# Patient Record
Sex: FEMALE | Race: WHITE | NOT HISPANIC OR LATINO
[De-identification: names, ages, dates, MRNs, and addresses within clinical notes are randomized per-mention and may not be internally consistent; named-entity substitution may affect disease eponyms.]

---

## 2017-07-19 ENCOUNTER — APPOINTMENT (OUTPATIENT)
Dept: DERMATOLOGY | Facility: CLINIC | Age: 72
End: 2017-07-19

## 2017-07-19 VITALS — HEIGHT: 64 IN | WEIGHT: 170 LBS | BODY MASS INDEX: 29.02 KG/M2

## 2017-08-11 ENCOUNTER — OUTPATIENT (OUTPATIENT)
Dept: OUTPATIENT SERVICES | Facility: HOSPITAL | Age: 72
LOS: 1 days | End: 2017-08-11
Payer: MEDICARE

## 2017-08-11 ENCOUNTER — APPOINTMENT (OUTPATIENT)
Dept: MAMMOGRAPHY | Facility: CLINIC | Age: 72
End: 2017-08-11
Payer: MEDICARE

## 2017-08-11 DIAGNOSIS — Z00.8 ENCOUNTER FOR OTHER GENERAL EXAMINATION: ICD-10-CM

## 2017-08-11 PROCEDURE — 77063 BREAST TOMOSYNTHESIS BI: CPT

## 2017-08-11 PROCEDURE — G0202: CPT | Mod: 26

## 2017-08-11 PROCEDURE — 77067 SCR MAMMO BI INCL CAD: CPT

## 2017-08-11 PROCEDURE — 77063 BREAST TOMOSYNTHESIS BI: CPT | Mod: 26

## 2018-07-18 ENCOUNTER — APPOINTMENT (OUTPATIENT)
Dept: DERMATOLOGY | Facility: CLINIC | Age: 73
End: 2018-07-18
Payer: MEDICARE

## 2018-07-18 DIAGNOSIS — M54.16 RADICULOPATHY, LUMBAR REGION: ICD-10-CM

## 2018-07-18 PROCEDURE — 99213 OFFICE O/P EST LOW 20 MIN: CPT

## 2018-07-19 ENCOUNTER — APPOINTMENT (OUTPATIENT)
Dept: DERMATOLOGY | Facility: CLINIC | Age: 73
End: 2018-07-19
Payer: MEDICARE

## 2018-07-19 PROCEDURE — ZZZZZ: CPT

## 2018-08-13 ENCOUNTER — APPOINTMENT (OUTPATIENT)
Dept: MAMMOGRAPHY | Facility: CLINIC | Age: 73
End: 2018-08-13
Payer: MEDICARE

## 2018-08-13 ENCOUNTER — OUTPATIENT (OUTPATIENT)
Dept: OUTPATIENT SERVICES | Facility: HOSPITAL | Age: 73
LOS: 1 days | End: 2018-08-13
Payer: MEDICARE

## 2018-08-13 DIAGNOSIS — Z00.8 ENCOUNTER FOR OTHER GENERAL EXAMINATION: ICD-10-CM

## 2018-08-13 PROCEDURE — 77063 BREAST TOMOSYNTHESIS BI: CPT | Mod: 26

## 2018-08-13 PROCEDURE — 77067 SCR MAMMO BI INCL CAD: CPT | Mod: 26

## 2018-08-13 PROCEDURE — 77067 SCR MAMMO BI INCL CAD: CPT

## 2018-08-13 PROCEDURE — 77063 BREAST TOMOSYNTHESIS BI: CPT

## 2018-12-30 ENCOUNTER — EMERGENCY (EMERGENCY)
Facility: HOSPITAL | Age: 73
LOS: 1 days | Discharge: HOME | End: 2018-12-30
Attending: EMERGENCY MEDICINE | Admitting: EMERGENCY MEDICINE
Payer: MEDICARE

## 2018-12-30 VITALS
DIASTOLIC BLOOD PRESSURE: 80 MMHG | WEIGHT: 175.05 LBS | HEIGHT: 64 IN | SYSTOLIC BLOOD PRESSURE: 166 MMHG | OXYGEN SATURATION: 99 % | RESPIRATION RATE: 18 BRPM | HEART RATE: 66 BPM | TEMPERATURE: 98 F

## 2018-12-30 VITALS
SYSTOLIC BLOOD PRESSURE: 170 MMHG | TEMPERATURE: 97 F | RESPIRATION RATE: 17 BRPM | DIASTOLIC BLOOD PRESSURE: 68 MMHG | OXYGEN SATURATION: 96 % | HEART RATE: 63 BPM

## 2018-12-30 LAB
ANION GAP SERPL CALC-SCNC: 8 MMOL/L — SIGNIFICANT CHANGE UP (ref 5–17)
APTT BLD: 45.7 SEC — HIGH (ref 28.5–37)
BASOPHILS # BLD AUTO: 0.05 K/UL — SIGNIFICANT CHANGE UP (ref 0–0.2)
BASOPHILS NFR BLD AUTO: 0.7 % — SIGNIFICANT CHANGE UP (ref 0–2)
BUN SERPL-MCNC: 22 MG/DL — SIGNIFICANT CHANGE UP (ref 7–23)
CALCIUM SERPL-MCNC: 9.4 MG/DL — SIGNIFICANT CHANGE UP (ref 8.4–10.5)
CHLORIDE SERPL-SCNC: 103 MMOL/L — SIGNIFICANT CHANGE UP (ref 96–108)
CO2 SERPL-SCNC: 30 MMOL/L — SIGNIFICANT CHANGE UP (ref 22–31)
CREAT SERPL-MCNC: 1.05 MG/DL — SIGNIFICANT CHANGE UP (ref 0.5–1.3)
EOSINOPHIL # BLD AUTO: 0.14 K/UL — SIGNIFICANT CHANGE UP (ref 0–0.5)
EOSINOPHIL NFR BLD AUTO: 1.9 % — SIGNIFICANT CHANGE UP (ref 0–6)
GLUCOSE SERPL-MCNC: 117 MG/DL — HIGH (ref 70–99)
HCT VFR BLD CALC: 34.4 % — LOW (ref 34.5–45)
HGB BLD-MCNC: 11.3 G/DL — LOW (ref 11.5–15.5)
IMM GRANULOCYTES NFR BLD AUTO: 0.3 % — SIGNIFICANT CHANGE UP (ref 0–1.5)
INR BLD: 3.01 RATIO — HIGH (ref 0.88–1.16)
LYMPHOCYTES # BLD AUTO: 1.68 K/UL — SIGNIFICANT CHANGE UP (ref 1–3.3)
LYMPHOCYTES # BLD AUTO: 22.8 % — SIGNIFICANT CHANGE UP (ref 13–44)
MCHC RBC-ENTMCNC: 32.7 PG — SIGNIFICANT CHANGE UP (ref 27–34)
MCHC RBC-ENTMCNC: 32.8 GM/DL — SIGNIFICANT CHANGE UP (ref 32–36)
MCV RBC AUTO: 99.4 FL — SIGNIFICANT CHANGE UP (ref 80–100)
MONOCYTES # BLD AUTO: 0.78 K/UL — SIGNIFICANT CHANGE UP (ref 0–0.9)
MONOCYTES NFR BLD AUTO: 10.6 % — SIGNIFICANT CHANGE UP (ref 2–14)
NEUTROPHILS # BLD AUTO: 4.71 K/UL — SIGNIFICANT CHANGE UP (ref 1.8–7.4)
NEUTROPHILS NFR BLD AUTO: 63.7 % — SIGNIFICANT CHANGE UP (ref 43–77)
PLATELET # BLD AUTO: 231 K/UL — SIGNIFICANT CHANGE UP (ref 150–400)
POTASSIUM SERPL-MCNC: 3.8 MMOL/L — SIGNIFICANT CHANGE UP (ref 3.5–5.3)
POTASSIUM SERPL-SCNC: 3.8 MMOL/L — SIGNIFICANT CHANGE UP (ref 3.5–5.3)
PROTHROM AB SERPL-ACNC: 34 SEC — HIGH (ref 10–12.9)
RBC # BLD: 3.46 M/UL — LOW (ref 3.8–5.2)
RBC # FLD: 13.6 % — SIGNIFICANT CHANGE UP (ref 10.3–14.5)
SODIUM SERPL-SCNC: 141 MMOL/L — SIGNIFICANT CHANGE UP (ref 135–145)
WBC # BLD: 7.38 K/UL — SIGNIFICANT CHANGE UP (ref 3.8–10.5)
WBC # FLD AUTO: 7.38 K/UL — SIGNIFICANT CHANGE UP (ref 3.8–10.5)

## 2018-12-30 PROCEDURE — 99283 EMERGENCY DEPT VISIT LOW MDM: CPT | Mod: 25

## 2018-12-30 PROCEDURE — 36415 COLL VENOUS BLD VENIPUNCTURE: CPT

## 2018-12-30 PROCEDURE — 99283 EMERGENCY DEPT VISIT LOW MDM: CPT

## 2018-12-30 PROCEDURE — 85730 THROMBOPLASTIN TIME PARTIAL: CPT

## 2018-12-30 PROCEDURE — 73630 X-RAY EXAM OF FOOT: CPT

## 2018-12-30 PROCEDURE — 73630 X-RAY EXAM OF FOOT: CPT | Mod: 26,50

## 2018-12-30 PROCEDURE — 85027 COMPLETE CBC AUTOMATED: CPT

## 2018-12-30 PROCEDURE — 85610 PROTHROMBIN TIME: CPT

## 2018-12-30 PROCEDURE — 80048 BASIC METABOLIC PNL TOTAL CA: CPT

## 2018-12-30 NOTE — ED PROVIDER NOTE - OBJECTIVE STATEMENT
72 y/o F pt with PMHx of HTN, HLD presents to the ED c/o left foot/leg pain and edema for approximately 4 weeks. Pt reports having varicose vein injections into her left foot and leg for 1 year, last injection 12/07 by Dr. Alarcon. Denies having similar sx prior to last injection. She had an XR done by podiatrist recently because of foot pain. States the injection sites have hardened and she has pain at the sites. She is concerned there is a foreign body in her leg s/p XR. Reports elevating her legs at night. Pt takes Coumadin and diuretics. Nonsmoker. Denies fever, chills, SOB when laying down. No further complaints at this time.  PMD: Sridhar Torrez)

## 2018-12-30 NOTE — ED PROVIDER NOTE - MEDICAL DECISION MAKING DETAILS
74 y/o F pt presents to the ED c/o left foot/leg pain and edema for approximately 4 weeks. Will do labs, bilateral XR foot then reassess.

## 2018-12-30 NOTE — ED ADULT NURSE NOTE - OBJECTIVE STATEMENT
pt s/p varicose vein injections and lumps to area no fever no red streaks area hard and slightly pink left foot swollen and warm with strong dp pulses

## 2018-12-30 NOTE — ED ADULT TRIAGE NOTE - CHIEF COMPLAINT QUOTE
I think I have cellulitis on my left foot. Had my veins injected for varicose vein December 7th and I notice redness.

## 2018-12-30 NOTE — ED ADULT NURSE NOTE - NSIMPLEMENTINTERV_GEN_ALL_ED
Implemented All Fall Risk Interventions:  [place] to call system. Call bell, personal items and telephone within reach. Instruct patient to call for assistance. Room bathroom lighting operational. Non-slip footwear when patient is off stretcher. Physically safe environment: no spills, clutter or unnecessary equipment. Stretcher in lowest position, wheels locked, appropriate side rails in place. Provide visual cue, wrist band, yellow gown, etc. Monitor gait and stability. Monitor for mental status changes and reorient to person, place, and time. Review medications for side effects contributing to fall risk. Reinforce activity limits and safety measures with patient and family.

## 2018-12-30 NOTE — ED PROVIDER NOTE - MUSCULOSKELETAL, MLM
Left leg 2+ edema, right leg no edema, sclerose varicosities at injection sites. Legs nontender. Tenderness over dorsum of left foot with no erythema.

## 2019-03-05 ENCOUNTER — APPOINTMENT (OUTPATIENT)
Dept: MRI IMAGING | Facility: CLINIC | Age: 74
End: 2019-03-05
Payer: MEDICARE

## 2019-03-05 ENCOUNTER — OUTPATIENT (OUTPATIENT)
Dept: OUTPATIENT SERVICES | Facility: HOSPITAL | Age: 74
LOS: 1 days | End: 2019-03-05
Payer: MEDICARE

## 2019-03-05 DIAGNOSIS — Z00.8 ENCOUNTER FOR OTHER GENERAL EXAMINATION: ICD-10-CM

## 2019-03-05 PROCEDURE — 72148 MRI LUMBAR SPINE W/O DYE: CPT | Mod: 26

## 2019-03-05 PROCEDURE — 72148 MRI LUMBAR SPINE W/O DYE: CPT

## 2019-06-20 ENCOUNTER — APPOINTMENT (OUTPATIENT)
Dept: DERMATOLOGY | Facility: CLINIC | Age: 74
End: 2019-06-20
Payer: MEDICARE

## 2019-06-20 VITALS — BODY MASS INDEX: 29.02 KG/M2 | WEIGHT: 170 LBS | HEIGHT: 64 IN

## 2019-06-20 DIAGNOSIS — D22.9 MELANOCYTIC NEVI, UNSPECIFIED: ICD-10-CM

## 2019-06-20 PROCEDURE — 99213 OFFICE O/P EST LOW 20 MIN: CPT

## 2019-06-20 NOTE — ASSESSMENT
[FreeTextEntry1] : A complete skin examination was performed.  There is no evidence of skin cancer.  We discussed the importance of photoprotection, including the use of hats, protective clothing and sunscreens with an SPF of at least 30.  Sun avoidance was also discussed.\par \par Pilar cyst - superior scalp.\par Will remove if bothersome - currently possible with large punch.\par Patient to call if desires removal.

## 2019-06-20 NOTE — HISTORY OF PRESENT ILLNESS
[FreeTextEntry1] : Patient presents for skin examination. [de-identified] : Scalp bump for months, slow growth.  No bleeding or tenderness currently.  No self tx.

## 2019-06-20 NOTE — PHYSICAL EXAM
[Alert] : alert [Oriented x 3] : ~L oriented x 3 [Well Nourished] : well nourished [Full Body Skin Exam Performed] : performed [FreeTextEntry3] : A full skin exam was performed including the scalp, face, neck, chest, abdomen, back, buttocks, upper extremities and lower extremities.  The patient declined examination of the breasts and genitalia.  \par The exam did not reveal any evidence of skin cancer, showing only the following benign growths:\par [place] pigmented nevi.\par Seborrheic keratoses.\par Lentigines.\par \par Cystic nodule of the superior scalp.\par

## 2019-08-15 ENCOUNTER — APPOINTMENT (OUTPATIENT)
Dept: MAMMOGRAPHY | Facility: CLINIC | Age: 74
End: 2019-08-15
Payer: MEDICARE

## 2019-08-15 ENCOUNTER — OUTPATIENT (OUTPATIENT)
Dept: OUTPATIENT SERVICES | Facility: HOSPITAL | Age: 74
LOS: 1 days | End: 2019-08-15
Payer: MEDICARE

## 2019-08-15 DIAGNOSIS — Z12.31 ENCOUNTER FOR SCREENING MAMMOGRAM FOR MALIGNANT NEOPLASM OF BREAST: ICD-10-CM

## 2019-08-15 DIAGNOSIS — Z00.8 ENCOUNTER FOR OTHER GENERAL EXAMINATION: ICD-10-CM

## 2019-08-15 PROCEDURE — 77067 SCR MAMMO BI INCL CAD: CPT

## 2019-08-15 PROCEDURE — 77067 SCR MAMMO BI INCL CAD: CPT | Mod: 26

## 2019-08-15 PROCEDURE — 77063 BREAST TOMOSYNTHESIS BI: CPT

## 2019-08-15 PROCEDURE — 77063 BREAST TOMOSYNTHESIS BI: CPT | Mod: 26

## 2019-08-26 ENCOUNTER — APPOINTMENT (OUTPATIENT)
Dept: ULTRASOUND IMAGING | Facility: CLINIC | Age: 74
End: 2019-08-26
Payer: MEDICARE

## 2019-08-26 ENCOUNTER — APPOINTMENT (OUTPATIENT)
Dept: MAMMOGRAPHY | Facility: CLINIC | Age: 74
End: 2019-08-26
Payer: MEDICARE

## 2019-08-26 ENCOUNTER — OUTPATIENT (OUTPATIENT)
Dept: OUTPATIENT SERVICES | Facility: HOSPITAL | Age: 74
LOS: 1 days | End: 2019-08-26
Payer: MEDICARE

## 2019-08-26 DIAGNOSIS — Z00.8 ENCOUNTER FOR OTHER GENERAL EXAMINATION: ICD-10-CM

## 2019-08-26 PROCEDURE — G0279: CPT | Mod: 26

## 2019-08-26 PROCEDURE — G0279: CPT

## 2019-08-26 PROCEDURE — 77065 DX MAMMO INCL CAD UNI: CPT | Mod: 26,RT

## 2019-08-26 PROCEDURE — 77065 DX MAMMO INCL CAD UNI: CPT

## 2019-09-02 ENCOUNTER — EMERGENCY (EMERGENCY)
Facility: HOSPITAL | Age: 74
LOS: 0 days | Discharge: HOME | End: 2019-09-02
Attending: EMERGENCY MEDICINE
Payer: MEDICARE

## 2019-09-02 VITALS
HEART RATE: 61 BPM | DIASTOLIC BLOOD PRESSURE: 71 MMHG | RESPIRATION RATE: 18 BRPM | TEMPERATURE: 97 F | OXYGEN SATURATION: 100 % | SYSTOLIC BLOOD PRESSURE: 160 MMHG

## 2019-09-02 VITALS
TEMPERATURE: 97 F | WEIGHT: 164.91 LBS | HEIGHT: 64 IN | OXYGEN SATURATION: 99 % | DIASTOLIC BLOOD PRESSURE: 81 MMHG | RESPIRATION RATE: 18 BRPM | SYSTOLIC BLOOD PRESSURE: 158 MMHG | HEART RATE: 61 BPM

## 2019-09-02 DIAGNOSIS — S09.90XA UNSPECIFIED INJURY OF HEAD, INITIAL ENCOUNTER: ICD-10-CM

## 2019-09-02 DIAGNOSIS — Z88.8 ALLERGY STATUS TO OTHER DRUGS, MEDICAMENTS AND BIOLOGICAL SUBSTANCES STATUS: ICD-10-CM
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
## 2019-09-02 DIAGNOSIS — E78.5 HYPERLIPIDEMIA, UNSPECIFIED: ICD-10-CM

## 2019-09-02 DIAGNOSIS — I48.91 UNSPECIFIED ATRIAL FIBRILLATION: ICD-10-CM
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
## 2019-09-02 DIAGNOSIS — I10 ESSENTIAL (PRIMARY) HYPERTENSION: ICD-10-CM

## 2019-09-02 DIAGNOSIS — Z79.01 LONG TERM (CURRENT) USE OF ANTICOAGULANTS: ICD-10-CM

## 2019-09-02 PROCEDURE — 99284 EMERGENCY DEPT VISIT MOD MDM: CPT

## 2019-09-02 PROCEDURE — 99284 EMERGENCY DEPT VISIT MOD MDM: CPT | Mod: 25

## 2019-09-02 PROCEDURE — 72220 X-RAY EXAM SACRUM TAILBONE: CPT

## 2019-09-02 PROCEDURE — 72125 CT NECK SPINE W/O DYE: CPT | Mod: 26

## 2019-09-02 PROCEDURE — 72125 CT NECK SPINE W/O DYE: CPT

## 2019-09-02 PROCEDURE — 70450 CT HEAD/BRAIN W/O DYE: CPT | Mod: 26

## 2019-09-02 PROCEDURE — 93005 ELECTROCARDIOGRAM TRACING: CPT

## 2019-09-02 PROCEDURE — 93010 ELECTROCARDIOGRAM REPORT: CPT

## 2019-09-02 PROCEDURE — 72220 X-RAY EXAM SACRUM TAILBONE: CPT | Mod: 26

## 2019-09-02 PROCEDURE — 70450 CT HEAD/BRAIN W/O DYE: CPT

## 2019-09-02 NOTE — ED ADULT TRIAGE NOTE - CHIEF COMPLAINT QUOTE
Pt slipped on floor and fell, landing on her coccyx and hit her head on the baseboard. Pt states that she is on Coumadin. Pt denies any LOC. Pt with large hematoma to back of head. Trauma alert called at triage.

## 2019-09-02 NOTE — ED PROVIDER NOTE - OBJECTIVE STATEMENT
73 y/o female with a PMHx of Afib on Coumadin, HLD, HTN presents to the ED s/p fall today. Pt states that she tripped on a step at a restaurant. Pt states that she landed on her coccyx and bumped her head on the fall. Now pt with pain on her coccyx. No LOC. Last Coumadin level was checked 4 days ago, which was around 1.9. States that she takes Coumadin every night. PCP: Rosalino Klein.

## 2019-09-02 NOTE — ED ADULT NURSE NOTE - OBJECTIVE STATEMENT
Patient fell and struck back of head on tile floor while at a [employer] Restaurant.  Patient has a contusion to the back of the head with 10/10 pain and pain in her coccyx area from the fall.  Patient reports that she is on Coumadin for chronic a-fib.

## 2019-09-02 NOTE — ED ADULT NURSE NOTE - CHPI ED NUR SYMPTOMS NEG
no seizure/no dizziness/no syncope/no weakness/no nausea/no change in level of consciousness/no confusion/no blurred vision/no vomiting

## 2019-09-02 NOTE — ED PROVIDER NOTE - NSCAREINITIATED _GEN_ER
I have personally evaluated and examined the patient. The Attending was available to me as a supervising provider if needed.
Matheus Bowling(Attending)

## 2019-09-02 NOTE — ED ADULT NURSE NOTE - NSIMPLEMENTINTERV_GEN_ALL_ED
Implemented All Fall with Harm Risk Interventions:  [place] to call system. Call bell, personal items and telephone within reach. Instruct patient to call for assistance. Room bathroom lighting operational. Non-slip footwear when patient is off stretcher. Physically safe environment: no spills, clutter or unnecessary equipment. Stretcher in lowest position, wheels locked, appropriate side rails in place. Provide visual cue, wrist band, yellow gown, etc. Monitor gait and stability. Monitor for mental status changes and reorient to person, place, and time. Review medications for side effects contributing to fall risk. Reinforce activity limits and safety measures with patient and family. Provide visual clues: red socks.

## 2020-05-11 ENCOUNTER — APPOINTMENT (OUTPATIENT)
Dept: DERMATOLOGY | Facility: CLINIC | Age: 75
End: 2020-05-11

## 2020-09-11 ENCOUNTER — APPOINTMENT (OUTPATIENT)
Dept: DERMATOLOGY | Facility: CLINIC | Age: 75
End: 2020-09-11
Payer: MEDICARE

## 2020-09-11 PROCEDURE — 17000 DESTRUCT PREMALG LESION: CPT | Mod: 59

## 2020-09-11 PROCEDURE — 99214 OFFICE O/P EST MOD 30 MIN: CPT | Mod: 25

## 2020-09-11 PROCEDURE — 11102 TANGNTL BX SKIN SINGLE LES: CPT | Mod: 59

## 2020-09-16 LAB — CORE LAB BIOPSY: NORMAL

## 2020-09-19 ENCOUNTER — OUTPATIENT (OUTPATIENT)
Dept: OUTPATIENT SERVICES | Facility: HOSPITAL | Age: 75
LOS: 1 days | End: 2020-09-19
Payer: MEDICARE

## 2020-09-19 ENCOUNTER — APPOINTMENT (OUTPATIENT)
Dept: MAMMOGRAPHY | Facility: CLINIC | Age: 75
End: 2020-09-19
Payer: MEDICARE

## 2020-09-19 ENCOUNTER — APPOINTMENT (OUTPATIENT)
Dept: ULTRASOUND IMAGING | Facility: CLINIC | Age: 75
End: 2020-09-19
Payer: MEDICARE

## 2020-09-19 DIAGNOSIS — Z00.8 ENCOUNTER FOR OTHER GENERAL EXAMINATION: ICD-10-CM

## 2020-09-19 PROCEDURE — 77067 SCR MAMMO BI INCL CAD: CPT | Mod: 26

## 2020-09-19 PROCEDURE — 77067 SCR MAMMO BI INCL CAD: CPT

## 2020-09-19 PROCEDURE — 77063 BREAST TOMOSYNTHESIS BI: CPT

## 2020-09-19 PROCEDURE — 77063 BREAST TOMOSYNTHESIS BI: CPT | Mod: 26

## 2020-10-01 ENCOUNTER — APPOINTMENT (OUTPATIENT)
Dept: DERMATOLOGY | Facility: CLINIC | Age: 75
End: 2020-10-01
Payer: MEDICARE

## 2020-10-01 VITALS — WEIGHT: 170 LBS | BODY MASS INDEX: 29.02 KG/M2 | HEIGHT: 64 IN

## 2020-10-01 PROCEDURE — 17262 DSTRJ MAL LES T/A/L 1.1-2.0: CPT | Mod: 79

## 2020-11-27 ENCOUNTER — TRANSCRIPTION ENCOUNTER (OUTPATIENT)
Age: 75
End: 2020-11-27

## 2020-12-10 ENCOUNTER — APPOINTMENT (OUTPATIENT)
Dept: DERMATOLOGY | Facility: CLINIC | Age: 75
End: 2020-12-10

## 2021-06-10 NOTE — ED ADULT NURSE NOTE - PAIN: BODY LOCATION
foot/Left: Rhof[place] Pregnancy And Lactation Text: This medication has not been assigned a Pregnancy Risk Category. It is unknown if the medication is excreted in breast milk.

## 2021-09-16 ENCOUNTER — APPOINTMENT (OUTPATIENT)
Dept: DERMATOLOGY | Facility: CLINIC | Age: 76
End: 2021-09-16
Payer: MEDICARE

## 2021-09-16 PROCEDURE — 99213 OFFICE O/P EST LOW 20 MIN: CPT

## 2021-09-16 RX ORDER — NIACINAMIDE 500 MG
500 TABLET ORAL
Qty: 60 | Refills: 5 | Status: COMPLETED | COMMUNITY
Start: 2020-10-01 | End: 2021-09-16

## 2021-09-21 ENCOUNTER — APPOINTMENT (OUTPATIENT)
Dept: MAMMOGRAPHY | Facility: CLINIC | Age: 76
End: 2021-09-21
Payer: MEDICARE

## 2021-09-21 ENCOUNTER — OUTPATIENT (OUTPATIENT)
Dept: OUTPATIENT SERVICES | Facility: HOSPITAL | Age: 76
LOS: 1 days | End: 2021-09-21
Payer: MEDICARE

## 2021-09-21 DIAGNOSIS — Z12.31 ENCOUNTER FOR SCREENING MAMMOGRAM FOR MALIGNANT NEOPLASM OF BREAST: ICD-10-CM

## 2021-09-21 PROCEDURE — 77063 BREAST TOMOSYNTHESIS BI: CPT

## 2021-09-21 PROCEDURE — 76641 ULTRASOUND BREAST COMPLETE: CPT

## 2021-09-21 PROCEDURE — 77067 SCR MAMMO BI INCL CAD: CPT | Mod: 26

## 2021-09-21 PROCEDURE — 76641 ULTRASOUND BREAST COMPLETE: CPT | Mod: 26,50

## 2021-09-21 PROCEDURE — 77067 SCR MAMMO BI INCL CAD: CPT

## 2021-09-21 PROCEDURE — 77063 BREAST TOMOSYNTHESIS BI: CPT | Mod: 26

## 2022-01-30 ENCOUNTER — OUTPATIENT (OUTPATIENT)
Dept: OUTPATIENT SERVICES | Facility: HOSPITAL | Age: 77
LOS: 1 days | End: 2022-01-30

## 2022-01-30 ENCOUNTER — APPOINTMENT (OUTPATIENT)
Dept: MRI IMAGING | Facility: CLINIC | Age: 77
End: 2022-01-30

## 2022-01-30 DIAGNOSIS — Z00.8 ENCOUNTER FOR OTHER GENERAL EXAMINATION: ICD-10-CM

## 2022-02-05 ENCOUNTER — OUTPATIENT (OUTPATIENT)
Dept: OUTPATIENT SERVICES | Facility: HOSPITAL | Age: 77
LOS: 1 days | End: 2022-02-05
Payer: MEDICARE

## 2022-02-05 ENCOUNTER — APPOINTMENT (OUTPATIENT)
Dept: MRI IMAGING | Facility: CLINIC | Age: 77
End: 2022-02-05
Payer: MEDICARE

## 2022-02-05 DIAGNOSIS — M75.41 IMPINGEMENT SYNDROME OF RIGHT SHOULDER: ICD-10-CM

## 2022-02-05 PROCEDURE — 73221 MRI JOINT UPR EXTREM W/O DYE: CPT | Mod: 26,RT,MH

## 2022-02-05 PROCEDURE — 73221 MRI JOINT UPR EXTREM W/O DYE: CPT | Mod: MH

## 2022-02-22 ENCOUNTER — OUTPATIENT (OUTPATIENT)
Dept: OUTPATIENT SERVICES | Facility: HOSPITAL | Age: 77
LOS: 1 days | End: 2022-02-22
Payer: MEDICARE

## 2022-02-22 ENCOUNTER — RESULT REVIEW (OUTPATIENT)
Age: 77
End: 2022-02-22

## 2022-02-22 ENCOUNTER — APPOINTMENT (OUTPATIENT)
Dept: CT IMAGING | Facility: CLINIC | Age: 77
End: 2022-02-22
Payer: MEDICARE

## 2022-02-22 DIAGNOSIS — R07.89 OTHER CHEST PAIN: ICD-10-CM

## 2022-02-22 PROCEDURE — 71250 CT THORAX DX C-: CPT | Mod: MH

## 2022-02-22 PROCEDURE — 71250 CT THORAX DX C-: CPT | Mod: 26,MH

## 2022-03-05 ENCOUNTER — OUTPATIENT (OUTPATIENT)
Dept: OUTPATIENT SERVICES | Facility: HOSPITAL | Age: 77
LOS: 1 days | End: 2022-03-05
Payer: MEDICARE

## 2022-03-05 ENCOUNTER — APPOINTMENT (OUTPATIENT)
Dept: MRI IMAGING | Facility: CLINIC | Age: 77
End: 2022-03-05
Payer: MEDICARE

## 2022-03-05 DIAGNOSIS — Z00.8 ENCOUNTER FOR OTHER GENERAL EXAMINATION: ICD-10-CM

## 2022-03-05 PROCEDURE — 73721 MRI JNT OF LWR EXTRE W/O DYE: CPT | Mod: MH

## 2022-03-05 PROCEDURE — 73721 MRI JNT OF LWR EXTRE W/O DYE: CPT | Mod: 26,LT,MH

## 2022-04-20 ENCOUNTER — NON-APPOINTMENT (OUTPATIENT)
Age: 77
End: 2022-04-20

## 2022-04-21 ENCOUNTER — APPOINTMENT (OUTPATIENT)
Dept: DERMATOLOGY | Facility: CLINIC | Age: 77
End: 2022-04-21
Payer: MEDICARE

## 2022-04-21 DIAGNOSIS — D48.5 NEOPLASM OF UNCERTAIN BEHAVIOR OF SKIN: ICD-10-CM

## 2022-04-21 PROCEDURE — 11102 TANGNTL BX SKIN SINGLE LES: CPT | Mod: 59

## 2022-04-21 PROCEDURE — 17000 DESTRUCT PREMALG LESION: CPT | Mod: 59

## 2022-04-21 PROCEDURE — 99213 OFFICE O/P EST LOW 20 MIN: CPT | Mod: 25

## 2022-04-21 PROCEDURE — 11103 TANGNTL BX SKIN EA SEP/ADDL: CPT | Mod: 59

## 2022-05-13 LAB — CORE LAB BIOPSY: NORMAL

## 2022-07-05 DIAGNOSIS — M79.672 PAIN IN LEFT FOOT: ICD-10-CM

## 2022-07-08 ENCOUNTER — RESULT REVIEW (OUTPATIENT)
Age: 77
End: 2022-07-08

## 2022-07-08 ENCOUNTER — APPOINTMENT (OUTPATIENT)
Dept: ORTHOPEDIC SURGERY | Facility: CLINIC | Age: 77
End: 2022-07-08

## 2022-07-08 ENCOUNTER — NON-APPOINTMENT (OUTPATIENT)
Age: 77
End: 2022-07-08

## 2022-07-08 DIAGNOSIS — M21.40 FLAT FOOT [PES PLANUS] (ACQUIRED), UNSPECIFIED FOOT: ICD-10-CM

## 2022-07-08 PROCEDURE — 73630 X-RAY EXAM OF FOOT: CPT | Mod: LT

## 2022-07-08 PROCEDURE — 99203 OFFICE O/P NEW LOW 30 MIN: CPT

## 2022-07-08 RX ORDER — FUROSEMIDE 20 MG/1
20 TABLET ORAL
Qty: 30 | Refills: 0 | Status: ACTIVE | COMMUNITY
Start: 2022-03-11

## 2022-07-08 RX ORDER — FLUCONAZOLE 100 MG/1
100 TABLET ORAL
Qty: 3 | Refills: 0 | Status: ACTIVE | COMMUNITY
Start: 2022-02-28

## 2022-07-08 RX ORDER — METOPROLOL TARTRATE 25 MG/1
25 TABLET, FILM COATED ORAL
Qty: 180 | Refills: 0 | Status: ACTIVE | COMMUNITY
Start: 2022-02-03

## 2022-07-08 RX ORDER — TRIAMCINOLONE ACETONIDE 1 MG/G
0.1 CREAM TOPICAL
Qty: 30 | Refills: 0 | Status: ACTIVE | COMMUNITY
Start: 2022-02-07

## 2022-07-08 RX ORDER — GABAPENTIN 300 MG/1
300 CAPSULE ORAL
Qty: 180 | Refills: 0 | Status: ACTIVE | COMMUNITY
Start: 2022-06-14

## 2022-07-08 RX ORDER — LOSARTAN POTASSIUM 50 MG/1
50 TABLET, FILM COATED ORAL
Qty: 180 | Refills: 0 | Status: ACTIVE | COMMUNITY
Start: 2021-12-27

## 2022-07-08 RX ORDER — CARVEDILOL 3.12 MG/1
3.12 TABLET, FILM COATED ORAL
Qty: 90 | Refills: 0 | Status: ACTIVE | COMMUNITY
Start: 2022-03-29

## 2022-07-08 RX ORDER — ATORVASTATIN CALCIUM 10 MG/1
10 TABLET, FILM COATED ORAL
Qty: 90 | Refills: 0 | Status: ACTIVE | COMMUNITY
Start: 2022-04-21

## 2022-07-08 RX ORDER — POTASSIUM CHLORIDE 1500 MG/1
20 TABLET, EXTENDED RELEASE ORAL
Qty: 135 | Refills: 0 | Status: ACTIVE | COMMUNITY
Start: 2022-04-13

## 2022-07-08 RX ORDER — NYSTATIN 100000 [USP'U]/G
100000 CREAM TOPICAL
Qty: 30 | Refills: 0 | Status: ACTIVE | COMMUNITY
Start: 2022-02-07

## 2022-07-08 NOTE — DISCUSSION/SUMMARY
[de-identified] : After a thorough rectal examination and review of the x-rays and MRI findings. It was explained to the patient she does have a pes planus deformity. With some strain along the posterior tibial tendon. She was also planned she does have some osteoarthritic changes especially at the second and third TMT joints. She was explained the different modalities of treatment which include conservative versus surgical intervention. However it is recommended that she continue with conservative management at the present time. This includes proper shoe wear. Along with ice moist heat anti-inflammatories as well as orthotics. She was also given a prescription for ultrasound guided intra-articular cortisone injection to be placed in the second and third TMT joint\par \par I saw and evaluated the patient. I discussed and reviewed the case details with the PA and agree with the PA's findings and plan as documented in the PA note.\par \par  MED

## 2022-07-08 NOTE — END OF VISIT
Chief Complaint   Patient presents with     RECHECK     RETURN       Vianca Pendleton MA    
[Time Spent: ___ minutes] : I have spent [unfilled] minutes of time on the encounter.

## 2022-07-08 NOTE — HISTORY OF PRESENT ILLNESS
[FreeTextEntry1] : Patient is a 77-year-old female who presents today for evaluation regarding her left foot. She states she's had discomfort for many years. It has been worse over the last year. It is also noted that her feet are becoming more flat-footed. She called with the podiatrist who tried injections along some therapy as well as orthotics. She states normally orthotics does do give her some improvement. Most recently this is becoming more pronounced with stiffness. She denies any history of injury or accident. Presents today for evaluation regarding her left foot.

## 2022-07-08 NOTE — PHYSICAL EXAM
[de-identified] : On physical examination of the left foot. Skin is clean dry and intact no erythema the swelling or heat noted. There is an obvious pes planus deformity noted. Especially with the patient standing area there is some diffuse pain and tenderness. Primarily along the plantar aspect. At the level of the second and third TMT joint. She also has some tenderness along the posterior tibial tendon. Which is most noted when the patient is trying to stand on 1 foot. There is no evidence of sensory deficit. No evidence of any muscle atrophy. No evidence of any calf tenderness. Patient does have good distal pulses. Good range of motion both passive and actively. [de-identified] : Trace of the left foot reveal pes planus deformity. Also some DJD at the second and third TMT joints.

## 2022-07-19 ENCOUNTER — OUTPATIENT (OUTPATIENT)
Dept: OUTPATIENT SERVICES | Facility: HOSPITAL | Age: 77
LOS: 1 days | End: 2022-07-19
Payer: MEDICARE

## 2022-07-19 ENCOUNTER — APPOINTMENT (OUTPATIENT)
Dept: ULTRASOUND IMAGING | Facility: CLINIC | Age: 77
End: 2022-07-19

## 2022-07-19 DIAGNOSIS — M21.40 FLAT FOOT [PES PLANUS] (ACQUIRED), UNSPECIFIED FOOT: ICD-10-CM

## 2022-07-19 PROCEDURE — 20611 DRAIN/INJ JOINT/BURSA W/US: CPT | Mod: LT

## 2022-07-19 PROCEDURE — 20611 DRAIN/INJ JOINT/BURSA W/US: CPT

## 2022-09-01 DIAGNOSIS — Z12.39 ENCOUNTER FOR OTHER SCREENING FOR MALIGNANT NEOPLASM OF BREAST: ICD-10-CM

## 2022-09-01 DIAGNOSIS — Z12.31 ENCOUNTER FOR SCREENING MAMMOGRAM FOR MALIGNANT NEOPLASM OF BREAST: ICD-10-CM

## 2022-09-23 ENCOUNTER — APPOINTMENT (OUTPATIENT)
Dept: ULTRASOUND IMAGING | Facility: CLINIC | Age: 77
End: 2022-09-23

## 2022-09-23 ENCOUNTER — APPOINTMENT (OUTPATIENT)
Dept: MAMMOGRAPHY | Facility: CLINIC | Age: 77
End: 2022-09-23

## 2022-09-23 ENCOUNTER — OUTPATIENT (OUTPATIENT)
Dept: OUTPATIENT SERVICES | Facility: HOSPITAL | Age: 77
LOS: 1 days | End: 2022-09-23
Payer: MEDICARE

## 2022-09-23 ENCOUNTER — RESULT REVIEW (OUTPATIENT)
Age: 77
End: 2022-09-23

## 2022-09-23 DIAGNOSIS — Z00.00 ENCOUNTER FOR GENERAL ADULT MEDICAL EXAMINATION WITHOUT ABNORMAL FINDINGS: ICD-10-CM

## 2022-09-23 PROCEDURE — 77067 SCR MAMMO BI INCL CAD: CPT | Mod: 26

## 2022-09-23 PROCEDURE — 77063 BREAST TOMOSYNTHESIS BI: CPT | Mod: 26

## 2022-09-23 PROCEDURE — 77067 SCR MAMMO BI INCL CAD: CPT

## 2022-09-23 PROCEDURE — 77063 BREAST TOMOSYNTHESIS BI: CPT

## 2022-09-26 ENCOUNTER — NON-APPOINTMENT (OUTPATIENT)
Age: 77
End: 2022-09-26

## 2022-11-14 ENCOUNTER — APPOINTMENT (OUTPATIENT)
Dept: OBGYN | Facility: CLINIC | Age: 77
End: 2022-11-14

## 2022-11-14 ENCOUNTER — RESULT CHARGE (OUTPATIENT)
Age: 77
End: 2022-11-14

## 2022-11-14 VITALS
DIASTOLIC BLOOD PRESSURE: 99 MMHG | SYSTOLIC BLOOD PRESSURE: 175 MMHG | BODY MASS INDEX: 25.44 KG/M2 | WEIGHT: 149 LBS | HEART RATE: 77 BPM | HEIGHT: 64 IN

## 2022-11-14 LAB
BILIRUB UR QL STRIP: NORMAL
CLARITY UR: CLEAR
COLLECTION METHOD: NORMAL
GLUCOSE UR-MCNC: NORMAL
HCG UR QL: 0.2 EU/DL
HEMOGLOBIN: 12.8
HGB UR QL STRIP.AUTO: NORMAL
KETONES UR-MCNC: NORMAL
LEUKOCYTE ESTERASE UR QL STRIP: NORMAL
NITRITE UR QL STRIP: NORMAL
PH UR STRIP: 7
PROT UR STRIP-MCNC: NORMAL
SP GR UR STRIP: 1.01

## 2022-11-14 PROCEDURE — 81003 URINALYSIS AUTO W/O SCOPE: CPT | Mod: QW

## 2022-11-14 PROCEDURE — G0101: CPT

## 2022-11-14 PROCEDURE — 85018 HEMOGLOBIN: CPT | Mod: QW

## 2022-11-17 NOTE — PHYSICAL EXAM
[Appropriately responsive] : appropriately responsive [Alert] : alert [No Lymphadenopathy] : no lymphadenopathy [Soft] : soft [Non-tender] : non-tender [Oriented x3] : oriented x3 [Examination Of The Breasts] : a normal appearance [No Discharge] : no discharge [No Masses] : no breast masses were palpable [Labia Majora] : normal [Labia Minora] : normal [Normal] : normal [Atrophy] : atrophy [No Bleeding] : There was no active vaginal bleeding [Absent] : absent [Uterine Adnexae] : normal [Occult Blood Positive] : was negative for occult blood analysis

## 2022-11-17 NOTE — HISTORY OF PRESENT ILLNESS
[TextBox_4] : Patient is a 78y/o female here today for annual visit. She denies any GYN complaints at this time. She is s/p total hysterectomy on premarin 0.625 tolerating well.

## 2022-11-17 NOTE — PLAN
[FreeTextEntry1] : \par \par Patient to follow up in 1 year for annual GYN exam\par Mammogram: 9/23 Rx given \par Colonoscopy due:\par Bone density due: next year \par Pap ordered\par \par Hemoccult ordered \par All questions answered, patient agreeable with plan.\par \par \par \par I Rossana RUST am scribing for the presence of Dr. Kessler the following sections HISTORY OF PRESENT ILLNESS, PAST MEDICAL/FAMILY/SOCIAL HISTORY; REVIEW OF SYSTEMS; VITAL SIGNS; PHYSICAL EXAM; DISPOSITION. \par \par \par I personally performed the services described in the documentation, reviewed the documentation recorded by the scribe in my presence and it accurately and completely records my words and actions.\par

## 2022-11-25 ENCOUNTER — APPOINTMENT (OUTPATIENT)
Dept: MRI IMAGING | Facility: CLINIC | Age: 77
End: 2022-11-25

## 2022-12-02 ENCOUNTER — NON-APPOINTMENT (OUTPATIENT)
Age: 77
End: 2022-12-02

## 2022-12-02 LAB — CYTOLOGY CVX/VAG DOC THIN PREP: ABNORMAL

## 2022-12-04 ENCOUNTER — NON-APPOINTMENT (OUTPATIENT)
Age: 77
End: 2022-12-04

## 2022-12-10 ENCOUNTER — OUTPATIENT (OUTPATIENT)
Dept: OUTPATIENT SERVICES | Facility: HOSPITAL | Age: 77
LOS: 1 days | End: 2022-12-10
Payer: MEDICARE

## 2022-12-10 ENCOUNTER — APPOINTMENT (OUTPATIENT)
Dept: MRI IMAGING | Facility: CLINIC | Age: 77
End: 2022-12-10

## 2022-12-10 DIAGNOSIS — S86.291A: ICD-10-CM

## 2022-12-10 PROCEDURE — 73718 MRI LOWER EXTREMITY W/O DYE: CPT | Mod: MH

## 2022-12-10 PROCEDURE — 73718 MRI LOWER EXTREMITY W/O DYE: CPT | Mod: 26,RT,MH

## 2022-12-13 ENCOUNTER — APPOINTMENT (OUTPATIENT)
Dept: OBGYN | Facility: CLINIC | Age: 77
End: 2022-12-13

## 2023-01-12 ENCOUNTER — APPOINTMENT (OUTPATIENT)
Dept: DERMATOLOGY | Facility: CLINIC | Age: 78
End: 2023-01-12
Payer: MEDICARE

## 2023-01-12 DIAGNOSIS — L72.11 PILAR CYST: ICD-10-CM

## 2023-01-12 DIAGNOSIS — D09.9 CARCINOMA IN SITU, UNSPECIFIED: ICD-10-CM

## 2023-01-12 DIAGNOSIS — L82.0 INFLAMED SEBORRHEIC KERATOSIS: ICD-10-CM

## 2023-01-12 PROCEDURE — 17110 DESTRUCTION B9 LES UP TO 14: CPT | Mod: 59

## 2023-01-12 PROCEDURE — 17000 DESTRUCT PREMALG LESION: CPT | Mod: 59

## 2023-01-12 PROCEDURE — 99213 OFFICE O/P EST LOW 20 MIN: CPT | Mod: 25

## 2023-03-26 ENCOUNTER — NON-APPOINTMENT (OUTPATIENT)
Age: 78
End: 2023-03-26

## 2023-03-27 ENCOUNTER — APPOINTMENT (OUTPATIENT)
Dept: DERMATOLOGY | Facility: CLINIC | Age: 78
End: 2023-03-27
Payer: MEDICARE

## 2023-03-27 DIAGNOSIS — D69.2 OTHER NONTHROMBOCYTOPENIC PURPURA: ICD-10-CM

## 2023-03-27 PROCEDURE — 11102 TANGNTL BX SKIN SINGLE LES: CPT

## 2023-03-27 PROCEDURE — 99213 OFFICE O/P EST LOW 20 MIN: CPT | Mod: 25

## 2023-03-27 NOTE — HISTORY OF PRESENT ILLNESS
[FreeTextEntry1] : Fit in for lesion of the right cheek. [de-identified] : Present for a month.  Persistent, without improvement despite warm soaks.

## 2023-03-27 NOTE — PHYSICAL EXAM
[Alert] : alert [Oriented x 3] : ~L oriented x 3 [Well Nourished] : well nourished [FreeTextEntry3] : violaceous firm papule of the right cheek.  Trace fine vessels centrally.\par \par Lentigines of the face, dorsal hands.\par \par Purpuric macules, forrearms, dorsal right hand.

## 2023-03-27 NOTE — ASSESSMENT
[FreeTextEntry1] : Lentigines\par Hats and sunscreens encouraged.\par \par Senile purpura\par Patient on coumadin.\par \par R/o BCC of the right cheek.\par Plan excision if positive.

## 2023-04-06 LAB — CORE LAB BIOPSY: NORMAL

## 2023-05-27 ENCOUNTER — APPOINTMENT (OUTPATIENT)
Dept: ULTRASOUND IMAGING | Facility: CLINIC | Age: 78
End: 2023-05-27
Payer: MEDICARE

## 2023-05-27 ENCOUNTER — OUTPATIENT (OUTPATIENT)
Dept: OUTPATIENT SERVICES | Facility: HOSPITAL | Age: 78
LOS: 1 days | End: 2023-05-27
Payer: MEDICARE

## 2023-05-27 DIAGNOSIS — R60.0 LOCALIZED EDEMA: ICD-10-CM

## 2023-05-27 DIAGNOSIS — Z00.8 ENCOUNTER FOR OTHER GENERAL EXAMINATION: ICD-10-CM

## 2023-05-27 PROCEDURE — 93970 EXTREMITY STUDY: CPT | Mod: 26

## 2023-05-27 PROCEDURE — 93970 EXTREMITY STUDY: CPT

## 2023-07-01 ENCOUNTER — APPOINTMENT (OUTPATIENT)
Dept: MRI IMAGING | Facility: CLINIC | Age: 78
End: 2023-07-01
Payer: MEDICARE

## 2023-07-01 ENCOUNTER — OUTPATIENT (OUTPATIENT)
Dept: OUTPATIENT SERVICES | Facility: HOSPITAL | Age: 78
LOS: 1 days | End: 2023-07-01
Payer: MEDICARE

## 2023-07-01 DIAGNOSIS — M51.26 OTHER INTERVERTEBRAL DISC DISPLACEMENT, LUMBAR REGION: ICD-10-CM

## 2023-07-01 PROCEDURE — 72148 MRI LUMBAR SPINE W/O DYE: CPT | Mod: 26,MH

## 2023-07-01 PROCEDURE — 72148 MRI LUMBAR SPINE W/O DYE: CPT | Mod: MH

## 2023-09-25 ENCOUNTER — OUTPATIENT (OUTPATIENT)
Dept: OUTPATIENT SERVICES | Facility: HOSPITAL | Age: 78
LOS: 1 days | End: 2023-09-25
Payer: MEDICARE

## 2023-09-25 ENCOUNTER — RESULT REVIEW (OUTPATIENT)
Age: 78
End: 2023-09-25

## 2023-09-25 ENCOUNTER — APPOINTMENT (OUTPATIENT)
Dept: MAMMOGRAPHY | Facility: CLINIC | Age: 78
End: 2023-09-25
Payer: MEDICARE

## 2023-09-25 DIAGNOSIS — Z00.00 ENCOUNTER FOR GENERAL ADULT MEDICAL EXAMINATION WITHOUT ABNORMAL FINDINGS: ICD-10-CM

## 2023-09-25 PROCEDURE — 77063 BREAST TOMOSYNTHESIS BI: CPT

## 2023-09-25 PROCEDURE — 77067 SCR MAMMO BI INCL CAD: CPT

## 2023-09-25 PROCEDURE — 77067 SCR MAMMO BI INCL CAD: CPT | Mod: 26

## 2023-09-25 PROCEDURE — 77063 BREAST TOMOSYNTHESIS BI: CPT | Mod: 26

## 2023-09-27 ENCOUNTER — APPOINTMENT (OUTPATIENT)
Dept: ULTRASOUND IMAGING | Facility: CLINIC | Age: 78
End: 2023-09-27
Payer: MEDICARE

## 2023-09-27 ENCOUNTER — RESULT REVIEW (OUTPATIENT)
Age: 78
End: 2023-09-27

## 2023-09-27 ENCOUNTER — OUTPATIENT (OUTPATIENT)
Dept: OUTPATIENT SERVICES | Facility: HOSPITAL | Age: 78
LOS: 1 days | End: 2023-09-27
Payer: MEDICARE

## 2023-09-27 DIAGNOSIS — Z00.8 ENCOUNTER FOR OTHER GENERAL EXAMINATION: ICD-10-CM

## 2023-09-27 PROCEDURE — 77065 DX MAMMO INCL CAD UNI: CPT | Mod: 26,RT

## 2023-09-27 PROCEDURE — G0279: CPT

## 2023-09-27 PROCEDURE — 77065 DX MAMMO INCL CAD UNI: CPT

## 2023-09-27 PROCEDURE — G0279: CPT | Mod: 26

## 2023-09-30 ENCOUNTER — NON-APPOINTMENT (OUTPATIENT)
Age: 78
End: 2023-09-30

## 2023-10-05 ENCOUNTER — NON-APPOINTMENT (OUTPATIENT)
Age: 78
End: 2023-10-05

## 2023-10-06 ENCOUNTER — APPOINTMENT (OUTPATIENT)
Dept: DERMATOLOGY | Facility: CLINIC | Age: 78
End: 2023-10-06
Payer: MEDICARE

## 2023-10-06 DIAGNOSIS — L90.5 SCAR CONDITIONS AND FIBROSIS OF SKIN: ICD-10-CM

## 2023-10-06 DIAGNOSIS — L72.0 EPIDERMAL CYST: ICD-10-CM

## 2023-10-06 PROCEDURE — 99214 OFFICE O/P EST MOD 30 MIN: CPT

## 2023-11-13 DIAGNOSIS — Z00.00 ENCOUNTER FOR GENERAL ADULT MEDICAL EXAMINATION W/OUT ABNORMAL FINDINGS: ICD-10-CM

## 2023-11-13 DIAGNOSIS — N95.2 POSTMENOPAUSAL ATROPHIC VAGINITIS: ICD-10-CM

## 2023-11-20 ENCOUNTER — RESULT CHARGE (OUTPATIENT)
Age: 78
End: 2023-11-20

## 2023-11-20 ENCOUNTER — NON-APPOINTMENT (OUTPATIENT)
Age: 78
End: 2023-11-20

## 2023-11-20 ENCOUNTER — APPOINTMENT (OUTPATIENT)
Dept: OBGYN | Facility: CLINIC | Age: 78
End: 2023-11-20
Payer: MEDICARE

## 2023-11-20 VITALS
HEIGHT: 64 IN | DIASTOLIC BLOOD PRESSURE: 65 MMHG | SYSTOLIC BLOOD PRESSURE: 157 MMHG | WEIGHT: 149 LBS | HEART RATE: 100 BPM | BODY MASS INDEX: 25.44 KG/M2

## 2023-11-20 LAB — HEMOGLOBIN: 9.5

## 2023-11-20 PROCEDURE — 82270 OCCULT BLOOD FECES: CPT

## 2023-11-20 PROCEDURE — 85018 HEMOGLOBIN: CPT | Mod: QW

## 2023-11-20 PROCEDURE — G0101: CPT

## 2023-11-27 ENCOUNTER — NON-APPOINTMENT (OUTPATIENT)
Age: 78
End: 2023-11-27

## 2023-11-28 ENCOUNTER — NON-APPOINTMENT (OUTPATIENT)
Age: 78
End: 2023-11-28

## 2023-11-28 LAB — CYTOLOGY CVX/VAG DOC THIN PREP: ABNORMAL

## 2023-12-05 ENCOUNTER — OUTPATIENT (OUTPATIENT)
Dept: OUTPATIENT SERVICES | Facility: HOSPITAL | Age: 78
LOS: 1 days | End: 2023-12-05
Payer: MEDICARE

## 2023-12-05 ENCOUNTER — APPOINTMENT (OUTPATIENT)
Dept: RADIOLOGY | Facility: CLINIC | Age: 78
End: 2023-12-05
Payer: MEDICARE

## 2023-12-05 DIAGNOSIS — Z00.00 ENCOUNTER FOR GENERAL ADULT MEDICAL EXAMINATION WITHOUT ABNORMAL FINDINGS: ICD-10-CM

## 2023-12-05 PROCEDURE — 77080 DXA BONE DENSITY AXIAL: CPT

## 2023-12-05 PROCEDURE — 77080 DXA BONE DENSITY AXIAL: CPT | Mod: 26

## 2023-12-12 ENCOUNTER — NON-APPOINTMENT (OUTPATIENT)
Age: 78
End: 2023-12-12

## 2023-12-29 ENCOUNTER — NON-APPOINTMENT (OUTPATIENT)
Age: 78
End: 2023-12-29

## 2024-01-11 ENCOUNTER — OUTPATIENT (OUTPATIENT)
Dept: OUTPATIENT SERVICES | Facility: HOSPITAL | Age: 79
LOS: 1 days | End: 2024-01-11
Payer: MEDICARE

## 2024-01-11 PROCEDURE — 93325 DOPPLER ECHO COLOR FLOW MAPG: CPT

## 2024-01-11 PROCEDURE — 93312 ECHO TRANSESOPHAGEAL: CPT

## 2024-01-11 PROCEDURE — 93320 DOPPLER ECHO COMPLETE: CPT

## 2024-01-18 DIAGNOSIS — I35.0 NONRHEUMATIC AORTIC (VALVE) STENOSIS: ICD-10-CM

## 2024-01-19 DIAGNOSIS — I35.0 NONRHEUMATIC AORTIC (VALVE) STENOSIS: ICD-10-CM

## 2024-01-23 PROBLEM — Z86.39 HISTORY OF HYPERLIPIDEMIA: Status: ACTIVE | Noted: 2024-01-23

## 2024-01-23 PROBLEM — I10 BENIGN ESSENTIAL HYPERTENSION: Status: ACTIVE | Noted: 2024-01-23

## 2024-01-23 PROBLEM — I48.20 CHRONIC ATRIAL FIBRILLATION: Status: ACTIVE | Noted: 2024-01-23

## 2024-01-30 ENCOUNTER — APPOINTMENT (OUTPATIENT)
Dept: CARDIOTHORACIC SURGERY | Facility: CLINIC | Age: 79
End: 2024-01-30
Payer: MEDICARE

## 2024-01-30 VITALS
HEART RATE: 60 BPM | HEIGHT: 64 IN | OXYGEN SATURATION: 98 % | BODY MASS INDEX: 25.61 KG/M2 | SYSTOLIC BLOOD PRESSURE: 194 MMHG | DIASTOLIC BLOOD PRESSURE: 84 MMHG | WEIGHT: 150 LBS | RESPIRATION RATE: 16 BRPM

## 2024-01-30 DIAGNOSIS — I36.1 NONRHEUMATIC TRICUSPID (VALVE) INSUFFICIENCY: ICD-10-CM

## 2024-01-30 DIAGNOSIS — I35.0 NONRHEUMATIC AORTIC (VALVE) STENOSIS: ICD-10-CM

## 2024-01-30 DIAGNOSIS — I10 ESSENTIAL (PRIMARY) HYPERTENSION: ICD-10-CM

## 2024-01-30 DIAGNOSIS — I48.20 CHRONIC ATRIAL FIBRILLATION, UNSP: ICD-10-CM

## 2024-01-30 DIAGNOSIS — Z86.39 PERSONAL HISTORY OF OTHER ENDOCRINE, NUTRITIONAL AND METABOLIC DISEASE: ICD-10-CM

## 2024-01-30 PROCEDURE — 99205 OFFICE O/P NEW HI 60 MIN: CPT

## 2024-01-30 RX ORDER — LOSARTAN POTASSIUM AND HYDROCHLOROTHIAZIDE 25; 100 MG/1; MG/1
100-25 TABLET ORAL
Refills: 0 | Status: COMPLETED | COMMUNITY
End: 2024-01-30

## 2024-01-30 NOTE — DATA REVIEWED
[FreeTextEntry1] : Transesophageal Echocardiogram from 01/11/24 - LVEF 60% - PAVG 42 mmHg, MAVG 25 mmHg, EZRA 0.86 cm2, severe aortic stenosis - Moderate mitral valve regurgitation - The left atrium is moderately dilated, right atrium is mildly dilated - moderate tricuspid valve regurgitation      Transthoracic Echocardiogram from 12/19/23 at Dr Lovell office - LVEF 60-65% - LV size is normal  - RV is normal  - LA and RA is normal - The aortic valve is trileaflet with trace aortic insufficiency. Moderate to severe aortic stenosis with PAVG 41.32 mmHg, MAVG 18.46 mmHg, EZRA 0.611 cm2 - Mild MAC, mild MR - Moderate to severe tricuspid valve regurgitation - Moderate pulmonary HTN

## 2024-01-30 NOTE — REVIEW OF SYSTEMS
[Feeling Tired] : feeling tired [Negative] : Heme/Lymph [Feeling Poorly] : not feeling poorly [Chest Pain] : no chest pain

## 2024-01-30 NOTE — HISTORY OF PRESENT ILLNESS
[FreeTextEntry1] : Ms. VALENTINE is a 78 year old female referred by Dr. Sridhar Lovell who presents for consultation. Her past medical history includes HTN, HLD, and atrial fibrillation (Warfarin), Cardiac Ablation in 1996 at [place].   She had routine work up with Cardiology and echocardiogram revealed severe aortic stenosis. She presents today to discuss candidacy for TAVR. She has epidural injections for her back pain and has left ankle weakness and wears an ankle brace and does yoga 2x a week. 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   She had routine follow up care with Cardiology and echocardiogram was performed revealing severe aortic stenosis. She has difficulty walking which prevents her from exerting herself however had noticed that she has to catch her breath half way up the stairs. She is still able to work and care for her grandchildren. Lives alone in private home with stairs, family available for assistance. She is independent with all ADL and has no home health aid.

## 2024-01-30 NOTE — PHYSICAL EXAM
[General Appearance - Well Nourished] : well nourished [General Appearance - Well Developed] : well developed [Sclera] : the sclera and conjunctiva were normal [Outer Ear] : the ears and nose were normal in appearance [Neck Appearance] : the appearance of the neck was normal [Respiration, Rhythm And Depth] : normal respiratory rhythm and effort [Auscultation Breath Sounds / Voice Sounds] : lungs were clear to auscultation bilaterally [Heart Rate And Rhythm] : heart rate was normal and rhythm regular [Heart Sounds] : normal S1 and S2 [Examination Of The Chest] : the chest was normal in appearance [Abnormal Walk] : normal gait [Skin Color & Pigmentation] : normal skin color and pigmentation [Sensation] : the sensory exam was normal to light touch and pinprick [Oriented To Time, Place, And Person] : oriented to person, place, and time [Impaired Insight] : insight and judgment were intact [FreeTextEntry1] : NYHAC II

## 2024-01-30 NOTE — ASSESSMENT
[FreeTextEntry1] : Ms. VALENTINE is a 78 year old female referred by Dr. Sridhar Lovell who presents for consultation. Her past medical history includes HTN, HLD, and atrial fibrillation (Warfarin), Cardiac Ablation in 1996 at [place].   Independent review of imaging and independent interpretation was performed at today's visit. Transesophageal imaging revealed severe aortic stenosis. This has showed progression since her prior imaging with Cardiology. She would be a good candidate for TAVR. Additional imaging will be required prior to placement including CTA of the chest abdomen and pelvis as well as Cardiac Catheterization. She is agreeable to proceed with imaging.    Risks benefits and alternatives to transcatheter aortic valve placement were discussed with the patient in detail. Risks discussed included, but not limited to infection, bleeding, myocardial infarction, cerebrovascular accident, renal failure, vascular injury requiring intervention, cardiac rupture, and death. In addition, a roughly 5-10% risk of permanent pacemaker requirement was highlighted. The procedure, hospital stay and recovery was discussed in detail. All questions addressed. Patient would like to proceed with surgical intervention as discussed.    Preoperative checklist (Reviewed with patient in office) - Confirm allergies, including latex: ADHESIVE DILAUDID - Confirm pacemaker: NONE - Anticoagulation/antiplatelets noted and will be discontinued/continued: WARFARIN D/C 3 DAYS - SGLT-2 Inhibitors (discontinued 3 days prior to surgery) or GLP-1 (discontinued 1 week prior to surgery): NONE - All other supplements, NSAIDs and fish oil were discussed and will be held one week before surgery   Testing: - Dental evaluation and clearance to be completed prior to surgery - Presurgical testing to be scheduled, which will include chest xray, electrocardiogram and standard labs - Testing to be completed prior to surgery includes:            - cardiac catheterization (Per Cardiology Guidelines: Pre-Cath: Coumadin: hold 3 nights)             - TAVR CTA chest/abdomen/pelvis   Surgical Plan: - Anticipate TAVR      I, Dr. Ferraro personally performed the evaluation and management (E/M) services for this new patient. That E/M includes conducting the initial examination, assessing all conditions, and establishing the plan of care. Today, Leif Hector NP was here to observe my evaluation and management services for this patient.

## 2024-02-06 RX ORDER — ESTROGENS, CONJUGATED 0.62 MG/1
0.62 TABLET, FILM COATED ORAL DAILY
Qty: 90 | Refills: 3 | Status: ACTIVE | COMMUNITY
Start: 2022-11-14

## 2024-02-08 ENCOUNTER — TRANSCRIPTION ENCOUNTER (OUTPATIENT)
Age: 79
End: 2024-02-08

## 2024-02-08 ENCOUNTER — OUTPATIENT (OUTPATIENT)
Dept: OUTPATIENT SERVICES | Facility: HOSPITAL | Age: 79
LOS: 1 days | Discharge: HOME | End: 2024-02-08
Payer: MEDICARE

## 2024-02-08 VITALS
OXYGEN SATURATION: 98 % | DIASTOLIC BLOOD PRESSURE: 79 MMHG | RESPIRATION RATE: 15 BRPM | HEART RATE: 61 BPM | SYSTOLIC BLOOD PRESSURE: 152 MMHG

## 2024-02-08 VITALS
SYSTOLIC BLOOD PRESSURE: 177 MMHG | DIASTOLIC BLOOD PRESSURE: 86 MMHG | TEMPERATURE: 98 F | HEART RATE: 64 BPM | RESPIRATION RATE: 13 BRPM | OXYGEN SATURATION: 96 % | WEIGHT: 145.06 LBS | HEIGHT: 64 IN

## 2024-02-08 DIAGNOSIS — I35.0 NONRHEUMATIC AORTIC (VALVE) STENOSIS: ICD-10-CM

## 2024-02-08 LAB
ANION GAP SERPL CALC-SCNC: 15 MMOL/L — SIGNIFICANT CHANGE UP (ref 5–17)
BUN SERPL-MCNC: 35.3 MG/DL — HIGH (ref 8–20)
CALCIUM SERPL-MCNC: 8.9 MG/DL — SIGNIFICANT CHANGE UP (ref 8.4–10.5)
CHLORIDE SERPL-SCNC: 101 MMOL/L — SIGNIFICANT CHANGE UP (ref 96–108)
CO2 SERPL-SCNC: 23 MMOL/L — SIGNIFICANT CHANGE UP (ref 22–29)
CREAT SERPL-MCNC: 1.24 MG/DL — SIGNIFICANT CHANGE UP (ref 0.5–1.3)
EGFR: 45 ML/MIN/1.73M2 — LOW
GLUCOSE SERPL-MCNC: 103 MG/DL — HIGH (ref 70–99)
HCT VFR BLD CALC: 34.2 % — LOW (ref 34.5–45)
HGB BLD-MCNC: 11.1 G/DL — LOW (ref 11.5–15.5)
MAGNESIUM SERPL-MCNC: 1.9 MG/DL — SIGNIFICANT CHANGE UP (ref 1.6–2.6)
MCHC RBC-ENTMCNC: 32.3 PG — SIGNIFICANT CHANGE UP (ref 27–34)
MCHC RBC-ENTMCNC: 32.5 GM/DL — SIGNIFICANT CHANGE UP (ref 32–36)
MCV RBC AUTO: 99.4 FL — SIGNIFICANT CHANGE UP (ref 80–100)
PLATELET # BLD AUTO: 180 K/UL — SIGNIFICANT CHANGE UP (ref 150–400)
POTASSIUM SERPL-MCNC: 3.5 MMOL/L — SIGNIFICANT CHANGE UP (ref 3.5–5.3)
POTASSIUM SERPL-SCNC: 3.5 MMOL/L — SIGNIFICANT CHANGE UP (ref 3.5–5.3)
RBC # BLD: 3.44 M/UL — LOW (ref 3.8–5.2)
RBC # FLD: 15.8 % — HIGH (ref 10.3–14.5)
SODIUM SERPL-SCNC: 138 MMOL/L — SIGNIFICANT CHANGE UP (ref 135–145)
WBC # BLD: 8.13 K/UL — SIGNIFICANT CHANGE UP (ref 3.8–10.5)
WBC # FLD AUTO: 8.13 K/UL — SIGNIFICANT CHANGE UP (ref 3.8–10.5)

## 2024-02-08 PROCEDURE — C1769: CPT

## 2024-02-08 PROCEDURE — C1887: CPT

## 2024-02-08 PROCEDURE — 85027 COMPLETE CBC AUTOMATED: CPT

## 2024-02-08 PROCEDURE — 99152 MOD SED SAME PHYS/QHP 5/>YRS: CPT

## 2024-02-08 PROCEDURE — 93454 CORONARY ARTERY ANGIO S&I: CPT | Mod: 26

## 2024-02-08 PROCEDURE — 93010 ELECTROCARDIOGRAM REPORT: CPT

## 2024-02-08 PROCEDURE — 93454 CORONARY ARTERY ANGIO S&I: CPT

## 2024-02-08 PROCEDURE — 83735 ASSAY OF MAGNESIUM: CPT

## 2024-02-08 PROCEDURE — C1894: CPT

## 2024-02-08 PROCEDURE — 93005 ELECTROCARDIOGRAM TRACING: CPT

## 2024-02-08 PROCEDURE — 80048 BASIC METABOLIC PNL TOTAL CA: CPT

## 2024-02-08 PROCEDURE — 36415 COLL VENOUS BLD VENIPUNCTURE: CPT

## 2024-02-08 RX ORDER — CHLORHEXIDINE GLUCONATE 213 G/1000ML
1 SOLUTION TOPICAL ONCE
Refills: 0 | Status: DISCONTINUED | OUTPATIENT
Start: 2024-02-08 | End: 2024-02-22

## 2024-02-08 RX ORDER — ASPIRIN/CALCIUM CARB/MAGNESIUM 324 MG
81 TABLET ORAL ONCE
Refills: 0 | Status: COMPLETED | OUTPATIENT
Start: 2024-02-08 | End: 2024-02-08

## 2024-02-08 RX ORDER — SODIUM CHLORIDE 9 MG/ML
250 INJECTION INTRAMUSCULAR; INTRAVENOUS; SUBCUTANEOUS ONCE
Refills: 0 | Status: COMPLETED | OUTPATIENT
Start: 2024-02-08 | End: 2024-02-08

## 2024-02-08 RX ADMIN — SODIUM CHLORIDE 125 MILLILITER(S): 9 INJECTION INTRAMUSCULAR; INTRAVENOUS; SUBCUTANEOUS at 11:26

## 2024-02-08 RX ADMIN — Medication 81 MILLIGRAM(S): at 08:45

## 2024-02-08 NOTE — DISCHARGE NOTE PROVIDER - NSDCFUSCHEDAPPT_GEN_ALL_CORE_FT
[facility] Physician [facility]  [facility]  Scheduled Appointment: 02/21/2024

## 2024-02-08 NOTE — H&P PST ADULT - NSICDXPASTMEDICALHX_GEN_ALL_CORE_FT
PAST MEDICAL HISTORY:  Atrial Fibrillation     History of Cardiac Arrhythmia     Hyperlipidemia     Hypertension

## 2024-02-08 NOTE — DISCHARGE NOTE NURSING/CASE MANAGEMENT/SOCIAL WORK - PATIENT PORTAL LINK FT
You can access the FollowMyHealth Patient Portal offered by [facility] by registering at the following website: [email or web address]. By joining [employer]’s FollowMyHealth portal, you will also be able to view your health information using other applications (apps) compatible with our system.

## 2024-02-08 NOTE — H&P PST ADULT - GASTROINTESTINAL
Patient Instructions by Neo Cleveland PT at 10/16/2020  9:00 AM     Author: Neo Cleveland PT Service: -- Author Type: Physical Therapist    Filed: 10/16/2020  9:42 AM Encounter Date: 10/16/2020 Status: Signed    : Neo Cleveland PT (Physical Therapist)       It is recommended that you ice 2-3x/day for 20 minutes at a time. Remember to not apply ice directly on skin.    You can build or buy a shoulder pulley system to work on keeping your range of motion in your shoulder. Keep your arms out in front of you and do in a pain-free range for about 3-5' at a time, 1-2x/day. Do pulley's before doing your stretches! It will help loosen up your shoulder     WALL WALK    Place your affected hand on the wall with the palm facing the wall. Next, walk your fingers up the wall towards overhead. Hold for 5-10 seconds. Lastly, slowly slide your hand back down the wall to the starting position. Do 5-8 repetitions, 2x/day      TOWEL STRETCH    Gently pull up your affected arm behind your back with the assist of a towel. Hold for 5-10 seconds. Do 5-8 repetitions at a time. Perform 2x/day      WAND ABDUCTION - STANDING    While holding a wand/cane palm face up on the injured side and palm face down on the uninjured side, slowly raise up your injured arm to the side. Do 5-8 repetitions, 2x/day    WAND EXTERNAL ROTATION - SUPINE    Lying on your back and holding a wand, palm face up the injured side and palm face down on the uninjured, push the wand to the side and let your injured shoulder roll outward. Hold for 5-10 seconds. Do 5-8 repetitions, 2x/day             normal/soft/nontender/nondistended/normal active bowel sounds negative

## 2024-02-08 NOTE — DISCHARGE NOTE PROVIDER - PROVIDER TOKENS
PROVIDER:[TOKEN:[[id number]],FOLLOWUP:[2 weeks]],PROVIDER:[TOKEN:[[id number]],FOLLOWUP:[2 weeks]]

## 2024-02-08 NOTE — DISCHARGE NOTE PROVIDER - CARE PROVIDER_API CALL
Sridhar Lovell  Cardiovascular Disease  [address], [address]  [place] [address]  Phone: [phone]  Fax: [phone]  Follow Up Time: 2 weeks    Pasah Ferraro  Thoracic and Cardiac Surgery  [address] [place] [address]  Phone: [phone]  Fax: [phone]  Follow Up Time: 2 weeks

## 2024-02-08 NOTE — H&P PST ADULT - HISTORY OF PRESENT ILLNESS
HPI:     Ms. VALENTINE is a 78 year old female  with  past medical history includes HTN, HLD, and atrial fibrillation (Warfarin), Cardiac Ablation in 1996 at [place], cerebral aneurysm, renal artery stenosis, lumbar radiculopathy.  She had routine work up with Cardiology and echocardiogram revealed severe aortic stenosis. . She has epidural injections for her back pain and has left ankle weakness and wears an ankle brace and does yoga 2x a week. 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  ?She had routine follow up care with Cardiology and echocardiogram was performed revealing severe aortic stenosis. She has difficulty walking which prevents her from exerting herself however had noticed that she has to catch her breath half way up the stairs. She is still able to work and care for her grandchildren. Lives alone in private home with stairs, family available for assistance. She is independent with all ADLS.   Patient was seen by CT surgeon on 01/30/24 for evaluation of her severe AS, patient referred for cath by CT surgery to evluate for CAD as a part of pre op work up.  Patient now presents to [facility] for LHC.      Symptoms:        Angina (Class): 2       Ischemic Symptoms: WHITE    Heart Failure: No       Systolic/Diastolic/Combined:        NYHA Class (within 2 weeks):     Assessment of LVEF (Must be within 6 months):       EF: 60%       Assessed by: TTE       Date: 01/30/24    Prior Cardiac Interventions:       PCI's (Date, Stents, Vessels): NO       CABG (Date, Grafts): NO    Noninvasive Testing:   Stress Test: Date: NA       Protocol:        Duration of Exercise:        Symptoms:        EKG Changes:        DTS:        Myocardial Imaging:        Risk Assessment (Low, Medium, High):     Echo (Date, Findings):     Transesophageal Echocardiogram from 01/11/24  - LVEF 60%  - PAVG 42 mmHg, MAVG 25 mmHg, EZRA 0.86 cm2, severe aortic stenosis  - Moderate mitral valve regurgitation  - The left atrium is moderately dilated, right atrium is mildly dilated  - moderate tricuspid valve regurgitation  ?  ?  Transthoracic Echocardiogram from 12/19/23 at Dr Lovell office  - LVEF 60-65%  - LV size is normal  - RV is normal  - LA and RA is normal  - The aortic valve is trileaflet with trace aortic insufficiency. Moderate to severe aortic stenosis with PAVG 41.32 mmHg, MAVG 18.46 mmHg, EZRA 0.611 cm2  - Mild MAC, mild MR  - Moderate to severe tricuspid valve regurgitation  - Moderate pulmonary HTN.    Antianginal Therapies:        Beta Blockers:  carvedolol       Calcium Channel Blockers: no       Long Acting Nitrates: no       Ranexa: no      Associated Risk Factors:        Cerebrovascular Disease: N/A       Chronic Lung Disease: N/A       Peripheral Arterial Disease: N/A       Chronic Kidney Disease (if yes, what is GFR): N/A       Uncontrolled Diabetes (if yes, what is HgbA1C or FBS): N/A       Poorly Controlled Hypertension (if yes, what is SBP): N/A       Morbid Obesity (if yes, what is BMI): N/A       History of Recent Ventricular Arrhythmia: N/A       Inability to Ambulate Safely: N/A       Need for Therapeutic Anticoagulation: Afib, last dose of coumadin on        Antiplatelet or Contrast Allergy: N/A    I HPI:     Ms. VALENTINE is a 78 year old female  with  past medical history includes HTN, HLD, and atrial fibrillation (Warfarin, last dose of coumadin on 01/31/24), Cardiac Ablation in 1996 at [place], left  renal artery stenosis, lumbar radiculopathy.  She had routine work up with Cardiology and echocardiogram revealed severe aortic stenosis. . She has epidural injections for her back pain and has left ankle weakness and wears an ankle brace and does yoga 2x a week. 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  ?She had routine follow up care with Cardiology and echocardiogram was performed revealing severe aortic stenosis. She has difficulty walking which prevents her from exerting herself however had noticed that she has to catch her breath half way up the stairs. She is still able to work and care for her grandchildren. Lives alone in private home with stairs, family available for assistance. She is independent with all ADLS.   Patient was seen by CT surgeon on 01/30/24 for evaluation of her severe AS, patient referred for cath by CT surgery to evluate for CAD as a part of pre op work up.  Patient now presents to [facility] for LHC.      Symptoms:        Angina (Class): 2       Ischemic Symptoms: WHITE    Heart Failure: No       Systolic/Diastolic/Combined:        NYHA Class (within 2 weeks):     Assessment of LVEF (Must be within 6 months):       EF: 60%       Assessed by: TTE       Date: 01/30/24    Prior Cardiac Interventions:       PCI's (Date, Stents, Vessels): NO       CABG (Date, Grafts): NO    Noninvasive Testing:   Stress Test: Date: NA       Protocol:        Duration of Exercise:        Symptoms:        EKG Changes:        DTS:        Myocardial Imaging:        Risk Assessment (Low, Medium, High):     Echo (Date, Findings):     Transesophageal Echocardiogram from 01/11/24  - LVEF 60%  - PAVG 42 mmHg, MAVG 25 mmHg, EZRA 0.86 cm2, severe aortic stenosis  - Moderate mitral valve regurgitation  - The left atrium is moderately dilated, right atrium is mildly dilated  - moderate tricuspid valve regurgitation  ?  ?  Transthoracic Echocardiogram from 12/19/23 at Dr Lovell office  - LVEF 60-65%  - LV size is normal  - RV is normal  - LA and RA is normal  - The aortic valve is trileaflet with trace aortic insufficiency. Moderate to severe aortic stenosis with PAVG 41.32 mmHg, MAVG 18.46 mmHg, EZRA 0.611 cm2  - Mild MAC, mild MR  - Moderate to severe tricuspid valve regurgitation  - Moderate pulmonary HTN.    Antianginal Therapies:        Beta Blockers:  carvedilol       Calcium Channel Blockers: no       Long Acting Nitrates: no       Ranexa: no      Associated Risk Factors:        Cerebrovascular Disease: N/A       Chronic Lung Disease: N/A       Peripheral Arterial Disease: N/A       Chronic Kidney Disease (if yes, what is GFR): left renal artery stenosis       Uncontrolled Diabetes (if yes, what is HgbA1C or FBS): N/A       Poorly Controlled Hypertension (if yes, what is SBP): N/A       Morbid Obesity (if yes, what is BMI): N/A       History of Recent Ventricular Arrhythmia: N/A       Inability to Ambulate Safely: N/A       Need for Therapeutic Anticoagulation: Afib, last dose of coumadin on        Antiplatelet or Contrast Allergy: N/A

## 2024-02-08 NOTE — H&P PST ADULT - ASSESSMENT
mpression:    Plan:    -plan for *** via ***  -patient seen and examined  -confirmed appropriate NPO duration  -ECG and Labs reviewed  -Aspirin 81mg po pre-cath  -procedure discussed with patient; risks and benefits explained, questions answered  -consent obtained by attending IC     Ms. VALENTINE is a 78 year old female  with  past medical history includes HTN, HLD, and atrial fibrillation (Warfarin, last dose of coumadin on 01/31/24), Cardiac Ablation in 1996 at [place], left  renal artery stenosis, lumbar radiculopathy.  She had routine work up with Cardiology and echocardiogram revealed severe aortic stenosis. . She has epidural injections for her back pain and has left ankle weakness and wears an ankle brace and does yoga 2x a week. 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  ?She had routine follow up care with Cardiology and echocardiogram was performed revealing severe aortic stenosis. She has difficulty walking which prevents her from exerting herself however had noticed that she has to catch her breath half way up the stairs. She is still able to work and care for her grandchildren. Lives alone in private home with stairs, family available for assistance. She is independent with all ADLS.   Patient was seen by CT surgeon on 01/30/24 for evaluation of her severe AS, patient referred for cath by CT surgery to evluate for CAD as a part of pre op work up.  Patient now presents to [facility] for LHC.    Risk assessments  ASA: 3  Mallampati: 2  BRA: 5.7%      impression: Strong CVD risk factors of HTN, HLD, SEVERE AS, ischemic w/u pre op w/u for TAVR   Requires LHC     Plan:    -plan for LHC  -patient seen and examined  -confirmed appropriate NPO duration  -ECG and Labs reviewed  -Aspirin 81mg po pre-cath  -Normal saline 250ml iv bolus held in setting of severe AS, WHITE, LLE swelling, on diuretics PRN  -procedure discussed with patient; risks and benefits explained, questions answered  -consent obtained by attending IC

## 2024-02-08 NOTE — DISCHARGE NOTE PROVIDER - NSDCMRMEDTOKEN_GEN_ALL_CORE_FT
atorvastatin 10 mg oral tablet: 1 tab(s) orally once a day  B12: orally once a day  CoQ10 300 mg oral capsule: 1 cap(s) orally once a day  Coreg 3.125 mg oral tablet: 1 tab(s) orally 2 times a day  Coumadin: 1.25 milligram(s) orally 4 times a week tuesdays, thrusdays, saturdays, sundays  RESUME COUMADIN ON 02/08/24 AT 10 PM  Coumadin 2.5 mg oral tablet: 1 tab(s) orally 3 times a week MONDAYS, WEDNESDAYS, FRIDAYS  gabapentin 300 mg oral tablet: orally 3 times a day  hydroCHLOROthiazide 25 mg oral tablet: orally 3 times a day  Lasix 20 mg oral tablet: 1 tab(s) orally prn as needed for lOWER EXTREMITY SWELLING  losartan 50 mg oral tablet: orally 2 times a day  potassium: 30 milliequivalent(s) orally once a day  Premarin: 0.625 milligram(s) orally once a day

## 2024-02-08 NOTE — PROGRESS NOTE ADULT - SUBJECTIVE AND OBJECTIVE BOX
Department of Cardiology                                                                  [facility]/[place] [address] [address]                                                            Telephone: [phone]. Fax:[phone]                                                    Post- Procedure Note: Left Heart Cardiac Catheterization       Narrative:   Patient  now s/p left heart catheterization via (right or left radial or groin) approach with Dr. Landeros,  tolerated procedure well without complications. Arrived to recovery room NAD and hemodynamically stable, distal pulse +, neurovascular intact          PAST MEDICAL & SURGICAL HISTORY:  History of Cardiac Arrhythmia      Hypertension      Hyperlipidemia      Atrial Fibrillation      S/P Exploratory Laparotomy  1966        Home Medications:  atorvastatin 10 mg oral tablet: 1 tab(s) orally once a day (11 Jan 2024 07:11)  B12: orally once a day (11 Jan 2024 07:11)  CoQ10 300 mg oral capsule: 1 cap(s) orally once a day (11 Jan 2024 07:11)  Coreg 3.125 mg oral tablet: 1 tab(s) orally 2 times a day (11 Jan 2024 07:11)  Coumadin: 1.25 milligram(s) orally once a day (11 Jan 2024 07:11)  Coumadin 2.5 mg oral tablet: 1 tab(s) orally once a day (11 Jan 2024 07:11)  gabapentin 300 mg oral tablet: orally 3 times a day (08 Feb 2024 08:48)  hydroCHLOROthiazide 25 mg oral tablet: orally 3 times a day (11 Jan 2024 07:11)  Lasix 20 mg oral tablet: 1 tab(s) orally (08 Feb 2024 08:50)  losartan 50 mg oral tablet: orally 2 times a day (11 Jan 2024 07:11)  potassium: 30 milliequivalent(s) orally once a day (11 Jan 2024 07:11)  Premarin: 0.625 milligram(s) orally once a day (11 Jan 2024 07:11)        No Known Allergies      Objective:  Vital Signs Last 24 Hrs  T(C): 36.6 (08 Feb 2024 08:12), Max: 36.6 (08 Feb 2024 08:12)  T(F): 97.9 (08 Feb 2024 08:12), Max: 97.9 (08 Feb 2024 08:12)  HR: 55 (08 Feb 2024 11:25) (53 - 64)  BP: 153/73 (08 Feb 2024 11:20) (153/73 - 177/86)  BP(mean): --  RR: 16 (08 Feb 2024 11:25) (13 - 16)  SpO2: 96% (08 Feb 2024 11:25) (96% - 96%)    Parameters below as of 08 Feb 2024 11:25  Patient On (Oxygen Delivery Method): room air        GENERAL: Pt lying comfortably, NAD.  ENMT: PERRL, +EOMI.  NECK: soft, Supple, No JVD,   CHEST/LUNG: Clear to auscultatation bilaterally; No wheezing.  HEART: S1S2+, Regular rate and rhythm; No murmurs.  ABDOMEN: Soft, Nontender, Nondistended; Bowel sounds present.  MUSCULOSKELETAL: Normal range of motion.  SKIN: No rashes or lesions.  NEURO: AAOX3, no focal deficits, no motor r sensory loss.  PSYCH: normal mood.  Procedure site: RRA,  no signs of bleeding or hematoma, neurovascular intact   VASCULAR:   Radial +2 R/+2 L  Femoral +2 R/+2 L  PT +2 R/+2 L  DP +2 R/+2 L                          11.1   8.13  )-----------( 180      ( 08 Feb 2024 07:53 )             34.2     02-08    138  |  101  |  35.3<H>  ----------------------------<  103<H>  3.5   |  23.0  |  1.24    Ca    8.9      08 Feb 2024 07:53  Mg     1.9     02-08

## 2024-02-08 NOTE — ASU PATIENT PROFILE, ADULT - FALL HARM RISK - UNIVERSAL INTERVENTIONS
Bed in lowest position, wheels locked, appropriate side rails in place/Call bell, personal items and telephone in reach/Instruct patient to call for assistance before getting out of bed or chair/Non-slip footwear when patient is out of bed/[employer] to call system/Physically safe environment - no spills, clutter or unnecessary equipment/Purposeful Proactive Rounding/Room/bathroom lighting operational, light cord in reach

## 2024-02-08 NOTE — DISCHARGE NOTE PROVIDER - CARE PROVIDERS DIRECT ADDRESSES
,DirectAddress_Unknown,[email or web address]

## 2024-02-08 NOTE — DISCHARGE NOTE PROVIDER - HOSPITAL COURSE
Ms. VALENTINE is a 78 year old female  with  past medical history includes HTN, HLD, and atrial fibrillation (Warfarin, last dose of coumadin on 24), Cardiac Ablation in  at [place], left  renal artery stenosis, lumbar radiculopathy.  She had routine work up with Cardiology and echocardiogram revealed severe aortic stenosis. . She has epidural injections for her back pain and has left ankle weakness and wears an ankle brace and does yoga 2x a week. 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  ?She had routine follow up care with Cardiology and echocardiogram was performed revealing severe aortic stenosis. She has difficulty walking which prevents her from exerting herself however had noticed that she has to catch her breath half way up the stairs. She is still able to work and care for her grandchildren. Lives alone in private home with stairs, family available for assistance. She is independent with all ADLS.   Patient was seen by CT surgeon on 24 for evaluation of her severe AS, patient referred for cath by CT surgery to evluate for CAD as a part of pre op work up.  Patient  presented  to [facility] for LHC.    Patient  now s/p left heart catheterization via (right or left radial or groin) approach with Dr. Landeros,    ntraprocedurally:  Ptaient received IV sedation fentanyl and Midazolam  received IV Heparin 3,000 units    Omnipaque: 35 ml     Findings:    Prelim cath  findings  as below:     S/P diagnostic LHC   mild LAD Disease   Dia %    Official cath report pending     # S/P Diagnostic LHC / Mild LAD disease Diag with 40%v disease       -post cardiac cath management per protocol   -Right radial precautions  -bedrest x 1 hour post procedure while RRB in place   -NS 0.9% 250ml/ 2 hours  x 1 bolus: post procedure SWETA ppx   -continue current medical therapy INCLUDING   - rESUME COUMADIN tonight at 10 pm  -statin therapy; C/W Atorvastatin   -beta blocker: c/w cOREG   -C/W rest of the home mediaction regimen   -follow up outpt in 2 weeks with Cardiologist DR Lovell   -[facility] following during hospitalization  -Lifestyle modifications discussed to reduce cardiovascular risk factors including weight reduction, smoking cessation, medication compliance, and routine follow up with Cardiologist to track your BMI, cholesterol, and glucose levels.   - Patient is not a candidate for cardiac rehab secondary to diagnostic cath   -Discharge at 13: 30 pm once criteria

## 2024-02-08 NOTE — PROGRESS NOTE ADULT - ASSESSMENT
Ms. VALENTINE is a 78 year old female  with  past medical history includes HTN, HLD, and atrial fibrillation (Warfarin, last dose of coumadin on 24), Cardiac Ablation in  at [place], left  renal artery stenosis, lumbar radiculopathy.  She had routine work up with Cardiology and echocardiogram revealed severe aortic stenosis. . She has epidural injections for her back pain and has left ankle weakness and wears an ankle brace and does yoga 2x a week. 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  ?She had routine follow up care with Cardiology and echocardiogram was performed revealing severe aortic stenosis. She has difficulty walking which prevents her from exerting herself however had noticed that she has to catch her breath half way up the stairs. She is still able to work and care for her grandchildren. Lives alone in private home with stairs, family available for assistance. She is independent with all ADLS.   Patient was seen by CT surgeon on 24 for evaluation of her severe AS, patient referred for cath by CT surgery to evluate for CAD as a part of pre op work up.  Patient  presented  to [facility] for LHC.    Patient  now s/p left heart catheterization via (right or left radial or groin) approach with Dr. Landeros,    ntraprocedurally:  Ptaient received IV sedation fentanyl and Midazolam  received IV Heparin 3,000 units    Omnipaque: 35 ml     Findings:    Prelim cath  findings  as below:     S/P diagnostic LHC   mild LAD Disease   Dia %    Official cath report pending     # S/P Diagnostic LHC / Mild LAD disease Diag with 40%v disease       -post cardiac cath management per protocol   -Right radial precautions  -bedrest x 1 hour post procedure while RRB in place   -NS 0.9% 250ml/ 2 hours  x 1 bolus: post procedure SWETA ppx   -continue current medical therapy INCLUDING   - rESUME COUMADIN tonight at 10 pm  -statin therapy; C/W Atorvastatin   -beta blocker: c/w cOREG   -follow up outpt in 2 weeks with Cardiologist DR oLvell   -[facility] following during hospitalization  -Lifestyle modifications discussed to reduce cardiovascular risk factors including weight reduction, smoking cessation, medication compliance, and routine follow up with Cardiologist to track your BMI, cholesterol, and glucose levels.   - Patient is not a candidate for cardiac rehab secondary to diagnostic cath   -Discharge at 13: 30 pm once criteria

## 2024-02-21 ENCOUNTER — OUTPATIENT (OUTPATIENT)
Dept: OUTPATIENT SERVICES | Facility: HOSPITAL | Age: 79
LOS: 1 days | End: 2024-02-21
Payer: MEDICARE

## 2024-02-21 ENCOUNTER — APPOINTMENT (OUTPATIENT)
Dept: CT IMAGING | Facility: CLINIC | Age: 79
End: 2024-02-21
Payer: MEDICARE

## 2024-02-21 ENCOUNTER — RESULT REVIEW (OUTPATIENT)
Age: 79
End: 2024-02-21

## 2024-02-21 DIAGNOSIS — I35.0 NONRHEUMATIC AORTIC (VALVE) STENOSIS: ICD-10-CM

## 2024-02-21 PROCEDURE — 74174 CTA ABD&PLVS W/CONTRAST: CPT

## 2024-02-21 PROCEDURE — 71275 CT ANGIOGRAPHY CHEST: CPT | Mod: MH

## 2024-02-21 PROCEDURE — 75574 CT ANGIO HRT W/3D IMAGE: CPT

## 2024-02-21 PROCEDURE — 75574 CT ANGIO HRT W/3D IMAGE: CPT | Mod: 26,MH

## 2024-02-21 PROCEDURE — 74174 CTA ABD&PLVS W/CONTRAST: CPT | Mod: 26,MH

## 2024-02-21 PROCEDURE — 71275 CT ANGIOGRAPHY CHEST: CPT | Mod: 26,MH

## 2024-02-23 ENCOUNTER — NON-APPOINTMENT (OUTPATIENT)
Age: 79
End: 2024-02-23

## 2024-02-26 ENCOUNTER — OUTPATIENT (OUTPATIENT)
Dept: OUTPATIENT SERVICES | Facility: HOSPITAL | Age: 79
LOS: 1 days | End: 2024-02-26
Payer: MEDICARE

## 2024-02-26 VITALS
RESPIRATION RATE: 16 BRPM | TEMPERATURE: 96 F | HEART RATE: 66 BPM | SYSTOLIC BLOOD PRESSURE: 162 MMHG | DIASTOLIC BLOOD PRESSURE: 80 MMHG | OXYGEN SATURATION: 96 % | WEIGHT: 141.1 LBS | HEIGHT: 64 IN

## 2024-02-26 DIAGNOSIS — Z98.890 OTHER SPECIFIED POSTPROCEDURAL STATES: Chronic | ICD-10-CM

## 2024-02-26 DIAGNOSIS — I35.0 NONRHEUMATIC AORTIC (VALVE) STENOSIS: ICD-10-CM

## 2024-02-26 DIAGNOSIS — Z13.89 ENCOUNTER FOR SCREENING FOR OTHER DISORDER: ICD-10-CM

## 2024-02-26 DIAGNOSIS — Z29.9 ENCOUNTER FOR PROPHYLACTIC MEASURES, UNSPECIFIED: ICD-10-CM

## 2024-02-26 DIAGNOSIS — I10 ESSENTIAL (PRIMARY) HYPERTENSION: ICD-10-CM

## 2024-02-26 DIAGNOSIS — Z01.818 ENCOUNTER FOR OTHER PREPROCEDURAL EXAMINATION: ICD-10-CM

## 2024-02-26 LAB
A1C WITH ESTIMATED AVERAGE GLUCOSE RESULT: 6.2 % — HIGH (ref 4–5.6)
ALBUMIN SERPL ELPH-MCNC: 4.1 G/DL — SIGNIFICANT CHANGE UP (ref 3.3–5.2)
ALP SERPL-CCNC: 140 U/L — HIGH (ref 40–120)
ALT FLD-CCNC: 21 U/L — SIGNIFICANT CHANGE UP
ANION GAP SERPL CALC-SCNC: 16 MMOL/L — SIGNIFICANT CHANGE UP (ref 5–17)
APPEARANCE UR: CLEAR — SIGNIFICANT CHANGE UP
APTT BLD: 39.8 SEC — HIGH (ref 24.5–35.6)
AST SERPL-CCNC: 28 U/L — SIGNIFICANT CHANGE UP
BASOPHILS # BLD AUTO: 0.06 K/UL — SIGNIFICANT CHANGE UP (ref 0–0.2)
BASOPHILS NFR BLD AUTO: 1 % — SIGNIFICANT CHANGE UP (ref 0–2)
BILIRUB DIRECT SERPL-MCNC: 0.3 MG/DL — SIGNIFICANT CHANGE UP (ref 0–0.3)
BILIRUB INDIRECT FLD-MCNC: 0.3 MG/DL — SIGNIFICANT CHANGE UP (ref 0.2–1)
BILIRUB SERPL-MCNC: 0.6 MG/DL — SIGNIFICANT CHANGE UP (ref 0.4–2)
BILIRUB UR-MCNC: NEGATIVE — SIGNIFICANT CHANGE UP
BLD GP AB SCN SERPL QL: SIGNIFICANT CHANGE UP
BUN SERPL-MCNC: 31.6 MG/DL — HIGH (ref 8–20)
CALCIUM SERPL-MCNC: 10.2 MG/DL — SIGNIFICANT CHANGE UP (ref 8.4–10.5)
CHLORIDE SERPL-SCNC: 101 MMOL/L — SIGNIFICANT CHANGE UP (ref 96–108)
CO2 SERPL-SCNC: 26 MMOL/L — SIGNIFICANT CHANGE UP (ref 22–29)
COLOR SPEC: YELLOW — SIGNIFICANT CHANGE UP
CREAT SERPL-MCNC: 0.98 MG/DL — SIGNIFICANT CHANGE UP (ref 0.5–1.3)
DIFF PNL FLD: NEGATIVE — SIGNIFICANT CHANGE UP
EGFR: 59 ML/MIN/1.73M2 — LOW
EOSINOPHIL # BLD AUTO: 0.15 K/UL — SIGNIFICANT CHANGE UP (ref 0–0.5)
EOSINOPHIL NFR BLD AUTO: 2.6 % — SIGNIFICANT CHANGE UP (ref 0–6)
ESTIMATED AVERAGE GLUCOSE: 131 MG/DL — HIGH (ref 68–114)
GLUCOSE SERPL-MCNC: 89 MG/DL — SIGNIFICANT CHANGE UP (ref 70–99)
GLUCOSE UR QL: NEGATIVE MG/DL — SIGNIFICANT CHANGE UP
HCT VFR BLD CALC: 39.2 % — SIGNIFICANT CHANGE UP (ref 34.5–45)
HGB BLD-MCNC: 12.9 G/DL — SIGNIFICANT CHANGE UP (ref 11.5–15.5)
IMM GRANULOCYTES NFR BLD AUTO: 0 % — SIGNIFICANT CHANGE UP (ref 0–0.9)
INR BLD: 1.56 RATIO — HIGH (ref 0.85–1.18)
KETONES UR-MCNC: NEGATIVE MG/DL — SIGNIFICANT CHANGE UP
LEUKOCYTE ESTERASE UR-ACNC: NEGATIVE — SIGNIFICANT CHANGE UP
LYMPHOCYTES # BLD AUTO: 1.5 K/UL — SIGNIFICANT CHANGE UP (ref 1–3.3)
LYMPHOCYTES # BLD AUTO: 26.1 % — SIGNIFICANT CHANGE UP (ref 13–44)
MCHC RBC-ENTMCNC: 32.4 PG — SIGNIFICANT CHANGE UP (ref 27–34)
MCHC RBC-ENTMCNC: 32.9 GM/DL — SIGNIFICANT CHANGE UP (ref 32–36)
MCV RBC AUTO: 98.5 FL — SIGNIFICANT CHANGE UP (ref 80–100)
MONOCYTES # BLD AUTO: 0.79 K/UL — SIGNIFICANT CHANGE UP (ref 0–0.9)
MONOCYTES NFR BLD AUTO: 13.8 % — SIGNIFICANT CHANGE UP (ref 2–14)
NEUTROPHILS # BLD AUTO: 3.24 K/UL — SIGNIFICANT CHANGE UP (ref 1.8–7.4)
NEUTROPHILS NFR BLD AUTO: 56.5 % — SIGNIFICANT CHANGE UP (ref 43–77)
NITRITE UR-MCNC: NEGATIVE — SIGNIFICANT CHANGE UP
NT-PROBNP SERPL-SCNC: 907 PG/ML — HIGH (ref 0–300)
PH UR: 6 — SIGNIFICANT CHANGE UP (ref 5–8)
PLATELET # BLD AUTO: 161 K/UL — SIGNIFICANT CHANGE UP (ref 150–400)
POTASSIUM SERPL-MCNC: 3.8 MMOL/L — SIGNIFICANT CHANGE UP (ref 3.5–5.3)
POTASSIUM SERPL-SCNC: 3.8 MMOL/L — SIGNIFICANT CHANGE UP (ref 3.5–5.3)
PREALB SERPL-MCNC: 25 MG/DL — SIGNIFICANT CHANGE UP (ref 18–38)
PROT SERPL-MCNC: 7.3 G/DL — SIGNIFICANT CHANGE UP (ref 6.6–8.7)
PROT UR-MCNC: NEGATIVE MG/DL — SIGNIFICANT CHANGE UP
PROTHROM AB SERPL-ACNC: 17.1 SEC — HIGH (ref 9.5–13)
RBC # BLD: 3.98 M/UL — SIGNIFICANT CHANGE UP (ref 3.8–5.2)
RBC # FLD: 15.4 % — HIGH (ref 10.3–14.5)
SODIUM SERPL-SCNC: 143 MMOL/L — SIGNIFICANT CHANGE UP (ref 135–145)
SP GR SPEC: 1.01 — SIGNIFICANT CHANGE UP (ref 1–1.03)
T3 SERPL-MCNC: 126 NG/DL — SIGNIFICANT CHANGE UP (ref 80–200)
T4 AB SER-ACNC: 9 UG/DL — SIGNIFICANT CHANGE UP (ref 4.5–12)
TSH SERPL-MCNC: 6.19 UIU/ML — HIGH (ref 0.27–4.2)
UROBILINOGEN FLD QL: 0.2 MG/DL — SIGNIFICANT CHANGE UP (ref 0.2–1)
WBC # BLD: 5.74 K/UL — SIGNIFICANT CHANGE UP (ref 3.8–10.5)
WBC # FLD AUTO: 5.74 K/UL — SIGNIFICANT CHANGE UP (ref 3.8–10.5)

## 2024-02-26 PROCEDURE — G0463: CPT

## 2024-02-26 PROCEDURE — 87086 URINE CULTURE/COLONY COUNT: CPT

## 2024-02-26 PROCEDURE — 71046 X-RAY EXAM CHEST 2 VIEWS: CPT

## 2024-02-26 PROCEDURE — 93010 ELECTROCARDIOGRAM REPORT: CPT

## 2024-02-26 PROCEDURE — 87186 SC STD MICRODIL/AGAR DIL: CPT

## 2024-02-26 PROCEDURE — 87077 CULTURE AEROBIC IDENTIFY: CPT

## 2024-02-26 PROCEDURE — 93005 ELECTROCARDIOGRAM TRACING: CPT

## 2024-02-26 PROCEDURE — 71046 X-RAY EXAM CHEST 2 VIEWS: CPT | Mod: 26

## 2024-02-26 RX ORDER — SODIUM CHLORIDE 9 MG/ML
3 INJECTION INTRAMUSCULAR; INTRAVENOUS; SUBCUTANEOUS EVERY 8 HOURS
Refills: 0 | Status: DISCONTINUED | OUTPATIENT
Start: 2024-02-26 | End: 2024-03-12

## 2024-02-26 RX ORDER — CEFUROXIME AXETIL 250 MG
1500 TABLET ORAL ONCE
Refills: 0 | Status: DISCONTINUED | OUTPATIENT
Start: 2024-02-26 | End: 2024-03-12

## 2024-02-26 NOTE — H&P PST ADULT - PROBLEM SELECTOR PLAN 1
78 year old female with PMH HTN, afib (warfarin) Cardiac Ablation in 1996 at [place] now with aortic valve stenosis schedueld for transcatheter aortic valve replacement , transfemoral on 3/1/2024.     - NPO after midnight the night before surgery except for meds  -Educated on NSAIDS, multivitamins and herbals that increase the risk of bleeding and need to be stopped 5 days before procedure  -Educated on infection prevention  -Tylenol can be taken if needed for pain  -Stop coumadin 3 days before surgery per Dr. moser  -Will continue all other medications as prescribed  -Verbalized understanding of all instructions.

## 2024-02-26 NOTE — H&P PST ADULT - ASSESSMENT
78 year old female with PMH HTN, afib (warfarin) Cardiac Ablation in  at [place] now with aortic valve stenosis schedueld for transcatheter aortic valve replacement , transfemoral on 3/1/2024.     - NPO after midnight the night before surgery except for meds  -Educated on NSAIDS, multivitamins and herbals that increase the risk of bleeding and need to be stopped 5 days before procedure  -Educated on infection prevention  -Tylenol can be taken if needed for pain  -Stop coumadin 3 days before surgery per Dr. moser  -Will continue all other medications as prescribed  -Verbalized understanding of all instructions.      OPIOID RISK TOOL    TOMAS EACH BOX THAT APPLIES AND ADD TOTALS AT THE END    FAMILY HISTORY OF SUBSTANCE ABUSE                 FEMALE         MALE                                                Alcohol                             [  ]1 pt          [  ]3pts                                               Illegal Durgs                     [  ]2 pts        [  ]3pts                                               Rx Drugs                           [  ]4 pts        [  ]4 pts    PERSONAL HISTORY OF SUBSTANCE ABUSE                                                                                          Alcohol                             [  ]3 pts       [  ]3 pts                                               Illegal Drugs                     [  ]4 pts        [  ]4 pts                                               Rx Drugs                           [  ]5 pts        [  ]5 pts    AGE BETWEEN 16-45 YEARS                                      [  ]1 pt         [  ]1 pt    HISTORY OF PREADOLESCENT   SEXUAL ABUSE                                                             [  ]3 pts        [  ]0pts    PSYCHOLOGICAL DISEASE                     ADD, OCD, Bipolar, Schizophrenia        [  ]2 pts         [  ]2 pts                      Depression                                               [  ]1 pt           [  ]1 pt           SCORING TOTAL   (add numbers and type here)              (0)                                     A score of 3 or lower indicated LOW risk for future opioid abuse  A score of 4 to 7 indicated moderate risk for future opioid abuse  A score of 8 or higher indicates a high risk for opioid abuse        CAPRINI SCORE [CLOT]    AGE RELATED RISK FACTORS                                                       MOBILITY RELATED FACTORS  [ ] Age 41-60 years                                            (1 Point)                  [ ] Bed rest                                                        (1 Point)  [ ] Age: 61-74 years                                           (2 Points)                 [ ] Plaster cast                                                   (2 Points)  [x ] Age= 75 years                                              (3 Points)                 [ ] Bed bound for more than 72 hours                 (2 Points)    DISEASE RELATED RISK FACTORS                                               GENDER SPECIFIC FACTORS  [ ] Edema in the lower extremities                       (1 Point)                  [ ] Pregnancy                                                     (1 Point)  [x ] Varicose veins                                               (1 Point)                  [ ] Post-partum < 6 weeks                                   (1 Point)             [x ] BMI > 25 Kg/m2                                            (1 Point)                  [ ] Hormonal therapy  or oral contraception          (1 Point)                 [ ] Sepsis (in the previous month)                        (1 Point)                  [ ] History of pregnancy complications                 (1 point)  [ ] Pneumonia or serious lung disease                                               [ ] Unexplained or recurrent                     (1 Point)           (in the previous month)                               (1 Point)  [ ] Abnormal pulmonary function test                     (1 Point)                 SURGERY RELATED RISK FACTORS  [ ] Acute myocardial infarction                              (1 Point)                 [ ]  Section                                             (1 Point)  [ ] Congestive heart failure (in the previous month)  (1 Point)               [ ] Minor surgery                                                  (1 Point)   [ ] Inflammatory bowel disease                             (1 Point)                 [ ] Arthroscopic surgery                                        (2 Points)  [ ] Central venous access                                      (2 Points)                [ x] General surgery lasting more than 45 minutes   (2 Points)       [ ] Stroke (in the previous month)                          (5 Points)               [ ] Elective arthroplasty                                         (5 Points)                                                                                                                                               HEMATOLOGY RELATED FACTORS                                                 TRAUMA RELATED RISK FACTORS  [ ] Prior episodes of VTE                                     (3 Points)                [ ] Fracture of the hip, pelvis, or leg                       (5 Points)  [ ] Positive family history for VTE                         (3 Points)                 [ ] Acute spinal cord injury (in the previous month)  (5 Points)  [ ] Prothrombin 13905 A                                     (3 Points)                 [ ] Paralysis  (less than 1 month)                             (5 Points)  [ ] Factor V Leiden                                             (3 Points)                  [ ] Multiple Trauma within 1 month                        (5 Points)  [ ] Lupus anticoagulants                                     (3 Points)                                                           [ ] Anticardiolipin antibodies                               (3 Points)                                                       [ ] High homocysteine in the blood                      (3 Points)                                             [ ] Other congenital or acquired thrombophilia      (3 Points)                                                [ ] Heparin induced thrombocytopenia                  (3 Points)                                          Total Score [ 7         ]    Caprini Score 0 - 2:  Low Risk, No VTE Prophylaxis required for most patients, encourage ambulation  Caprini Score 3 - 6:  At Risk, pharmacologic VTE prophylaxis is indicated for most patients (in the absence of a contraindication)  Caprini Score Greater than or = 7:  High Risk, pharmacologic VTE prophylaxis is indicated for most patients (in the absence of a contraindication)

## 2024-02-26 NOTE — H&P PST ADULT - PROBLEM SELECTOR PLAN 3
Caprini - 7 high risk   Surgical team please assess/recommend mechanical/pharmacological measures for VTE prophylaxis

## 2024-02-26 NOTE — H&P PST ADULT - EKG AND INTERPRETATION
Afib  Left axis deviation  Minimal voltage criteria for LVH, may be normal variant  Anteroseptal infarct  Pending final int

## 2024-02-26 NOTE — H&P PST ADULT - HISTORY OF PRESENT ILLNESS
Ms. VALENTINE is a 78 year old female referred by Dr. Sridhar Lovell who presents for consultation. Her past medical history includes HTN, HLD, and atrial fibrillation (Warfarin), Cardiac Ablation in 1996 at [place].  ?  She had routine work up with Cardiology and echocardiogram revealed severe aortic stenosis. She presents today to discuss candidacy for TAVR. She has epidural injections for her back pain and has left ankle weakness and wears an ankle brace and does yoga 2x a week. 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  ?  She had routine follow up care with Cardiology and echocardiogram was performed revealing severe aortic stenosis. She has difficulty walking which prevents her from exerting herself however had noticed that she has to catch her breath half way up the stairs. She is still able to work and care for her grandchildren. Lives alone in private home with stairs, family available for assistance. She is independent with all ADL and has no home health aid.  ?Current Meds  ALPRAZolam 0.5 MG Oral Tablet  Atorvastatin Calcium 10 MG Oral Tablet  Carvedilol 3.125 MG Oral Tablet  Coumadin TABS  Fluconazole 100 MG Oral Tablet  Furosemide 20 MG Oral Tablet  Gabapentin 300 MG Oral Capsule  hydroCHLOROthiazide TABS  Losartan Potassium 50 MG Oral Tablet; TAKE 1 TABLET BY MOUTH TWICE DAILY  Metoprolol Tartrate 25 MG Oral Tablet  Nystatin 712158 UNIT/GM External Cream  Potassium Chloride Janett ER 20 MEQ Oral Tablet Extended Release  Premarin 0.625 MG Oral Tablet; TAKE 1 TABLET DAILY  Triamcinolone Acetonide 0.1 % External Cream; APPLY 1 APPLICATION ON THE SKIN  TWICE A DAY  Warfarin Sodium 2.5 MG Oral Tablet; TAKE 1 TABLET BY MOUTH EVERY DAY OR AS  DIRECTED       ??  Data Reviewed       Transesophageal Echocardiogram from 01/11/24  - LVEF 60%  - PAVG 42 mmHg, MAVG 25 mmHg, EZRA 0.86 cm2, severe aortic stenosis  - Moderate mitral valve regurgitation  - The left atrium is moderately dilated, right atrium is mildly dilated  - moderate tricuspid valve regurgitation  ?  ?  ?  ?  Transthoracic Echocardiogram from 12/19/23 at Dr Lovell office  - LVEF 60-65%  - LV size is normal  - RV is normal  - LA and RA is normal  - The aortic valve is trileaflet with trace aortic insufficiency. Moderate to severe aortic stenosis with PAVG 41.32 mmHg, MAVG 18.46 mmHg, EZRA 0.611 cm2  - Mild MAC, mild MR  - Moderate to severe tricuspid valve regurgitation  - Moderate pulmonary HTN.    78 year old female with PMH HTN, afib (warfarin presents to PST today. Pt. reports hx aortic valve stenosis diagnosed 1 month ago. Reports SOB with ambulation and inability to go up the stairs with stopping. Denies any chest pain, palpitations. Denies any dizziness, syncope or lightheadedness. C/o edema to BLE.     referred by Dr. Sridhar Lovell who presents for consultation. Her past medical history includes HTN, HLD, and atrial fibrillation (Warfarin), Cardiac Ablation in 1996 at [employer].  ?  She had routine work up with Cardiology and echocardiogram revealed severe aortic stenosis. She presents today to discuss candidacy for TAVR. She has epidural injections for her back pain and has left ankle weakness and wears an ankle brace and does yoga 2x a week. 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  ?  She had routine follow up care with Cardiology and echocardiogram was performed revealing severe aortic stenosis. She has difficulty walking which prevents her from exerting herself however had noticed that she has to catch her breath half way up the stairs. She is still able to work and care for her grandchildren. Lives alone in private home with stairs, family available for assistance. She is independent with all ADL and has no home health aid.  ?Current Meds  ALPRAZolam 0.5 MG Oral Tablet  Atorvastatin Calcium 10 MG Oral Tablet  Carvedilol 3.125 MG Oral Tablet  Coumadin TABS  Fluconazole 100 MG Oral Tablet  Furosemide 20 MG Oral Tablet  Gabapentin 300 MG Oral Capsule  hydroCHLOROthiazide TABS  Losartan Potassium 50 MG Oral Tablet; TAKE 1 TABLET BY MOUTH TWICE DAILY  Metoprolol Tartrate 25 MG Oral Tablet  Nystatin 654442 UNIT/GM External Cream  Potassium Chloride Janett ER 20 MEQ Oral Tablet Extended Release  Premarin 0.625 MG Oral Tablet; TAKE 1 TABLET DAILY  Triamcinolone Acetonide 0.1 % External Cream; APPLY 1 APPLICATION ON THE SKIN  TWICE A DAY  Warfarin Sodium 2.5 MG Oral Tablet; TAKE 1 TABLET BY MOUTH EVERY DAY OR AS  DIRECTED       ??  Data Reviewed       Transesophageal Echocardiogram from 01/11/24  - LVEF 60%  - PAVG 42 mmHg, MAVG 25 mmHg, EZRA 0.86 cm2, severe aortic stenosis  - Moderate mitral valve regurgitation  - The left atrium is moderately dilated, right atrium is mildly dilated  - moderate tricuspid valve regurgitation  ?  ?  ?  ?  Transthoracic Echocardiogram from 12/19/23 at Dr Lovell office  - LVEF 60-65%  - LV size is normal  - RV is normal  - LA and RA is normal  - The aortic valve is trileaflet with trace aortic insufficiency. Moderate to severe aortic stenosis with PAVG 41.32 mmHg, MAVG 18.46 mmHg, EZRA 0.611 cm2  - Mild MAC, mild MR  - Moderate to severe tricuspid valve regurgitation  - Moderate pulmonary HTN.    78 year old female with PMH HTN, afib (warfarin) Cardiac Ablation in 1996 at [place] presents to PST today. Pt. reports hx aortic valve stenosis diagnosed 1 month ago. Reports SOB with ambulation and inability to go up the stairs without stopping for rest. Denies any chest pain, palpitations. Denies any dizziness, syncope or lightheadedness. C/o edema to BLE. Scheduled for transcatheter aortic valve replacement , transfemoral on 3/1/2024.     Transesophageal Echocardiogram from 01/11/24  - LVEF 60%  - PAVG 42 mmHg, MAVG 25 mmHg, EZRA 0.86 cm2, severe aortic stenosis  - Moderate mitral valve regurgitation  - The left atrium is moderately dilated, right atrium is mildly dilated  - moderate tricuspid valve regurgitation  ?  ?  Transthoracic Echocardiogram from 12/19/23 at Dr Lovell office  - LVEF 60-65%  - LV size is normal  - RV is normal  - LA and RA is normal  - The aortic valve is trileaflet with trace aortic insufficiency. Moderate to severe aortic stenosis with PAVG 41.32 mmHg, MAVG 18.46 mmHg, EZRA 0.611 cm2  - Mild MAC, mild MR  - Moderate to severe tricuspid valve regurgitation  - Moderate pulmonary HTN.

## 2024-02-26 NOTE — H&P PST ADULT - TEMPERATURE IN CELSIUS (DEGREES C)
Congestion and sore throat started 1 week ago. Covid positive 3 weeks ago. No fever. Last night coughed all night. Seems improved this am.  No coughing. Better energy. Eating and drinking well. No wheezing noted. Has inhaler and uses before bed. Advised symptomatic care of symptoms. Monitor. If symptoms worsen or fever develops call back for evaluation. Mother agreeable.
Pt mother is calling pt has cold for a week.    Pt is up all night coughing ,
35.8

## 2024-02-26 NOTE — H&P PST ADULT - MUSCULOSKELETAL
details… no joint swelling/no joint erythema/no joint warmth/strength 5/5 bilateral lower extremities

## 2024-02-27 LAB
MRSA PCR RESULT.: SIGNIFICANT CHANGE UP
S AUREUS DNA NOSE QL NAA+PROBE: SIGNIFICANT CHANGE UP

## 2024-02-28 ENCOUNTER — NON-APPOINTMENT (OUTPATIENT)
Age: 79
End: 2024-02-28

## 2024-02-29 LAB
-  AMOXICILLIN/CLAVULANIC ACID: SIGNIFICANT CHANGE UP
-  AMPICILLIN/SULBACTAM: SIGNIFICANT CHANGE UP
-  AMPICILLIN: SIGNIFICANT CHANGE UP
-  AZTREONAM: SIGNIFICANT CHANGE UP
-  CEFAZOLIN: SIGNIFICANT CHANGE UP
-  CEFEPIME: SIGNIFICANT CHANGE UP
-  CEFTRIAXONE: SIGNIFICANT CHANGE UP
-  CEFUROXIME: SIGNIFICANT CHANGE UP
-  CIPROFLOXACIN: SIGNIFICANT CHANGE UP
-  ERTAPENEM: SIGNIFICANT CHANGE UP
-  GENTAMICIN: SIGNIFICANT CHANGE UP
-  IMIPENEM: SIGNIFICANT CHANGE UP
-  LEVOFLOXACIN: SIGNIFICANT CHANGE UP
-  MEROPENEM: SIGNIFICANT CHANGE UP
-  NITROFURANTOIN: SIGNIFICANT CHANGE UP
-  PIPERACILLIN/TAZOBACTAM: SIGNIFICANT CHANGE UP
-  TOBRAMYCIN: SIGNIFICANT CHANGE UP
-  TRIMETHOPRIM/SULFAMETHOXAZOLE: SIGNIFICANT CHANGE UP
CULTURE RESULTS: ABNORMAL
METHOD TYPE: SIGNIFICANT CHANGE UP
ORGANISM # SPEC MICROSCOPIC CNT: ABNORMAL
ORGANISM # SPEC MICROSCOPIC CNT: SIGNIFICANT CHANGE UP
SPECIMEN SOURCE: SIGNIFICANT CHANGE UP

## 2024-03-01 ENCOUNTER — NON-APPOINTMENT (OUTPATIENT)
Age: 79
End: 2024-03-01

## 2024-03-01 ENCOUNTER — APPOINTMENT (OUTPATIENT)
Dept: CARDIOTHORACIC SURGERY | Facility: HOSPITAL | Age: 79
End: 2024-03-01

## 2024-03-01 DIAGNOSIS — N30.00 ACUTE CYSTITIS W/OUT HEMATURIA: ICD-10-CM

## 2024-03-06 ENCOUNTER — NON-APPOINTMENT (OUTPATIENT)
Age: 79
End: 2024-03-06

## 2024-03-06 RX ORDER — WARFARIN 2.5 MG/1
2.5 TABLET ORAL
Qty: 90 | Refills: 0 | Status: COMPLETED | COMMUNITY
Start: 2021-06-02 | End: 2024-03-06

## 2024-03-06 RX ORDER — RIVAROXABAN 20 MG/1
20 TABLET, FILM COATED ORAL
Refills: 0 | Status: ACTIVE | COMMUNITY
Start: 2024-03-06

## 2024-03-20 DIAGNOSIS — B37.9 CANDIDIASIS, UNSPECIFIED: ICD-10-CM

## 2024-03-20 DIAGNOSIS — T36.95XA CANDIDIASIS, UNSPECIFIED: ICD-10-CM

## 2024-03-21 ENCOUNTER — EMERGENCY (EMERGENCY)
Facility: HOSPITAL | Age: 79
LOS: 0 days | Discharge: HOME | End: 2024-03-22
Attending: EMERGENCY MEDICINE
Payer: MEDICARE

## 2024-03-21 VITALS
HEIGHT: 64 IN | WEIGHT: 164.91 LBS | DIASTOLIC BLOOD PRESSURE: 88 MMHG | SYSTOLIC BLOOD PRESSURE: 176 MMHG | RESPIRATION RATE: 16 BRPM | TEMPERATURE: 98 F | OXYGEN SATURATION: 100 % | HEART RATE: 76 BPM

## 2024-03-21 DIAGNOSIS — I48.91 UNSPECIFIED ATRIAL FIBRILLATION: ICD-10-CM

## 2024-03-21 DIAGNOSIS — I10 ESSENTIAL (PRIMARY) HYPERTENSION: ICD-10-CM
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
## 2024-03-21 DIAGNOSIS — Z79.01 LONG TERM (CURRENT) USE OF ANTICOAGULANTS: ICD-10-CM

## 2024-03-21 DIAGNOSIS — S81.812A LACERATION WITHOUT FOREIGN BODY, LEFT LOWER LEG, INITIAL ENCOUNTER: ICD-10-CM
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
## 2024-03-21 DIAGNOSIS — Z98.890 OTHER SPECIFIED POSTPROCEDURAL STATES: Chronic | ICD-10-CM
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
## 2024-03-21 PROCEDURE — 86901 BLOOD TYPING SEROLOGIC RH(D): CPT

## 2024-03-21 PROCEDURE — 85025 COMPLETE CBC W/AUTO DIFF WBC: CPT

## 2024-03-21 PROCEDURE — 85730 THROMBOPLASTIN TIME PARTIAL: CPT

## 2024-03-21 PROCEDURE — 86900 BLOOD TYPING SEROLOGIC ABO: CPT

## 2024-03-21 PROCEDURE — 73562 X-RAY EXAM OF KNEE 3: CPT | Mod: LT

## 2024-03-21 PROCEDURE — 96374 THER/PROPH/DIAG INJ IV PUSH: CPT

## 2024-03-21 PROCEDURE — 86850 RBC ANTIBODY SCREEN: CPT

## 2024-03-21 PROCEDURE — 85610 PROTHROMBIN TIME: CPT

## 2024-03-21 PROCEDURE — 99284 EMERGENCY DEPT VISIT MOD MDM: CPT | Mod: 25

## 2024-03-21 PROCEDURE — 80048 BASIC METABOLIC PNL TOTAL CA: CPT

## 2024-03-21 PROCEDURE — 73590 X-RAY EXAM OF LOWER LEG: CPT | Mod: LT

## 2024-03-21 PROCEDURE — 36415 COLL VENOUS BLD VENIPUNCTURE: CPT

## 2024-03-21 PROCEDURE — 99285 EMERGENCY DEPT VISIT HI MDM: CPT

## 2024-03-21 NOTE — ED ADULT TRIAGE NOTE - CHIEF COMPLAINT QUOTE
Pt BIB ambulance from home to the ED with c/o left sided knee pain/injury s/p fall. Pt reports she tripped and fell on the carpet and "scrapped her knee". Pt is on Xarelto for AFIB. Pt's left knee noted to be wrapped in guaze and is saturated in blood. Pt brought into trauma 1. MD Starr evaluating patient.

## 2024-03-22 VITALS
SYSTOLIC BLOOD PRESSURE: 164 MMHG | TEMPERATURE: 98 F | DIASTOLIC BLOOD PRESSURE: 77 MMHG | HEART RATE: 71 BPM | OXYGEN SATURATION: 97 % | RESPIRATION RATE: 18 BRPM

## 2024-03-22 LAB
ANION GAP SERPL CALC-SCNC: 5 MMOL/L — SIGNIFICANT CHANGE UP (ref 5–17)
APTT BLD: 47.5 SEC — HIGH (ref 24.5–35.6)
BASOPHILS # BLD AUTO: 0 K/UL — SIGNIFICANT CHANGE UP (ref 0–0.2)
BASOPHILS NFR BLD AUTO: 0 % — SIGNIFICANT CHANGE UP (ref 0–2)
BLD GP AB SCN SERPL QL: SIGNIFICANT CHANGE UP
BUN SERPL-MCNC: 33 MG/DL — HIGH (ref 7–23)
CALCIUM SERPL-MCNC: 9.1 MG/DL — SIGNIFICANT CHANGE UP (ref 8.5–10.1)
CHLORIDE SERPL-SCNC: 110 MMOL/L — HIGH (ref 96–108)
CO2 SERPL-SCNC: 27 MMOL/L — SIGNIFICANT CHANGE UP (ref 22–31)
CREAT SERPL-MCNC: 1.17 MG/DL — SIGNIFICANT CHANGE UP (ref 0.5–1.3)
EGFR: 48 ML/MIN/1.73M2 — LOW
EOSINOPHIL # BLD AUTO: 0 K/UL — SIGNIFICANT CHANGE UP (ref 0–0.5)
EOSINOPHIL NFR BLD AUTO: 0 % — SIGNIFICANT CHANGE UP (ref 0–6)
GLUCOSE SERPL-MCNC: 145 MG/DL — HIGH (ref 70–99)
HCT VFR BLD CALC: 34 % — LOW (ref 34.5–45)
HGB BLD-MCNC: 11.2 G/DL — LOW (ref 11.5–15.5)
IMM GRANULOCYTES NFR BLD AUTO: 0.2 % — SIGNIFICANT CHANGE UP (ref 0–0.9)
INR BLD: 2.76 RATIO — HIGH (ref 0.85–1.18)
LYMPHOCYTES # BLD AUTO: 1.26 K/UL — SIGNIFICANT CHANGE UP (ref 1–3.3)
LYMPHOCYTES # BLD AUTO: 14.8 % — SIGNIFICANT CHANGE UP (ref 13–44)
MCHC RBC-ENTMCNC: 32.3 PG — SIGNIFICANT CHANGE UP (ref 27–34)
MCHC RBC-ENTMCNC: 32.9 GM/DL — SIGNIFICANT CHANGE UP (ref 32–36)
MCV RBC AUTO: 98 FL — SIGNIFICANT CHANGE UP (ref 80–100)
MONOCYTES # BLD AUTO: 0.92 K/UL — HIGH (ref 0–0.9)
MONOCYTES NFR BLD AUTO: 10.8 % — SIGNIFICANT CHANGE UP (ref 2–14)
NEUTROPHILS # BLD AUTO: 6.31 K/UL — SIGNIFICANT CHANGE UP (ref 1.8–7.4)
NEUTROPHILS NFR BLD AUTO: 74.2 % — SIGNIFICANT CHANGE UP (ref 43–77)
PLATELET # BLD AUTO: 193 K/UL — SIGNIFICANT CHANGE UP (ref 150–400)
POTASSIUM SERPL-MCNC: 4.2 MMOL/L — SIGNIFICANT CHANGE UP (ref 3.5–5.3)
POTASSIUM SERPL-SCNC: 4.2 MMOL/L — SIGNIFICANT CHANGE UP (ref 3.5–5.3)
PROTHROM AB SERPL-ACNC: 30.3 SEC — HIGH (ref 9.5–13)
RBC # BLD: 3.47 M/UL — LOW (ref 3.8–5.2)
RBC # FLD: 15.6 % — HIGH (ref 10.3–14.5)
SODIUM SERPL-SCNC: 142 MMOL/L — SIGNIFICANT CHANGE UP (ref 135–145)
WBC # BLD: 8.51 K/UL — SIGNIFICANT CHANGE UP (ref 3.8–10.5)
WBC # FLD AUTO: 8.51 K/UL — SIGNIFICANT CHANGE UP (ref 3.8–10.5)

## 2024-03-22 PROCEDURE — 73590 X-RAY EXAM OF LOWER LEG: CPT | Mod: 26,LT

## 2024-03-22 PROCEDURE — 73562 X-RAY EXAM OF KNEE 3: CPT | Mod: 26,LT

## 2024-03-22 RX ORDER — CEFAZOLIN SODIUM 1 G
1000 VIAL (EA) INJECTION ONCE
Refills: 0 | Status: DISCONTINUED | OUTPATIENT
Start: 2024-03-22 | End: 2024-03-22

## 2024-03-22 RX ORDER — CEFAZOLIN SODIUM 1 G
1000 VIAL (EA) INJECTION ONCE
Refills: 0 | Status: COMPLETED | OUTPATIENT
Start: 2024-03-22 | End: 2024-03-22

## 2024-03-22 RX ADMIN — Medication 1000 MILLIGRAM(S): at 00:45

## 2024-03-22 NOTE — ED PROVIDER NOTE - SKIN, MLM
approximately 10 cm vertical by 3 cm horizontal skin tear of the left anterior knee with moderate active bleeding when the dressing is removed.

## 2024-03-22 NOTE — ED ADULT NURSE NOTE - NSFALLHARMRISKINTERV_ED_ALL_ED

## 2024-03-22 NOTE — ED ADULT NURSE NOTE - NS ED NOTE  TALK SOMEONE YN
LVM for patient to call the office to schedule the injection under ultrasound with Dr. Mcnally.     Routed to PSR's to call the patient tomorrow. Prior auth will need to be done so I will ask for the date to be sent back to staff.    No

## 2024-03-22 NOTE — ED ADULT NURSE NOTE - OBJECTIVE STATEMENT
Pt presents to ED c/o left sided knee pain/injury s/p fall. Pt states she tripped and slid on the carpet and 'scraped her knee". Denies hitting head, (-) LOC,  Pt is on Xarelto for pmh AFIB. carpet.  Pt notes that there was a significant amount of bleeding and she applied pressure with several towels as well as wrapping dressing around her knee. Pt left knee noted to be wrapped in gauze and is saturated in blood. Dressing changed by MD Haskins, bleeding starting to slow. Pt awake and alert, daughter at bedside, no s/s of distress noted.

## 2024-03-22 NOTE — ED PROVIDER NOTE - CLINICAL SUMMARY MEDICAL DECISION MAKING FREE TEXT BOX
78-year-old female with history of hypertension, atrial fibrillation on anticoagulation presents for evaluation deep skin tear with continued hemorrhage of the left anterior knee status post trip and fall on a carpeted surface.  The patient states that she did not feel dizzy and simply tripped on her shoes causing her to fall forward directly onto her left knee and states that she skidded across the carpet.  Patient notes that there was a significant amount of bleeding and she applied pressure with several towels as well as wrapping dressing around her knee.  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  She was able to bear weight on the left lower extremity after the fall and presented because of the continued bleeding.  She denies any head injury or loss of consciousness.  Patient denies any other injury or complaint.    patient has a large skin tear on exam which cannot be sutured.  Bleeding controlled was achieved with placement of Gelfoam covered with gauze, roll gauze and Ace bandage.  Patient advised to continue wound care and dressing changes and given strict return precautions.  Patient was given a prophylactic dose of 1000 mg of Ancef in case she had an open fracture.  There is no evidence of fracture and x-ray.

## 2024-03-22 NOTE — ED PROVIDER NOTE - OBJECTIVE STATEMENT
78-year-old female with history of hypertension, atrial fibrillation on anticoagulation presents for evaluation deep skin tear with continued hemorrhage of the left anterior knee status post trip and fall on a carpeted surface.  The patient states that she did not feel dizzy and simply tripped on her shoes causing her to fall forward directly onto her left knee and states that she skidded across the carpet.  Patient notes that there was a significant amount of bleeding and she applied pressure with several towels as well as wrapping dressing around her knee.  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  She was able to bear weight on the left lower extremity after the fall and presented because of the continued bleeding.  She denies any head injury or loss of consciousness.  Patient denies any other injury or complaint.

## 2024-03-22 NOTE — ED PROVIDER NOTE - PATIENT PORTAL LINK FT
You can access the FollowMyHealth Patient Portal offered by Health system by registering at the following website: [email or web address]. By joining [employer]’s FollowMyHealth portal, you will also be able to view your health information using other applications (apps) compatible with our system.

## 2024-03-22 NOTE — ED PROVIDER NOTE - NSFOLLOWUPINSTRUCTIONS_ED_ALL_ED_FT
Laceration    A laceration is a cut that goes through all of the layers of the skin and into the tissue that is right under the skin. Some lacerations heal on their own. Others need to be closed with skin adhesive strips, skin glue, stitches (sutures), or staples. Proper laceration care minimizes the risk of infection and helps the laceration to heal better.  If non-absorbable stitches or staples have been placed, they must be taken out within the time frame instructed by your healthcare provider.    SEEK IMMEDIATE MEDICAL CARE IF YOU HAVE ANY OF THE FOLLOWING SYMPTOMS: swelling around the wound, worsening pain, drainage from the wound, red streaking going away from your wound, inability to move finger or toe near the laceration, or discoloration of skin near the laceration.     performed dressing changes every 24 hours with Xeroform gauze, 4 x 4's, Kerlix and Ace bandage.  Return to the emergency department if you have red streaking from the wound, drainage of pus, increased swelling, decreased range of motion or further concerns.    Follow-up with your primary care doctor in 2 days for wound check.

## 2024-03-24 ENCOUNTER — EMERGENCY (EMERGENCY)
Facility: HOSPITAL | Age: 79
LOS: 0 days | Discharge: HOME | End: 2024-03-24
Attending: EMERGENCY MEDICINE
Payer: MEDICARE

## 2024-03-24 VITALS
HEART RATE: 80 BPM | SYSTOLIC BLOOD PRESSURE: 164 MMHG | OXYGEN SATURATION: 99 % | RESPIRATION RATE: 18 BRPM | DIASTOLIC BLOOD PRESSURE: 93 MMHG | TEMPERATURE: 98 F

## 2024-03-24 VITALS — WEIGHT: 139.99 LBS | HEIGHT: 64 IN

## 2024-03-24 DIAGNOSIS — Z98.890 OTHER SPECIFIED POSTPROCEDURAL STATES: Chronic | ICD-10-CM

## 2024-03-24 DIAGNOSIS — I48.91 UNSPECIFIED ATRIAL FIBRILLATION: ICD-10-CM

## 2024-03-24 DIAGNOSIS — I35.0 NONRHEUMATIC AORTIC (VALVE) STENOSIS: ICD-10-CM

## 2024-03-24 DIAGNOSIS — S81.002D UNSPECIFIED OPEN WOUND, LEFT KNEE, SUBSEQUENT ENCOUNTER: ICD-10-CM

## 2024-03-24 DIAGNOSIS — Z79.01 LONG TERM (CURRENT) USE OF ANTICOAGULANTS: ICD-10-CM

## 2024-03-24 DIAGNOSIS — I10 ESSENTIAL (PRIMARY) HYPERTENSION: ICD-10-CM

## 2024-03-24 PROCEDURE — 99282 EMERGENCY DEPT VISIT SF MDM: CPT

## 2024-03-24 PROCEDURE — 99283 EMERGENCY DEPT VISIT LOW MDM: CPT | Mod: FS

## 2024-03-24 NOTE — ED STATDOCS - SKIN, MLM
left knee with irregular 8cm deep skin avulsion now with clotted blood over wound, blood soaked dressing removed, ecchymosis to anterior left knee

## 2024-03-24 NOTE — ED ADULT TRIAGE NOTE - CHIEF COMPLAINT QUOTE
Pt presents to ED c/o L knee abrasion. Pt fell on Thursday and was seen in  for same complaint. Unable to control bleeding. +bloodthinners. Scheduled TAVR 4/18, concerned for cancellatin of procedure. Pt ambulatory in triage.

## 2024-03-24 NOTE — ED STATDOCS - CLINICAL SUMMARY MEDICAL DECISION MAKING FREE TEXT BOX
repeat wound dressing, bulking compression dressing to stop bleeding, outpatient plastics referral. repeat wound dressing, bulking compression dressing to stop bleeding, outpatient plastics referral.    signed Purvi Perales PA-C Pt seen in intake initially by Dr Smart.   78F with skin tear below left knee from the evening of 3/21. Pt seen in ED and wound dressed but today it bled through the dressing. Pt on xarelto. Pt says she may have been a bit too active today. wound well appearing in ED, no erythema, swelling, or purulence. Not actively bleeding at this time. Dressed with surgifoam over area pt says was bleeding and then covered with xeroform, gauze and bulky dressing. f/u PMD wilder. return precautions given. Pt feeling well, pt and family agree with DC and plan of care.

## 2024-03-24 NOTE — ED STATDOCS - OBJECTIVE STATEMENT
77 y/o female with PMHx of HTN, a-fib on Xarelto, aortic stenosis scheduled for TAVR presents to the ED c/o bleeding wound to left knee. Pt reports she tripped and fell on Thursday, has had persistent oozing and bleeding despite having Xeroform placed.

## 2024-03-24 NOTE — ED ADULT NURSE NOTE - PATIENT'S PREFERRED PRONOUN
Bedside and Verbal shift change report given to Gabino Chairez RN (oncoming nurse) by Rebekah Smith RN (offgoing nurse). Report included the following information SBAR, Intake/Output, MAR and Recent Results. Her/She

## 2024-03-24 NOTE — ED ADULT NURSE NOTE - NSFALLHARMRISKINTERV_ED_ALL_ED

## 2024-03-24 NOTE — ED STATDOCS - ATTENDING APP SHARED VISIT CONTRIBUTION OF CARE
Attending Contribution to Care: I, Deborah Smart, performed the initial face to face bedside interview with this patient regarding history of present illness, review of symptoms and relevant past medical, social and family history.  I completed an independent physical examination.  I was the initial provider who evaluated this patient. I have signed out the follow up of any pending tests (i.e. labs, radiological studies) to the ACP.  I have communicated the patient’s plan of care and disposition with the ACP.

## 2024-03-24 NOTE — ED ADULT NURSE NOTE - SUICIDE SCREENING QUESTION 3
Problem: Discharge Planning  Goal: Discharge to home or other facility with appropriate resources  11/14/2023 0835 by Malika Lilly RN  Outcome: Progressing  11/13/2023 2344 by Herman Jade RN  Outcome: Progressing     Problem: Chronic Conditions and Co-morbidities  Goal: Patient's chronic conditions and co-morbidity symptoms are monitored and maintained or improved  11/14/2023 0835 by Malika Lilly RN  Outcome: Progressing  11/13/2023 2344 by Herman Jade RN  Outcome: Progressing     Problem: Pain  Goal: Verbalizes/displays adequate comfort level or baseline comfort level  11/14/2023 0835 by Malika Lilly RN  Outcome: Progressing  11/13/2023 2344 by Herman Jade RN  Outcome: Progressing     Problem: Safety - Adult  Goal: Free from fall injury  11/14/2023 0835 by Malika Lilly RN  Outcome: Progressing  11/13/2023 2344 by Herman Jade RN  Outcome: Progressing No

## 2024-03-24 NOTE — ED STATDOCS - NSFOLLOWUPINSTRUCTIONS_ED_ALL_ED_FT
REMOVE THE BANDAGE IN 3 DAYS. FOLLOW UP WITH YOUR DOCTOR THIS WEEK. CALL THE OFFICE TO MAKE AN APPOINTMENT. RETURN TO ER FOR ANY WORSENING SYMPTOMS OR NEW CONCERNS.    Skin Tear    WHAT YOU NEED TO KNOW:    What do I need to know about a skin tear? A skin tear occurs when the layers of weakened skin split open from an injury. It is important to treat and prevent skin tears to prevent infection.    What increases my risk for a skin tear?     and elderly ages    Chronic or critical illness    Long-term use of steroids  How is a skin tear treated? Treatment may include any of the following:    Medicines may be given to decrease pain or treat a bacterial infection.    Bandages such as moist gauze pads or wraps may be placed on your wound. Bandages will help protect your wound from more injury, and allow your wound to heal. Do not use adhesive bandages. These could stick to your wound and make your skin tear worse.    Stitches or medical glue may be used to close the wound so it can heal.    Debridement is removal of dead, damaged, or infected skin to help your wound heal correctly.  How can I help prevent a skin tear?    Clean, moisturize, and protect your skin. Baths, hot showers, and soap can dry your skin and increase your risk for skin tears. Take lukewarm showers, use mild soap as directed, and gently pat your skin dry. Use lotion to keep your skin moist after you shower. Wear long sleeves, pants, and protective footwear.    Move carefully. Ask for help if you cannot lift yourself. Do not drag your skin when you move.    Keep your home safe. Cover sharp corners, keep your pathways clear, and turn on lights so you can see clearly. Ask for more information if you have questions about home safety.    Drink liquids as directed. Ask your provider how much liquid to drink each day and which liquids are best for you. Liquids will help keep your skin moist and protected from another skin tear.    Eat high-protein foods to help with wound healing. Examples are lean meats, fish, low-fat dairy products, and beans.  When should I call my doctor?    You have a fever or chills.    Blood soaks through your bandage.    You have redness, swelling, pus, or a bad odor coming from your wound.    You have severe pain.    Your wound tears open again.    You have questions or concerns about your condition or care.  CARE AGREEMENT:    You have the right to help plan your care. Learn about your health condition and how it may be treated. Discuss treatment options with your healthcare providers to decide what care you want to receive. You always have the right to refuse treatment.    © Cris SHOOK L.P. 2024

## 2024-03-24 NOTE — ED ADULT NURSE NOTE - OBJECTIVE STATEMENT
Pt presented to ED after fall on carpet after tripping on shoes. Pt is on Xarelto. Pt presented with dressing and it was saturated.

## 2024-03-28 ENCOUNTER — APPOINTMENT (OUTPATIENT)
Dept: CT IMAGING | Facility: CLINIC | Age: 79
End: 2024-03-28
Payer: MEDICARE

## 2024-03-28 ENCOUNTER — RESULT REVIEW (OUTPATIENT)
Age: 79
End: 2024-03-28

## 2024-03-28 ENCOUNTER — OUTPATIENT (OUTPATIENT)
Dept: OUTPATIENT SERVICES | Facility: HOSPITAL | Age: 79
LOS: 1 days | End: 2024-03-28
Payer: MEDICARE

## 2024-03-28 DIAGNOSIS — I23.6 THROMBOSIS OF ATRIUM, AURICULAR APPENDAGE, AND VENTRICLE AS CURRENT COMPLICATIONS FOLLOWING ACUTE MYOCARDIAL INFARCTION: ICD-10-CM

## 2024-03-28 DIAGNOSIS — Z98.890 OTHER SPECIFIED POSTPROCEDURAL STATES: Chronic | ICD-10-CM

## 2024-03-28 DIAGNOSIS — Z00.8 ENCOUNTER FOR OTHER GENERAL EXAMINATION: ICD-10-CM

## 2024-03-28 PROCEDURE — 75572 CT HRT W/3D IMAGE: CPT | Mod: 26,MH

## 2024-03-28 PROCEDURE — 75572 CT HRT W/3D IMAGE: CPT | Mod: MH

## 2024-04-09 ENCOUNTER — APPOINTMENT (OUTPATIENT)
Dept: DERMATOLOGY | Facility: CLINIC | Age: 79
End: 2024-04-09
Payer: MEDICARE

## 2024-04-09 DIAGNOSIS — L82.1 OTHER SEBORRHEIC KERATOSIS: ICD-10-CM

## 2024-04-09 DIAGNOSIS — L81.4 OTHER MELANIN HYPERPIGMENTATION: ICD-10-CM

## 2024-04-09 DIAGNOSIS — L57.0 ACTINIC KERATOSIS: ICD-10-CM

## 2024-04-09 DIAGNOSIS — Z12.83 ENCOUNTER FOR SCREENING FOR MALIGNANT NEOPLASM OF SKIN: ICD-10-CM

## 2024-04-09 DIAGNOSIS — D48.5 NEOPLASM OF UNCERTAIN BEHAVIOR OF SKIN: ICD-10-CM

## 2024-04-09 PROCEDURE — 99213 OFFICE O/P EST LOW 20 MIN: CPT | Mod: 25

## 2024-04-09 PROCEDURE — 17000 DESTRUCT PREMALG LESION: CPT | Mod: 59

## 2024-04-09 PROCEDURE — 11102 TANGNTL BX SKIN SINGLE LES: CPT | Mod: 59

## 2024-04-09 NOTE — ASSESSMENT
[FreeTextEntry1] : Neoplasm of uncertain behavior x1, R shin - Rule out hemorrhagic crusted erosion vs. melanoma - Recommend tangential shave biopsy for definitive diagnosis.   - After informed consent was obtained, using Hibiclens for cleansing and 1% Lidocaine with epinephrine for anesthetic, with sterile technique a shave biopsy was used to obtain a biopsy specimen of the lesion. Hemostasis was obtained with aluminum chloride.  Vaseline was applied, and wound care instructions provided. The specimen was labeled and sent to pathology for evaluation. The procedure was well tolerated without complication.   - The patient will be contacted with biopsy results  Actinic Keratosis x1, R forearm - Patient was verbally consented and the lesions identified above were treated with liquid nitrogen freeze, thaw, freeze x 10 seconds each cycle x 2. Side effects include blister formation, hypopigmentation, and scarring. The patient tolerated the procedure well.  Wound care instructions, care of a blister with vaseline, signs and symptoms of infection were discussed in full.  The patient denies any questions at this time.  Solar lentigines - Discussed etiology and benign nature of condition - Sun protective measures reinforced. Recommended OTC sunscreen products, including SPF30+ with broadband UV protection as well as proper use.  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  Recommended OTC sunscreen products (EltaMD/Neutrogena/La Roche Posay), including SPF30+ with broadband UV protection as well as proper use.  Discussed OTC sun protective clothing - Counseled patient to monitor for changes, including mole monitoring and self-skin exams

## 2024-04-09 NOTE — HISTORY OF PRESENT ILLNESS
[FreeTextEntry1] : spots [de-identified] : Ms. BASILIO VALENTINE is a 78 year old F here for evaluation of below s/p traumatic fall 3 weeks ago and L knee fracture  FBSE.  Spots scattered on body x years. Asymptomatic and unchanged. No alleviating/aggravating factors. Never been treated.  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

## 2024-04-09 NOTE — PHYSICAL EXAM
[Alert] : alert [Oriented x 3] : ~L oriented x 3 [Well Nourished] : well nourished [Conjunctiva Non-injected] : conjunctiva non-injected [No Visual Lymphadenopathy] : no visual  lymphadenopathy [No Clubbing] : no clubbing [No Bromhidrosis] : no bromhidrosis [No Chromhidrosis] : no chromhidrosis [Full Body Skin Exam Performed] : performed [FreeTextEntry3] : General: Alert and oriented, in NAD.  All of the following were examined and were within normal limits, except as noted:   Scalp: Face, including eyelids, nose, lips, ears, oropharynx: Neck: Chest/Back/Abdomen: Bilateral Arms/Hands: Bilateral Legs/Feet: Buttocks, Genitalia, Anus/perineum:  	 Hair, Nails, Oral Mucosa, Eyes:   Unable to examine toenails due to polish  *crusted black papule with underlying erythema R shin pink gritty papule R wrist - firm mobile SQ nodule on crown of scalp - numerous brown macules and patches on face, torso, arms, legs - stuckon waxy brown papules - WHSS without recurrence on left lower leg (shin); background varicosities on BLE - R forearm with traumatic scar (well healed) with overlying milia L knee in wraps; distal LLE with swelling

## 2024-04-16 ENCOUNTER — NON-APPOINTMENT (OUTPATIENT)
Age: 79
End: 2024-04-16

## 2024-04-19 ENCOUNTER — APPOINTMENT (OUTPATIENT)
Dept: CARDIOTHORACIC SURGERY | Facility: HOSPITAL | Age: 79
End: 2024-04-19

## 2024-04-23 LAB — CORE LAB BIOPSY: NORMAL

## 2024-06-12 DIAGNOSIS — K05.10 CHRONIC GINGIVITIS, PLAQUE INDUCED: ICD-10-CM

## 2024-06-12 RX ORDER — AMOXICILLIN AND CLAVULANATE POTASSIUM 500; 125 MG/1; MG/1
500-125 TABLET, FILM COATED ORAL
Qty: 14 | Refills: 0 | Status: COMPLETED | COMMUNITY
Start: 2024-03-01 | End: 2024-06-12

## 2024-06-12 RX ORDER — ALPRAZOLAM 0.5 MG/1
0.5 TABLET ORAL
Qty: 16 | Refills: 0 | Status: COMPLETED | COMMUNITY
Start: 2022-01-27 | End: 2024-06-12

## 2024-06-12 RX ORDER — BENZOCAINE/BENZALKONIUM/ALOE/E 5 %-0.13 %
10 CREAM (GRAM) TOPICAL
Refills: 0 | Status: ACTIVE | COMMUNITY

## 2024-06-12 RX ORDER — FLUCONAZOLE 150 MG/1
150 TABLET ORAL DAILY
Qty: 1 | Refills: 0 | Status: COMPLETED | COMMUNITY
Start: 2024-03-20 | End: 2024-06-12

## 2024-06-12 RX ORDER — DOXYCYCLINE HYCLATE 20 MG/1
20 TABLET ORAL
Refills: 0 | Status: ACTIVE | COMMUNITY

## 2024-06-21 ENCOUNTER — OUTPATIENT (OUTPATIENT)
Dept: OUTPATIENT SERVICES | Facility: HOSPITAL | Age: 79
LOS: 1 days | End: 2024-06-21
Payer: MEDICARE

## 2024-06-21 VITALS
OXYGEN SATURATION: 98 % | HEART RATE: 62 BPM | TEMPERATURE: 97 F | SYSTOLIC BLOOD PRESSURE: 120 MMHG | WEIGHT: 147.71 LBS | DIASTOLIC BLOOD PRESSURE: 70 MMHG | RESPIRATION RATE: 18 BRPM | HEIGHT: 64 IN

## 2024-06-21 DIAGNOSIS — Z86.79 PERSONAL HISTORY OF OTHER DISEASES OF THE CIRCULATORY SYSTEM: ICD-10-CM

## 2024-06-21 DIAGNOSIS — Z29.9 ENCOUNTER FOR PROPHYLACTIC MEASURES, UNSPECIFIED: ICD-10-CM

## 2024-06-21 DIAGNOSIS — I35.0 NONRHEUMATIC AORTIC (VALVE) STENOSIS: ICD-10-CM

## 2024-06-21 DIAGNOSIS — Z98.890 OTHER SPECIFIED POSTPROCEDURAL STATES: Chronic | ICD-10-CM

## 2024-06-21 DIAGNOSIS — Z01.818 ENCOUNTER FOR OTHER PREPROCEDURAL EXAMINATION: ICD-10-CM

## 2024-06-21 DIAGNOSIS — I48.91 UNSPECIFIED ATRIAL FIBRILLATION: ICD-10-CM

## 2024-06-21 DIAGNOSIS — I10 ESSENTIAL (PRIMARY) HYPERTENSION: ICD-10-CM

## 2024-06-21 LAB
A1C WITH ESTIMATED AVERAGE GLUCOSE RESULT: 5.8 % — HIGH (ref 4–5.6)
ALBUMIN SERPL ELPH-MCNC: 3.7 G/DL — SIGNIFICANT CHANGE UP (ref 3.3–5.2)
ALP SERPL-CCNC: 125 U/L — HIGH (ref 40–120)
ALT FLD-CCNC: 15 U/L — SIGNIFICANT CHANGE UP
ANION GAP SERPL CALC-SCNC: 14 MMOL/L — SIGNIFICANT CHANGE UP (ref 5–17)
APPEARANCE UR: CLEAR — SIGNIFICANT CHANGE UP
APTT BLD: 38.2 SEC — HIGH (ref 24.5–35.6)
AST SERPL-CCNC: 20 U/L — SIGNIFICANT CHANGE UP
BASOPHILS # BLD AUTO: 0.08 K/UL — SIGNIFICANT CHANGE UP (ref 0–0.2)
BASOPHILS NFR BLD AUTO: 1.4 % — SIGNIFICANT CHANGE UP (ref 0–2)
BILIRUB SERPL-MCNC: 0.6 MG/DL — SIGNIFICANT CHANGE UP (ref 0.4–2)
BILIRUB UR-MCNC: NEGATIVE — SIGNIFICANT CHANGE UP
BLD GP AB SCN SERPL QL: SIGNIFICANT CHANGE UP
BUN SERPL-MCNC: 31.7 MG/DL — HIGH (ref 8–20)
CALCIUM SERPL-MCNC: 9.1 MG/DL — SIGNIFICANT CHANGE UP (ref 8.4–10.5)
CHLORIDE SERPL-SCNC: 104 MMOL/L — SIGNIFICANT CHANGE UP (ref 96–108)
CO2 SERPL-SCNC: 23 MMOL/L — SIGNIFICANT CHANGE UP (ref 22–29)
COLOR SPEC: YELLOW — SIGNIFICANT CHANGE UP
CREAT SERPL-MCNC: 1.07 MG/DL — SIGNIFICANT CHANGE UP (ref 0.5–1.3)
DIFF PNL FLD: NEGATIVE — SIGNIFICANT CHANGE UP
EGFR: 53 ML/MIN/1.73M2 — LOW
EOSINOPHIL # BLD AUTO: 0.25 K/UL — SIGNIFICANT CHANGE UP (ref 0–0.5)
EOSINOPHIL NFR BLD AUTO: 4.4 % — SIGNIFICANT CHANGE UP (ref 0–6)
ESTIMATED AVERAGE GLUCOSE: 120 MG/DL — HIGH (ref 68–114)
GLUCOSE SERPL-MCNC: 88 MG/DL — SIGNIFICANT CHANGE UP (ref 70–99)
GLUCOSE UR QL: NEGATIVE MG/DL — SIGNIFICANT CHANGE UP
HCT VFR BLD CALC: 29.9 % — LOW (ref 34.5–45)
HGB BLD-MCNC: 9.2 G/DL — LOW (ref 11.5–15.5)
IMM GRANULOCYTES NFR BLD AUTO: 0.4 % — SIGNIFICANT CHANGE UP (ref 0–0.9)
INR BLD: 1.63 RATIO — HIGH (ref 0.85–1.18)
KETONES UR-MCNC: NEGATIVE MG/DL — SIGNIFICANT CHANGE UP
LEUKOCYTE ESTERASE UR-ACNC: NEGATIVE — SIGNIFICANT CHANGE UP
LYMPHOCYTES # BLD AUTO: 1.42 K/UL — SIGNIFICANT CHANGE UP (ref 1–3.3)
LYMPHOCYTES # BLD AUTO: 25.1 % — SIGNIFICANT CHANGE UP (ref 13–44)
MAGNESIUM SERPL-MCNC: 1.9 MG/DL — SIGNIFICANT CHANGE UP (ref 1.8–2.6)
MCHC RBC-ENTMCNC: 30.3 PG — SIGNIFICANT CHANGE UP (ref 27–34)
MCHC RBC-ENTMCNC: 30.8 GM/DL — LOW (ref 32–36)
MCV RBC AUTO: 98.4 FL — SIGNIFICANT CHANGE UP (ref 80–100)
MONOCYTES # BLD AUTO: 0.77 K/UL — SIGNIFICANT CHANGE UP (ref 0–0.9)
MONOCYTES NFR BLD AUTO: 13.6 % — SIGNIFICANT CHANGE UP (ref 2–14)
MRSA PCR RESULT.: SIGNIFICANT CHANGE UP
NEUTROPHILS # BLD AUTO: 3.11 K/UL — SIGNIFICANT CHANGE UP (ref 1.8–7.4)
NEUTROPHILS NFR BLD AUTO: 55.1 % — SIGNIFICANT CHANGE UP (ref 43–77)
NITRITE UR-MCNC: NEGATIVE — SIGNIFICANT CHANGE UP
NT-PROBNP SERPL-SCNC: 862 PG/ML — HIGH (ref 0–300)
PH UR: 5.5 — SIGNIFICANT CHANGE UP (ref 5–8)
PLATELET # BLD AUTO: 166 K/UL — SIGNIFICANT CHANGE UP (ref 150–400)
POTASSIUM SERPL-MCNC: 4.2 MMOL/L — SIGNIFICANT CHANGE UP (ref 3.5–5.3)
POTASSIUM SERPL-SCNC: 4.2 MMOL/L — SIGNIFICANT CHANGE UP (ref 3.5–5.3)
PREALB SERPL-MCNC: 18 MG/DL — SIGNIFICANT CHANGE UP (ref 18–38)
PROT SERPL-MCNC: 6.7 G/DL — SIGNIFICANT CHANGE UP (ref 6.6–8.7)
PROT UR-MCNC: SIGNIFICANT CHANGE UP MG/DL
PROTHROM AB SERPL-ACNC: 17.8 SEC — HIGH (ref 9.5–13)
RBC # BLD: 3.04 M/UL — LOW (ref 3.8–5.2)
RBC # FLD: 14.8 % — HIGH (ref 10.3–14.5)
S AUREUS DNA NOSE QL NAA+PROBE: SIGNIFICANT CHANGE UP
SODIUM SERPL-SCNC: 140 MMOL/L — SIGNIFICANT CHANGE UP (ref 135–145)
SP GR SPEC: 1.02 — SIGNIFICANT CHANGE UP (ref 1–1.03)
T3 SERPL-MCNC: 129 NG/DL — SIGNIFICANT CHANGE UP (ref 80–200)
T4 AB SER-ACNC: 8.1 UG/DL — SIGNIFICANT CHANGE UP (ref 4.5–12)
TSH SERPL-MCNC: 4.29 UIU/ML — HIGH (ref 0.27–4.2)
UROBILINOGEN FLD QL: 1 MG/DL — SIGNIFICANT CHANGE UP (ref 0.2–1)
WBC # BLD: 5.65 K/UL — SIGNIFICANT CHANGE UP (ref 3.8–10.5)
WBC # FLD AUTO: 5.65 K/UL — SIGNIFICANT CHANGE UP (ref 3.8–10.5)

## 2024-06-21 PROCEDURE — G0463: CPT

## 2024-06-21 PROCEDURE — 93005 ELECTROCARDIOGRAM TRACING: CPT

## 2024-06-21 PROCEDURE — 84134 ASSAY OF PREALBUMIN: CPT

## 2024-06-21 PROCEDURE — 71046 X-RAY EXAM CHEST 2 VIEWS: CPT | Mod: 26

## 2024-06-21 PROCEDURE — 71046 X-RAY EXAM CHEST 2 VIEWS: CPT

## 2024-06-21 PROCEDURE — 80053 COMPREHEN METABOLIC PANEL: CPT

## 2024-06-21 PROCEDURE — 81003 URINALYSIS AUTO W/O SCOPE: CPT

## 2024-06-21 PROCEDURE — 84436 ASSAY OF TOTAL THYROXINE: CPT

## 2024-06-21 PROCEDURE — 85025 COMPLETE CBC W/AUTO DIFF WBC: CPT

## 2024-06-21 PROCEDURE — 86901 BLOOD TYPING SEROLOGIC RH(D): CPT

## 2024-06-21 PROCEDURE — 84443 ASSAY THYROID STIM HORMONE: CPT

## 2024-06-21 PROCEDURE — 83036 HEMOGLOBIN GLYCOSYLATED A1C: CPT

## 2024-06-21 PROCEDURE — 85610 PROTHROMBIN TIME: CPT

## 2024-06-21 PROCEDURE — 87641 MR-STAPH DNA AMP PROBE: CPT

## 2024-06-21 PROCEDURE — 86850 RBC ANTIBODY SCREEN: CPT

## 2024-06-21 PROCEDURE — 83735 ASSAY OF MAGNESIUM: CPT

## 2024-06-21 PROCEDURE — 87640 STAPH A DNA AMP PROBE: CPT

## 2024-06-21 PROCEDURE — 84480 ASSAY TRIIODOTHYRONINE (T3): CPT

## 2024-06-21 PROCEDURE — 83880 ASSAY OF NATRIURETIC PEPTIDE: CPT

## 2024-06-21 PROCEDURE — 93010 ELECTROCARDIOGRAM REPORT: CPT

## 2024-06-21 PROCEDURE — 87086 URINE CULTURE/COLONY COUNT: CPT

## 2024-06-21 PROCEDURE — 86900 BLOOD TYPING SEROLOGIC ABO: CPT

## 2024-06-21 PROCEDURE — 36415 COLL VENOUS BLD VENIPUNCTURE: CPT

## 2024-06-21 PROCEDURE — 85730 THROMBOPLASTIN TIME PARTIAL: CPT

## 2024-06-21 RX ORDER — LOSARTAN POTASSIUM 100 MG/1
0 TABLET, FILM COATED ORAL
Qty: 0 | Refills: 0 | DISCHARGE

## 2024-06-21 RX ORDER — UBIDECARENONE 100 MG
1 CAPSULE ORAL
Qty: 0 | Refills: 0 | DISCHARGE

## 2024-06-21 RX ORDER — CARVEDILOL PHOSPHATE 80 MG/1
1 CAPSULE, EXTENDED RELEASE ORAL
Qty: 0 | Refills: 0 | DISCHARGE

## 2024-06-21 RX ORDER — GABAPENTIN 400 MG/1
0 CAPSULE ORAL
Refills: 0 | DISCHARGE

## 2024-06-21 RX ORDER — ATORVASTATIN CALCIUM 80 MG/1
1 TABLET, FILM COATED ORAL
Qty: 0 | Refills: 0 | DISCHARGE

## 2024-06-21 RX ORDER — FUROSEMIDE 40 MG
1 TABLET ORAL
Qty: 0 | Refills: 0 | DISCHARGE

## 2024-06-21 RX ORDER — ESTROGENS, CONJUGATED 0.625 MG
0.62 TABLET ORAL
Qty: 0 | Refills: 0 | DISCHARGE

## 2024-06-21 RX ORDER — POTASSIUM CHLORIDE 20 MEQ
30 PACKET (EA) ORAL
Qty: 0 | Refills: 0 | DISCHARGE

## 2024-06-21 RX ORDER — HYDROCHLOROTHIAZIDE 25 MG
0 TABLET ORAL
Qty: 0 | Refills: 0 | DISCHARGE

## 2024-06-21 RX ORDER — WARFARIN SODIUM 2.5 MG/1
1.25 TABLET ORAL
Qty: 0 | Refills: 0 | DISCHARGE

## 2024-06-21 RX ORDER — WARFARIN SODIUM 2.5 MG/1
1 TABLET ORAL
Qty: 0 | Refills: 0 | DISCHARGE

## 2024-06-22 LAB
CULTURE RESULTS: SIGNIFICANT CHANGE UP
SPECIMEN SOURCE: SIGNIFICANT CHANGE UP

## 2024-06-28 ENCOUNTER — APPOINTMENT (OUTPATIENT)
Dept: DERMATOLOGY | Facility: CLINIC | Age: 79
End: 2024-06-28

## 2024-07-04 ENCOUNTER — INPATIENT (INPATIENT)
Facility: HOSPITAL | Age: 79
LOS: 1 days | Discharge: HOME HEALTH | DRG: 267 | End: 2024-07-06
Attending: THORACIC SURGERY (CARDIOTHORACIC VASCULAR SURGERY) | Admitting: THORACIC SURGERY (CARDIOTHORACIC VASCULAR SURGERY)
Payer: MEDICARE

## 2024-07-04 VITALS
TEMPERATURE: 98 F | DIASTOLIC BLOOD PRESSURE: 70 MMHG | OXYGEN SATURATION: 100 % | RESPIRATION RATE: 16 BRPM | HEART RATE: 79 BPM | SYSTOLIC BLOOD PRESSURE: 138 MMHG

## 2024-07-04 DIAGNOSIS — Z98.890 OTHER SPECIFIED POSTPROCEDURAL STATES: Chronic | ICD-10-CM

## 2024-07-04 DIAGNOSIS — I35.0 NONRHEUMATIC AORTIC (VALVE) STENOSIS: ICD-10-CM

## 2024-07-04 DIAGNOSIS — I51.3 INTRACARDIAC THROMBOSIS, NOT ELSEWHERE CLASSIFIED: ICD-10-CM

## 2024-07-04 LAB
ALBUMIN SERPL ELPH-MCNC: 3.7 G/DL — SIGNIFICANT CHANGE UP (ref 3.3–5.2)
ALP SERPL-CCNC: 141 U/L — HIGH (ref 40–120)
ALT FLD-CCNC: 13 U/L — SIGNIFICANT CHANGE UP
ANION GAP SERPL CALC-SCNC: 12 MMOL/L — SIGNIFICANT CHANGE UP (ref 5–17)
APTT BLD: 33.4 SEC — SIGNIFICANT CHANGE UP (ref 24.5–35.6)
APTT BLD: 77.8 SEC — HIGH (ref 24.5–35.6)
AST SERPL-CCNC: 19 U/L — SIGNIFICANT CHANGE UP
BASOPHILS # BLD AUTO: 0.07 K/UL — SIGNIFICANT CHANGE UP (ref 0–0.2)
BASOPHILS NFR BLD AUTO: 1.2 % — SIGNIFICANT CHANGE UP (ref 0–2)
BILIRUB SERPL-MCNC: 0.5 MG/DL — SIGNIFICANT CHANGE UP (ref 0.4–2)
BLD GP AB SCN SERPL QL: SIGNIFICANT CHANGE UP
BUN SERPL-MCNC: 28.9 MG/DL — HIGH (ref 8–20)
CALCIUM SERPL-MCNC: 9.2 MG/DL — SIGNIFICANT CHANGE UP (ref 8.4–10.5)
CHLORIDE SERPL-SCNC: 103 MMOL/L — SIGNIFICANT CHANGE UP (ref 96–108)
CO2 SERPL-SCNC: 24 MMOL/L — SIGNIFICANT CHANGE UP (ref 22–29)
CREAT SERPL-MCNC: 0.97 MG/DL — SIGNIFICANT CHANGE UP (ref 0.5–1.3)
EGFR: 59 ML/MIN/1.73M2 — LOW
EGFR: 59 ML/MIN/1.73M2 — LOW
EOSINOPHIL # BLD AUTO: 0.28 K/UL — SIGNIFICANT CHANGE UP (ref 0–0.5)
EOSINOPHIL NFR BLD AUTO: 4.9 % — SIGNIFICANT CHANGE UP (ref 0–6)
GLUCOSE SERPL-MCNC: 90 MG/DL — SIGNIFICANT CHANGE UP (ref 70–99)
HCT VFR BLD CALC: 29.3 % — LOW (ref 34.5–45)
HCT VFR BLD CALC: 30.2 % — LOW (ref 34.5–45)
HGB BLD-MCNC: 9.4 G/DL — LOW (ref 11.5–15.5)
HGB BLD-MCNC: 9.6 G/DL — LOW (ref 11.5–15.5)
IMM GRANULOCYTES NFR BLD AUTO: 0.3 % — SIGNIFICANT CHANGE UP (ref 0–0.9)
INR BLD: 1.05 RATIO — SIGNIFICANT CHANGE UP (ref 0.85–1.18)
LYMPHOCYTES # BLD AUTO: 1.4 K/UL — SIGNIFICANT CHANGE UP (ref 1–3.3)
LYMPHOCYTES # BLD AUTO: 24.3 % — SIGNIFICANT CHANGE UP (ref 13–44)
MCHC RBC-ENTMCNC: 30.6 PG — SIGNIFICANT CHANGE UP (ref 27–34)
MCHC RBC-ENTMCNC: 30.7 PG — SIGNIFICANT CHANGE UP (ref 27–34)
MCHC RBC-ENTMCNC: 31.8 GM/DL — LOW (ref 32–36)
MCHC RBC-ENTMCNC: 32.1 GM/DL — SIGNIFICANT CHANGE UP (ref 32–36)
MCV RBC AUTO: 95.4 FL — SIGNIFICANT CHANGE UP (ref 80–100)
MCV RBC AUTO: 96.5 FL — SIGNIFICANT CHANGE UP (ref 80–100)
MONOCYTES # BLD AUTO: 0.85 K/UL — SIGNIFICANT CHANGE UP (ref 0–0.9)
MONOCYTES NFR BLD AUTO: 14.8 % — HIGH (ref 2–14)
NEUTROPHILS # BLD AUTO: 3.13 K/UL — SIGNIFICANT CHANGE UP (ref 1.8–7.4)
NEUTROPHILS NFR BLD AUTO: 54.5 % — SIGNIFICANT CHANGE UP (ref 43–77)
NT-PROBNP SERPL-SCNC: 712 PG/ML — HIGH (ref 0–300)
PLATELET # BLD AUTO: 231 K/UL — SIGNIFICANT CHANGE UP (ref 150–400)
PLATELET # BLD AUTO: 238 K/UL — SIGNIFICANT CHANGE UP (ref 150–400)
POTASSIUM SERPL-MCNC: 4.1 MMOL/L — SIGNIFICANT CHANGE UP (ref 3.5–5.3)
POTASSIUM SERPL-SCNC: 4.1 MMOL/L — SIGNIFICANT CHANGE UP (ref 3.5–5.3)
PROT SERPL-MCNC: 6.7 G/DL — SIGNIFICANT CHANGE UP (ref 6.6–8.7)
PROTHROM AB SERPL-ACNC: 11.6 SEC — SIGNIFICANT CHANGE UP (ref 9.5–13)
RBC # BLD: 3.07 M/UL — LOW (ref 3.8–5.2)
RBC # BLD: 3.13 M/UL — LOW (ref 3.8–5.2)
RBC # FLD: 14.9 % — HIGH (ref 10.3–14.5)
RBC # FLD: 15.1 % — HIGH (ref 10.3–14.5)
SODIUM SERPL-SCNC: 138 MMOL/L — SIGNIFICANT CHANGE UP (ref 135–145)
WBC # BLD: 4.94 K/UL — SIGNIFICANT CHANGE UP (ref 3.8–10.5)
WBC # BLD: 5.75 K/UL — SIGNIFICANT CHANGE UP (ref 3.8–10.5)
WBC # FLD AUTO: 4.94 K/UL — SIGNIFICANT CHANGE UP (ref 3.8–10.5)
WBC # FLD AUTO: 5.75 K/UL — SIGNIFICANT CHANGE UP (ref 3.8–10.5)

## 2024-07-04 PROCEDURE — 99232 SBSQ HOSP IP/OBS MODERATE 35: CPT

## 2024-07-04 RX ORDER — GABAPENTIN 400 MG/1
300 CAPSULE ORAL THREE TIMES A DAY
Refills: 0 | Status: DISCONTINUED | OUTPATIENT
Start: 2024-07-04 | End: 2024-07-04

## 2024-07-04 RX ORDER — CEFUROXIME SODIUM 1.5 G
1500 VIAL (EA) INJECTION ONCE
Refills: 0 | Status: DISCONTINUED | OUTPATIENT
Start: 2024-07-05 | End: 2024-07-05

## 2024-07-04 RX ORDER — VANCOMYCIN HCL IN 5 % DEXTROSE 1.5G/250ML
1000 PLASTIC BAG, INJECTION (ML) INTRAVENOUS ONCE
Refills: 0 | Status: DISCONTINUED | OUTPATIENT
Start: 2024-07-05 | End: 2024-07-05

## 2024-07-04 RX ORDER — GABAPENTIN 400 MG/1
300 CAPSULE ORAL
Refills: 0 | Status: DISCONTINUED | OUTPATIENT
Start: 2024-07-05 | End: 2024-07-05

## 2024-07-04 RX ORDER — ATORVASTATIN CALCIUM 80 MG/1
10 TABLET, FILM COATED ORAL AT BEDTIME
Refills: 0 | Status: DISCONTINUED | OUTPATIENT
Start: 2024-07-04 | End: 2024-07-05

## 2024-07-04 RX ORDER — ACETAMINOPHEN 500 MG/5ML
975 LIQUID (ML) ORAL EVERY 6 HOURS
Refills: 0 | Status: DISCONTINUED | OUTPATIENT
Start: 2024-07-04 | End: 2024-07-05

## 2024-07-04 RX ORDER — HEPARIN SODIUM 1000 [USP'U]/ML
1000 INJECTION INTRAVENOUS; SUBCUTANEOUS
Qty: 25000 | Refills: 0 | Status: DISCONTINUED | OUTPATIENT
Start: 2024-07-04 | End: 2024-07-05

## 2024-07-04 RX ADMIN — Medication 15 MILLILITER(S): at 21:11

## 2024-07-04 RX ADMIN — GABAPENTIN 300 MILLIGRAM(S): 400 CAPSULE ORAL at 17:15

## 2024-07-04 RX ADMIN — Medication 3 MILLILITER(S): at 13:09

## 2024-07-04 RX ADMIN — Medication 1 APPLICATION(S): at 21:13

## 2024-07-04 RX ADMIN — Medication 3 MILLILITER(S): at 20:41

## 2024-07-04 RX ADMIN — HEPARIN SODIUM 10 UNIT(S)/HR: 1000 INJECTION INTRAVENOUS; SUBCUTANEOUS at 21:14

## 2024-07-04 RX ADMIN — Medication 975 MILLIGRAM(S): at 23:07

## 2024-07-04 RX ADMIN — Medication 975 MILLIGRAM(S): at 22:01

## 2024-07-04 RX ADMIN — HEPARIN SODIUM 10 UNIT(S)/HR: 1000 INJECTION INTRAVENOUS; SUBCUTANEOUS at 19:29

## 2024-07-04 RX ADMIN — HEPARIN SODIUM 10 UNIT(S)/HR: 1000 INJECTION INTRAVENOUS; SUBCUTANEOUS at 14:36

## 2024-07-05 ENCOUNTER — RESULT REVIEW (OUTPATIENT)
Age: 79
End: 2024-07-05

## 2024-07-05 ENCOUNTER — APPOINTMENT (OUTPATIENT)
Dept: CARDIOTHORACIC SURGERY | Facility: HOSPITAL | Age: 79
End: 2024-07-05

## 2024-07-05 ENCOUNTER — TRANSCRIPTION ENCOUNTER (OUTPATIENT)
Age: 79
End: 2024-07-05

## 2024-07-05 ENCOUNTER — APPOINTMENT (OUTPATIENT)
Dept: CARDIOTHORACIC SURGERY | Facility: CLINIC | Age: 79
End: 2024-07-05
Payer: MEDICARE

## 2024-07-05 PROBLEM — I35.0 NONRHEUMATIC AORTIC (VALVE) STENOSIS: Chronic | Status: ACTIVE | Noted: 2024-06-21

## 2024-07-05 LAB
ALBUMIN SERPL ELPH-MCNC: 3.8 G/DL — SIGNIFICANT CHANGE UP (ref 3.3–5.2)
ALP SERPL-CCNC: 147 U/L — HIGH (ref 40–120)
ALT FLD-CCNC: 13 U/L — SIGNIFICANT CHANGE UP
ANION GAP SERPL CALC-SCNC: 14 MMOL/L — SIGNIFICANT CHANGE UP (ref 5–17)
ANION GAP SERPL CALC-SCNC: 14 MMOL/L — SIGNIFICANT CHANGE UP (ref 5–17)
APTT BLD: 37.9 SEC — HIGH (ref 24.5–35.6)
AST SERPL-CCNC: 20 U/L — SIGNIFICANT CHANGE UP
BASE EXCESS BLDA CALC-SCNC: -0.8 MMOL/L — SIGNIFICANT CHANGE UP (ref -2–3)
BASE EXCESS BLDA CALC-SCNC: -1.1 MMOL/L — SIGNIFICANT CHANGE UP (ref -2–3)
BASOPHILS # BLD AUTO: 0.03 K/UL — SIGNIFICANT CHANGE UP (ref 0–0.2)
BASOPHILS # BLD AUTO: 0.06 K/UL — SIGNIFICANT CHANGE UP (ref 0–0.2)
BASOPHILS NFR BLD AUTO: 0.5 % — SIGNIFICANT CHANGE UP (ref 0–2)
BASOPHILS NFR BLD AUTO: 1.2 % — SIGNIFICANT CHANGE UP (ref 0–2)
BILIRUB SERPL-MCNC: 0.5 MG/DL — SIGNIFICANT CHANGE UP (ref 0.4–2)
BUN SERPL-MCNC: 22 MG/DL — HIGH (ref 8–20)
BUN SERPL-MCNC: 25.6 MG/DL — HIGH (ref 8–20)
CA-I BLDA-SCNC: 1.15 MMOL/L — SIGNIFICANT CHANGE UP (ref 1.15–1.33)
CA-I BLDA-SCNC: 1.21 MMOL/L — SIGNIFICANT CHANGE UP (ref 1.15–1.33)
CALCIUM SERPL-MCNC: 8.7 MG/DL — SIGNIFICANT CHANGE UP (ref 8.4–10.5)
CALCIUM SERPL-MCNC: 9.2 MG/DL — SIGNIFICANT CHANGE UP (ref 8.4–10.5)
CHLORIDE BLDA-SCNC: 104 MMOL/L — SIGNIFICANT CHANGE UP (ref 96–108)
CHLORIDE SERPL-SCNC: 101 MMOL/L — SIGNIFICANT CHANGE UP (ref 96–108)
CHLORIDE SERPL-SCNC: 102 MMOL/L — SIGNIFICANT CHANGE UP (ref 96–108)
CK SERPL-CCNC: 35 U/L — SIGNIFICANT CHANGE UP (ref 25–170)
CO2 SERPL-SCNC: 19 MMOL/L — LOW (ref 22–29)
CO2 SERPL-SCNC: 23 MMOL/L — SIGNIFICANT CHANGE UP (ref 22–29)
COHGB MFR BLDA: 1.1 % — SIGNIFICANT CHANGE UP
COHGB MFR BLDA: 1.3 % — SIGNIFICANT CHANGE UP
CREAT SERPL-MCNC: 0.89 MG/DL — SIGNIFICANT CHANGE UP (ref 0.5–1.3)
CREAT SERPL-MCNC: 0.93 MG/DL — SIGNIFICANT CHANGE UP (ref 0.5–1.3)
EGFR: 63 ML/MIN/1.73M2 — SIGNIFICANT CHANGE UP
EGFR: 63 ML/MIN/1.73M2 — SIGNIFICANT CHANGE UP
EGFR: 66 ML/MIN/1.73M2 — SIGNIFICANT CHANGE UP
EGFR: 66 ML/MIN/1.73M2 — SIGNIFICANT CHANGE UP
EOSINOPHIL # BLD AUTO: 0.15 K/UL — SIGNIFICANT CHANGE UP (ref 0–0.5)
EOSINOPHIL # BLD AUTO: 0.29 K/UL — SIGNIFICANT CHANGE UP (ref 0–0.5)
EOSINOPHIL NFR BLD AUTO: 2.4 % — SIGNIFICANT CHANGE UP (ref 0–6)
EOSINOPHIL NFR BLD AUTO: 6 % — SIGNIFICANT CHANGE UP (ref 0–6)
GAS PNL BLDA: SIGNIFICANT CHANGE UP
GLUCOSE BLDA-MCNC: 91 MG/DL — SIGNIFICANT CHANGE UP (ref 70–99)
GLUCOSE BLDA-MCNC: 95 MG/DL — SIGNIFICANT CHANGE UP (ref 70–99)
GLUCOSE SERPL-MCNC: 193 MG/DL — HIGH (ref 70–99)
GLUCOSE SERPL-MCNC: 93 MG/DL — SIGNIFICANT CHANGE UP (ref 70–99)
HCO3 BLDA-SCNC: 24 MMOL/L — SIGNIFICANT CHANGE UP (ref 21–28)
HCO3 BLDA-SCNC: 24 MMOL/L — SIGNIFICANT CHANGE UP (ref 21–28)
HCT VFR BLD CALC: 25.7 % — LOW (ref 34.5–45)
HCT VFR BLD CALC: 28.5 % — LOW (ref 34.5–45)
HCT VFR BLD CALC: 30.2 % — LOW (ref 34.5–45)
HCT VFR BLDA CALC: 25 % — SIGNIFICANT CHANGE UP
HCT VFR BLDA CALC: 29 % — SIGNIFICANT CHANGE UP
HGB BLD-MCNC: 8.4 G/DL — LOW (ref 11.5–15.5)
HGB BLD-MCNC: 9.2 G/DL — LOW (ref 11.5–15.5)
HGB BLD-MCNC: 9.8 G/DL — LOW (ref 11.5–15.5)
HGB BLDA-MCNC: 8.3 G/DL — LOW (ref 11.7–16.1)
HGB BLDA-MCNC: 9.6 G/DL — LOW (ref 11.7–16.1)
IMM GRANULOCYTES NFR BLD AUTO: 0.2 % — SIGNIFICANT CHANGE UP (ref 0–0.9)
IMM GRANULOCYTES NFR BLD AUTO: 1 % — HIGH (ref 0–0.9)
INR BLD: 1.09 RATIO — SIGNIFICANT CHANGE UP (ref 0.85–1.18)
LACTATE BLDA-MCNC: 0.7 MMOL/L — SIGNIFICANT CHANGE UP (ref 0.5–2)
LACTATE BLDA-MCNC: 1.1 MMOL/L — SIGNIFICANT CHANGE UP (ref 0.5–2)
LYMPHOCYTES # BLD AUTO: 0.58 K/UL — LOW (ref 1–3.3)
LYMPHOCYTES # BLD AUTO: 1.66 K/UL — SIGNIFICANT CHANGE UP (ref 1–3.3)
LYMPHOCYTES # BLD AUTO: 34.4 % — SIGNIFICANT CHANGE UP (ref 13–44)
LYMPHOCYTES # BLD AUTO: 9.3 % — LOW (ref 13–44)
MAGNESIUM SERPL-MCNC: 1.4 MG/DL — LOW (ref 1.6–2.6)
MAGNESIUM SERPL-MCNC: 1.5 MG/DL — LOW (ref 1.6–2.6)
MAGNESIUM SERPL-MCNC: 1.7 MG/DL — SIGNIFICANT CHANGE UP (ref 1.6–2.6)
MCHC RBC-ENTMCNC: 30.3 PG — SIGNIFICANT CHANGE UP (ref 27–34)
MCHC RBC-ENTMCNC: 30.8 PG — SIGNIFICANT CHANGE UP (ref 27–34)
MCHC RBC-ENTMCNC: 30.9 PG — SIGNIFICANT CHANGE UP (ref 27–34)
MCHC RBC-ENTMCNC: 32.3 GM/DL — SIGNIFICANT CHANGE UP (ref 32–36)
MCHC RBC-ENTMCNC: 32.5 GM/DL — SIGNIFICANT CHANGE UP (ref 32–36)
MCHC RBC-ENTMCNC: 32.7 GM/DL — SIGNIFICANT CHANGE UP (ref 32–36)
MCV RBC AUTO: 93.8 FL — SIGNIFICANT CHANGE UP (ref 80–100)
MCV RBC AUTO: 94.5 FL — SIGNIFICANT CHANGE UP (ref 80–100)
MCV RBC AUTO: 95 FL — SIGNIFICANT CHANGE UP (ref 80–100)
METHGB MFR BLDA: 0.9 % — SIGNIFICANT CHANGE UP
METHGB MFR BLDA: 0.9 % — SIGNIFICANT CHANGE UP
MONOCYTES # BLD AUTO: 0.27 K/UL — SIGNIFICANT CHANGE UP (ref 0–0.9)
MONOCYTES # BLD AUTO: 0.82 K/UL — SIGNIFICANT CHANGE UP (ref 0–0.9)
MONOCYTES NFR BLD AUTO: 17 % — HIGH (ref 2–14)
MONOCYTES NFR BLD AUTO: 4.3 % — SIGNIFICANT CHANGE UP (ref 2–14)
NEUTROPHILS # BLD AUTO: 1.98 K/UL — SIGNIFICANT CHANGE UP (ref 1.8–7.4)
NEUTROPHILS # BLD AUTO: 5.12 K/UL — SIGNIFICANT CHANGE UP (ref 1.8–7.4)
NEUTROPHILS NFR BLD AUTO: 41.2 % — LOW (ref 43–77)
NEUTROPHILS NFR BLD AUTO: 82.5 % — HIGH (ref 43–77)
OXYHGB MFR BLDA: 98 % — HIGH (ref 90–95)
OXYHGB MFR BLDA: 98 % — HIGH (ref 90–95)
PCO2 BLDA: 37 MMHG — SIGNIFICANT CHANGE UP (ref 32–45)
PCO2 BLDA: 41 MMHG — SIGNIFICANT CHANGE UP (ref 32–45)
PH BLDA: 7.38 — SIGNIFICANT CHANGE UP (ref 7.35–7.45)
PH BLDA: 7.41 — SIGNIFICANT CHANGE UP (ref 7.35–7.45)
PLATELET # BLD AUTO: 180 K/UL — SIGNIFICANT CHANGE UP (ref 150–400)
PLATELET # BLD AUTO: 201 K/UL — SIGNIFICANT CHANGE UP (ref 150–400)
PLATELET # BLD AUTO: 248 K/UL — SIGNIFICANT CHANGE UP (ref 150–400)
PO2 BLDA: 421 MMHG — HIGH (ref 83–108)
PO2 BLDA: >496 MMHG — HIGH (ref 83–108)
POTASSIUM BLDA-SCNC: 3.3 MMOL/L — LOW (ref 3.5–5.1)
POTASSIUM BLDA-SCNC: 3.5 MMOL/L — SIGNIFICANT CHANGE UP (ref 3.5–5.1)
POTASSIUM SERPL-MCNC: 3.9 MMOL/L — SIGNIFICANT CHANGE UP (ref 3.5–5.3)
POTASSIUM SERPL-MCNC: 5.2 MMOL/L — SIGNIFICANT CHANGE UP (ref 3.5–5.3)
POTASSIUM SERPL-SCNC: 3.9 MMOL/L — SIGNIFICANT CHANGE UP (ref 3.5–5.3)
POTASSIUM SERPL-SCNC: 5.2 MMOL/L — SIGNIFICANT CHANGE UP (ref 3.5–5.3)
PROT SERPL-MCNC: 6.7 G/DL — SIGNIFICANT CHANGE UP (ref 6.6–8.7)
PROTHROM AB SERPL-ACNC: 12.1 SEC — SIGNIFICANT CHANGE UP (ref 9.5–13)
RBC # BLD: 2.72 M/UL — LOW (ref 3.8–5.2)
RBC # BLD: 3.04 M/UL — LOW (ref 3.8–5.2)
RBC # BLD: 3.18 M/UL — LOW (ref 3.8–5.2)
RBC # FLD: 14.8 % — HIGH (ref 10.3–14.5)
RBC # FLD: 15 % — HIGH (ref 10.3–14.5)
RBC # FLD: 15.1 % — HIGH (ref 10.3–14.5)
SAO2 % BLDA: 100 % — HIGH (ref 94–98)
SAO2 % BLDA: 100 % — HIGH (ref 94–98)
SODIUM BLDA-SCNC: 133 MMOL/L — LOW (ref 136–145)
SODIUM BLDA-SCNC: 134 MMOL/L — LOW (ref 136–145)
SODIUM SERPL-SCNC: 135 MMOL/L — SIGNIFICANT CHANGE UP (ref 135–145)
SODIUM SERPL-SCNC: 138 MMOL/L — SIGNIFICANT CHANGE UP (ref 135–145)
TROPONIN T, HIGH SENSITIVITY RESULT: 42 NG/L — SIGNIFICANT CHANGE UP (ref 0–51)
WBC # BLD: 4.82 K/UL — SIGNIFICANT CHANGE UP (ref 3.8–10.5)
WBC # BLD: 6.14 K/UL — SIGNIFICANT CHANGE UP (ref 3.8–10.5)
WBC # BLD: 6.21 K/UL — SIGNIFICANT CHANGE UP (ref 3.8–10.5)
WBC # FLD AUTO: 4.82 K/UL — SIGNIFICANT CHANGE UP (ref 3.8–10.5)
WBC # FLD AUTO: 6.14 K/UL — SIGNIFICANT CHANGE UP (ref 3.8–10.5)
WBC # FLD AUTO: 6.21 K/UL — SIGNIFICANT CHANGE UP (ref 3.8–10.5)

## 2024-07-05 PROCEDURE — 71045 X-RAY EXAM CHEST 1 VIEW: CPT | Mod: 26

## 2024-07-05 PROCEDURE — MCOT1: CPT | Mod: NC

## 2024-07-05 PROCEDURE — 93010 ELECTROCARDIOGRAM REPORT: CPT

## 2024-07-05 PROCEDURE — 33361 REPLACE AORTIC VALVE PERQ: CPT | Mod: 62,Q0

## 2024-07-05 PROCEDURE — 93306 TTE W/DOPPLER COMPLETE: CPT | Mod: 26

## 2024-07-05 DEVICE — VLV TRANS CATH W/COMM SYS SAPIEN 3 ULTRA 23MM: Type: IMPLANTABLE DEVICE | Status: FUNCTIONAL

## 2024-07-05 DEVICE — KIT A-LINE 1LUM 20G X 12CM SAFE KIT: Type: IMPLANTABLE DEVICE | Status: FUNCTIONAL

## 2024-07-05 RX ORDER — ACETAMINOPHEN 500 MG/5ML
1000 LIQUID (ML) ORAL ONCE
Refills: 0 | Status: COMPLETED | OUTPATIENT
Start: 2024-07-05 | End: 2024-07-05

## 2024-07-05 RX ORDER — KETOROLAC TROMETHAMINE 30 MG/ML
15 INJECTION, SOLUTION INTRAMUSCULAR; INTRAVENOUS ONCE
Refills: 0 | Status: DISCONTINUED | OUTPATIENT
Start: 2024-07-05 | End: 2024-07-05

## 2024-07-05 RX ORDER — HYDROMORPHONE/SOD CHLOR,ISO/PF 2 MG/10 ML
0.25 SYRINGE (ML) INJECTION ONCE
Refills: 0 | Status: DISCONTINUED | OUTPATIENT
Start: 2024-07-05 | End: 2024-07-05

## 2024-07-05 RX ORDER — CEFUROXIME SODIUM 1.5 G
1500 VIAL (EA) INJECTION EVERY 8 HOURS
Refills: 0 | Status: DISCONTINUED | OUTPATIENT
Start: 2024-07-05 | End: 2024-07-06

## 2024-07-05 RX ORDER — MAGNESIUM SULFATE 500 MG/ML
2 SYRINGE (ML) INJECTION ONCE
Refills: 0 | Status: COMPLETED | OUTPATIENT
Start: 2024-07-05 | End: 2024-07-05

## 2024-07-05 RX ORDER — LOSARTAN POTASSIUM 100 MG/1
50 TABLET, FILM COATED ORAL DAILY
Refills: 0 | Status: DISCONTINUED | OUTPATIENT
Start: 2024-07-05 | End: 2024-07-06

## 2024-07-05 RX ORDER — GABAPENTIN 400 MG/1
300 CAPSULE ORAL THREE TIMES A DAY
Refills: 0 | Status: DISCONTINUED | OUTPATIENT
Start: 2024-07-05 | End: 2024-07-06

## 2024-07-05 RX ORDER — ASPIRIN 325 MG
81 TABLET ORAL DAILY
Refills: 0 | Status: DISCONTINUED | OUTPATIENT
Start: 2024-07-05 | End: 2024-07-06

## 2024-07-05 RX ORDER — ASPIRIN 325 MG
325 TABLET ORAL DAILY
Refills: 0 | Status: DISCONTINUED | OUTPATIENT
Start: 2024-07-05 | End: 2024-07-05

## 2024-07-05 RX ORDER — FUROSEMIDE 10 MG/ML
20 INJECTION INTRAMUSCULAR; INTRAVENOUS DAILY
Refills: 0 | Status: DISCONTINUED | OUTPATIENT
Start: 2024-07-05 | End: 2024-07-06

## 2024-07-05 RX ORDER — ATORVASTATIN CALCIUM 80 MG/1
10 TABLET, FILM COATED ORAL AT BEDTIME
Refills: 0 | Status: DISCONTINUED | OUTPATIENT
Start: 2024-07-05 | End: 2024-07-06

## 2024-07-05 RX ADMIN — Medication 400 MILLIGRAM(S): at 21:20

## 2024-07-05 RX ADMIN — Medication 10 MILLIGRAM(S): at 16:33

## 2024-07-05 RX ADMIN — ATORVASTATIN CALCIUM 10 MILLIGRAM(S): 80 TABLET, FILM COATED ORAL at 21:20

## 2024-07-05 RX ADMIN — Medication 1 APPLICATION(S): at 07:17

## 2024-07-05 RX ADMIN — Medication 25 GRAM(S): at 22:08

## 2024-07-05 RX ADMIN — Medication 0.25 MILLIGRAM(S): at 14:06

## 2024-07-05 RX ADMIN — GABAPENTIN 300 MILLIGRAM(S): 400 CAPSULE ORAL at 21:20

## 2024-07-05 RX ADMIN — Medication 10 MILLILITER(S): at 14:07

## 2024-07-05 RX ADMIN — Medication 975 MILLIGRAM(S): at 06:30

## 2024-07-05 RX ADMIN — Medication 3 MILLILITER(S): at 05:39

## 2024-07-05 RX ADMIN — Medication 100 MILLIEQUIVALENT(S): at 16:16

## 2024-07-05 RX ADMIN — Medication 100 MILLIEQUIVALENT(S): at 15:14

## 2024-07-05 RX ADMIN — Medication 100 MILLIGRAM(S): at 19:10

## 2024-07-05 RX ADMIN — Medication 40 MILLIEQUIVALENT(S): at 17:16

## 2024-07-05 RX ADMIN — Medication 975 MILLIGRAM(S): at 05:30

## 2024-07-05 RX ADMIN — Medication 15 MILLILITER(S): at 07:17

## 2024-07-05 RX ADMIN — Medication 15 MILLILITER(S): at 17:16

## 2024-07-05 RX ADMIN — Medication 81 MILLIGRAM(S): at 19:11

## 2024-07-05 RX ADMIN — KETOROLAC TROMETHAMINE 15 MILLIGRAM(S): 30 INJECTION, SOLUTION INTRAMUSCULAR; INTRAVENOUS at 16:13

## 2024-07-05 RX ADMIN — GABAPENTIN 300 MILLIGRAM(S): 400 CAPSULE ORAL at 05:30

## 2024-07-06 ENCOUNTER — TRANSCRIPTION ENCOUNTER (OUTPATIENT)
Age: 79
End: 2024-07-06

## 2024-07-06 VITALS
DIASTOLIC BLOOD PRESSURE: 43 MMHG | HEART RATE: 67 BPM | OXYGEN SATURATION: 98 % | RESPIRATION RATE: 17 BRPM | SYSTOLIC BLOOD PRESSURE: 123 MMHG

## 2024-07-06 PROBLEM — Z87.39 PERSONAL HISTORY OF OTHER DISEASES OF THE MUSCULOSKELETAL SYSTEM AND CONNECTIVE TISSUE: Chronic | Status: ACTIVE | Noted: 2024-06-21

## 2024-07-06 PROBLEM — E78.00 PURE HYPERCHOLESTEROLEMIA, UNSPECIFIED: Chronic | Status: ACTIVE | Noted: 2024-06-21

## 2024-07-06 LAB
ALBUMIN SERPL ELPH-MCNC: 3 G/DL — LOW (ref 3.3–5.2)
ALP SERPL-CCNC: 133 U/L — HIGH (ref 40–120)
ALT FLD-CCNC: 12 U/L — SIGNIFICANT CHANGE UP
ANION GAP SERPL CALC-SCNC: 11 MMOL/L — SIGNIFICANT CHANGE UP (ref 5–17)
AST SERPL-CCNC: 22 U/L — SIGNIFICANT CHANGE UP
BASOPHILS # BLD AUTO: 0.01 K/UL — SIGNIFICANT CHANGE UP (ref 0–0.2)
BASOPHILS NFR BLD AUTO: 0.2 % — SIGNIFICANT CHANGE UP (ref 0–2)
BILIRUB SERPL-MCNC: 0.3 MG/DL — LOW (ref 0.4–2)
BUN SERPL-MCNC: 25.6 MG/DL — HIGH (ref 8–20)
BURR CELLS BLD QL SMEAR: PRESENT — SIGNIFICANT CHANGE UP
CALCIUM SERPL-MCNC: 8.5 MG/DL — SIGNIFICANT CHANGE UP (ref 8.4–10.5)
CHLORIDE SERPL-SCNC: 103 MMOL/L — SIGNIFICANT CHANGE UP (ref 96–108)
CK SERPL-CCNC: 32 U/L — SIGNIFICANT CHANGE UP (ref 25–170)
CO2 SERPL-SCNC: 22 MMOL/L — SIGNIFICANT CHANGE UP (ref 22–29)
CREAT SERPL-MCNC: 1.09 MG/DL — SIGNIFICANT CHANGE UP (ref 0.5–1.3)
EGFR: 52 ML/MIN/1.73M2 — LOW
EGFR: 52 ML/MIN/1.73M2 — LOW
ELLIPTOCYTES BLD QL SMEAR: SLIGHT — SIGNIFICANT CHANGE UP
EOSINOPHIL # BLD AUTO: 0 K/UL — SIGNIFICANT CHANGE UP (ref 0–0.5)
EOSINOPHIL NFR BLD AUTO: 0 % — SIGNIFICANT CHANGE UP (ref 0–6)
GLUCOSE SERPL-MCNC: 131 MG/DL — HIGH (ref 70–99)
HCT VFR BLD CALC: 24.9 % — LOW (ref 34.5–45)
HGB BLD-MCNC: 7.9 G/DL — LOW (ref 11.5–15.5)
IMM GRANULOCYTES NFR BLD AUTO: 0.2 % — SIGNIFICANT CHANGE UP (ref 0–0.9)
LYMPHOCYTES # BLD AUTO: 0.47 K/UL — LOW (ref 1–3.3)
LYMPHOCYTES # BLD AUTO: 11.1 % — LOW (ref 13–44)
MANUAL SMEAR VERIFICATION: SIGNIFICANT CHANGE UP
MCHC RBC-ENTMCNC: 29.8 PG — SIGNIFICANT CHANGE UP (ref 27–34)
MCHC RBC-ENTMCNC: 31.7 GM/DL — LOW (ref 32–36)
MCV RBC AUTO: 94 FL — SIGNIFICANT CHANGE UP (ref 80–100)
MONOCYTES # BLD AUTO: 0.68 K/UL — SIGNIFICANT CHANGE UP (ref 0–0.9)
MONOCYTES NFR BLD AUTO: 16 % — HIGH (ref 2–14)
NEUTROPHILS # BLD AUTO: 3.08 K/UL — SIGNIFICANT CHANGE UP (ref 1.8–7.4)
NEUTROPHILS NFR BLD AUTO: 72.5 % — SIGNIFICANT CHANGE UP (ref 43–77)
OVALOCYTES BLD QL SMEAR: SLIGHT — SIGNIFICANT CHANGE UP
PLAT MORPH BLD: NORMAL — SIGNIFICANT CHANGE UP
PLATELET # BLD AUTO: 193 K/UL — SIGNIFICANT CHANGE UP (ref 150–400)
POIKILOCYTOSIS BLD QL AUTO: SLIGHT — SIGNIFICANT CHANGE UP
POLYCHROMASIA BLD QL SMEAR: SLIGHT — SIGNIFICANT CHANGE UP
POTASSIUM SERPL-MCNC: 4.1 MMOL/L — SIGNIFICANT CHANGE UP (ref 3.5–5.3)
POTASSIUM SERPL-SCNC: 4.1 MMOL/L — SIGNIFICANT CHANGE UP (ref 3.5–5.3)
PROT SERPL-MCNC: 5.5 G/DL — LOW (ref 6.6–8.7)
RBC # BLD: 2.65 M/UL — LOW (ref 3.8–5.2)
RBC # FLD: 15.1 % — HIGH (ref 10.3–14.5)
RBC BLD AUTO: ABNORMAL
SODIUM SERPL-SCNC: 136 MMOL/L — SIGNIFICANT CHANGE UP (ref 135–145)
TROPONIN T, HIGH SENSITIVITY RESULT: 58 NG/L — HIGH (ref 0–51)
WBC # BLD: 4.25 K/UL — SIGNIFICANT CHANGE UP (ref 3.8–10.5)
WBC # FLD AUTO: 4.25 K/UL — SIGNIFICANT CHANGE UP (ref 3.8–10.5)

## 2024-07-06 PROCEDURE — C1894: CPT

## 2024-07-06 PROCEDURE — 82803 BLOOD GASES ANY COMBINATION: CPT

## 2024-07-06 PROCEDURE — 83605 ASSAY OF LACTIC ACID: CPT

## 2024-07-06 PROCEDURE — 82947 ASSAY GLUCOSE BLOOD QUANT: CPT

## 2024-07-06 PROCEDURE — 93325 DOPPLER ECHO COLOR FLOW MAPG: CPT

## 2024-07-06 PROCEDURE — C1769: CPT

## 2024-07-06 PROCEDURE — 80048 BASIC METABOLIC PNL TOTAL CA: CPT

## 2024-07-06 PROCEDURE — 80053 COMPREHEN METABOLIC PANEL: CPT

## 2024-07-06 PROCEDURE — 86901 BLOOD TYPING SEROLOGIC RH(D): CPT

## 2024-07-06 PROCEDURE — 85025 COMPLETE CBC W/AUTO DIFF WBC: CPT

## 2024-07-06 PROCEDURE — 36415 COLL VENOUS BLD VENIPUNCTURE: CPT

## 2024-07-06 PROCEDURE — 85014 HEMATOCRIT: CPT

## 2024-07-06 PROCEDURE — 84484 ASSAY OF TROPONIN QUANT: CPT

## 2024-07-06 PROCEDURE — 71045 X-RAY EXAM CHEST 1 VIEW: CPT

## 2024-07-06 PROCEDURE — 86850 RBC ANTIBODY SCREEN: CPT

## 2024-07-06 PROCEDURE — 85018 HEMOGLOBIN: CPT

## 2024-07-06 PROCEDURE — C1889: CPT

## 2024-07-06 PROCEDURE — 86923 COMPATIBILITY TEST ELECTRIC: CPT

## 2024-07-06 PROCEDURE — 71045 X-RAY EXAM CHEST 1 VIEW: CPT | Mod: 26

## 2024-07-06 PROCEDURE — C1887: CPT

## 2024-07-06 PROCEDURE — 83735 ASSAY OF MAGNESIUM: CPT

## 2024-07-06 PROCEDURE — 85610 PROTHROMBIN TIME: CPT

## 2024-07-06 PROCEDURE — 93306 TTE W/DOPPLER COMPLETE: CPT

## 2024-07-06 PROCEDURE — 84132 ASSAY OF SERUM POTASSIUM: CPT

## 2024-07-06 PROCEDURE — 84295 ASSAY OF SERUM SODIUM: CPT

## 2024-07-06 PROCEDURE — 82435 ASSAY OF BLOOD CHLORIDE: CPT

## 2024-07-06 PROCEDURE — L8699: CPT

## 2024-07-06 PROCEDURE — 86900 BLOOD TYPING SEROLOGIC ABO: CPT

## 2024-07-06 PROCEDURE — C9399: CPT

## 2024-07-06 PROCEDURE — 83880 ASSAY OF NATRIURETIC PEPTIDE: CPT

## 2024-07-06 PROCEDURE — 82330 ASSAY OF CALCIUM: CPT

## 2024-07-06 PROCEDURE — 85027 COMPLETE CBC AUTOMATED: CPT

## 2024-07-06 PROCEDURE — 93320 DOPPLER ECHO COMPLETE: CPT

## 2024-07-06 PROCEDURE — 85730 THROMBOPLASTIN TIME PARTIAL: CPT

## 2024-07-06 PROCEDURE — 76000 FLUOROSCOPY <1 HR PHYS/QHP: CPT

## 2024-07-06 PROCEDURE — 93005 ELECTROCARDIOGRAM TRACING: CPT

## 2024-07-06 PROCEDURE — C1760: CPT

## 2024-07-06 PROCEDURE — 82550 ASSAY OF CK (CPK): CPT

## 2024-07-06 RX ORDER — FOLIC ACID 1 MG/1
1 TABLET ORAL
Qty: 30 | Refills: 0
Start: 2024-07-06 | End: 2024-08-04

## 2024-07-06 RX ORDER — RIVAROXABAN 10 MG/1
1 TABLET, FILM COATED ORAL
Qty: 0 | Refills: 0 | DISCHARGE

## 2024-07-06 RX ORDER — ASPIRIN 325 MG
1 TABLET ORAL
Qty: 30 | Refills: 0
Start: 2024-07-06 | End: 2024-08-04

## 2024-07-06 RX ORDER — IRON SUCROSE 20 MG/ML
100 INJECTION, SOLUTION INTRAVENOUS ONCE
Refills: 0 | Status: COMPLETED | OUTPATIENT
Start: 2024-07-06 | End: 2024-07-06

## 2024-07-06 RX ORDER — FERROUS SULFATE 137(45) MG
1 TABLET, EXTENDED RELEASE ORAL
Qty: 30 | Refills: 0
Start: 2024-07-06 | End: 2024-08-04

## 2024-07-06 RX ORDER — FUROSEMIDE 10 MG/ML
1 INJECTION INTRAMUSCULAR; INTRAVENOUS
Qty: 0 | Refills: 0 | DISCHARGE
Start: 2024-07-06

## 2024-07-06 RX ADMIN — IRON SUCROSE 210 MILLIGRAM(S): 20 INJECTION, SOLUTION INTRAVENOUS at 05:36

## 2024-07-06 RX ADMIN — Medication 81 MILLIGRAM(S): at 09:15

## 2024-07-06 RX ADMIN — Medication 3 MILLILITER(S): at 13:50

## 2024-07-06 RX ADMIN — Medication 100 MILLIGRAM(S): at 07:19

## 2024-07-06 RX ADMIN — GABAPENTIN 300 MILLIGRAM(S): 400 CAPSULE ORAL at 13:55

## 2024-07-06 RX ADMIN — Medication 100 MILLIGRAM(S): at 12:03

## 2024-07-06 RX ADMIN — LOSARTAN POTASSIUM 50 MILLIGRAM(S): 100 TABLET, FILM COATED ORAL at 05:36

## 2024-07-06 RX ADMIN — GABAPENTIN 300 MILLIGRAM(S): 400 CAPSULE ORAL at 05:35

## 2024-07-06 RX ADMIN — Medication 30 MILLIEQUIVALENT(S): at 09:15

## 2024-07-08 ENCOUNTER — NON-APPOINTMENT (OUTPATIENT)
Age: 79
End: 2024-07-08

## 2024-07-08 PROBLEM — I35.0 NONRHEUMATIC AORTIC (VALVE) STENOSIS: Chronic | Status: ACTIVE | Noted: 2024-06-21

## 2024-07-09 ENCOUNTER — NON-APPOINTMENT (OUTPATIENT)
Age: 79
End: 2024-07-09

## 2024-07-11 ENCOUNTER — APPOINTMENT (OUTPATIENT)
Dept: CARE COORDINATION | Facility: HOME HEALTH | Age: 79
End: 2024-07-11

## 2024-07-11 DIAGNOSIS — Z86.39 PERSONAL HISTORY OF OTHER ENDOCRINE, NUTRITIONAL AND METABOLIC DISEASE: ICD-10-CM

## 2024-07-11 DIAGNOSIS — I35.0 NONRHEUMATIC AORTIC (VALVE) STENOSIS: ICD-10-CM

## 2024-07-11 DIAGNOSIS — Z95.2 PRESENCE OF PROSTHETIC HEART VALVE: ICD-10-CM

## 2024-07-11 DIAGNOSIS — I10 ESSENTIAL (PRIMARY) HYPERTENSION: ICD-10-CM

## 2024-07-11 RX ORDER — DOXYCYCLINE HYCLATE 20 MG/1
20 TABLET ORAL
Refills: 0 | Status: ACTIVE | COMMUNITY
Start: 2024-07-11

## 2024-07-16 ENCOUNTER — APPOINTMENT (OUTPATIENT)
Dept: CARDIOTHORACIC SURGERY | Facility: CLINIC | Age: 79
End: 2024-07-16
Payer: MEDICARE

## 2024-07-16 VITALS
WEIGHT: 136 LBS | SYSTOLIC BLOOD PRESSURE: 123 MMHG | RESPIRATION RATE: 16 BRPM | HEIGHT: 64 IN | OXYGEN SATURATION: 98 % | DIASTOLIC BLOOD PRESSURE: 73 MMHG | HEART RATE: 74 BPM | BODY MASS INDEX: 23.22 KG/M2

## 2024-07-16 PROCEDURE — 99213 OFFICE O/P EST LOW 20 MIN: CPT

## 2024-07-16 NOTE — ASU PATIENT PROFILE, ADULT - PRO ARRIVE FROM
no lesions, no deformities, no traumatic injuries, no significant scars are present, chest wall non-tender, no masses present, breathing is unlabored without accessory muscle use,normal breath sounds home

## 2024-07-25 ENCOUNTER — NON-APPOINTMENT (OUTPATIENT)
Age: 79
End: 2024-07-25

## 2024-07-26 RX ORDER — ASPIRIN/MAG CARB/ALUMINUM AMIN 325 MG
1 TABLET ORAL
Qty: 30 | Refills: 0 | DISCHARGE
Start: 2024-07-26

## 2024-07-30 ENCOUNTER — TRANSCRIPTION ENCOUNTER (OUTPATIENT)
Age: 79
End: 2024-07-30

## 2024-08-05 ENCOUNTER — TRANSCRIPTION ENCOUNTER (OUTPATIENT)
Age: 79
End: 2024-08-05

## 2024-09-05 NOTE — H&P PST ADULT - BLOOD AVOIDANCE/RESTRICTIONS, PROFILE
Kwesi Ellison (:  1967) is a 56 y.o. male,New patient, here for evaluation of the following chief complaint(s):  New Patient (Establish Care- Back pain down into Rt hip. Started 3 weeks ago. ) and Health Maintenance (Refuses Cologuard at this time)     Diagnosis Orders   1. Sacroiliac joint pain  predniSONE (DELTASONE) 20 MG tablet      2. Sciatica of right side  predniSONE (DELTASONE) 20 MG tablet          Assessment & Plan  Sacroiliac joint pain    Use Prednisone taper  Can continue to use OTC Topical ointments   Increase water intake   Do sciatica stretches daily (resources provided)  May need to consider PT if symptoms persist  Orders:    predniSONE (DELTASONE) 20 MG tablet; 3 tabs daily for 2 days then 2 tabs daily for 2 days then 1 tab daily for 2 days.    Sciatica of right side       Orders:    predniSONE (DELTASONE) 20 MG tablet; 3 tabs daily for 2 days then 2 tabs daily for 2 days then 1 tab daily for 2 days.    Recommend get Consumer [facility] at [facility], patient is self pay      Return if symptoms worsen or fail to improve.       Subjective   Kwesi presents today as a new patient. He complains of lower back pain and possible sciatica. He reports pain has been worse over the last 3 weeks. He is a truck diver. He has tried Aleve, and topical OTC creams (Icey Hot and BioFreeze). He gets some relief. He denies a specific injury. He does have a hx of a left knee surgery with hardware. He feels he could have arthritis in the left knee. He reports he has seen a \"specialist\" and was told he may need to have a knee replacement.    New Patient    55 YO Male  Left handed    Children: 2 daughters, 3 grandchildren  Education: HS grad, was in the [employer]  Employment:Self Employed-  Immunizations: childhood vaccines, no covid, no influenza vaccines in many years    Alcohol: [place] every 2 months  Tobacco: chew for 36 years  Drugs: none    HTN-currently taking lisinopril and coreg. Tolerating  none

## 2024-09-27 ENCOUNTER — APPOINTMENT (OUTPATIENT)
Dept: MAMMOGRAPHY | Facility: CLINIC | Age: 79
End: 2024-09-27
Payer: MEDICARE

## 2024-09-27 ENCOUNTER — OUTPATIENT (OUTPATIENT)
Dept: OUTPATIENT SERVICES | Facility: HOSPITAL | Age: 79
LOS: 1 days | End: 2024-09-27
Payer: MEDICARE

## 2024-09-27 ENCOUNTER — RESULT REVIEW (OUTPATIENT)
Age: 79
End: 2024-09-27

## 2024-09-27 ENCOUNTER — APPOINTMENT (OUTPATIENT)
Dept: ULTRASOUND IMAGING | Facility: CLINIC | Age: 79
End: 2024-09-27
Payer: MEDICARE

## 2024-09-27 DIAGNOSIS — Z98.890 OTHER SPECIFIED POSTPROCEDURAL STATES: Chronic | ICD-10-CM

## 2024-09-27 DIAGNOSIS — Z00.00 ENCOUNTER FOR GENERAL ADULT MEDICAL EXAMINATION WITHOUT ABNORMAL FINDINGS: ICD-10-CM

## 2024-09-27 DIAGNOSIS — D48.5 NEOPLASM OF UNCERTAIN BEHAVIOR OF SKIN: ICD-10-CM

## 2024-09-27 PROCEDURE — 77063 BREAST TOMOSYNTHESIS BI: CPT | Mod: 26

## 2024-09-27 PROCEDURE — 77067 SCR MAMMO BI INCL CAD: CPT

## 2024-09-27 PROCEDURE — 77063 BREAST TOMOSYNTHESIS BI: CPT

## 2024-09-27 PROCEDURE — 76641 ULTRASOUND BREAST COMPLETE: CPT

## 2024-09-27 PROCEDURE — 77067 SCR MAMMO BI INCL CAD: CPT | Mod: 26

## 2024-09-27 PROCEDURE — 76641 ULTRASOUND BREAST COMPLETE: CPT | Mod: 26,50,3G

## 2024-10-09 ENCOUNTER — EMERGENCY (EMERGENCY)
Facility: HOSPITAL | Age: 79
LOS: 0 days | Discharge: HOME | End: 2024-10-10
Attending: EMERGENCY MEDICINE
Payer: MEDICARE

## 2024-10-09 VITALS
DIASTOLIC BLOOD PRESSURE: 82 MMHG | HEIGHT: 64 IN | HEART RATE: 65 BPM | SYSTOLIC BLOOD PRESSURE: 176 MMHG | OXYGEN SATURATION: 100 % | WEIGHT: 143.08 LBS | RESPIRATION RATE: 16 BRPM | TEMPERATURE: 99 F

## 2024-10-09 DIAGNOSIS — Z95.4 PRESENCE OF OTHER HEART-VALVE REPLACEMENT: ICD-10-CM

## 2024-10-09 DIAGNOSIS — I48.91 UNSPECIFIED ATRIAL FIBRILLATION: ICD-10-CM

## 2024-10-09 DIAGNOSIS — I10 ESSENTIAL (PRIMARY) HYPERTENSION: ICD-10-CM

## 2024-10-09 DIAGNOSIS — M25.511 PAIN IN RIGHT SHOULDER: ICD-10-CM

## 2024-10-09 DIAGNOSIS — G89.29 OTHER CHRONIC PAIN: ICD-10-CM

## 2024-10-09 DIAGNOSIS — Z98.890 OTHER SPECIFIED POSTPROCEDURAL STATES: Chronic | ICD-10-CM

## 2024-10-09 DIAGNOSIS — Z91.09 OTHER ALLERGY STATUS, OTHER THAN TO DRUGS AND BIOLOGICAL SUBSTANCES: ICD-10-CM

## 2024-10-09 PROCEDURE — 36415 COLL VENOUS BLD VENIPUNCTURE: CPT

## 2024-10-09 PROCEDURE — 73030 X-RAY EXAM OF SHOULDER: CPT | Mod: RT

## 2024-10-09 PROCEDURE — 85025 COMPLETE CBC W/AUTO DIFF WBC: CPT

## 2024-10-09 PROCEDURE — 85652 RBC SED RATE AUTOMATED: CPT

## 2024-10-09 PROCEDURE — 99283 EMERGENCY DEPT VISIT LOW MDM: CPT | Mod: 25

## 2024-10-09 PROCEDURE — 99284 EMERGENCY DEPT VISIT MOD MDM: CPT

## 2024-10-09 PROCEDURE — 80053 COMPREHEN METABOLIC PANEL: CPT

## 2024-10-09 NOTE — ED ADULT TRIAGE NOTE - CHIEF COMPLAINT QUOTE
Pt presents from home complaining of R shoulder pain that has been waxing and waning for past few days, was at yoga this afternoon and shoulder pain has increased.  Able to move all digits. Elevated arm on pillows and sheets. Arrives in sling from EMS. Took tramadol x2 hours PTA with no relief. Hx rotator cuff sx.

## 2024-10-10 VITALS
TEMPERATURE: 98 F | RESPIRATION RATE: 18 BRPM | OXYGEN SATURATION: 96 % | DIASTOLIC BLOOD PRESSURE: 111 MMHG | HEART RATE: 84 BPM | SYSTOLIC BLOOD PRESSURE: 158 MMHG

## 2024-10-10 LAB
ALBUMIN SERPL ELPH-MCNC: 3.1 G/DL — LOW (ref 3.3–5)
ALP SERPL-CCNC: 112 U/L — SIGNIFICANT CHANGE UP (ref 40–120)
ALT FLD-CCNC: 34 U/L — SIGNIFICANT CHANGE UP (ref 12–78)
ANION GAP SERPL CALC-SCNC: 5 MMOL/L — SIGNIFICANT CHANGE UP (ref 5–17)
AST SERPL-CCNC: 22 U/L — SIGNIFICANT CHANGE UP (ref 15–37)
BASOPHILS # BLD AUTO: 0 K/UL — SIGNIFICANT CHANGE UP (ref 0–0.2)
BASOPHILS NFR BLD AUTO: 0 % — SIGNIFICANT CHANGE UP (ref 0–2)
BILIRUB SERPL-MCNC: 0.5 MG/DL — SIGNIFICANT CHANGE UP (ref 0.2–1.2)
BUN SERPL-MCNC: 35 MG/DL — HIGH (ref 7–23)
CALCIUM SERPL-MCNC: 9.1 MG/DL — SIGNIFICANT CHANGE UP (ref 8.5–10.1)
CHLORIDE SERPL-SCNC: 109 MMOL/L — HIGH (ref 96–108)
CO2 SERPL-SCNC: 25 MMOL/L — SIGNIFICANT CHANGE UP (ref 22–31)
CREAT SERPL-MCNC: 1.05 MG/DL — SIGNIFICANT CHANGE UP (ref 0.5–1.3)
EGFR: 54 ML/MIN/1.73M2 — LOW
EOSINOPHIL # BLD AUTO: 0.05 K/UL — SIGNIFICANT CHANGE UP (ref 0–0.5)
EOSINOPHIL NFR BLD AUTO: 0.5 % — SIGNIFICANT CHANGE UP (ref 0–6)
ERYTHROCYTE [SEDIMENTATION RATE] IN BLOOD: 29 MM/HR — HIGH (ref 0–20)
GLUCOSE SERPL-MCNC: 184 MG/DL — HIGH (ref 70–99)
HCT VFR BLD CALC: 24 % — LOW (ref 34.5–45)
HGB BLD-MCNC: 7.3 G/DL — LOW (ref 11.5–15.5)
IMM GRANULOCYTES NFR BLD AUTO: 0.5 % — SIGNIFICANT CHANGE UP (ref 0–0.9)
LYMPHOCYTES # BLD AUTO: 0.86 K/UL — LOW (ref 1–3.3)
LYMPHOCYTES # BLD AUTO: 8.4 % — LOW (ref 13–44)
MCHC RBC-ENTMCNC: 27.5 PG — SIGNIFICANT CHANGE UP (ref 27–34)
MCHC RBC-ENTMCNC: 30.4 GM/DL — LOW (ref 32–36)
MCV RBC AUTO: 90.6 FL — SIGNIFICANT CHANGE UP (ref 80–100)
MONOCYTES # BLD AUTO: 1.25 K/UL — HIGH (ref 0–0.9)
MONOCYTES NFR BLD AUTO: 12.2 % — SIGNIFICANT CHANGE UP (ref 2–14)
NEUTROPHILS # BLD AUTO: 8.04 K/UL — HIGH (ref 1.8–7.4)
NEUTROPHILS NFR BLD AUTO: 78.4 % — HIGH (ref 43–77)
PLATELET # BLD AUTO: 215 K/UL — SIGNIFICANT CHANGE UP (ref 150–400)
POTASSIUM SERPL-MCNC: 3.7 MMOL/L — SIGNIFICANT CHANGE UP (ref 3.5–5.3)
POTASSIUM SERPL-SCNC: 3.7 MMOL/L — SIGNIFICANT CHANGE UP (ref 3.5–5.3)
PROT SERPL-MCNC: 6.7 GM/DL — SIGNIFICANT CHANGE UP (ref 6–8.3)
RBC # BLD: 2.65 M/UL — LOW (ref 3.8–5.2)
RBC # FLD: 16.8 % — HIGH (ref 10.3–14.5)
SODIUM SERPL-SCNC: 139 MMOL/L — SIGNIFICANT CHANGE UP (ref 135–145)
WBC # BLD: 10.25 K/UL — SIGNIFICANT CHANGE UP (ref 3.8–10.5)
WBC # FLD AUTO: 10.25 K/UL — SIGNIFICANT CHANGE UP (ref 3.8–10.5)

## 2024-10-10 PROCEDURE — 73030 X-RAY EXAM OF SHOULDER: CPT | Mod: 26,RT

## 2024-10-10 RX ORDER — ACETAMINOPHEN 325 MG
1000 TABLET ORAL ONCE
Refills: 0 | Status: COMPLETED | OUTPATIENT
Start: 2024-10-10 | End: 2024-10-10

## 2024-10-10 RX ORDER — OXYCODONE HYDROCHLORIDE 30 MG/1
1 TABLET, FILM COATED, EXTENDED RELEASE ORAL
Qty: 12 | Refills: 0
Start: 2024-10-10 | End: 2024-10-12

## 2024-10-10 RX ORDER — OXYCODONE HYDROCHLORIDE 30 MG/1
5 TABLET, FILM COATED, EXTENDED RELEASE ORAL ONCE
Refills: 0 | Status: DISCONTINUED | OUTPATIENT
Start: 2024-10-10 | End: 2024-10-10

## 2024-10-10 RX ORDER — LIDOCAINE 50 MG/G
1 CREAM TOPICAL ONCE
Refills: 0 | Status: COMPLETED | OUTPATIENT
Start: 2024-10-10 | End: 2024-10-10

## 2024-10-10 RX ADMIN — OXYCODONE HYDROCHLORIDE 5 MILLIGRAM(S): 30 TABLET, FILM COATED, EXTENDED RELEASE ORAL at 00:47

## 2024-10-10 RX ADMIN — LIDOCAINE 1 PATCH: 50 CREAM TOPICAL at 02:33

## 2024-10-10 RX ADMIN — Medication 1000 MILLIGRAM(S): at 00:47

## 2024-10-10 NOTE — ED PROVIDER NOTE - OBJECTIVE STATEMENT
79-year-old female past medical history hypertension, A-fib, aortic stenosis status post TAVR presents with right shoulder pain.  Patient states she was at PT today and then had worsening of pain.  No specific trauma.  Patient had recent cortisone injection into that shoulder.  No fevers or chills.  Denies warmth or redness.  Tried Tylenol and tramadol without relief.

## 2024-10-10 NOTE — ED PROVIDER NOTE - PATIENT PORTAL LINK FT
You can access the FollowMyHealth Patient Portal offered by [facility] by registering at the following website: [email or web address]. By joining [facility]’s FollowMyHealth portal, you will also be able to view your health information using other applications (apps) compatible with our system.

## 2024-10-10 NOTE — ED ADULT NURSE NOTE - OBJECTIVE STATEMENT
79y female AAOx3 ambulatory from home complaining of shoulder pain/injury. PMH Rotator cuff surgery. Pt reports 2x week ago potentially injuring it during yoga, and then felt increasing discomfort. As per pt, pain is worse today than before, took Tramdol 2x hours PTA. +Sensation/ movement of fingers on right hand. Denies any recent falls. Medicated as per MD order. Pt does not offer other complaints at this time. Respirations are even and unlabored, in no acute distress.

## 2024-10-10 NOTE — ED PROVIDER NOTE - NSFOLLOWUPINSTRUCTIONS_ED_ALL_ED_FT
Please follow-up with the orthopedist.  Please take 1000 mg Tylenol every 6 hours for pain.  Please take 5 mg oxycodone for breakthrough pain.  Please change Lidoderm patch daily.    Your hemoglobin today was 7.3.  Please follow-up with Dr. Lovell.  Please continue your iron supplements.

## 2024-10-10 NOTE — ED PROVIDER NOTE - CLINICAL SUMMARY MEDICAL DECISION MAKING FREE TEXT BOX
Patient with acute on chronic shoulder pain with acute worsening, recent injection, will check basic labs however unlikely a septic joint by exam, will check x-ray rule out fracture, will pain control.    Pain somewhat improved, will send with short course of oxycodone and Ortho follow-up.Patient found to have hemoglobin of 7.3, patient is aware that hemoglobin is low, patient following up with Dr. Lovell, patient is supposed to be on iron supplements but self DC'd.

## 2024-10-10 NOTE — ED PROVIDER NOTE - PHYSICAL EXAMINATION
Gen: Well appearing in NAD  Head: NC/AT  Neck: Trachea midline  Resp: No distress  Ext: Pain with range of motion right shoulder, no overlying erythema or warmth, normal radial pulse  Neuro: A&O appears non focal  Skin: Warm and dry as visualized  Psych: Normal affect and mood

## 2024-10-10 NOTE — ED ADULT NURSE NOTE - NSFALLHARMRISKINTERV_ED_ALL_ED
Assistance OOB with selected safe patient handling equipment if applicable/Communicate risk of Fall with Harm to all staff, patient, and family/Provide patient with walking aids/Provide visual cue: red socks, yellow wristband, yellow gown, etc/Reinforce activity limits and safety measures with patient and family/Bed in lowest position, wheels locked, appropriate side rails in place/Call bell, personal items and telephone in reach/Instruct patient to call for assistance before getting out of bed/chair/stretcher/Non-slip footwear applied when patient is off stretcher/[place] to call system/Physically safe environment - no spills, clutter or unnecessary equipment/Purposeful Proactive Rounding/Room/bathroom lighting operational, light cord in reach

## 2024-10-16 ENCOUNTER — APPOINTMENT (OUTPATIENT)
Dept: CT IMAGING | Facility: CLINIC | Age: 79
End: 2024-10-16
Payer: MEDICARE

## 2024-10-16 ENCOUNTER — OUTPATIENT (OUTPATIENT)
Dept: OUTPATIENT SERVICES | Facility: HOSPITAL | Age: 79
LOS: 1 days | End: 2024-10-16
Payer: MEDICARE

## 2024-10-16 ENCOUNTER — RESULT REVIEW (OUTPATIENT)
Age: 79
End: 2024-10-16

## 2024-10-16 DIAGNOSIS — Z00.8 ENCOUNTER FOR OTHER GENERAL EXAMINATION: ICD-10-CM

## 2024-10-16 DIAGNOSIS — Z98.890 OTHER SPECIFIED POSTPROCEDURAL STATES: Chronic | ICD-10-CM

## 2024-10-16 PROCEDURE — 71260 CT THORAX DX C+: CPT | Mod: 26,MH

## 2024-10-16 PROCEDURE — 74177 CT ABD & PELVIS W/CONTRAST: CPT | Mod: 26,MH

## 2024-10-17 NOTE — ED ADULT TRIAGE NOTE - HISTORY OF COVID-19 VACCINATION
Vaccine status unknown no blurred vision L/no blurred vision R/no loss of vision L/no loss of vision R

## 2024-10-31 RX ORDER — CEFUROXIME AXETIL 250 MG
1 TABLET ORAL
Refills: 0 | DISCHARGE
Start: 2024-10-31

## 2024-11-02 ENCOUNTER — EMERGENCY (EMERGENCY)
Facility: HOSPITAL | Age: 79
LOS: 0 days | Discharge: HOME | End: 2024-11-02
Attending: EMERGENCY MEDICINE
Payer: MEDICARE

## 2024-11-02 VITALS
SYSTOLIC BLOOD PRESSURE: 136 MMHG | HEIGHT: 64 IN | RESPIRATION RATE: 20 BRPM | WEIGHT: 147.05 LBS | TEMPERATURE: 98 F | DIASTOLIC BLOOD PRESSURE: 58 MMHG | HEART RATE: 89 BPM | OXYGEN SATURATION: 100 %

## 2024-11-02 VITALS
DIASTOLIC BLOOD PRESSURE: 55 MMHG | TEMPERATURE: 99 F | SYSTOLIC BLOOD PRESSURE: 136 MMHG | HEART RATE: 80 BPM | RESPIRATION RATE: 19 BRPM | OXYGEN SATURATION: 96 %
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
## 2024-11-02 DIAGNOSIS — Z98.890 OTHER SPECIFIED POSTPROCEDURAL STATES: Chronic | ICD-10-CM
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
## 2024-11-02 DIAGNOSIS — S09.90XA UNSPECIFIED INJURY OF HEAD, INITIAL ENCOUNTER: ICD-10-CM

## 2024-11-02 DIAGNOSIS — I48.91 UNSPECIFIED ATRIAL FIBRILLATION: ICD-10-CM

## 2024-11-02 DIAGNOSIS — S81.811A LACERATION WITHOUT FOREIGN BODY, RIGHT LOWER LEG, INITIAL ENCOUNTER: ICD-10-CM

## 2024-11-02 DIAGNOSIS — Z91.09 OTHER ALLERGY STATUS, OTHER THAN TO DRUGS AND BIOLOGICAL SUBSTANCES: ICD-10-CM

## 2024-11-02 DIAGNOSIS — S41.111A LACERATION WITHOUT FOREIGN BODY OF RIGHT UPPER ARM, INITIAL ENCOUNTER: ICD-10-CM

## 2024-11-02 DIAGNOSIS — Z79.01 LONG TERM (CURRENT) USE OF ANTICOAGULANTS: ICD-10-CM

## 2024-11-02 DIAGNOSIS — Z23 ENCOUNTER FOR IMMUNIZATION: ICD-10-CM

## 2024-11-02 DIAGNOSIS — S01.01XA LACERATION WITHOUT FOREIGN BODY OF SCALP, INITIAL ENCOUNTER: ICD-10-CM

## 2024-11-02 PROCEDURE — 99285 EMERGENCY DEPT VISIT HI MDM: CPT

## 2024-11-02 PROCEDURE — 82962 GLUCOSE BLOOD TEST: CPT

## 2024-11-02 PROCEDURE — 12002 RPR S/N/AX/GEN/TRNK2.6-7.5CM: CPT

## 2024-11-02 PROCEDURE — 90715 TDAP VACCINE 7 YRS/> IM: CPT

## 2024-11-02 PROCEDURE — 93005 ELECTROCARDIOGRAM TRACING: CPT | Mod: XU

## 2024-11-02 PROCEDURE — 72125 CT NECK SPINE W/O DYE: CPT | Mod: 26,MC

## 2024-11-02 PROCEDURE — 72125 CT NECK SPINE W/O DYE: CPT | Mod: MC

## 2024-11-02 PROCEDURE — 93010 ELECTROCARDIOGRAM REPORT: CPT

## 2024-11-02 PROCEDURE — 70450 CT HEAD/BRAIN W/O DYE: CPT | Mod: 26,MC

## 2024-11-02 PROCEDURE — 90471 IMMUNIZATION ADMIN: CPT

## 2024-11-02 PROCEDURE — 70450 CT HEAD/BRAIN W/O DYE: CPT | Mod: MC

## 2024-11-02 PROCEDURE — 99284 EMERGENCY DEPT VISIT MOD MDM: CPT | Mod: 25

## 2024-11-02 RX ORDER — OXYCODONE HYDROCHLORIDE 30 MG/1
5 TABLET ORAL ONCE
Refills: 0 | Status: DISCONTINUED | OUTPATIENT
Start: 2024-11-02 | End: 2024-11-02

## 2024-11-02 RX ADMIN — OXYCODONE HYDROCHLORIDE 5 MILLIGRAM(S): 30 TABLET ORAL at 18:18

## 2024-11-06 ENCOUNTER — INPATIENT (INPATIENT)
Facility: HOSPITAL | Age: 79
LOS: 6 days | Discharge: NURSING, REHAB OR LONG-TERM CARE | DRG: 948 | End: 2024-11-13
Attending: STUDENT IN AN ORGANIZED HEALTH CARE EDUCATION/TRAINING PROGRAM | Admitting: HOSPITALIST
Payer: MEDICARE

## 2024-11-06 VITALS
HEART RATE: 76 BPM | SYSTOLIC BLOOD PRESSURE: 106 MMHG | RESPIRATION RATE: 18 BRPM | DIASTOLIC BLOOD PRESSURE: 47 MMHG | HEIGHT: 64 IN | TEMPERATURE: 98 F | OXYGEN SATURATION: 100 %

## 2024-11-06 DIAGNOSIS — R09.89 OTHER SPECIFIED SYMPTOMS AND SIGNS INVOLVING THE CIRCULATORY AND RESPIRATORY SYSTEMS: ICD-10-CM

## 2024-11-06 DIAGNOSIS — J96.01 ACUTE RESPIRATORY FAILURE WITH HYPOXIA: ICD-10-CM

## 2024-11-06 DIAGNOSIS — Z98.890 OTHER SPECIFIED POSTPROCEDURAL STATES: Chronic | ICD-10-CM

## 2024-11-06 DIAGNOSIS — I63.40 CEREBRAL INFARCTION DUE TO EMBOLISM OF UNSPECIFIED CEREBRAL ARTERY: ICD-10-CM

## 2024-11-06 DIAGNOSIS — S46.001A UNSPECIFIED INJURY OF MUSCLE(S) AND TENDON(S) OF THE ROTATOR CUFF OF RIGHT SHOULDER, INITIAL ENCOUNTER: ICD-10-CM

## 2024-11-06 DIAGNOSIS — I35.0 NONRHEUMATIC AORTIC (VALVE) STENOSIS: ICD-10-CM

## 2024-11-06 DIAGNOSIS — I27.20 PULMONARY HYPERTENSION, UNSPECIFIED: ICD-10-CM

## 2024-11-06 DIAGNOSIS — Z16.12 EXTENDED SPECTRUM BETA LACTAMASE (ESBL) RESISTANCE: ICD-10-CM

## 2024-11-06 DIAGNOSIS — I95.9 HYPOTENSION, UNSPECIFIED: ICD-10-CM

## 2024-11-06 DIAGNOSIS — R53.1 WEAKNESS: ICD-10-CM

## 2024-11-06 DIAGNOSIS — B96.1 KLEBSIELLA PNEUMONIAE [K. PNEUMONIAE] AS THE CAUSE OF DISEASES CLASSIFIED ELSEWHERE: ICD-10-CM

## 2024-11-06 DIAGNOSIS — S40.011A CONTUSION OF RIGHT SHOULDER, INITIAL ENCOUNTER: ICD-10-CM

## 2024-11-06 DIAGNOSIS — Z79.82 LONG TERM (CURRENT) USE OF ASPIRIN: ICD-10-CM

## 2024-11-06 DIAGNOSIS — Z91.048 OTHER NONMEDICINAL SUBSTANCE ALLERGY STATUS: ICD-10-CM

## 2024-11-06 LAB
ADD ON TEST-SPECIMEN IN LAB: SIGNIFICANT CHANGE UP
ALBUMIN SERPL ELPH-MCNC: 2.4 G/DL — LOW (ref 3.3–5)
ALP SERPL-CCNC: 216 U/L — HIGH (ref 40–120)
ALT FLD-CCNC: 15 U/L — SIGNIFICANT CHANGE UP (ref 12–78)
ANION GAP SERPL CALC-SCNC: 5 MMOL/L — SIGNIFICANT CHANGE UP (ref 5–17)
APPEARANCE UR: ABNORMAL
APTT BLD: 44.4 SEC — HIGH (ref 24.5–35.6)
AST SERPL-CCNC: 19 U/L — SIGNIFICANT CHANGE UP (ref 15–37)
BACTERIA # UR AUTO: ABNORMAL /HPF
BASOPHILS # BLD AUTO: 0.09 K/UL — SIGNIFICANT CHANGE UP (ref 0–0.2)
BASOPHILS NFR BLD AUTO: 1.2 % — SIGNIFICANT CHANGE UP (ref 0–2)
BILIRUB SERPL-MCNC: 0.6 MG/DL — SIGNIFICANT CHANGE UP (ref 0.2–1.2)
BILIRUB UR-MCNC: NEGATIVE — SIGNIFICANT CHANGE UP
BLD GP AB SCN SERPL QL: SIGNIFICANT CHANGE UP
BUN SERPL-MCNC: 38 MG/DL — HIGH (ref 7–23)
CALCIUM SERPL-MCNC: 9 MG/DL — SIGNIFICANT CHANGE UP (ref 8.5–10.1)
CAST: 0 /LPF — SIGNIFICANT CHANGE UP (ref 0–4)
CHLORIDE SERPL-SCNC: 107 MMOL/L — SIGNIFICANT CHANGE UP (ref 96–108)
CO2 SERPL-SCNC: 24 MMOL/L — SIGNIFICANT CHANGE UP (ref 22–31)
COLOR SPEC: SIGNIFICANT CHANGE UP
CREAT SERPL-MCNC: 1.21 MG/DL — SIGNIFICANT CHANGE UP (ref 0.5–1.3)
DIFF PNL FLD: ABNORMAL
EGFR: 46 ML/MIN/1.73M2 — LOW
EOSINOPHIL # BLD AUTO: 0.34 K/UL — SIGNIFICANT CHANGE UP (ref 0–0.5)
EOSINOPHIL NFR BLD AUTO: 4.4 % — SIGNIFICANT CHANGE UP (ref 0–6)
GLUCOSE SERPL-MCNC: 123 MG/DL — HIGH (ref 70–99)
GLUCOSE UR QL: NEGATIVE MG/DL — SIGNIFICANT CHANGE UP
HCT VFR BLD CALC: 26 % — LOW (ref 34.5–45)
HGB BLD-MCNC: 7.7 G/DL — LOW (ref 11.5–15.5)
IMM GRANULOCYTES NFR BLD AUTO: 0.3 % — SIGNIFICANT CHANGE UP (ref 0–0.9)
INR BLD: 1.8 RATIO — HIGH (ref 0.85–1.16)
KETONES UR-MCNC: NEGATIVE MG/DL — SIGNIFICANT CHANGE UP
LEUKOCYTE ESTERASE UR-ACNC: ABNORMAL
LYMPHOCYTES # BLD AUTO: 1.09 K/UL — SIGNIFICANT CHANGE UP (ref 1–3.3)
LYMPHOCYTES # BLD AUTO: 14.2 % — SIGNIFICANT CHANGE UP (ref 13–44)
MAGNESIUM SERPL-MCNC: 2.4 MG/DL — SIGNIFICANT CHANGE UP (ref 1.6–2.6)
MCHC RBC-ENTMCNC: 27.9 PG — SIGNIFICANT CHANGE UP (ref 27–34)
MCHC RBC-ENTMCNC: 29.6 G/DL — LOW (ref 32–36)
MCV RBC AUTO: 94.2 FL — SIGNIFICANT CHANGE UP (ref 80–100)
MONOCYTES # BLD AUTO: 1.25 K/UL — HIGH (ref 0–0.9)
MONOCYTES NFR BLD AUTO: 16.3 % — HIGH (ref 2–14)
NEUTROPHILS # BLD AUTO: 4.87 K/UL — SIGNIFICANT CHANGE UP (ref 1.8–7.4)
NEUTROPHILS NFR BLD AUTO: 63.6 % — SIGNIFICANT CHANGE UP (ref 43–77)
NITRITE UR-MCNC: POSITIVE
PH UR: 5 — SIGNIFICANT CHANGE UP (ref 5–8)
PLATELET # BLD AUTO: 356 K/UL — SIGNIFICANT CHANGE UP (ref 150–400)
POTASSIUM SERPL-MCNC: 4 MMOL/L — SIGNIFICANT CHANGE UP (ref 3.5–5.3)
POTASSIUM SERPL-SCNC: 4 MMOL/L — SIGNIFICANT CHANGE UP (ref 3.5–5.3)
PROT SERPL-MCNC: 7.2 GM/DL — SIGNIFICANT CHANGE UP (ref 6–8.3)
PROT UR-MCNC: 30 MG/DL
PROTHROM AB SERPL-ACNC: 21.1 SEC — HIGH (ref 9.9–13.4)
RBC # BLD: 2.76 M/UL — LOW (ref 3.8–5.2)
RBC # FLD: 19.5 % — HIGH (ref 10.3–14.5)
RBC CASTS # UR COMP ASSIST: 1 /HPF — SIGNIFICANT CHANGE UP (ref 0–4)
RETICS #: 16.4 K/UL — LOW (ref 25–125)
RETICS/RBC NFR: 0.6 % — SIGNIFICANT CHANGE UP (ref 0.5–2.5)
SODIUM SERPL-SCNC: 136 MMOL/L — SIGNIFICANT CHANGE UP (ref 135–145)
SP GR SPEC: 1.02 — SIGNIFICANT CHANGE UP (ref 1–1.03)
SQUAMOUS # UR AUTO: 1 /HPF — SIGNIFICANT CHANGE UP (ref 0–5)
TROPONIN I, HIGH SENSITIVITY RESULT: 23.78 NG/L — SIGNIFICANT CHANGE UP
UROBILINOGEN FLD QL: 1 MG/DL — SIGNIFICANT CHANGE UP (ref 0.2–1)
WBC # BLD: 7.66 K/UL — SIGNIFICANT CHANGE UP (ref 3.8–10.5)
WBC # FLD AUTO: 7.66 K/UL — SIGNIFICANT CHANGE UP (ref 3.8–10.5)
WBC UR QL: 661 /HPF — HIGH (ref 0–5)

## 2024-11-06 PROCEDURE — 99497 ADVNCD CARE PLAN 30 MIN: CPT | Mod: 25

## 2024-11-06 PROCEDURE — 85025 COMPLETE CBC W/AUTO DIFF WBC: CPT

## 2024-11-06 PROCEDURE — 83735 ASSAY OF MAGNESIUM: CPT

## 2024-11-06 PROCEDURE — 83550 IRON BINDING TEST: CPT

## 2024-11-06 PROCEDURE — 70551 MRI BRAIN STEM W/O DYE: CPT | Mod: MC

## 2024-11-06 PROCEDURE — 73200 CT UPPER EXTREMITY W/O DYE: CPT | Mod: MC,RT

## 2024-11-06 PROCEDURE — 80061 LIPID PANEL: CPT

## 2024-11-06 PROCEDURE — 93970 EXTREMITY STUDY: CPT | Mod: 26

## 2024-11-06 PROCEDURE — 71045 X-RAY EXAM CHEST 1 VIEW: CPT | Mod: 26

## 2024-11-06 PROCEDURE — 99223 1ST HOSP IP/OBS HIGH 75: CPT | Mod: AI

## 2024-11-06 PROCEDURE — 70498 CT ANGIOGRAPHY NECK: CPT | Mod: MC

## 2024-11-06 PROCEDURE — 84146 ASSAY OF PROLACTIN: CPT

## 2024-11-06 PROCEDURE — 70496 CT ANGIOGRAPHY HEAD: CPT | Mod: MC

## 2024-11-06 PROCEDURE — 36415 COLL VENOUS BLD VENIPUNCTURE: CPT

## 2024-11-06 PROCEDURE — 87186 SC STD MICRODIL/AGAR DIL: CPT

## 2024-11-06 PROCEDURE — 80053 COMPREHEN METABOLIC PANEL: CPT

## 2024-11-06 PROCEDURE — 76376 3D RENDER W/INTRP POSTPROCES: CPT | Mod: 26

## 2024-11-06 PROCEDURE — 0241U: CPT

## 2024-11-06 PROCEDURE — 81001 URINALYSIS AUTO W/SCOPE: CPT

## 2024-11-06 PROCEDURE — 85018 HEMOGLOBIN: CPT

## 2024-11-06 PROCEDURE — 36430 TRANSFUSION BLD/BLD COMPNT: CPT

## 2024-11-06 PROCEDURE — 73030 X-RAY EXAM OF SHOULDER: CPT | Mod: 26,RT

## 2024-11-06 PROCEDURE — 87086 URINE CULTURE/COLONY COUNT: CPT

## 2024-11-06 PROCEDURE — 83540 ASSAY OF IRON: CPT

## 2024-11-06 PROCEDURE — 87040 BLOOD CULTURE FOR BACTERIA: CPT

## 2024-11-06 PROCEDURE — 86923 COMPATIBILITY TEST ELECTRIC: CPT

## 2024-11-06 PROCEDURE — P9040: CPT

## 2024-11-06 PROCEDURE — 71275 CT ANGIOGRAPHY CHEST: CPT | Mod: MC

## 2024-11-06 PROCEDURE — 76882 US LMTD JT/FCL EVL NVASC XTR: CPT | Mod: RT

## 2024-11-06 PROCEDURE — 82607 VITAMIN B-12: CPT

## 2024-11-06 PROCEDURE — 76376 3D RENDER W/INTRP POSTPROCES: CPT

## 2024-11-06 PROCEDURE — 82746 ASSAY OF FOLIC ACID SERUM: CPT

## 2024-11-06 PROCEDURE — 71045 X-RAY EXAM CHEST 1 VIEW: CPT

## 2024-11-06 PROCEDURE — 99285 EMERGENCY DEPT VISIT HI MDM: CPT

## 2024-11-06 PROCEDURE — 83036 HEMOGLOBIN GLYCOSYLATED A1C: CPT

## 2024-11-06 PROCEDURE — 83880 ASSAY OF NATRIURETIC PEPTIDE: CPT

## 2024-11-06 PROCEDURE — 97530 THERAPEUTIC ACTIVITIES: CPT | Mod: GP

## 2024-11-06 PROCEDURE — 80048 BASIC METABOLIC PNL TOTAL CA: CPT

## 2024-11-06 PROCEDURE — 83605 ASSAY OF LACTIC ACID: CPT

## 2024-11-06 PROCEDURE — 85014 HEMATOCRIT: CPT

## 2024-11-06 PROCEDURE — 93306 TTE W/DOPPLER COMPLETE: CPT

## 2024-11-06 PROCEDURE — 70450 CT HEAD/BRAIN W/O DYE: CPT | Mod: 26,MC

## 2024-11-06 PROCEDURE — 85027 COMPLETE CBC AUTOMATED: CPT

## 2024-11-06 PROCEDURE — 97116 GAIT TRAINING THERAPY: CPT | Mod: GP

## 2024-11-06 PROCEDURE — 93970 EXTREMITY STUDY: CPT

## 2024-11-06 PROCEDURE — 84100 ASSAY OF PHOSPHORUS: CPT

## 2024-11-06 PROCEDURE — 73200 CT UPPER EXTREMITY W/O DYE: CPT | Mod: 26,RT

## 2024-11-06 PROCEDURE — 87077 CULTURE AEROBIC IDENTIFY: CPT

## 2024-11-06 PROCEDURE — 82728 ASSAY OF FERRITIN: CPT

## 2024-11-06 PROCEDURE — 97163 PT EVAL HIGH COMPLEX 45 MIN: CPT | Mod: GP

## 2024-11-06 RX ORDER — TRAMADOL HYDROCHLORIDE 50 MG/1
25 TABLET, COATED ORAL ONCE
Refills: 0 | Status: DISCONTINUED | OUTPATIENT
Start: 2024-11-06 | End: 2024-11-07

## 2024-11-06 RX ORDER — ACETAMINOPHEN 500 MG
650 TABLET ORAL EVERY 6 HOURS
Refills: 0 | Status: DISCONTINUED | OUTPATIENT
Start: 2024-11-06 | End: 2024-11-13

## 2024-11-06 RX ORDER — RIVAROXABAN 20 MG/1
15 TABLET, FILM COATED ORAL
Refills: 0 | Status: DISCONTINUED | OUTPATIENT
Start: 2024-11-06 | End: 2024-11-06

## 2024-11-06 RX ORDER — ACETAMINOPHEN 500 MG
1000 TABLET ORAL ONCE
Refills: 0 | Status: COMPLETED | OUTPATIENT
Start: 2024-11-06 | End: 2024-11-06

## 2024-11-06 RX ORDER — ASPIRIN/MAG CARB/ALUMINUM AMIN 325 MG
81 TABLET ORAL DAILY
Refills: 0 | Status: DISCONTINUED | OUTPATIENT
Start: 2024-11-06 | End: 2024-11-07

## 2024-11-06 RX ORDER — LIDOCAINE HYDROCHLORIDE 40 MG/ML
1 SOLUTION TOPICAL ONCE
Refills: 0 | Status: COMPLETED | OUTPATIENT
Start: 2024-11-06 | End: 2024-11-06

## 2024-11-06 RX ORDER — INFLUENZ VIR VAC TV P-SURF2003 15MCG/.5ML
0.5 SYRINGE (ML) INTRAMUSCULAR ONCE
Refills: 0 | Status: DISCONTINUED | OUTPATIENT
Start: 2024-11-06 | End: 2024-11-13

## 2024-11-06 RX ORDER — GABAPENTIN 300 MG/1
1 CAPSULE ORAL
Refills: 0 | DISCHARGE

## 2024-11-06 RX ORDER — ESTROGENS, CONJUGATED 1.25 MG
1 TABLET ORAL
Refills: 0 | DISCHARGE

## 2024-11-06 RX ORDER — CEFTRIAXONE SODIUM 10 G
VIAL (EA) INJECTION
Refills: 0 | Status: DISCONTINUED | OUTPATIENT
Start: 2024-11-06 | End: 2024-11-06

## 2024-11-06 RX ORDER — FOLIC ACID 1 MG/1
1 TABLET ORAL DAILY
Refills: 0 | Status: DISCONTINUED | OUTPATIENT
Start: 2024-11-06 | End: 2024-11-13

## 2024-11-06 RX ORDER — GABAPENTIN 300 MG/1
300 CAPSULE ORAL
Refills: 0 | Status: DISCONTINUED | OUTPATIENT
Start: 2024-11-06 | End: 2024-11-13

## 2024-11-06 RX ORDER — HEPARIN SODIUM 10000 [USP'U]/ML
5000 INJECTION INTRAVENOUS; SUBCUTANEOUS EVERY 12 HOURS
Refills: 0 | Status: DISCONTINUED | OUTPATIENT
Start: 2024-11-06 | End: 2024-11-06

## 2024-11-06 RX ORDER — SODIUM CHLORIDE 9 MG/ML
500 INJECTION, SOLUTION INTRAMUSCULAR; INTRAVENOUS; SUBCUTANEOUS ONCE
Refills: 0 | Status: COMPLETED | OUTPATIENT
Start: 2024-11-06 | End: 2024-11-06

## 2024-11-06 RX ORDER — CEFTRIAXONE SODIUM 10 G
1000 VIAL (EA) INJECTION ONCE
Refills: 0 | Status: COMPLETED | OUTPATIENT
Start: 2024-11-06 | End: 2024-11-06

## 2024-11-06 RX ORDER — GLUCOSAMINE SULFATE DIPOT CHLR 500 MG
1 CAPSULE ORAL
Refills: 0 | DISCHARGE

## 2024-11-06 RX ORDER — DOCUSATE SODIUM 100 MG
1 CAPSULE ORAL
Refills: 0 | DISCHARGE

## 2024-11-06 RX ORDER — CEFTRIAXONE SODIUM 10 G
VIAL (EA) INJECTION
Refills: 0 | Status: DISCONTINUED | OUTPATIENT
Start: 2024-11-06 | End: 2024-11-09

## 2024-11-06 RX ORDER — FERROUS SULFATE 325(65) MG
325 TABLET ORAL DAILY
Refills: 0 | Status: DISCONTINUED | OUTPATIENT
Start: 2024-11-06 | End: 2024-11-13

## 2024-11-06 RX ORDER — TRAMADOL HYDROCHLORIDE 50 MG/1
1 TABLET, COATED ORAL
Refills: 0 | DISCHARGE

## 2024-11-06 RX ORDER — POTASSIUM CHLORIDE 10 MEQ
3 TABLET, EXTENDED RELEASE ORAL
Refills: 0 | DISCHARGE

## 2024-11-06 RX ORDER — MELATONIN 5 MG
3 TABLET ORAL AT BEDTIME
Refills: 0 | Status: DISCONTINUED | OUTPATIENT
Start: 2024-11-06 | End: 2024-11-13

## 2024-11-06 RX ORDER — CEFTRIAXONE SODIUM 10 G
1000 VIAL (EA) INJECTION EVERY 24 HOURS
Refills: 0 | Status: DISCONTINUED | OUTPATIENT
Start: 2024-11-07 | End: 2024-11-09

## 2024-11-06 RX ADMIN — Medication 650 MILLIGRAM(S): at 17:34

## 2024-11-06 RX ADMIN — LIDOCAINE HYDROCHLORIDE 1 PATCH: 40 SOLUTION TOPICAL at 20:19

## 2024-11-06 RX ADMIN — Medication 400 MILLIGRAM(S): at 20:17

## 2024-11-06 RX ADMIN — Medication 81 MILLIGRAM(S): at 22:29

## 2024-11-06 RX ADMIN — Medication 1000 MILLIGRAM(S): at 17:36

## 2024-11-06 RX ADMIN — GABAPENTIN 300 MILLIGRAM(S): 300 CAPSULE ORAL at 23:19

## 2024-11-06 RX ADMIN — Medication 40 MILLIGRAM(S): at 22:29

## 2024-11-06 RX ADMIN — SODIUM CHLORIDE 500 MILLILITER(S): 9 INJECTION, SOLUTION INTRAMUSCULAR; INTRAVENOUS; SUBCUTANEOUS at 20:26

## 2024-11-06 RX ADMIN — Medication 3 MILLIGRAM(S): at 22:29

## 2024-11-06 NOTE — ED PROVIDER NOTE - NS_ATTENDINGSCRIBE_ED_ALL_ED
I personally performed the service described in the documentation recorded by the scribe in my presence, and it accurately and completely records my words and actions. Authored by Resident/PA/NP

## 2024-11-06 NOTE — ED PROVIDER NOTE - PHYSICAL EXAMINATION
Constitutional: NAD, alert, verbal, appears fatigued but answering questions appropriately  HEENT: NCAT, EOMi, PERRL  Cardiac: RRR no MRG  Resp: clear, no wheezing or crackles  GI: ab soft ntnd, no r/g  Ext: no edema  Neuro: AYALA  Skin: No rashes

## 2024-11-06 NOTE — H&P ADULT - NSHPPOADEEPVENOUSTHROMB_GEN_A_CORE
----- Message from Keyana Stewart MD sent at 11/25/2020  5:45 AM EST -----  Please let patient know pap smear results.   Her results were showing Atypical Cells of Undetermined/Unknown Significance, but her HPV testing was negative.    The cells have a slightly abnormal appearance on examination.  This does not mean cervical cancer.  Often, it may resolve itself by the next pap smear.  Neg HPV testing reduces her risk.  I recommend a repeat pap smear in 1 year.  STD testing negative.   suspected

## 2024-11-06 NOTE — PATIENT PROFILE ADULT - FALL HARM RISK - HARM RISK INTERVENTIONS

## 2024-11-06 NOTE — ED ADULT NURSE REASSESSMENT NOTE - NS ED NURSE REASSESS COMMENT FT1
resumed care from Sheree FRASER. pt awake, resting in stretcher, respirations even and unlabored. family at bedside.

## 2024-11-06 NOTE — ED PROVIDER NOTE - NSICDXPASTSURGICALHX_GEN_ALL_CORE_FT
PAST SURGICAL HISTORY:  H/O prior ablation treatment     History of temporal artery biopsy     S/P Exploratory Laparotomy 1966    S/P foot surgery

## 2024-11-06 NOTE — H&P ADULT - ASSESSMENT
80 yo F  w/ PMHx of AS s/p TAVR ( Bioprosthetic valve) , AFib on Xarelto, temporal arteritis, HLD, HTN presents complaining of generalized weakness and fatigue since Saturday. Per daughter at bedside she had a fall,  was seen here on SAT after a fall and discharged home.+ Reports R shoulder pain.     # UTI  - doesn't meet sepsis criteria on admission  - UA turbid protein 30 large LE + nitrite  many bacteria  - s/p iv ofirmevx1, Rocephin x1 lidocaine patch , 500cc NS in ER   - Continue Rocephin for now follow cx    # generalized weakness s/p fall  # deconditioning/ poor appetite  # R shoulder pain   - CT HEAD:  No acute intracranial hemorrhage, mass effect, or midline shift. Chronic small vessel disease. If there is continued concern for acute neurologic compromise, recommend MRI of the brain for further evaluation.  - per daughter progressive decline since Saturday s/p fall with R shoulder pain   - X- ray R shoulder no visible fracture pending official read   - follow MRI brain   - CT shoulder ordered   - STAT atorvastatin 40mg , ASA 81  - F/u lipid panel, A1c  - aspiration and seizure precautions  - fall risk protocol  - PT/OT consult  - Neuro checks q4  - follow orthostatics   - Recent TTE EF 55% grade 3 diastolic dysfunction   - Nutrition consult   - SW consult     # Bioprosthetic aortic valve TAVR  # Grade 3 diastolic dysfunction   - TTE 7/5/24  Mild left ventricular hypertrophy. Left ventricular systolic function is normal with an ejection fraction of 55 %  There is severe (grade 3) left ventricular diastolic dysfunction S/p bioprosthetic aortic valve. Likely MIQUEL. Severe tricuspid regurgitation. artery systolic pressure is 48 mmHg, consistent with echocardiographic evidence of pulmonary hypertension Small right pleural effusion noted.      # anemia   -  H/H: 7.7/26 rbc 2.76 RDW 19.5 Retic count 16.4  - baseline 7.3 last hb   - Monitor for signs/symptoms of bleeding  - Monitor daily CBC.    # A- fib  - Not sure if pt is on coreg for rate control   - Continue home Xarelto     # HTN  - on losartan and HCTZ   - BP labile hold losartan    # LE edema chronic  - on HCTZ ( will give once daily for now) reaccess in am  - follow LE doppler U/S      # DVT pro  - XARELTO daily       # GOC  FULL CODE

## 2024-11-06 NOTE — H&P ADULT - NSHPPHYSICALEXAM_GEN_ALL_CORE
Vital Signs Last 24 Hrs  T(C): 36.6 (06 Nov 2024 19:36), Max: 36.6 (06 Nov 2024 19:36)  T(F): 97.9 (06 Nov 2024 19:36), Max: 97.9 (06 Nov 2024 19:36)  HR: 92 (06 Nov 2024 19:36) (69 - 92)  BP: 107/45 (06 Nov 2024 19:36) (106/47 - 107/46)  BP(mean): 63 (06 Nov 2024 15:10) (63 - 63)  RR: 18 (06 Nov 2024 19:36) (18 - 18)  SpO2: 100% (06 Nov 2024 19:36) (98% - 100%)    Parameters below as of 06 Nov 2024 19:36  Patient On (Oxygen Delivery Method): room air    GENERAL:  cachetic, weak frail   EYES: sclera clear, EOM intact,   ENMT: normocephalic, atraumatic, dry mucous membranes  NECK: supple, soft,  LUNGS: good air entry bilaterally, clear to auscultation, symmetric breath sounds,  HEART: soft S1/S2, regular rate and rhythm, systolic  murmurs + lower extremity edema  GASTROINTESTINAL: abdomen is soft, nontender, nondistended, normoactive bowel sounds,  INTEGUMENT: good skin turgor, no lesions noted  MUSCULOSKELETAL: no cyanosis, clubbing, edema, calves nontender to palpation b/l  NEUROLOGIC: awake, alert, oriented x3, good muscle tone in 4 extremities, no obvious sensory deficits

## 2024-11-06 NOTE — ED ADULT NURSE NOTE - OBJECTIVE STATEMENT
bib daughter c/o confusion, pain, difficulty waking off balance. seen here @ HH sat for a  trip and fall sent home and is c/o continuos pain, confusion. pt has staples in head, skin tears in right forearm, right leg.  pt on xarelto for afib.

## 2024-11-06 NOTE — ED PROVIDER NOTE - CLINICAL SUMMARY MEDICAL DECISION MAKING FREE TEXT BOX
79 year-old female with PMHx of aortic stenosis, AFib on Xarelto, temporal arteritis, high cholesterol, cardiac arrhythmia, HTN, and nonrheumatic aortic valve stenosis presents complaining of generalized weakness and fatigue since Sat. Pt was seen here on SAT after a fall and discharged home. Since returning home, patient unable to ambulate secondary to weakness. Has been sleeping more and not eating. BIB daughter and son who state that patient is normally independent, ambulates independently, and that this is not her baseline. Called her PCP Dr. Perales who sent her to ED. Denies chest pain, shortness of breath, fever, but endorses R chronic shoulder pain secondary to rotator cuff injury. ROS otherwise negative.     Rule out subdural infection, electrolyte disorder. Pt likely to be admitted for inability to ambulate, needs rehab. 79 year-old female with PMHx of aortic stenosis, AFib on Xarelto, temporal arteritis, high cholesterol, cardiac arrhythmia, HTN, and nonrheumatic aortic valve stenosis presents complaining of generalized weakness and fatigue since Sat. Pt was seen here on SAT after a fall and discharged home. Since returning home, patient unable to ambulate secondary to weakness. Has been sleeping more and not eating. BIB daughter and son who state that patient is normally independent, ambulates independently, and that this is not her baseline. Called her PCP Dr. Perales who sent her to ED. Denies chest pain, shortness of breath, fever, but endorses R chronic shoulder pain secondary to rotator cuff injury. ROS otherwise negative.     Rule out subdural, infection, electrolyte disorder. Pt likely to be admitted for inability to ambulate, needs rehab.

## 2024-11-06 NOTE — ED ADULT NURSE NOTE - NSFALLHARMRISKINTERV_ED_ALL_ED

## 2024-11-06 NOTE — ED ADULT NURSE REASSESSMENT NOTE - NS ED NURSE REASSESS COMMENT FT1
Pt states she is having 7/10 right shoulder pain. BP also running soft (107/45). Dr. Hendricks aware. Awaiting orders.

## 2024-11-06 NOTE — ED PROVIDER NOTE - CARE PLAN
Principal Discharge DX:	Generalized weakness  Secondary Diagnosis:	Increased weakness when ambulating   1

## 2024-11-06 NOTE — H&P ADULT - HISTORY OF PRESENT ILLNESS
78 yo F  w/ PMHx of AS s/p TAVR ( Bioprosthetic valve) , AFib on Xarelto, temporal arteritis, HLD, HTN presents complaining of generalized weakness and fatigue since Saturday. Per daughter at bedside she had a fall,  was seen here on SAT after a fall and discharged home.+ Reports R shoulder pain. Since returning home, patient unable to ambulate secondary to weakness. Has been sleeping more with poor appetite, not her baseline. Denies chest pain, shortness of breath, fever, but endorses R chronic shoulder pain secondary to rotator cuff injury.  Per daughter she has ligamentous tear on R shoulder     IN ED  VITALS: /47 HR 76 RR 18 temp 97F   LABS: H/H: 7.7/26 rbc 2.76 RDW 19.5 Retic count 16.4    BUN 38  ALKpo4 216     UA turbid protein 30 large LE + nitrite  many bacteria    CT HEAD:  No acute intracranial hemorrhage, mass effect, or midline shift. Chronic small vessel disease. If there is continued concern for acute neurologic compromise, recommend MRI of the brain for further evaluation.    TTE 7/5/24  Mild left ventricular hypertrophy. Left ventricular systolic function is normal with an ejection fraction of 55 %  There is severe (grade 3) left ventricular diastolic dysfunction S/p bioprosthetic aortic valve. Likely MIQUEL. Severe tricuspid regurgitation. artery systolic pressure is 48 mmHg, consistent with echocardiographic evidence of pulmonary hypertension Small right pleural effusion noted.    s/p iv ofirmevx1, Rocephin x1 lidocaine patch , 500cc in ER

## 2024-11-06 NOTE — ED PROVIDER NOTE - NSICDXPASTMEDICALHX_GEN_ALL_CORE_FT
PAST MEDICAL HISTORY:  Aortic stenosis     Atrial Fibrillation     H/O temporal arteritis     High cholesterol     History of Cardiac Arrhythmia     Hypertension     Nonrheumatic aortic valve stenosis

## 2024-11-06 NOTE — ED ADULT TRIAGE NOTE - CHIEF COMPLAINT QUOTE
Patient brought in by EMS for pain from fall 3 days ago. patient reports pain has not improved and PMD recommended  she come to ED.

## 2024-11-07 ENCOUNTER — RESULT REVIEW (OUTPATIENT)
Age: 79
End: 2024-11-07

## 2024-11-07 LAB
A1C WITH ESTIMATED AVERAGE GLUCOSE RESULT: 5.8 % — HIGH (ref 4–5.6)
ALBUMIN SERPL ELPH-MCNC: 1.9 G/DL — LOW (ref 3.3–5)
ALP SERPL-CCNC: 202 U/L — HIGH (ref 40–120)
ALT FLD-CCNC: 14 U/L — SIGNIFICANT CHANGE UP (ref 12–78)
ANION GAP SERPL CALC-SCNC: 6 MMOL/L — SIGNIFICANT CHANGE UP (ref 5–17)
AST SERPL-CCNC: 15 U/L — SIGNIFICANT CHANGE UP (ref 15–37)
BASOPHILS # BLD AUTO: 0.07 K/UL — SIGNIFICANT CHANGE UP (ref 0–0.2)
BASOPHILS NFR BLD AUTO: 1 % — SIGNIFICANT CHANGE UP (ref 0–2)
BILIRUB SERPL-MCNC: 0.5 MG/DL — SIGNIFICANT CHANGE UP (ref 0.2–1.2)
BUN SERPL-MCNC: 34 MG/DL — HIGH (ref 7–23)
CALCIUM SERPL-MCNC: 8.6 MG/DL — SIGNIFICANT CHANGE UP (ref 8.5–10.1)
CHLORIDE SERPL-SCNC: 108 MMOL/L — SIGNIFICANT CHANGE UP (ref 96–108)
CHOLEST SERPL-MCNC: 102 MG/DL — SIGNIFICANT CHANGE UP
CO2 SERPL-SCNC: 23 MMOL/L — SIGNIFICANT CHANGE UP (ref 22–31)
CREAT SERPL-MCNC: 0.89 MG/DL — SIGNIFICANT CHANGE UP (ref 0.5–1.3)
EGFR: 66 ML/MIN/1.73M2 — SIGNIFICANT CHANGE UP
EOSINOPHIL # BLD AUTO: 0.29 K/UL — SIGNIFICANT CHANGE UP (ref 0–0.5)
EOSINOPHIL NFR BLD AUTO: 4 % — SIGNIFICANT CHANGE UP (ref 0–6)
ESTIMATED AVERAGE GLUCOSE: 120 MG/DL — HIGH (ref 68–114)
FERRITIN SERPL-MCNC: 114 NG/ML — SIGNIFICANT CHANGE UP (ref 13–330)
FOLATE SERPL-MCNC: >20 NG/ML — SIGNIFICANT CHANGE UP
GLUCOSE SERPL-MCNC: 107 MG/DL — HIGH (ref 70–99)
HCT VFR BLD CALC: 20.9 % — CRITICAL LOW (ref 34.5–45)
HCT VFR BLD CALC: 29.1 % — LOW (ref 34.5–45)
HDLC SERPL-MCNC: 48 MG/DL — LOW
HGB BLD-MCNC: 6.3 G/DL — CRITICAL LOW (ref 11.5–15.5)
HGB BLD-MCNC: 9.2 G/DL — LOW (ref 11.5–15.5)
IRON SATN MFR SERPL: 16 UG/DL — LOW (ref 30–160)
IRON SATN MFR SERPL: 5 % — LOW (ref 14–50)
LIPID PNL WITH DIRECT LDL SERPL: 40 MG/DL — SIGNIFICANT CHANGE UP
LYMPHOCYTES # BLD AUTO: 1.31 K/UL — SIGNIFICANT CHANGE UP (ref 1–3.3)
LYMPHOCYTES # BLD AUTO: 18 % — SIGNIFICANT CHANGE UP (ref 13–44)
MANUAL SMEAR VERIFICATION: SIGNIFICANT CHANGE UP
MCHC RBC-ENTMCNC: 27.6 PG — SIGNIFICANT CHANGE UP (ref 27–34)
MCHC RBC-ENTMCNC: 30.1 G/DL — LOW (ref 32–36)
MCV RBC AUTO: 91.7 FL — SIGNIFICANT CHANGE UP (ref 80–100)
MONOCYTES # BLD AUTO: 0.51 K/UL — SIGNIFICANT CHANGE UP (ref 0–0.9)
MONOCYTES NFR BLD AUTO: 7 % — SIGNIFICANT CHANGE UP (ref 2–14)
NEUTROPHILS # BLD AUTO: 5.1 K/UL — SIGNIFICANT CHANGE UP (ref 1.8–7.4)
NEUTROPHILS NFR BLD AUTO: 69 % — SIGNIFICANT CHANGE UP (ref 43–77)
NEUTS BAND # BLD: 1 % — SIGNIFICANT CHANGE UP (ref 0–8)
NON HDL CHOLESTEROL: 54 MG/DL — SIGNIFICANT CHANGE UP
NRBC # BLD: 0 /100 WBCS — SIGNIFICANT CHANGE UP (ref 0–0)
NRBC # BLD: SIGNIFICANT CHANGE UP /100 WBCS (ref 0–0)
PLAT MORPH BLD: NORMAL — SIGNIFICANT CHANGE UP
PLATELET # BLD AUTO: 300 K/UL — SIGNIFICANT CHANGE UP (ref 150–400)
POTASSIUM SERPL-MCNC: 3.8 MMOL/L — SIGNIFICANT CHANGE UP (ref 3.5–5.3)
POTASSIUM SERPL-SCNC: 3.8 MMOL/L — SIGNIFICANT CHANGE UP (ref 3.5–5.3)
PROT SERPL-MCNC: 5.9 GM/DL — LOW (ref 6–8.3)
RBC # BLD: 2.28 M/UL — LOW (ref 3.8–5.2)
RBC # FLD: 19.4 % — HIGH (ref 10.3–14.5)
RBC BLD AUTO: NORMAL — SIGNIFICANT CHANGE UP
SODIUM SERPL-SCNC: 137 MMOL/L — SIGNIFICANT CHANGE UP (ref 135–145)
TIBC SERPL-MCNC: 291 UG/DL — SIGNIFICANT CHANGE UP (ref 220–430)
TRIGL SERPL-MCNC: 64 MG/DL — SIGNIFICANT CHANGE UP
UIBC SERPL-MCNC: 275 UG/DL — SIGNIFICANT CHANGE UP (ref 110–370)
VIT B12 SERPL-MCNC: 705 PG/ML — SIGNIFICANT CHANGE UP (ref 232–1245)
WBC # BLD: 7.29 K/UL — SIGNIFICANT CHANGE UP (ref 3.8–10.5)
WBC # FLD AUTO: 7.29 K/UL — SIGNIFICANT CHANGE UP (ref 3.8–10.5)

## 2024-11-07 PROCEDURE — 99233 SBSQ HOSP IP/OBS HIGH 50: CPT

## 2024-11-07 PROCEDURE — 93306 TTE W/DOPPLER COMPLETE: CPT | Mod: 26

## 2024-11-07 RX ORDER — TRAMADOL HYDROCHLORIDE 50 MG/1
25 TABLET, COATED ORAL ONCE
Refills: 0 | Status: DISCONTINUED | OUTPATIENT
Start: 2024-11-07 | End: 2024-11-07

## 2024-11-07 RX ORDER — ACETAMINOPHEN 500 MG
1000 TABLET ORAL ONCE
Refills: 0 | Status: COMPLETED | OUTPATIENT
Start: 2024-11-07 | End: 2024-11-07

## 2024-11-07 RX ORDER — OXYCODONE HYDROCHLORIDE 30 MG/1
5 TABLET ORAL EVERY 6 HOURS
Refills: 0 | Status: DISCONTINUED | OUTPATIENT
Start: 2024-11-07 | End: 2024-11-13

## 2024-11-07 RX ADMIN — OXYCODONE HYDROCHLORIDE 5 MILLIGRAM(S): 30 TABLET ORAL at 10:07

## 2024-11-07 RX ADMIN — Medication 50 MILLILITER(S): at 19:00

## 2024-11-07 RX ADMIN — Medication 81 MILLIGRAM(S): at 09:25

## 2024-11-07 RX ADMIN — GABAPENTIN 300 MILLIGRAM(S): 300 CAPSULE ORAL at 09:26

## 2024-11-07 RX ADMIN — TRAMADOL HYDROCHLORIDE 25 MILLIGRAM(S): 50 TABLET, COATED ORAL at 00:03

## 2024-11-07 RX ADMIN — OXYCODONE HYDROCHLORIDE 5 MILLIGRAM(S): 30 TABLET ORAL at 11:00

## 2024-11-07 RX ADMIN — Medication 400 MILLIGRAM(S): at 18:33

## 2024-11-07 RX ADMIN — OXYCODONE HYDROCHLORIDE 5 MILLIGRAM(S): 30 TABLET ORAL at 21:59

## 2024-11-07 RX ADMIN — GABAPENTIN 300 MILLIGRAM(S): 300 CAPSULE ORAL at 21:59

## 2024-11-07 RX ADMIN — Medication 325 MILLIGRAM(S): at 09:26

## 2024-11-07 RX ADMIN — Medication 650 MILLIGRAM(S): at 05:26

## 2024-11-07 RX ADMIN — FOLIC ACID 1 MILLIGRAM(S): 1 TABLET ORAL at 09:26

## 2024-11-07 RX ADMIN — LIDOCAINE HYDROCHLORIDE 1 PATCH: 40 SOLUTION TOPICAL at 06:31

## 2024-11-07 RX ADMIN — Medication 10 MILLIGRAM(S): at 21:59

## 2024-11-07 RX ADMIN — Medication 1000 MILLIGRAM(S): at 18:44

## 2024-11-07 RX ADMIN — Medication 1000 MILLIGRAM(S): at 16:13

## 2024-11-07 RX ADMIN — TRAMADOL HYDROCHLORIDE 25 MILLIGRAM(S): 50 TABLET, COATED ORAL at 04:30

## 2024-11-07 NOTE — PROGRESS NOTE ADULT - ASSESSMENT
80 yo F  w/ PMHx of AS s/p TAVR ( Bioprosthetic valve) , AFib on Xarelto, temporal arteritis, HLD, HTN presents complaining of generalized weakness and fatigue since Saturday. Per daughter at bedside she had a fall,  was seen here on SAT after a fall and discharged home.+ Reports R shoulder pain.     # UTI  - doesn't meet sepsis criteria on admission  - UA turbid protein 30 large LE + nitrite  many bacteria  - s/p iv ofirmevx1, Rocephin x1 lidocaine patch , 500cc NS in ER   - Continue Rocephin for now follow cx    # generalized weakness s/p fall  # deconditioning/ poor appetite  # R shoulder pain   - CT HEAD:  No acute intracranial hemorrhage, mass effect, or midline shift. Chronic small vessel disease. If there is continued concern for acute neurologic compromise, recommend MRI of the brain for further evaluation.  - per daughter progressive decline since Saturday s/p fall with R shoulder pain   - X- ray R shoulder no visible fracture pending official read   - follow MRI brain   - CT shoulder ordered   - STAT atorvastatin 40mg , ASA 81  - F/u lipid panel, A1c  - aspiration and seizure precautions  - fall risk protocol  - PT/OT consult  - Neuro checks q4  - follow orthostatics   - Recent TTE EF 55% grade 3 diastolic dysfunction   - Nutrition consult   - SW consult     # Bioprosthetic aortic valve TAVR  # Grade 3 diastolic dysfunction   - TTE 7/5/24  Mild left ventricular hypertrophy. Left ventricular systolic function is normal with an ejection fraction of 55 %  There is severe (grade 3) left ventricular diastolic dysfunction S/p bioprosthetic aortic valve. Likely MIQUEL. Severe tricuspid regurgitation. artery systolic pressure is 48 mmHg, consistent with echocardiographic evidence of pulmonary hypertension Small right pleural effusion noted.      # anemia   - s/p 2 units prbc  - h/h at 18:30  -  H/H: 7.7/26   -> 6.3/20.9  - baseline 7.3 last hb   - Monitor for signs/symptoms of bleeding  - Monitor daily CBC.    # A- fib  - Not sure if pt is on coreg for rate control   - Continue home Xarelto     # HTN  - on losartan and HCTZ   - BP labile hold losartan    # LE edema chronic  - on HCTZ ( will give once daily for now) reaccess in am  - follow LE doppler U/S      # DVT pro  - XARELTO daily

## 2024-11-07 NOTE — DIETITIAN INITIAL EVALUATION ADULT - NSFNSPHYEXAMSKINFT_GEN_A_CORE
Soren = 12   Pressure Injury 1: Left:, heel, Stage I  Pressure Injury 2: Right:, heel, Stage I  Pressure Injury 3: Bilateral:, buttocks, Stage I

## 2024-11-07 NOTE — DIETITIAN INITIAL EVALUATION ADULT - CALCULATED FROM (ML/KG)
[id number] Surgeon/Pathologist Verbiage (Will Incorporate Name Of Surgeon From Intro If Not Blank): operated in two distinct and integrated capacities as the surgeon and pathologist.

## 2024-11-07 NOTE — PHYSICAL THERAPY INITIAL EVALUATION ADULT - CRITERIA FOR SKILLED THERAPEUTIC INTERVENTIONS
will attempt completion of Eval @ later date; pt currently with low H&H; transfusion pending; further course of PT intervention TBD

## 2024-11-07 NOTE — DIETITIAN INITIAL EVALUATION ADULT - ORAL INTAKE PTA/DIET HISTORY
Is lethargic at time of visit, not always forthcoming w/ information. Lives at home w/ son; pt cooks/grocery shops w/o difficulty (? accuracy). Endorses poor appetite and likely meeting </= 50% of ENN x 2 weeks 2/2 generalized weakness, lethargy, pain. Follows weight watcher's x ? time frame.

## 2024-11-07 NOTE — DIETITIAN INITIAL EVALUATION ADULT - PERTINENT LABORATORY DATA
11-07    137  |  108  |  34[H]  ----------------------------<  107[H]  3.8   |  23  |  0.89    Ca    8.6      07 Nov 2024 07:36  Mg     2.4     11-06    TPro  5.9[L]  /  Alb  1.9[L]  /  TBili  0.5  /  DBili  x   /  AST  15  /  ALT  14  /  AlkPhos  202[H]  11-07  A1C with Estimated Average Glucose Result: 5.8 % (06-21-24 @ 10:40)  A1C with Estimated Average Glucose Result: 6.2 % (02-26-24 @ 18:25)

## 2024-11-07 NOTE — DIETITIAN INITIAL EVALUATION ADULT - DIET TYPE
Margi Doshi is a 44 year old female presents today for   Chief Complaint   Patient presents with   • Fever   • Congestion     Chest and nasal       Margi Doshi is a 44 year old female presenting with patient endorses chest and nasal congestion, phlegm, SOB, and fever. Other symptoms include body aches, headaches, and fatigue. Notes history of asthma and had done a breathing treatment with mild relief. OTC medications taken include tylenol sinus with moderate relief. Onset of symptoms began over the weekend. Patient would like to get swabbed today. Writer to collect COVID/FLU/RSV.     Denies Latex allergy or sensitivity    Medications: medications verified and updated    ALLERGIES:   Allergen Reactions   • Cat Hair Extract Other (See Comments) and SHORTNESS OF BREATH     Bothers asthma  Bothers asthma     • Dog Epithelium Other (See Comments) and SHORTNESS OF BREATH     Bothers asthma  Bothers asthma     • Somatropin SHORTNESS OF BREATH     Trouble breathing   • Penicillins Other (See Comments)     Unknown, childhood       PCP: RIYA Claros    /88 (BP Location: RFA - Right forearm, Patient Position: Sitting)   Pulse 74   Temp 97.6 °F (36.4 °C) (Temporal)   Resp 18   Ht 5' 7\" (1.702 m)   Wt 105.2 kg (232 lb)   LMP 04/10/2022 (Exact Date)      Pregnant no  Breastfeeding no      Patient here alone    Patient would not like their family or friends informed of their status during treatment.     Patient would like communication of their results via:    Cell Phone:   Telephone Information:   Mobile [phone]   Mobile [phone]     Okay to leave a message containing results? Yes    Health Maintenance Due   Topic Date Due   • Hepatitis B Vaccine (1 of 3 - 3-dose series) Never done   • Pneumococcal Vaccine 0-64 (1 - PCV) Never done   • COVID-19 Vaccine (3 - Booster for Pfizer series) 04/14/2022   • Influenza Vaccine (1) 09/01/2022   • Depression Screening  04/06/2023        regular/supplement (specify)

## 2024-11-07 NOTE — CONSULT NOTE ADULT - ASSESSMENT
79 F with hx of Aortic valve replacement (TAVR), AF on AC, presents with weakness       Anemia- severe anemia-- needs transfusion and iron supplementation recommend goal Hb> 9     Afib-- hold xarelto for now--    UTI-- being treated with abx       pain control     supportive care will follow

## 2024-11-07 NOTE — PHYSICAL THERAPY INITIAL EVALUATION ADULT - GENERAL OBSERVATIONS, REHAB EVAL
pt rec'd seated in chair; R shld / R periorbital bruising; R leg bandaged; pedal edema; pain 10/10 R UE; EVELIO Hernandez made aware

## 2024-11-07 NOTE — DIETITIAN INITIAL EVALUATION ADULT - ADD RECOMMEND
1) Recommend to liberalize diet to regular to maximize caloric and nutrient intake  2) Add ensure + HP shake BID (350kcal, 20g protein)  3) Monitor bowel movements, if no BM for >3 days, consider implementing bowel regimen.  4) Encourage protein-rich foods, maximize food preferences  5) MVI w/ minerals daily to ensure 100% RDA met  6) Consider adding thiamine 100 mg daily 2/2 poor PO intake/ malnutrition  7) Monitor lytes/ min and replete prn.  8) Confirm goals of care regarding nutrition support  RD will continue to monitor PO intake, labs, hydration, and wt prn.

## 2024-11-07 NOTE — CONSULT NOTE ADULT - SUBJECTIVE AND OBJECTIVE BOX
CHIEF COMPLAINT: Patient is a 79y old  Female who presents with a chief complaint of UTI, generalized weakness inability to ambulate (06 Nov 2024 17:21)      HPI:  78 yo F  w/ PMHx of AS s/p TAVR ( Bioprosthetic valve) , AFib on Xarelto, temporal arteritis, HLD, HTN presents complaining of generalized weakness and fatigue since Saturday. Per daughter at bedside she had a fall,  was seen here on SAT after a fall and discharged home.+ Reports R shoulder pain. Since returning home, patient unable to ambulate secondary to weakness. Has been sleeping more with poor appetite, not her baseline. Denies chest pain, shortness of breath, fever, but endorses R chronic shoulder pain secondary to rotator cuff injury.  Per daughter she has ligamentous tear on R shoulder     IN ED  VITALS: /47 HR 76 RR 18 temp 97F   LABS: H/H: 7.7/26 rbc 2.76 RDW 19.5 Retic count 16.4    BUN 38  ALKpo4 216     UA turbid protein 30 large LE + nitrite  many bacteria    CT HEAD:  No acute intracranial hemorrhage, mass effect, or midline shift. Chronic small vessel disease. If there is continued concern for acute neurologic compromise, recommend MRI of the brain for further evaluation.    TTE 7/5/24  Mild left ventricular hypertrophy. Left ventricular systolic function is normal with an ejection fraction of 55 %  There is severe (grade 3) left ventricular diastolic dysfunction S/p bioprosthetic aortic valve. Likely MIQUEL. Severe tricuspid regurgitation. artery systolic pressure is 48 mmHg, consistent with echocardiographic evidence of pulmonary hypertension Small right pleural effusion noted.    s/p iv ofirmevx1, Rocephin x1 lidocaine patch , 500cc in ER    (06 Nov 2024 17:21)      PMHx: PAST MEDICAL & SURGICAL HISTORY:  History of Cardiac Arrhythmia      Hypertension      Atrial Fibrillation      Aortic stenosis      Nonrheumatic aortic valve stenosis      High cholesterol      H/O temporal arteritis      S/P Exploratory Laparotomy  1966      H/O prior ablation treatment      History of temporal artery biopsy      S/P foot surgery            Soc Hx:       Allergies: Allergies    adhesives (Rash)  No Known Drug Allergies    Intolerances          REVIEW OF SYSTEMS:    CONSTITUTIONAL: No weakness, fevers or chills  EYES/ENT: No visual changes;  No vertigo or throat pain   NECK: No pain or stiffness  RESPIRATORY: No cough, wheezing, hemoptysis; No shortness of breath  CARDIOVASCULAR: No chest pain or palpitations  GASTROINTESTINAL: No abdominal or epigastric pain. No nausea, vomiting, or hematemesis; No diarrhea or constipation. No melena or hematochezia.  GENITOURINARY: No dysuria, frequency or hematuria  NEUROLOGICAL: No numbness or weakness  SKIN: No itching, burning, rashes, or lesions   All other review of systems is negative unless indicated above    Vital Signs Last 24 Hrs  T(C): 36.2 (07 Nov 2024 08:07), Max: 36.6 (06 Nov 2024 19:36)  T(F): 97.2 (07 Nov 2024 08:07), Max: 97.9 (06 Nov 2024 19:36)  HR: 91 (07 Nov 2024 08:07) (69 - 92)  BP: 113/57 (07 Nov 2024 08:07) (106/47 - 125/64)  BP(mean): 63 (06 Nov 2024 15:10) (63 - 63)  RR: 16 (07 Nov 2024 08:07) (16 - 18)  SpO2: 95% (07 Nov 2024 08:07) (95% - 100%)    Parameters below as of 07 Nov 2024 08:07  Patient On (Oxygen Delivery Method): room air        I&O's Summary          PHYSICAL EXAM:   Constitutional: Awake  alert  Respiratory: Breath sounds are clear bilaterally, No wheezing, rales or rhonchi  Cardiovascular: S1 and S2, regular rate and rhythm, no Murmurs, gallops or rubs  Extremities: No peripheral edema  Vascular: 2+ peripheral pulses  Neurological: alert, oriented to name,, ( normally AOx 4)       MEDICATIONS:  MEDICATIONS  (STANDING):  aspirin enteric coated 81 milliGRAM(s) Oral daily  atorvastatin 10 milliGRAM(s) Oral at bedtime  cefTRIAXone Injectable.      cefTRIAXone Injectable. 1000 milliGRAM(s) IV Push every 24 hours  ferrous    sulfate 325 milliGRAM(s) Oral daily  folic acid 1 milliGRAM(s) Oral daily  gabapentin 300 milliGRAM(s) Oral two times a day  hydrochlorothiazide 25 milliGRAM(s) Oral daily  influenza  Vaccine (HIGH DOSE) 0.5 milliLiter(s) IntraMuscular once      LABS: All Labs Reviewed:                        6.3    7.29  )-----------( 300      ( 07 Nov 2024 07:36 )             20.9     11-07    137  |  108  |  34[H]  ----------------------------<  107[H]  3.8   |  23  |  0.89    Ca    8.6      07 Nov 2024 07:36  Mg     2.4     11-06    TPro  5.9[L]  /  Alb  1.9[L]  /  TBili  0.5  /  DBili  x   /  AST  15  /  ALT  14  /  AlkPhos  202[H]  11-07    PT/INR - ( 06 Nov 2024 14:12 )   PT: 21.1 sec;   INR: 1.80 ratio         PTT - ( 06 Nov 2024 14:12 )  PTT:44.4 sec          EKG:< from: 12 Lead ECG (11.02.24 @ 17:40) >    Diagnosis Line Atrial fibrillation  Left axis deviation  Anteroseptal infarct (cited on or before 08-FEB-2016)  Abnormal ECG  When compared with ECG of 02-SEP-2019 16:15,  Questionable change in QRS duration  Confirmed by REI CEE PAUL ([id number]) on 11/3/2024 10:58:52 AM    < end of copied text >      ECHO:    < from: TTE W or WO Ultrasound Enhancing Agent (07.05.24 @ 13:47) >     CONCLUSIONS:      1. The left atrium is mildly dilated.   2. Mild left ventricular hypertrophy.   3. Left ventricular cavity is normal in size. Left ventricular systolic function is normal with an ejection fraction of 55 % by Amador's methodof disks.   4. There is severe (grade 3) left ventricular diastolic dysfunction, with elevated left ventricular filling pressure.   5. The right atrium is dilated.   6. Normal right ventricular cavity size and borderline reduced right ventricular systolic function.   7. Mild mitral regurgitation.   8. S/p bioprosthetic aortic valve. Likely MIQUEL. well seated valve. Acceptable gradients. No regurgitation.   9. Severe tricuspid regurgitation.  10. Estimated pulmonary artery systolic pressure is 48 mmHg, consistent with echocardiographic evidence of pulmonary hyptertension.  11. Small right pleural effusion noted.  12. Repeat study after significant diuresis to reevaluate severity of TR if clinically indicated.    ________________________________________________________________________________________  FINDINGS:     Left Ventricle:  The left ventricular cavity is normal in size. Left ventricular systolic function is normal with a calculated ejection fraction of 55 % by the Amador's biplane method of disks. There is severe (grade 3) left ventricular diastolic dysfunction, with Left ventricular filling pressures are elevated. Mild left ventricular hypertrophy.     Right Ventricle:  The right ventricular cavity is normal in size and right ventricular systolic function is borderline reduced. Tricuspid annular plane systolic excursion (TAPSE) is 1.6 cm (normal >=1.7 cm).     Left Atrium:  The left atrium is mildly dilated.     Right Atrium:  The right atrium is dilated.     Interatrial Septum:  The interatrial septum appears intact.     Aortic Valve:  A a transcatheter deployed (TAVR) is present in the aortic position. There is no intravalvular regurgitation. S/p bioprosthetic aortic valve. Likely MIQUEL. well seated valve. Acceptable gradients. No regurgitation.     Mitral Valve:  Structurally normal mitral valve with normal leaflet excursion. There is mitral valve thickening of the anterior and posterior leaflets. There is mild mitral regurgitation.     Tricuspid Valve:  Structurally normal tricuspid valve with normal leaflet excursion. There is severe tricuspid regurgitation. Estimated pulmonary artery systolic pressure is 48 mmHg, consistent with echocardiographic evidence of pulmonary hyptertension.     Pulmonic Valve:  Thepulmonic valve was not well visualized.     Aorta:  The aortic root at the sinuses of Valsalva is normal in size. The ascending aorta diameter is normal in size.     Pleura:  Small right pleural effusion noted.     Systemic Veins:  The inferior vena cava is normal in size (normal <2.1cm) with normal inspiratory collapse (normal >50%) consistent with normal right atrial pressure (~3, range 0-5mmHg).  ____________________________________________________________________    < end of copied text >

## 2024-11-07 NOTE — DIETITIAN INITIAL EVALUATION ADULT - PERTINENT MEDS FT
MEDICATIONS  (STANDING):  aspirin enteric coated 81 milliGRAM(s) Oral daily  atorvastatin 10 milliGRAM(s) Oral at bedtime  cefTRIAXone Injectable.      cefTRIAXone Injectable. 1000 milliGRAM(s) IV Push every 24 hours  ferrous    sulfate 325 milliGRAM(s) Oral daily  folic acid 1 milliGRAM(s) Oral daily  gabapentin 300 milliGRAM(s) Oral two times a day  hydrochlorothiazide 25 milliGRAM(s) Oral daily  influenza  Vaccine (HIGH DOSE) 0.5 milliLiter(s) IntraMuscular once    MEDICATIONS  (PRN):  acetaminophen     Tablet .. 650 milliGRAM(s) Oral every 6 hours PRN Mild Pain (1 - 3)  melatonin 3 milliGRAM(s) Oral at bedtime PRN Insomnia  oxyCODONE    IR 5 milliGRAM(s) Oral every 6 hours PRN Severe Pain (7 - 10)

## 2024-11-07 NOTE — DIETITIAN INITIAL EVALUATION ADULT - OTHER INFO
80 yo F  w/ PMHx of AS s/p TAVR ( Bioprosthetic valve) , AFib on Xarelto, temporal arteritis, HLD, HTN presents complaining of generalized weakness and fatigue since Saturday. Per daughter at bedside she had a fall,  was seen here on SAT after a fall and discharged home.+ Reports R shoulder pain. Since returning home, patient unable to ambulate secondary to weakness. Has been sleeping more with poor appetite, not her baseline.  Admit for UTI, generalized weakness, R shoulder pain     W/ severe anemia noted - needs transfusion and iron supplementation; given 325mg of ferrous sulfate x 24 hrs. RD ordered breakfast for pt (omelette, rye toast, fresh fruit, yogurt, coffee). Reports UBW of ~140# x 2 weeks ago; Unable to obtain bed scale wt 2/2 pt sitting in chair. Admit dosing wt as per EMR of 154# taken on 11/6/24 (w/ mild-mod edema that could be skewing wt); no clinically significant wt changes noted at this time. NFPE reveals mild-mod muscle/ fat wasting - appears thin and frail; edema could be masking losses, skewing appearance (+2 R/L foot, +1 R shoulder edema). Recommend to liberalize diet to regular to maximize caloric and nutrient intake. Add ensure + HP shake BID (350kcal, 20g protein) to optimize nutritional needs - pt is receptive. Encourage protein-rich foods, maximize food preferences. See below for other recommendations.

## 2024-11-07 NOTE — PROGRESS NOTE ADULT - SUBJECTIVE AND OBJECTIVE BOX
HOSPITALIST ATTENDING PROGRESS NOTE    Chart and meds reviewed.  Patient seen and examined.    CC/ HPI Patient is a 79y old  Female who presents with a chief complaint of Weakness     (07 Nov 2024 10:43)      Subjective: Seen with daughter at bedside. Complains of right shoulder pain.  States she has no headache, no abdominal pain and no pain in her legs.     All other systems reviewed and found to be negative with the exception of what has been described above.    MEDICATIONS:  MEDICATIONS  (STANDING):  atorvastatin 10 milliGRAM(s) Oral at bedtime  cefTRIAXone Injectable.      cefTRIAXone Injectable. 1000 milliGRAM(s) IV Push every 24 hours  ferrous    sulfate 325 milliGRAM(s) Oral daily  folic acid 1 milliGRAM(s) Oral daily  gabapentin 300 milliGRAM(s) Oral two times a day  hydrochlorothiazide 25 milliGRAM(s) Oral daily  influenza  Vaccine (HIGH DOSE) 0.5 milliLiter(s) IntraMuscular once      Vital Signs Last 24 Hrs  T(C): 37.6 (07 Nov 2024 16:05), Max: 37.6 (07 Nov 2024 16:05)  T(F): 99.7 (07 Nov 2024 16:05), Max: 99.7 (07 Nov 2024 16:05)  HR: 99 (07 Nov 2024 16:05) (80 - 99)  BP: 152/77 (07 Nov 2024 16:05) (107/45 - 155/76)  BP(mean): --  RR: 16 (07 Nov 2024 16:05) (16 - 18)  SpO2: 95% (07 Nov 2024 16:05) (95% - 100%)    Parameters below as of 07 Nov 2024 16:05  Patient On (Oxygen Delivery Method): room air        I&O's Summary      CAPILLARY BLOOD GLUCOSE          PHYSICAL EXAM:    Constitutional: NAD, alert but sleepy, thin  HEENT:  EOMI, Normal Hearing, MMM  Neck: Soft and supple, No LAD, No JVD  Respiratory: Breath sounds are clear bilaterally, No wheezing, rales or rhonchi  Cardiovascular: S1 and S2, regular rate and rhythm, no Murmurs, gallops or rubs  Gastrointestinal: Bowel Sounds present, soft, nontender, nondistended, no guarding, no rebound  Extremities: No peripheral edema  Vascular: 2+ peripheral pulses  Neurological: A/O x 3, no focal deficits  Musculoskeletal: 5/5 strength b/l upper and lower extremities  Skin: + multiple ecchymotic areas over left forehead and upper and lower extremities        LABS: All Labs Reviewed:                        6.3    7.29  )-----------( 300      ( 07 Nov 2024 07:36 )             20.9     11-07    137  |  108  |  34[H]  ----------------------------<  107[H]  3.8   |  23  |  0.89    Ca    8.6      07 Nov 2024 07:36  Mg     2.4     11-06    TPro  5.9[L]  /  Alb  1.9[L]  /  TBili  0.5  /  DBili  x   /  AST  15  /  ALT  14  /  AlkPhos  202[H]  11-07    PT/INR - ( 06 Nov 2024 14:12 )   PT: 21.1 sec;   INR: 1.80 ratio         PTT - ( 06 Nov 2024 14:12 )  PTT:44.4 sec      Blood Culture:     RADIOLOGY/EKG:    DVT PPX:    ADVANCED DIRECTIVE:    DISPOSITION:    Total time spent:  55 minutes

## 2024-11-07 NOTE — PHYSICAL THERAPY INITIAL EVALUATION ADULT - MODALITIES TREATMENT COMMENTS
pt left seated in chair post Eval; chair alarm donned; callbell in reach; pt instructed not to get up alone; call nursing for assist; nichelle well; pain 10/10; RN Mary aware; pt refused elevation of LE's

## 2024-11-08 LAB
ADD ON TEST-SPECIMEN IN LAB: SIGNIFICANT CHANGE UP
ANION GAP SERPL CALC-SCNC: 7 MMOL/L — SIGNIFICANT CHANGE UP (ref 5–17)
BUN SERPL-MCNC: 30 MG/DL — HIGH (ref 7–23)
CALCIUM SERPL-MCNC: 8.8 MG/DL — SIGNIFICANT CHANGE UP (ref 8.5–10.1)
CHLORIDE SERPL-SCNC: 106 MMOL/L — SIGNIFICANT CHANGE UP (ref 96–108)
CO2 SERPL-SCNC: 25 MMOL/L — SIGNIFICANT CHANGE UP (ref 22–31)
CREAT SERPL-MCNC: 0.88 MG/DL — SIGNIFICANT CHANGE UP (ref 0.5–1.3)
EGFR: 67 ML/MIN/1.73M2 — SIGNIFICANT CHANGE UP
GLUCOSE SERPL-MCNC: 129 MG/DL — HIGH (ref 70–99)
HCT VFR BLD CALC: 25.5 % — LOW (ref 34.5–45)
HCT VFR BLD CALC: 29.1 % — LOW (ref 34.5–45)
HGB BLD-MCNC: 8.4 G/DL — LOW (ref 11.5–15.5)
HGB BLD-MCNC: 9.4 G/DL — LOW (ref 11.5–15.5)
MCHC RBC-ENTMCNC: 29 PG — SIGNIFICANT CHANGE UP (ref 27–34)
MCHC RBC-ENTMCNC: 32.9 G/DL — SIGNIFICANT CHANGE UP (ref 32–36)
MCV RBC AUTO: 87.9 FL — SIGNIFICANT CHANGE UP (ref 80–100)
NT-PROBNP SERPL-SCNC: 4308 PG/ML — HIGH (ref 0–450)
PLATELET # BLD AUTO: 313 K/UL — SIGNIFICANT CHANGE UP (ref 150–400)
POTASSIUM SERPL-MCNC: 3.5 MMOL/L — SIGNIFICANT CHANGE UP (ref 3.5–5.3)
POTASSIUM SERPL-SCNC: 3.5 MMOL/L — SIGNIFICANT CHANGE UP (ref 3.5–5.3)
RBC # BLD: 2.9 M/UL — LOW (ref 3.8–5.2)
RBC # FLD: 18.5 % — HIGH (ref 10.3–14.5)
SODIUM SERPL-SCNC: 138 MMOL/L — SIGNIFICANT CHANGE UP (ref 135–145)
WBC # BLD: 6.83 K/UL — SIGNIFICANT CHANGE UP (ref 3.8–10.5)
WBC # FLD AUTO: 6.83 K/UL — SIGNIFICANT CHANGE UP (ref 3.8–10.5)

## 2024-11-08 PROCEDURE — 99233 SBSQ HOSP IP/OBS HIGH 50: CPT

## 2024-11-08 PROCEDURE — 70551 MRI BRAIN STEM W/O DYE: CPT | Mod: 26

## 2024-11-08 RX ORDER — POTASSIUM CHLORIDE 10 MEQ
20 TABLET, EXTENDED RELEASE ORAL
Refills: 0 | Status: COMPLETED | OUTPATIENT
Start: 2024-11-08 | End: 2024-11-11

## 2024-11-08 RX ORDER — POLYETHYLENE GLYCOL 3350 17 G/17G
17 POWDER, FOR SOLUTION ORAL DAILY
Refills: 0 | Status: DISCONTINUED | OUTPATIENT
Start: 2024-11-08 | End: 2024-11-13

## 2024-11-08 RX ORDER — SENNA 187 MG
2 TABLET ORAL AT BEDTIME
Refills: 0 | Status: DISCONTINUED | OUTPATIENT
Start: 2024-11-08 | End: 2024-11-13

## 2024-11-08 RX ORDER — FUROSEMIDE 40 MG
20 TABLET ORAL DAILY
Refills: 0 | Status: COMPLETED | OUTPATIENT
Start: 2024-11-08 | End: 2024-11-10

## 2024-11-08 RX ADMIN — Medication 10 MILLIGRAM(S): at 22:30

## 2024-11-08 RX ADMIN — Medication 650 MILLIGRAM(S): at 10:00

## 2024-11-08 RX ADMIN — OXYCODONE HYDROCHLORIDE 5 MILLIGRAM(S): 30 TABLET ORAL at 18:48

## 2024-11-08 RX ADMIN — Medication 1000 MILLIGRAM(S): at 16:30

## 2024-11-08 RX ADMIN — GABAPENTIN 300 MILLIGRAM(S): 300 CAPSULE ORAL at 22:31

## 2024-11-08 RX ADMIN — Medication 650 MILLIGRAM(S): at 11:00

## 2024-11-08 RX ADMIN — Medication 20 MILLIEQUIVALENT(S): at 22:31

## 2024-11-08 RX ADMIN — Medication 325 MILLIGRAM(S): at 09:59

## 2024-11-08 RX ADMIN — FOLIC ACID 1 MILLIGRAM(S): 1 TABLET ORAL at 09:58

## 2024-11-08 RX ADMIN — GABAPENTIN 300 MILLIGRAM(S): 300 CAPSULE ORAL at 09:58

## 2024-11-08 RX ADMIN — Medication 650 MILLIGRAM(S): at 20:20

## 2024-11-08 RX ADMIN — Medication 650 MILLIGRAM(S): at 21:00

## 2024-11-08 RX ADMIN — Medication 20 MILLIGRAM(S): at 13:57

## 2024-11-08 NOTE — PROGRESS NOTE ADULT - ASSESSMENT
79 F with hx of Aortic valve replacement (TAVR), AF on AC, presents with weakness       Anemia- severe anemia--s/p transfusion with improvement in Hb-- keep Hb ~9 , will need iron supplementation      Afib-- hold xarelto for now-- if Hb remains stable- would resume AC    UTI-- being treated with abx    Pulm HTN - higher than before-- will benefit from gentle diiuresis. continue with HCTZ for now- check BNP, may need lasix      pain control     supportive care will follow / PT     please call over weekend if needed

## 2024-11-08 NOTE — PROGRESS NOTE ADULT - ASSESSMENT
78 yo F  w/ PMHx of AS s/p TAVR ( Bioprosthetic valve) , AFib on Xarelto, temporal arteritis, HLD, HTN presents complaining of generalized weakness and fatigue since Saturday. Per daughter at bedside she had a fall,  was seen here on SAT after a fall and discharged home.+ Reports R shoulder pain.     # UTI  - doesn't meet sepsis criteria on admission  - UA turbid protein 30 large LE + nitrite  many bacteria  - s/p iv ofirmevx1, Rocephin x1 lidocaine patch , 500cc NS in ER   - Continue Rocephin for now follow cx - Urine cx: > 100k Kleb    # generalized weakness s/p fall  # deconditioning/ poor appetite  # R shoulder pain   - CT HEAD:  No acute intracranial hemorrhage, mass effect, or midline shift. Chronic small vessel disease. If there is continued concern for acute neurologic compromise, recommend MRI of the brain for further evaluation.  - per daughter progressive decline since Saturday s/p fall with R shoulder pain   - X- ray R shoulder no visible fracture pending official read   - follow MRI brain   - CT shoulder ordered   - STAT atorvastatin 40mg ,   - Hold ASA 81 and Xarelto and plan to restart 11/9 if h/h stable  - F/u lipid panel, A1c  - aspiration and seizure precautions  - fall risk protocol  - PT/OT consult  - Neuro checks q4  - follow orthostatics   - Recent TTE EF 55% grade 3 diastolic dysfunction   - Nutrition consult   - SW consult     # Bioprosthetic aortic valve TAVR  # Grade 3 diastolic dysfunction   - TTE 7/5/24  Mild left ventricular hypertrophy. Left ventricular systolic function is normal with an ejection fraction of 55 %  There is severe (grade 3) left ventricular diastolic dysfunction S/p bioprosthetic aortic valve. Likely MIQUEL. Severe tricuspid regurgitation. artery systolic pressure is 48 mmHg, consistent with echocardiographic evidence of pulmonary hypertension Small right pleural effusion noted.      # anemia   - s/p 2 units prbc  - h/h at 18:30  -  H/H: 7.7/26   -> 6.3/20.9  - baseline 7.3 last hb   - Monitor for signs/symptoms of bleeding  - Monitor daily CBC.    # A- fib  - Not sure if pt is on coreg for rate control   - Continue home Xarelto     # HTN  - on losartan and HCTZ   - BP labile hold losartan    # LE edema chronic  - on HCTZ ( will give once daily for now) reaccess in am - stopped and given Lasix 20 mg IV daily x 2 days  - follow LE doppler U/S      # DVT pro  - XARELTO daily - held for drop in h/h s/p 2 units prbc

## 2024-11-08 NOTE — PROGRESS NOTE ADULT - SUBJECTIVE AND OBJECTIVE BOX
HOSPITALIST ATTENDING PROGRESS NOTE    Chart and meds reviewed.  Patient seen and examined.    CC/ HPI Patient is a 79y old  Female who presents with a chief complaint of UTI, generalized weakness inability to ambulate (08 Nov 2024 07:12)      Subjective:     All other systems reviewed and found to be negative with the exception of what has been described above.    MEDICATIONS:  MEDICATIONS  (STANDING):  atorvastatin 10 milliGRAM(s) Oral at bedtime  cefTRIAXone Injectable.      cefTRIAXone Injectable. 1000 milliGRAM(s) IV Push every 24 hours  ferrous    sulfate 325 milliGRAM(s) Oral daily  folic acid 1 milliGRAM(s) Oral daily  furosemide   Injectable 20 milliGRAM(s) IV Push daily  gabapentin 300 milliGRAM(s) Oral two times a day  influenza  Vaccine (HIGH DOSE) 0.5 milliLiter(s) IntraMuscular once  lactated ringers. 500 milliLiter(s) (50 mL/Hr) IV Continuous <Continuous>      Vital Signs Last 24 Hrs  T(C): 36.3 (08 Nov 2024 15:37), Max: 36.9 (08 Nov 2024 07:38)  T(F): 97.3 (08 Nov 2024 15:37), Max: 98.4 (08 Nov 2024 07:38)  HR: 98 (08 Nov 2024 15:37) (86 - 98)  BP: 116/51 (08 Nov 2024 15:37) (116/51 - 132/63)  BP(mean): --  RR: 18 (08 Nov 2024 15:37) (16 - 18)  SpO2: 100% (08 Nov 2024 15:37) (95% - 100%)    Parameters below as of 08 Nov 2024 15:37  Patient On (Oxygen Delivery Method): room air        I&O's Summary    08 Nov 2024 07:01  -  08 Nov 2024 20:25  --------------------------------------------------------  IN: 300 mL / OUT: 0 mL / NET: 300 mL        CAPILLARY BLOOD GLUCOSE          PHYSICAL EXAM:    Constitutional: NAD, awake and alert, well-developed  HEENT:  EOMI, Normal Hearing, MMM  Neck: Soft and supple, No LAD, No JVD  Respiratory: Breath sounds are clear bilaterally, No wheezing, rales or rhonchi  Cardiovascular: S1 and S2, regular rate and rhythm, no Murmurs, gallops or rubs  Gastrointestinal: Bowel Sounds present, soft, nontender, nondistended, no guarding, no rebound  Extremities: No peripheral edema  Vascular: 2+ peripheral pulses  Neurological: A/O x 3, no focal deficits  Musculoskeletal: 5/5 strength b/l upper and lower extremities  Skin: No rashes        LABS: All Labs Reviewed:                        9.4    x     )-----------( x        ( 08 Nov 2024 18:54 )             29.1     11-08    138  |  106  |  30[H]  ----------------------------<  129[H]  3.5   |  25  |  0.88    Ca    8.8      08 Nov 2024 12:07    TPro  5.9[L]  /  Alb  1.9[L]  /  TBili  0.5  /  DBili  x   /  AST  15  /  ALT  14  /  AlkPhos  202[H]  11-07          Blood Culture: 11-06 @ 16:36  Organism --  Gram Stain Blood -- Gram Stain --  Specimen Source .Urine None  Culture-Blood --        RADIOLOGY/EKG:    DVT PPX:    ADVANCED DIRECTIVE:    DISPOSITION:    Total time spent: 25 / 35  / 50 minutes  HOSPITALIST ATTENDING PROGRESS NOTE    Chart and meds reviewed.  Patient seen and examined.    CC/ HPI Patient is a 79y old  Female who presents with a chief complaint of UTI, generalized weakness inability to ambulate (08 Nov 2024 07:12)      Subjective: Seen with brother in am and again in evening with daughter and son-in-law.  Patient more alert today.  Upon reviewing MRI results, patient does report noticing left foot drop.     All other systems reviewed and found to be negative with the exception of what has been described above.    MEDICATIONS:  MEDICATIONS  (STANDING):  atorvastatin 10 milliGRAM(s) Oral at bedtime  cefTRIAXone Injectable.      cefTRIAXone Injectable. 1000 milliGRAM(s) IV Push every 24 hours  ferrous    sulfate 325 milliGRAM(s) Oral daily  folic acid 1 milliGRAM(s) Oral daily  furosemide   Injectable 20 milliGRAM(s) IV Push daily  gabapentin 300 milliGRAM(s) Oral two times a day  influenza  Vaccine (HIGH DOSE) 0.5 milliLiter(s) IntraMuscular once  lactated ringers. 500 milliLiter(s) (50 mL/Hr) IV Continuous <Continuous>      Vital Signs Last 24 Hrs  T(C): 36.3 (08 Nov 2024 15:37), Max: 36.9 (08 Nov 2024 07:38)  T(F): 97.3 (08 Nov 2024 15:37), Max: 98.4 (08 Nov 2024 07:38)  HR: 98 (08 Nov 2024 15:37) (86 - 98)  BP: 116/51 (08 Nov 2024 15:37) (116/51 - 132/63)  BP(mean): --  RR: 18 (08 Nov 2024 15:37) (16 - 18)  SpO2: 100% (08 Nov 2024 15:37) (95% - 100%)    Parameters below as of 08 Nov 2024 15:37  Patient On (Oxygen Delivery Method): room air        I&O's Summary    08 Nov 2024 07:01  -  08 Nov 2024 20:25  --------------------------------------------------------  IN: 300 mL / OUT: 0 mL / NET: 300 mL        CAPILLARY BLOOD GLUCOSE      PHYSICAL EXAM:    Constitutional: NAD, alert but sleepy, thin  HEENT:  EOMI, Normal Hearing, MMM  Neck: Soft and supple, No LAD, No JVD  Respiratory: Breath sounds are clear bilaterally, No wheezing, rales or rhonchi  Cardiovascular: S1 and S2, regular rate and rhythm, no Murmurs, gallops or rubs  Gastrointestinal: Bowel Sounds present, soft, nontender, nondistended, no guarding, no rebound  Extremities: No peripheral edema  Vascular: 2+ peripheral pulses  Neurological: A/O x 3, no focal deficits  Musculoskeletal: 5/5 strength b/l upper and lower extremities  Skin: + multiple ecchymotic areas over left forehead and upper and lower extremities        LABS: All Labs Reviewed:                        9.4    x     )-----------( x        ( 08 Nov 2024 18:54 )             29.1     11-08    138  |  106  |  30[H]  ----------------------------<  129[H]  3.5   |  25  |  0.88    Ca    8.8      08 Nov 2024 12:07    TPro  5.9[L]  /  Alb  1.9[L]  /  TBili  0.5  /  DBili  x   /  AST  15  /  ALT  14  /  AlkPhos  202[H]  11-07          Blood Culture: 11-06 @ 16:36  Organism --  Gram Stain Blood -- Gram Stain --  Specimen Source .Urine None  Culture-Blood --        RADIOLOGY/EKG:    DVT PPX:    ADVANCED DIRECTIVE:    DISPOSITION:     Total time spent: 50 minutes

## 2024-11-08 NOTE — PROGRESS NOTE ADULT - SUBJECTIVE AND OBJECTIVE BOX
CHIEF COMPLAINT: Patient is a 79y old  Female who presents with a chief complaint of UTI, generalized weakness inability to ambulate (06 Nov 2024 17:21)      HPI:  78 yo F  w/ PMHx of AS s/p TAVR ( Bioprosthetic valve) , AFib on Xarelto, temporal arteritis, HLD, HTN presents complaining of generalized weakness and fatigue since Saturday. Per daughter at bedside she had a fall,  was seen here on SAT after a fall and discharged home.+ Reports R shoulder pain. Since returning home, patient unable to ambulate secondary to weakness. Has been sleeping more with poor appetite, not her baseline. Denies chest pain, shortness of breath, fever, but endorses R chronic shoulder pain secondary to rotator cuff injury.  Per daughter she has ligamentous tear on R shoulder     IN ED  VITALS: /47 HR 76 RR 18 temp 97F   LABS: H/H: 7.7/26 rbc 2.76 RDW 19.5 Retic count 16.4    BUN 38  ALKpo4 216     UA turbid protein 30 large LE + nitrite  many bacteria    CT HEAD:  No acute intracranial hemorrhage, mass effect, or midline shift. Chronic small vessel disease. If there is continued concern for acute neurologic compromise, recommend MRI of the brain for further evaluation.    TTE 7/5/24  Mild left ventricular hypertrophy. Left ventricular systolic function is normal with an ejection fraction of 55 %  There is severe (grade 3) left ventricular diastolic dysfunction S/p bioprosthetic aortic valve. Likely MIQUEL. Severe tricuspid regurgitation. artery systolic pressure is 48 mmHg, consistent with echocardiographic evidence of pulmonary hypertension Small right pleural effusion noted.    s/p iv ofirmevx1, Rocephin x1 lidocaine patch , 500cc in ER    (06 Nov 2024 17:21)    11/8/24-- s/p transfusion- still feeling weak    PMHx: PAST MEDICAL & SURGICAL HISTORY:  History of Cardiac Arrhythmia      Hypertension      Atrial Fibrillation      Aortic stenosis      Nonrheumatic aortic valve stenosis      High cholesterol      H/O temporal arteritis      S/P Exploratory Laparotomy  1966      H/O prior ablation treatment      History of temporal artery biopsy      S/P foot surgery      Vital Signs Last 24 Hrs  T(C): 36.7 (07 Nov 2024 20:31), Max: 39.1 (07 Nov 2024 18:45)  T(F): 98.1 (07 Nov 2024 20:31), Max: 102.3 (07 Nov 2024 18:45)  HR: 86 (07 Nov 2024 20:31) (82 - 99)  BP: 132/63 (07 Nov 2024 20:31) (113/57 - 155/76)  BP(mean): --  RR: 16 (07 Nov 2024 20:31) (16 - 16)  SpO2: 95% (07 Nov 2024 20:31) (95% - 99%)    Parameters below as of 07 Nov 2024 20:31  Patient On (Oxygen Delivery Method): room air        PHYSICAL EXAM:   Constitutional: fatigued appearing  Respiratory: Breath sounds are clear bilaterally, No wheezing, rales or rhonchi  Cardiovascular: S1 and S2, regular rate and rhythm,[place]  Extremities: No peripheral edema  Vascular: 2+ peripheral pulses  Neurological: alert, oriented to name,and place, ( normally AOx 4)   skin - mult ecchymosis      MEDICATIONS  (STANDING):  atorvastatin 10 milliGRAM(s) Oral at bedtime  cefTRIAXone Injectable.      cefTRIAXone Injectable. 1000 milliGRAM(s) IV Push every 24 hours  ferrous    sulfate 325 milliGRAM(s) Oral daily  folic acid 1 milliGRAM(s) Oral daily  gabapentin 300 milliGRAM(s) Oral two times a day  hydrochlorothiazide 25 milliGRAM(s) Oral daily  influenza  Vaccine (HIGH DOSE) 0.5 milliLiter(s) IntraMuscular once  lactated ringers. 500 milliLiter(s) (50 mL/Hr) IV Continuous <Continuous>      LABS: All Labs Reviewed:                                          9.2    x     )-----------( x        ( 07 Nov 2024 18:49 )             29.1   11-07    137  |  108  |  34[H]  ----------------------------<  107[H]  3.8   |  23  |  0.89    Ca    8.6      07 Nov 2024 07:36  Mg     2.4     11-06    TPro  5.9[L]  /  Alb  1.9[L]  /  TBili  0.5  /  DBili  x   /  AST  15  /  ALT  14  /  AlkPhos  202[H]  11-07          EKG:< from: 12 Lead ECG (11.02.24 @ 17:40) >    Diagnosis Line Atrial fibrillation  Left axis deviation  Anteroseptal infarct (cited on or before 08-FEB-2016)  Abnormal ECG  When compared with ECG of 02-SEP-2019 16:15,  Questionable change in QRS duration  Confirmed by REI CEE PAUL ([id number]) on 11/3/2024 10:58:52 AM    < end of copied text >    < from: TTE W or WO Ultrasound Enhancing Agent (11.07.24 @ 16:07) >    CONCLUSIONS:      1. Left ventricular systolic function is normal with an ejection fraction visually estimated at 55 to 60 %.   2. Mild left ventricular hypertrophy.   3. Enlarged right ventricular cavity size and borderline reduced right ventricular systolic function.   4. Left atrium is moderately dilated.   5. The right atrium is severely dilated.   6. A Transcatheter deployed (TAVR) valve replacement is present in the aortic position The prosthetic valve is well seated with normal function. Mild paravalvular regurgitation.   7. Mild mitral regurgitation.   8. Severe tricuspid regurgitation.   9. Severe pulmonary hypertension.    ________________________________________________________________________________________  FINDINGS:     Left Ventricle:  The left ventricular cavity is normal in size. Left ventricular wall thickness is mildly increased. Abnormal (paradoxical) septal motion consistent with conduction delay. Left ventricular systolic function is normal with an ejection fraction visually estimated at 55 to 60%. Mild left ventricular hypertrophy. The left ventricular diastolic function is indeterminate.     Right Ventricle:  The right ventricular cavity is enlarged in size and right ventricular systolic function is borderline reduced. Tricuspid annular plane systolic excursion (TAPSE) is 1.7 cm (normal >=1.7 cm).     Left Atrium:  The left atrium is moderately dilated with an indexed volume of 47.36 ml/m².     Right Atrium:  The right atrium is severely dilated with an indexed volume of 48.37 ml/m².     Interatrial Septum:  The interatrial septum appears intact.     Aortic Valve:  A a transcatheter deployed (TAVR) is present in the aortic position. The prosthetic valve is well seated with normal function. There is mild paravalvular regurgitation.     Mitral Valve:  Structurally normal mitral valve with normal leaflet excursion. There is mitral valve thickening of the anterior and posterior leaflets. There is mild calcification of the mitral valve annulus. There is mild mitral regurgitation.     Tricuspid Valve:  Structurally normal tricuspid valve with normal leaflet excursion. There is severe tricuspid regurgitation. Estimated pulmonary artery systolic pressure is 73 mmHg, consistent with severe pulmonary hypertension.     Pulmonic Valve:  The pulmonic valve was not well visualized. There is trace pulmonic regurgitation.     Aorta:  The aortic root at the sinuses of Valsalva is normal in size, measuring 3.00 cm (indexed 1.78 cm/m²). The ascending aorta is normal in size, measuring 3.30 cm (indexed 1.96 cm/m²).     Pericardium:  No pericardial effusion seen.     Systemic Veins:  The inferior vena cava is dilated (dilated >2.1cm) with abnormal inspiratory collapse (abnormal <50%) consistent with elevated right atrial pressure (~15, range 10-20mmHg).  ____________________________________________________________________    < end of copied text >

## 2024-11-09 LAB
-  AMPICILLIN/SULBACTAM: SIGNIFICANT CHANGE UP
-  AMPICILLIN: SIGNIFICANT CHANGE UP
-  AZTREONAM: SIGNIFICANT CHANGE UP
-  CEFAZOLIN: SIGNIFICANT CHANGE UP
-  CEFEPIME: SIGNIFICANT CHANGE UP
-  CEFTRIAXONE: SIGNIFICANT CHANGE UP
-  CEFUROXIME: SIGNIFICANT CHANGE UP
-  CIPROFLOXACIN: SIGNIFICANT CHANGE UP
-  ERTAPENEM: SIGNIFICANT CHANGE UP
-  GENTAMICIN: SIGNIFICANT CHANGE UP
-  IMIPENEM: SIGNIFICANT CHANGE UP
-  LEVOFLOXACIN: SIGNIFICANT CHANGE UP
-  MEROPENEM: SIGNIFICANT CHANGE UP
-  NITROFURANTOIN: SIGNIFICANT CHANGE UP
-  PIPERACILLIN/TAZOBACTAM: SIGNIFICANT CHANGE UP
-  TOBRAMYCIN: SIGNIFICANT CHANGE UP
-  TRIMETHOPRIM/SULFAMETHOXAZOLE: SIGNIFICANT CHANGE UP
ADD ON TEST-SPECIMEN IN LAB: SIGNIFICANT CHANGE UP
ANION GAP SERPL CALC-SCNC: 7 MMOL/L — SIGNIFICANT CHANGE UP (ref 5–17)
BUN SERPL-MCNC: 29 MG/DL — HIGH (ref 7–23)
CALCIUM SERPL-MCNC: 9.3 MG/DL — SIGNIFICANT CHANGE UP (ref 8.5–10.1)
CHLORIDE SERPL-SCNC: 104 MMOL/L — SIGNIFICANT CHANGE UP (ref 96–108)
CO2 SERPL-SCNC: 28 MMOL/L — SIGNIFICANT CHANGE UP (ref 22–31)
CREAT SERPL-MCNC: 1.03 MG/DL — SIGNIFICANT CHANGE UP (ref 0.5–1.3)
CULTURE RESULTS: ABNORMAL
EGFR: 55 ML/MIN/1.73M2 — LOW
GLUCOSE SERPL-MCNC: 127 MG/DL — HIGH (ref 70–99)
HCT VFR BLD CALC: 30.9 % — LOW (ref 34.5–45)
HGB BLD-MCNC: 10 G/DL — LOW (ref 11.5–15.5)
MAGNESIUM SERPL-MCNC: 1.7 MG/DL — SIGNIFICANT CHANGE UP (ref 1.6–2.6)
MCHC RBC-ENTMCNC: 28.5 PG — SIGNIFICANT CHANGE UP (ref 27–34)
MCHC RBC-ENTMCNC: 32.4 G/DL — SIGNIFICANT CHANGE UP (ref 32–36)
MCV RBC AUTO: 88 FL — SIGNIFICANT CHANGE UP (ref 80–100)
METHOD TYPE: SIGNIFICANT CHANGE UP
ORGANISM # SPEC MICROSCOPIC CNT: ABNORMAL
ORGANISM # SPEC MICROSCOPIC CNT: SIGNIFICANT CHANGE UP
PLATELET # BLD AUTO: 377 K/UL — SIGNIFICANT CHANGE UP (ref 150–400)
POTASSIUM SERPL-MCNC: 3.1 MMOL/L — LOW (ref 3.5–5.3)
POTASSIUM SERPL-SCNC: 3.1 MMOL/L — LOW (ref 3.5–5.3)
RBC # BLD: 3.51 M/UL — LOW (ref 3.8–5.2)
RBC # FLD: 18.5 % — HIGH (ref 10.3–14.5)
SODIUM SERPL-SCNC: 139 MMOL/L — SIGNIFICANT CHANGE UP (ref 135–145)
SPECIMEN SOURCE: SIGNIFICANT CHANGE UP
WBC # BLD: 6.69 K/UL — SIGNIFICANT CHANGE UP (ref 3.8–10.5)
WBC # FLD AUTO: 6.69 K/UL — SIGNIFICANT CHANGE UP (ref 3.8–10.5)

## 2024-11-09 PROCEDURE — 99233 SBSQ HOSP IP/OBS HIGH 50: CPT | Mod: FS

## 2024-11-09 PROCEDURE — 76882 US LMTD JT/FCL EVL NVASC XTR: CPT | Mod: 26,RT

## 2024-11-09 PROCEDURE — 99233 SBSQ HOSP IP/OBS HIGH 50: CPT

## 2024-11-09 RX ORDER — MAGNESIUM OXIDE 400 MG/1
400 TABLET ORAL
Refills: 0 | Status: COMPLETED | OUTPATIENT
Start: 2024-11-09 | End: 2024-11-11

## 2024-11-09 RX ORDER — APIXABAN 5 MG/1
5 TABLET, FILM COATED ORAL EVERY 12 HOURS
Refills: 0 | Status: DISCONTINUED | OUTPATIENT
Start: 2024-11-09 | End: 2024-11-11

## 2024-11-09 RX ORDER — MEROPENEM 1 G/30ML
1000 INJECTION INTRAVENOUS EVERY 8 HOURS
Refills: 0 | Status: COMPLETED | OUTPATIENT
Start: 2024-11-09 | End: 2024-11-11

## 2024-11-09 RX ORDER — ASPIRIN/MAG CARB/ALUMINUM AMIN 325 MG
81 TABLET ORAL DAILY
Refills: 0 | Status: DISCONTINUED | OUTPATIENT
Start: 2024-11-09 | End: 2024-11-11

## 2024-11-09 RX ORDER — MEROPENEM 1 G/30ML
1000 INJECTION INTRAVENOUS EVERY 8 HOURS
Refills: 0 | Status: DISCONTINUED | OUTPATIENT
Start: 2024-11-09 | End: 2024-11-09

## 2024-11-09 RX ADMIN — Medication 650 MILLIGRAM(S): at 04:37

## 2024-11-09 RX ADMIN — GABAPENTIN 300 MILLIGRAM(S): 300 CAPSULE ORAL at 09:31

## 2024-11-09 RX ADMIN — Medication 10 MILLIGRAM(S): at 22:08

## 2024-11-09 RX ADMIN — MEROPENEM 1000 MILLIGRAM(S): 1 INJECTION INTRAVENOUS at 22:08

## 2024-11-09 RX ADMIN — Medication 650 MILLIGRAM(S): at 03:54

## 2024-11-09 RX ADMIN — GABAPENTIN 300 MILLIGRAM(S): 300 CAPSULE ORAL at 22:09

## 2024-11-09 RX ADMIN — Medication 20 MILLIGRAM(S): at 06:01

## 2024-11-09 RX ADMIN — Medication 20 MILLIEQUIVALENT(S): at 09:31

## 2024-11-09 RX ADMIN — MAGNESIUM OXIDE 400 MILLIGRAM(S): 400 TABLET ORAL at 16:49

## 2024-11-09 RX ADMIN — Medication 650 MILLIGRAM(S): at 14:00

## 2024-11-09 RX ADMIN — Medication 650 MILLIGRAM(S): at 13:26

## 2024-11-09 RX ADMIN — Medication 20 MILLIEQUIVALENT(S): at 22:08

## 2024-11-09 RX ADMIN — POLYETHYLENE GLYCOL 3350 17 GRAM(S): 17 POWDER, FOR SOLUTION ORAL at 06:02

## 2024-11-09 RX ADMIN — OXYCODONE HYDROCHLORIDE 5 MILLIGRAM(S): 30 TABLET ORAL at 10:21

## 2024-11-09 RX ADMIN — Medication 650 MILLIGRAM(S): at 22:09

## 2024-11-09 RX ADMIN — Medication 81 MILLIGRAM(S): at 13:25

## 2024-11-09 RX ADMIN — APIXABAN 5 MILLIGRAM(S): 5 TABLET, FILM COATED ORAL at 22:09

## 2024-11-09 RX ADMIN — FOLIC ACID 1 MILLIGRAM(S): 1 TABLET ORAL at 09:31

## 2024-11-09 RX ADMIN — MEROPENEM 1000 MILLIGRAM(S): 1 INJECTION INTRAVENOUS at 13:25

## 2024-11-09 RX ADMIN — OXYCODONE HYDROCHLORIDE 5 MILLIGRAM(S): 30 TABLET ORAL at 10:55

## 2024-11-09 RX ADMIN — Medication 650 MILLIGRAM(S): at 22:55

## 2024-11-09 RX ADMIN — Medication 325 MILLIGRAM(S): at 09:31

## 2024-11-09 NOTE — PROGRESS NOTE ADULT - ASSESSMENT
78 yo F  w/ PMHx of AS s/p TAVR ( Bioprosthetic valve) , AFib on Xarelto, temporal arteritis, HLD, HTN presents complaining of generalized weakness and fatigue since Saturday. Per daughter at bedside she had a fall,  was seen here on SAT after a fall and discharged home.+ Reports R shoulder pain.     # UTI  - doesn't meet sepsis criteria on admission  - UA turbid protein 30 large LE + nitrite  many bacteria  - s/p iv ofirmevx1, Rocephin x1 lidocaine patch , 500cc NS in ER   - Continue Rocephin for now follow cx - Urine cx: > 100k Kleb    # generalized weakness s/p fall  # deconditioning/ poor appetite  # R shoulder pain   - CT HEAD:  No acute intracranial hemorrhage, mass effect, or midline shift. Chronic small vessel disease. If there is continued concern for acute neurologic compromise, recommend MRI of the brain for further evaluation.  - per daughter progressive decline since Saturday s/p fall with R shoulder pain   - X- ray R shoulder no visible fracture pending official read   - follow MRI brain - Acute/subacute right-sided corona radiata lacunar infarction   with associated cytotoxic edema.  - CT shoulder : density around right humeral head, right shoulder sono ordered  - STAT atorvastatin 40mg ,   - Hold ASA 81 and Xarelto and plan to restart 11/9 if h/h stable  - F/u lipid panel, A1c  - aspiration and seizure precautions  - fall risk protocol  - PT/OT consult  - Neuro checks q4  - follow orthostatics   - Recent TTE EF 55% grade 3 diastolic dysfunction   - Nutrition consult   - SW consult     # Bioprosthetic aortic valve TAVR  # Grade 3 diastolic dysfunction   - TTE 7/5/24  Mild left ventricular hypertrophy. Left ventricular systolic function is normal with an ejection fraction of 55 %  There is severe (grade 3) left ventricular diastolic dysfunction S/p bioprosthetic aortic valve. Likely MIQUEL. Severe tricuspid regurgitation. artery systolic pressure is 48 mmHg, consistent with echocardiographic evidence of pulmonary hypertension Small right pleural effusion noted.      # anemia   - s/p 2 units prbc  - h/h at 18:30  -  H/H: 7.7/26   -> 6.3/20.9  - baseline 7.3 last hb   - Monitor for signs/symptoms of bleeding  - Monitor daily CBC.    # A- fib  - Not sure if pt is on coreg for rate control   - Continue home Xarelto     # HTN  - on losartan and HCTZ   - BP labile hold losartan    # LE edema chronic  - on HCTZ ( will give once daily for now) reaccess in am - stopped and given Lasix 20 mg IV daily x 2 days  - follow LE doppler U/S      # DVT pro  - XARELTO daily - held for drop in h/h s/p 2 units prbc 80 yo F  w/ PMHx of AS s/p TAVR ( Bioprosthetic valve) , AFib on Xarelto, temporal arteritis, HLD, HTN presents complaining of generalized weakness and fatigue since Saturday. Per daughter at bedside she had a fall,  was seen here on SAT after a fall and discharged home.+ Reports R shoulder pain.     # UTI  - doesn't meet sepsis criteria on admission  - UA turbid protein 30 large LE + nitrite  many bacteria  - s/p iv ofirmevx1, Rocephin x1 lidocaine patch , 500cc NS in ER   - Continue Rocephin for now follow cx - Urine cx: > 100k Kleb    # generalized weakness s/p fall  # deconditioning/ poor appetite  # R shoulder pain   - CT HEAD:  No acute intracranial hemorrhage, mass effect, or midline shift. Chronic small vessel disease. If there is continued concern for acute neurologic compromise, recommend MRI of the brain for further evaluation.  - per daughter progressive decline since Saturday s/p fall with R shoulder pain   - X- ray R shoulder no visible fracture pending official read   - follow MRI brain - Acute/subacute right-sided corona radiata lacunar infarction   with associated cytotoxic edema.  - CT shoulder : density around right humeral head, right shoulder sono ordered  - STAT atorvastatin 40mg ,   - Hold ASA 81 and Xarelto and plan to restart 11/9 if h/h stable  - F/u lipid panel, A1c  - aspiration and seizure precautions  - fall risk protocol  - PT/OT consult  - Neuro checks q4  - follow orthostatics   - Recent TTE EF 55% grade 3 diastolic dysfunction   - Nutrition consult   - SW consult     # Bioprosthetic aortic valve TAVR  # Grade 3 diastolic dysfunction   - TTE 7/5/24  Mild left ventricular hypertrophy. Left ventricular systolic function is normal with an ejection fraction of 55 %  There is severe (grade 3) left ventricular diastolic dysfunction S/p bioprosthetic aortic valve. Likely MIQUEL. Severe tricuspid regurgitation. artery systolic pressure is 48 mmHg, consistent with echocardiographic evidence of pulmonary hypertension Small right pleural effusion noted.      # anemia   - s/p 2 units prbc  - h/h at 18:30  -  H/H: 7.7/26   -> 6.3/20.9  - baseline 7.3 last hb   - Monitor for signs/symptoms of bleeding  - Monitor daily CBC.    # A- fib  - Not sure if pt is on coreg for rate control   - Continue home Xarelto     # HTN  - on losartan and HCTZ   - BP labile hold losartan    # LE edema chronic  - on HCTZ ( will give once daily for now) reaccess in am - stopped and given Lasix 20 mg IV daily x 2 days  - follow LE doppler U/S      # DVT pro  - XARELTO daily - held for drop in h/h s/p 2 units prbc, restarted on Eliquis and ASA 11/9 - discussed with Dr. Perkins and Dr. Lovell 78 yo F  w/ PMHx of AS s/p TAVR ( Bioprosthetic valve) , AFib on Xarelto, temporal arteritis, HLD, HTN presents complaining of generalized weakness and fatigue since Saturday. Per daughter at bedside she had a fall,  was seen here on SAT after a fall and discharged home.+ Reports R shoulder pain.     # UTI  - doesn't meet sepsis criteria on admission  - UA turbid protein 30 large LE + nitrite  many bacteria  - s/p iv ofirmevx1, Rocephin x1 lidocaine patch , 500cc NS in ER   - Continue Rocephin for now follow cx - Urine cx: > 100k Kleb -> ESBL, stop ceftriaxone and start meropenem x 3 days, to be completed 11/11/24 after 9 doses    # generalized weakness s/p fall  # deconditioning/ poor appetite  # R shoulder pain   - CT HEAD:  No acute intracranial hemorrhage, mass effect, or midline shift. Chronic small vessel disease. If there is continued concern for acute neurologic compromise, recommend MRI of the brain for further evaluation.  - per daughter progressive decline since Saturday s/p fall with R shoulder pain   - X- ray R shoulder no visible fracture pending official read   - follow MRI brain - Acute/subacute right-sided corona radiata lacunar infarction   with associated cytotoxic edema.  - CT shoulder : density around right humeral head, right shoulder sono ordered  - STAT atorvastatin 40mg ,   - Hold ASA 81 and Xarelto and plan to restart 11/9 if h/h stable  - F/u lipid panel, A1c  - aspiration and seizure precautions  - fall risk protocol  - PT/OT consult  - Neuro checks q4  - follow orthostatics   - Recent TTE EF 55% grade 3 diastolic dysfunction   - Nutrition consult   - SW consult     # Bioprosthetic aortic valve TAVR  # Grade 3 diastolic dysfunction   - TTE 7/5/24  Mild left ventricular hypertrophy. Left ventricular systolic function is normal with an ejection fraction of 55 %  There is severe (grade 3) left ventricular diastolic dysfunction S/p bioprosthetic aortic valve. Likely MIQUEL. Severe tricuspid regurgitation. artery systolic pressure is 48 mmHg, consistent with echocardiographic evidence of pulmonary hypertension Small right pleural effusion noted.      # anemia   - s/p 2 units prbc  - h/h at 18:30  -  H/H: 7.7/26   -> 6.3/20.9  - baseline 7.3 last hb   - Monitor for signs/symptoms of bleeding  - Monitor daily CBC.    # A- fib  - Not sure if pt is on coreg for rate control   - Continue home Xarelto     # HTN  - on losartan and HCTZ   - BP labile hold losartan    # LE edema chronic  - on HCTZ ( will give once daily for now) reaccess in am - stopped and given Lasix 20 mg IV daily x 2 days  - follow LE doppler U/S      # DVT pro  - XARELTO daily - held for drop in h/h s/p 2 units prbc, restarted on Eliquis and ASA 11/9 - discussed with Dr. Perkins and Dr. Lovell

## 2024-11-09 NOTE — CONSULT NOTE ADULT - SUBJECTIVE AND OBJECTIVE BOX
Patient is a 79y old  Female who presents with a chief complaint of UTI, generalized weakness inability to ambulate    HPI:  80 y/o Female with h/o AS s/p TAVR (Bioprosthetic valve), AFib on Xarelto, temporal arteritis, HLD, HTN, right chronic shoulder pain secondary to rotator cuff injury, prior UTI with ESBL organism was admitted on 11/9 for increased generalized weakness and fatigue since Saturday. Per daughter she had a fall, was seen in ER after a fall and discharged home. She reported R shoulder pain. Since returning home, patient unable to ambulate secondary to weakness. Has been sleeping more with poor appetite, not her baseline. Denies fever or chills at home. In ER she was noted with pyuria and received ceftriaxone, then meropenem.    PMH: as above  PSH: as above  Meds: per reconciliation sheet, noted below  MEDICATIONS  (STANDING):  apixaban 5 milliGRAM(s) Oral every 12 hours  aspirin enteric coated 81 milliGRAM(s) Oral daily  atorvastatin 10 milliGRAM(s) Oral at bedtime  ferrous    sulfate 325 milliGRAM(s) Oral daily  folic acid 1 milliGRAM(s) Oral daily  furosemide   Injectable 20 milliGRAM(s) IV Push daily  gabapentin 300 milliGRAM(s) Oral two times a day  influenza  Vaccine (HIGH DOSE) 0.5 milliLiter(s) IntraMuscular once  lactated ringers. 500 milliLiter(s) (50 mL/Hr) IV Continuous <Continuous>  meropenem Injectable 1000 milliGRAM(s) IV Push every 8 hours  potassium chloride   Powder 20 milliEquivalent(s) Oral two times a day    MEDICATIONS  (PRN):  acetaminophen     Tablet .. 650 milliGRAM(s) Oral every 6 hours PRN Mild Pain (1 - 3)  melatonin 3 milliGRAM(s) Oral at bedtime PRN Insomnia  oxyCODONE    IR 5 milliGRAM(s) Oral every 6 hours PRN Severe Pain (7 - 10)  polyethylene glycol 3350 17 Gram(s) Oral daily PRN Constipation  senna 2 Tablet(s) Oral at bedtime PRN Constipation    Allergies    adhesives (Rash)  No Known Drug Allergies    Intolerances      Social: no smoking, no alcohol, no illegal drugs; no recent travel, no exposure to TB  FAMILY HISTORY:  FH: lung cancer (Father)      no history of premature cardiovascular disease in first degree relatives    ROS: the patient denies fever, no chills, no HA, no seizures, no dizziness, no sore throat, no nasal congestion, no blurry vision, no CP, no palpitations, no SOB, no cough, no abdominal pain, no diarrhea, no N/V, no dysuria, no leg pain, no claudication, no rash, no joint aches, no rectal pain or bleeding, no night sweats  All other systems reviewed and are negative    Vital Signs Last 24 Hrs  T(C): 36.9 (09 Nov 2024 07:54), Max: 36.9 (09 Nov 2024 07:54)  T(F): 98.5 (09 Nov 2024 07:54), Max: 98.5 (09 Nov 2024 07:54)  HR: 79 (09 Nov 2024 07:54) (78 - 98)  BP: 122/65 (09 Nov 2024 07:54) (116/51 - 127/60)  BP(mean): --  RR: 18 (09 Nov 2024 07:54) (18 - 18)  SpO2: 95% (09 Nov 2024 07:54) (95% - 100%)    Parameters below as of 09 Nov 2024 07:54  Patient On (Oxygen Delivery Method): room air    PE:    Constitutional:  No acute distress  HEENT: NC/AT, EOMI, PERRLA, conjunctivae clear; ears and nose atraumatic; pharynx benign  Neck: supple; thyroid not palpable  Back: no tenderness  Respiratory: respiratory effort normal; clear to auscultation  Cardiovascular: S1S2 regular, no murmurs  Abdomen: soft, not tender, not distended, positive BS; no liver or spleen organomegaly  Genitourinary: no suprapubic tenderness  Lymphatic: no LN palpable  Musculoskeletal: no muscle tenderness, no joint swelling or tenderness  Extremities: no pedal edema  Neurological/ Psychiatric: AxOx3, judgement and insight impaired; moving all extremities  Skin: no rashes; no palpable lesions    Labs: all available labs reviewed                        10.0   6.69  )-----------( 377      ( 09 Nov 2024 10:22 )             30.9     11-09    139  |  104  |  29[H]  ----------------------------<  127[H]  3.1[L]   |  28  |  1.03    Ca    9.3      09 Nov 2024 10:22  Mg     1.7     11-09    Culture - Urine (collected 06 Nov 2024 16:36)  Source: .Urine None  Final Report (09 Nov 2024 10:00):    >100,000 CFU/ml Klebsiella pneumoniae ESBL  Organism: Klebsiella pneumoniae ESBL (09 Nov 2024 10:01)  Organism: Klebsiella pneumoniae ESBL (09 Nov 2024 10:01)      Method Type: LUIS ARMANDO      -  Ampicillin: R >16 These ampicillin results predict results for amoxicillin      -  Ampicillin/Sulbactam: I 16/8      -  Aztreonam: R 8      -  Cefazolin: R >16 For uncomplicated UTI with K. pneumoniae, E. coli, or P. mirablis: LUIS ARMANDO <=16 is sensitive and LUIS ARMANDO >=32 is resistant. This also predicts results for oral agents cefaclor, cefdinir, cefpodoxime, cefprozil, cefuroxime axetil, cephalexin and locarbef for uncomplicated UTI. Note that some isolates may be susceptible to these agents while testing resistant to cefazolin.      -  Cefepime: R >16      -  Ceftriaxone: R >32      -  Cefuroxime: R >16      -  Ciprofloxacin: I 0.5      -  Ertapenem: S <=0.5      -  Gentamicin: S <=2      -  Imipenem: S <=1      -  Levofloxacin: S <=0.5      -  Meropenem: S <=1      -  Nitrofurantoin: S <=32 Should not be used to treat pyelonephritis      -  Piperacillin/Tazobactam: S <=8      -  Tobramycin: S <=2      -  Trimethoprim/Sulfamethoxazole: R >2/38    Radiology: all available radiological tests reviewed    < from: MR Head No Cont (11.08.24 @ 17:34) >  IMPRESSION: Acute/subacute right-sided corona radiata lacunar infarction with associated cytotoxic edema.  No acute intracranial hemorrhage.  Additional chronic lacunar infarct in the left corona radiata and mild chronic white matter microvascular type changes.  < end of copied text >    < from: CT Humerus No Cont, Right (11.06.24 @ 21:46) >  No fracture or dislocation.  Heterogeneous soft tissue density around the right humeral head . Leading differential is hematoma. Neoplastic condition is less likely but not   excluded. Consider nonvascular ultrasound for the right shoulder.  < end of copied text >      Advanced directives addressed: full resuscitation Patient is a 79y old  Female who presents with a chief complaint of UTI, generalized weakness inability to ambulate    HPI:  80 y/o Female with h/o AS s/p TAVR (Bioprosthetic valve), AFib on Xarelto, temporal arteritis, HLD, HTN, right chronic shoulder pain secondary to rotator cuff injury, prior UTI with ESBL organism was admitted on 11/9 for increased generalized weakness and fatigue since Saturday. Per daughter she had a fall, was seen in ER after a fall and discharged home. She reported R shoulder pain. Since returning home, patient unable to ambulate secondary to weakness. Has been sleeping more with poor appetite, not her baseline. Denies fever or chills at home. In ER she was noted with pyuria and received ceftriaxone, then meropenem.    PMH: as above  PSH: as above  Meds: per reconciliation sheet, noted below  MEDICATIONS  (STANDING):  apixaban 5 milliGRAM(s) Oral every 12 hours  aspirin enteric coated 81 milliGRAM(s) Oral daily  atorvastatin 10 milliGRAM(s) Oral at bedtime  ferrous    sulfate 325 milliGRAM(s) Oral daily  folic acid 1 milliGRAM(s) Oral daily  furosemide   Injectable 20 milliGRAM(s) IV Push daily  gabapentin 300 milliGRAM(s) Oral two times a day  influenza  Vaccine (HIGH DOSE) 0.5 milliLiter(s) IntraMuscular once  lactated ringers. 500 milliLiter(s) (50 mL/Hr) IV Continuous <Continuous>  meropenem Injectable 1000 milliGRAM(s) IV Push every 8 hours  potassium chloride   Powder 20 milliEquivalent(s) Oral two times a day    MEDICATIONS  (PRN):  acetaminophen     Tablet .. 650 milliGRAM(s) Oral every 6 hours PRN Mild Pain (1 - 3)  melatonin 3 milliGRAM(s) Oral at bedtime PRN Insomnia  oxyCODONE    IR 5 milliGRAM(s) Oral every 6 hours PRN Severe Pain (7 - 10)  polyethylene glycol 3350 17 Gram(s) Oral daily PRN Constipation  senna 2 Tablet(s) Oral at bedtime PRN Constipation    Allergies    adhesives (Rash)  No Known Drug Allergies    Intolerances      Social: no smoking, no alcohol, no illegal drugs; no recent travel, no exposure to TB  FAMILY HISTORY:  FH: lung cancer (Father)      no history of premature cardiovascular disease in first degree relatives    ROS: the patient is confused, limited  All other systems reviewed and are negative    Vital Signs Last 24 Hrs  T(C): 36.9 (09 Nov 2024 07:54), Max: 36.9 (09 Nov 2024 07:54)  T(F): 98.5 (09 Nov 2024 07:54), Max: 98.5 (09 Nov 2024 07:54)  HR: 79 (09 Nov 2024 07:54) (78 - 98)  BP: 122/65 (09 Nov 2024 07:54) (116/51 - 127/60)  BP(mean): --  RR: 18 (09 Nov 2024 07:54) (18 - 18)  SpO2: 95% (09 Nov 2024 07:54) (95% - 100%)    Parameters below as of 09 Nov 2024 07:54  Patient On (Oxygen Delivery Method): room air    PE:    Constitutional:  No acute distress  HEENT: NC/AT, EOMI, PERRLA, conjunctivae clear; ears and nose atraumatic; pharynx benign  Neck: supple; thyroid not palpable  Back: no tenderness  Respiratory: respiratory effort normal; clear to auscultation  Cardiovascular: S1S2 regular, no murmurs  Abdomen: soft, not tender, not distended, positive BS; no liver or spleen organomegaly  Genitourinary: no suprapubic tenderness  Lymphatic: no LN palpable  Musculoskeletal: no muscle tenderness, no joint swelling or tenderness  Extremities: no pedal edema  Neurological/ Psychiatric: Alert, judgement and insight impaired; moving all extremities  Skin: no rashes; no palpable lesions    Labs: all available labs reviewed                        10.0   6.69  )-----------( 377      ( 09 Nov 2024 10:22 )             30.9     11-09    139  |  104  |  29[H]  ----------------------------<  127[H]  3.1[L]   |  28  |  1.03    Ca    9.3      09 Nov 2024 10:22  Mg     1.7     11-09    Culture - Urine (collected 06 Nov 2024 16:36)  Source: .Urine None  Final Report (09 Nov 2024 10:00):    >100,000 CFU/ml Klebsiella pneumoniae ESBL  Organism: Klebsiella pneumoniae ESBL (09 Nov 2024 10:01)  Organism: Klebsiella pneumoniae ESBL (09 Nov 2024 10:01)      Method Type: LUIS ARMANDO      -  Ampicillin: R >16 These ampicillin results predict results for amoxicillin      -  Ampicillin/Sulbactam: I 16/8      -  Aztreonam: R 8      -  Cefazolin: R >16 For uncomplicated UTI with K. pneumoniae, E. coli, or P. mirablis: LUIS ARMANDO <=16 is sensitive and LUIS ARMANDO >=32 is resistant. This also predicts results for oral agents cefaclor, cefdinir, cefpodoxime, cefprozil, cefuroxime axetil, cephalexin and locarbef for uncomplicated UTI. Note that some isolates may be susceptible to these agents while testing resistant to cefazolin.      -  Cefepime: R >16      -  Ceftriaxone: R >32      -  Cefuroxime: R >16      -  Ciprofloxacin: I 0.5      -  Ertapenem: S <=0.5      -  Gentamicin: S <=2      -  Imipenem: S <=1      -  Levofloxacin: S <=0.5      -  Meropenem: S <=1      -  Nitrofurantoin: S <=32 Should not be used to treat pyelonephritis      -  Piperacillin/Tazobactam: S <=8      -  Tobramycin: S <=2      -  Trimethoprim/Sulfamethoxazole: R >2/38    Radiology: all available radiological tests reviewed    < from: MR Head No Cont (11.08.24 @ 17:34) >  IMPRESSION: Acute/subacute right-sided corona radiata lacunar infarction with associated cytotoxic edema.  No acute intracranial hemorrhage.  Additional chronic lacunar infarct in the left corona radiata and mild chronic white matter microvascular type changes.  < end of copied text >    < from: CT Humerus No Cont, Right (11.06.24 @ 21:46) >  No fracture or dislocation.  Heterogeneous soft tissue density around the right humeral head . Leading differential is hematoma. Neoplastic condition is less likely but not   excluded. Consider nonvascular ultrasound for the right shoulder.  < end of copied text >      Advanced directives addressed: full resuscitation

## 2024-11-09 NOTE — CONSULT NOTE ADULT - SUBJECTIVE AND OBJECTIVE BOX
79 year old woman with afib on Xarelto, aortic stenosis s/p TAVR, history of temporal arteritis, HTN, HLD, presenting to  on 11/6 after a fall, with reported generalized weakness for several days, found to have a small stroke on MRI brain.      At time of my evaluation, patient reports pain in the R shoulder, and arm, as well as the R leg.  She endorsed feeling week all over, and when pressed did think the L was worse than the R.  She denied having any speech or vision disturbances, no swallowing difficulty.  She has not ambulated much during this admission, and is waiting PT evaluation.      Of note, upon initial evaluation, she had acute anemia, and UTI.  She received 2U of pRBC's, and her home Xarelto was discontinued.  She was treated for UTI.      She denies having any recent medical complaints, other than generalized weakness.  She did not experience any chest pain, shortness of breath, or abdominal pain.  No dysuria, or diarrhea.      PMhx:   AS s/p TAVR  AFib on Xarelto  HTN  HLD    SHx:   Works as a nurse.   Independent in all ADL's.    Denies ETOH, drugs, and tobacco.     On exam:   GEN; NAD  HEENT: R periorbital ecchymosis.  No temporal tenderness  CV: RRR, S1, s2  PULM: CTAB  GI: soft, nont bakari  EXTREM: R arm ecchymoses, R leg ecchymoses.  No CCE  SKIN: no rashes.   NEURO:   Awake, alert, oriented to month, date, year, normal naming, repetition, fluency  Pupils 3-2mm, symmetric, full VF's,. normal EOM, no facial weakness, no dysarthria.   MOTOR: limited by recent trauma and pain.   is 4+/5 on the R, and 5/5 on the L.  Hip flexors and knee extensors 5/5 symmetric  SENSORY: intact symmetrically to light touch  COORDINATION: limited exam, but no symone ataxia    MRI brain images reviewed: diffusion restriction R centrum semiovale.    A1c 5.8, LDL 40.

## 2024-11-09 NOTE — CONSULT NOTE ADULT - ASSESSMENT
80 y/o Female with h/o AS s/p TAVR (Bioprosthetic valve), AFib on Xarelto, temporal arteritis, HLD, HTN, right chronic shoulder pain secondary to rotator cuff injury, prior UTI with ESBL organism was admitted on 11/6 for increased generalized weakness and fatigue since Saturday. Per daughter she had a fall, was seen in ER after a fall and discharged home. She reported R shoulder pain. Since returning home, patient unable to ambulate secondary to weakness. Has been sleeping more with poor appetite, not her baseline. Denies fever or chills at home. In ER she was noted with pyuria and received ceftriaxone, then meropenem.    #Pyuria.   #UTI with ESBL KLPN.   #AS s/p TAVR.   #Acute/subacute right-sided corona radiata lacunar infarction/ CVA.  -cultures reviewed  -start meropenem 1 gm IV q8h  -reason for abx use and side effects reviewed with patient; monitor BMP   -old chart reviewed to assess prior cultures  -no oral abx choice; she will need IV abx therapy  -contact isolation  -monitor temps  -f/u CBC  -supportive care  2. Other issues:   -care per medicine    IV to PO abx token dose not apply    78 y/o Female with h/o AS s/p TAVR (Bioprosthetic valve), AFib on Xarelto, temporal arteritis, HLD, HTN, right chronic shoulder pain secondary to rotator cuff injury, prior UTI with ESBL organism was admitted on 11/6 for increased generalized weakness and fatigue since Saturday. Per daughter she had a fall, was seen in ER after a fall and discharged home. She reported R shoulder pain. Since returning home, patient unable to ambulate secondary to weakness. Has been sleeping more with poor appetite, not her baseline. Denies fever or chills at home. In ER she was noted with pyuria and received ceftriaxone, then meropenem.    #Pyuria.   #UTI with ESBL KLPN.   #AS s/p TAVR.   #Acute/subacute right-sided corona radiata lacunar infarction/ CVA.  #metabolic encephalopathy   -cultures reviewed  -start meropenem 1 gm IV q8h  -reason for abx use and side effects reviewed with patient; monitor BMP   -old chart reviewed to assess prior cultures  -no oral abx choice; she will need IV abx therapy  -contact isolation  -monitor temps  -f/u CBC  -supportive care  2. Other issues:   -care per medicine    IV to PO abx token dose not apply

## 2024-11-09 NOTE — PROGRESS NOTE ADULT - SUBJECTIVE AND OBJECTIVE BOX
< from: MR Head No Cont (11.08.24 @ 17:34) >  IMPRESSION: Acute/subacute right-sided corona radiata lacunar infarction   with associated cytotoxic edema.    No acute intracranial hemorrhage.    Additional chronic lacunar infarct in the left corona radiata and mild   chronic white matter microvascular type changes.    --- End of Report ---    < end of copied text >   HOSPITALIST ATTENDING PROGRESS NOTE    Chart and meds reviewed.  Patient seen and examined.    CC/ HPI Patient is a 79y old  Female who presents with a chief complaint of UTI, generalized weakness inability to ambulate (09 Nov 2024 13:20)      Subjective: Seen with daughter and friend at bedside, sitting in chair comfortably. Still with pain in right shoulder. No blood in urine or stool.     All other systems reviewed and found to be negative with the exception of what has been described above.    MEDICATIONS:  MEDICATIONS  (STANDING):  apixaban 5 milliGRAM(s) Oral every 12 hours  aspirin enteric coated 81 milliGRAM(s) Oral daily  atorvastatin 10 milliGRAM(s) Oral at bedtime  ferrous    sulfate 325 milliGRAM(s) Oral daily  folic acid 1 milliGRAM(s) Oral daily  furosemide   Injectable 20 milliGRAM(s) IV Push daily  gabapentin 300 milliGRAM(s) Oral two times a day  influenza  Vaccine (HIGH DOSE) 0.5 milliLiter(s) IntraMuscular once  lactated ringers. 500 milliLiter(s) (50 mL/Hr) IV Continuous <Continuous>  magnesium oxide 400 milliGRAM(s) Oral three times a day with meals  meropenem Injectable 1000 milliGRAM(s) IV Push every 8 hours  potassium chloride   Powder 20 milliEquivalent(s) Oral two times a day      Vital Signs Last 24 Hrs  T(C): 37.1 (09 Nov 2024 16:18), Max: 37.1 (09 Nov 2024 16:18)  T(F): 98.8 (09 Nov 2024 16:18), Max: 98.8 (09 Nov 2024 16:18)  HR: 94 (09 Nov 2024 16:18) (78 - 95)  BP: 119/62 (09 Nov 2024 16:18) (119/62 - 127/60)  BP(mean): --  RR: 18 (09 Nov 2024 16:18) (18 - 18)  SpO2: 97% (09 Nov 2024 16:18) (95% - 97%)    Parameters below as of 09 Nov 2024 16:18  Patient On (Oxygen Delivery Method): room air        I&O's Summary    08 Nov 2024 07:01  -  09 Nov 2024 07:00  --------------------------------------------------------  IN: 300 mL / OUT: 775 mL / NET: -475 mL        CAPILLARY BLOOD GLUCOSE    PHYSICAL EXAM:    Constitutional: NAD, alert but sleepy, thin  HEENT:  EOMI, Normal Hearing, MMM  Neck: Soft and supple, No LAD, No JVD  Respiratory: Breath sounds are clear bilaterally, No wheezing, rales or rhonchi  Cardiovascular: S1 and S2, regular rate and rhythm, no Murmurs, gallops or rubs  Gastrointestinal: Bowel Sounds present, soft, nontender, nondistended, no guarding, no rebound  Extremities: No peripheral edema  Vascular: 2+ peripheral pulses  Neurological: A/O x 3, no focal deficits  Musculoskeletal: 5/5 strength b/l upper and lower extremities  Skin: + multiple ecchymotic areas over left forehead and upper and lower extremities      LABS: All Labs Reviewed:                        10.0   6.69  )-----------( 377      ( 09 Nov 2024 10:22 )             30.9     11-09    139  |  104  |  29[H]  ----------------------------<  127[H]  3.1[L]   |  28  |  1.03    Ca    9.3      09 Nov 2024 10:22  Mg     1.7     11-09            Blood Culture: 11-06 @ 16:36  Organism Klebsiella pneumoniae ESBL  Gram Stain Blood -- Gram Stain --  Specimen Source .Urine None  Culture-Blood --        RADIOLOGY/EKG:    DVT PPX:    ADVANCED DIRECTIVE:    DISPOSITION:    Total time spent: 25 / 35  / 50 minutes     < from: MR Head No Cont (11.08.24 @ 17:34) >  IMPRESSION: Acute/subacute right-sided corona radiata lacunar infarction   with associated cytotoxic edema.    No acute intracranial hemorrhage.    Additional chronic lacunar infarct in the left corona radiata and mild   chronic white matter microvascular type changes.    --- End of Report ---    < end of copied text >

## 2024-11-09 NOTE — CONSULT NOTE ADULT - ASSESSMENT
79 year old woman with afib on Xarelto, aortic stenosis s/p TAVR, history of temporal arteritis, HTN, HLD, presenting to  on 11/6 after a fall, with reported generalized weakness for several days, found to have a small stroke on MRI brain.  Exam is limited by recent trauma.  The motor strength on the R is diminished compared to the L.  Imaging showing the acute infarct in the R centrum semiovale.    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  Need to rule out any carotid disease.      -would obtain CTA head and neck  -routine echo  -restart DOAC when safe from anemia/trauma standpoint.  From stroke prevention standpoint, earlier reinstitution of DOAC preferred.  Would consider switching to eliquis if permissible from cardiac standpoint.    -continue on statin at current dose.   -PT/OT/SLP  -neurology to follow.  Please page or call with any acute neuro changes, or other issues we can help address.  Mastoid Interpolation Flap Text: A decision was made to reconstruct the defect utilizing an interpolation axial flap and a staged reconstruction.  A telfa template was made of the defect.  This telfa template was then used to outline the mastoid interpolation flap.  The donor area for the pedicle flap was then injected with anesthesia.  The flap was excised through the skin and subcutaneous tissue down to the layer of the underlying musculature.  The pedicle flap was carefully excised within this deep plane to maintain its blood supply.  The edges of the donor site were undermined.   The donor site was closed in a primary fashion.  The pedicle was then rotated into position and sutured.  Once the tube was sutured into place, adequate blood supply was confirmed with blanching and refill.  The pedicle was then wrapped with xeroform gauze and dressed appropriately with a telfa and gauze bandage to ensure continued blood supply and protect the attached pedicle.

## 2024-11-10 LAB
ALBUMIN SERPL ELPH-MCNC: 1.9 G/DL — LOW (ref 3.3–5)
ALP SERPL-CCNC: 249 U/L — HIGH (ref 40–120)
ALT FLD-CCNC: 21 U/L — SIGNIFICANT CHANGE UP (ref 12–78)
ANION GAP SERPL CALC-SCNC: 6 MMOL/L — SIGNIFICANT CHANGE UP (ref 5–17)
AST SERPL-CCNC: 26 U/L — SIGNIFICANT CHANGE UP (ref 15–37)
BILIRUB SERPL-MCNC: 0.5 MG/DL — SIGNIFICANT CHANGE UP (ref 0.2–1.2)
BUN SERPL-MCNC: 37 MG/DL — HIGH (ref 7–23)
CALCIUM SERPL-MCNC: 9 MG/DL — SIGNIFICANT CHANGE UP (ref 8.5–10.1)
CHLORIDE SERPL-SCNC: 102 MMOL/L — SIGNIFICANT CHANGE UP (ref 96–108)
CO2 SERPL-SCNC: 31 MMOL/L — SIGNIFICANT CHANGE UP (ref 22–31)
CREAT SERPL-MCNC: 0.9 MG/DL — SIGNIFICANT CHANGE UP (ref 0.5–1.3)
EGFR: 65 ML/MIN/1.73M2 — SIGNIFICANT CHANGE UP
GLUCOSE SERPL-MCNC: 113 MG/DL — HIGH (ref 70–99)
HCT VFR BLD CALC: 27.6 % — LOW (ref 34.5–45)
HCT VFR BLD CALC: 30.9 % — LOW (ref 34.5–45)
HGB BLD-MCNC: 8.7 G/DL — LOW (ref 11.5–15.5)
HGB BLD-MCNC: 9.6 G/DL — LOW (ref 11.5–15.5)
MAGNESIUM SERPL-MCNC: 1.9 MG/DL — SIGNIFICANT CHANGE UP (ref 1.6–2.6)
MCHC RBC-ENTMCNC: 28.2 PG — SIGNIFICANT CHANGE UP (ref 27–34)
MCHC RBC-ENTMCNC: 31.5 G/DL — LOW (ref 32–36)
MCV RBC AUTO: 89.6 FL — SIGNIFICANT CHANGE UP (ref 80–100)
PHOSPHATE SERPL-MCNC: 3.4 MG/DL — SIGNIFICANT CHANGE UP (ref 2.5–4.5)
PLATELET # BLD AUTO: 350 K/UL — SIGNIFICANT CHANGE UP (ref 150–400)
POTASSIUM SERPL-MCNC: 3.7 MMOL/L — SIGNIFICANT CHANGE UP (ref 3.5–5.3)
POTASSIUM SERPL-SCNC: 3.7 MMOL/L — SIGNIFICANT CHANGE UP (ref 3.5–5.3)
PROT SERPL-MCNC: 6.2 GM/DL — SIGNIFICANT CHANGE UP (ref 6–8.3)
RBC # BLD: 3.08 M/UL — LOW (ref 3.8–5.2)
RBC # FLD: 18.4 % — HIGH (ref 10.3–14.5)
SODIUM SERPL-SCNC: 139 MMOL/L — SIGNIFICANT CHANGE UP (ref 135–145)
WBC # BLD: 6.29 K/UL — SIGNIFICANT CHANGE UP (ref 3.8–10.5)
WBC # FLD AUTO: 6.29 K/UL — SIGNIFICANT CHANGE UP (ref 3.8–10.5)

## 2024-11-10 PROCEDURE — 99222 1ST HOSP IP/OBS MODERATE 55: CPT

## 2024-11-10 PROCEDURE — 99233 SBSQ HOSP IP/OBS HIGH 50: CPT

## 2024-11-10 PROCEDURE — 70498 CT ANGIOGRAPHY NECK: CPT | Mod: 26

## 2024-11-10 PROCEDURE — 70496 CT ANGIOGRAPHY HEAD: CPT | Mod: 26

## 2024-11-10 RX ORDER — LIDOCAINE HYDROCHLORIDE 40 MG/ML
1 SOLUTION TOPICAL DAILY
Refills: 0 | Status: DISCONTINUED | OUTPATIENT
Start: 2024-11-10 | End: 2024-11-13

## 2024-11-10 RX ORDER — FUROSEMIDE 40 MG
20 TABLET ORAL DAILY
Refills: 0 | Status: DISCONTINUED | OUTPATIENT
Start: 2024-11-10 | End: 2024-11-13

## 2024-11-10 RX ORDER — ACETAMINOPHEN 500 MG
650 TABLET ORAL ONCE
Refills: 0 | Status: COMPLETED | OUTPATIENT
Start: 2024-11-10 | End: 2024-11-10

## 2024-11-10 RX ADMIN — MEROPENEM 1000 MILLIGRAM(S): 1 INJECTION INTRAVENOUS at 22:53

## 2024-11-10 RX ADMIN — MEROPENEM 1000 MILLIGRAM(S): 1 INJECTION INTRAVENOUS at 06:29

## 2024-11-10 RX ADMIN — MAGNESIUM OXIDE 400 MILLIGRAM(S): 400 TABLET ORAL at 17:35

## 2024-11-10 RX ADMIN — MAGNESIUM OXIDE 400 MILLIGRAM(S): 400 TABLET ORAL at 11:39

## 2024-11-10 RX ADMIN — OXYCODONE HYDROCHLORIDE 5 MILLIGRAM(S): 30 TABLET ORAL at 00:29

## 2024-11-10 RX ADMIN — FOLIC ACID 1 MILLIGRAM(S): 1 TABLET ORAL at 10:19

## 2024-11-10 RX ADMIN — Medication 20 MILLIEQUIVALENT(S): at 22:53

## 2024-11-10 RX ADMIN — OXYCODONE HYDROCHLORIDE 5 MILLIGRAM(S): 30 TABLET ORAL at 01:15

## 2024-11-10 RX ADMIN — Medication 650 MILLIGRAM(S): at 14:54

## 2024-11-10 RX ADMIN — APIXABAN 5 MILLIGRAM(S): 5 TABLET, FILM COATED ORAL at 10:20

## 2024-11-10 RX ADMIN — LIDOCAINE HYDROCHLORIDE 1 PATCH: 40 SOLUTION TOPICAL at 13:48

## 2024-11-10 RX ADMIN — Medication 650 MILLIGRAM(S): at 13:54

## 2024-11-10 RX ADMIN — Medication 650 MILLIGRAM(S): at 06:13

## 2024-11-10 RX ADMIN — LIDOCAINE HYDROCHLORIDE 1 PATCH: 40 SOLUTION TOPICAL at 21:37

## 2024-11-10 RX ADMIN — Medication 20 MILLIEQUIVALENT(S): at 10:19

## 2024-11-10 RX ADMIN — OXYCODONE HYDROCHLORIDE 5 MILLIGRAM(S): 30 TABLET ORAL at 16:25

## 2024-11-10 RX ADMIN — OXYCODONE HYDROCHLORIDE 5 MILLIGRAM(S): 30 TABLET ORAL at 22:53

## 2024-11-10 RX ADMIN — Medication 325 MILLIGRAM(S): at 10:20

## 2024-11-10 RX ADMIN — OXYCODONE HYDROCHLORIDE 5 MILLIGRAM(S): 30 TABLET ORAL at 23:30

## 2024-11-10 RX ADMIN — Medication 81 MILLIGRAM(S): at 10:20

## 2024-11-10 RX ADMIN — APIXABAN 5 MILLIGRAM(S): 5 TABLET, FILM COATED ORAL at 22:53

## 2024-11-10 RX ADMIN — OXYCODONE HYDROCHLORIDE 5 MILLIGRAM(S): 30 TABLET ORAL at 15:49

## 2024-11-10 RX ADMIN — Medication 650 MILLIGRAM(S): at 03:25

## 2024-11-10 RX ADMIN — MEROPENEM 1000 MILLIGRAM(S): 1 INJECTION INTRAVENOUS at 13:48

## 2024-11-10 RX ADMIN — GABAPENTIN 300 MILLIGRAM(S): 300 CAPSULE ORAL at 10:19

## 2024-11-10 RX ADMIN — Medication 20 MILLIGRAM(S): at 06:29

## 2024-11-10 RX ADMIN — GABAPENTIN 300 MILLIGRAM(S): 300 CAPSULE ORAL at 22:53

## 2024-11-10 RX ADMIN — MAGNESIUM OXIDE 400 MILLIGRAM(S): 400 TABLET ORAL at 08:59

## 2024-11-10 RX ADMIN — Medication 10 MILLIGRAM(S): at 22:53

## 2024-11-10 NOTE — CONSULT NOTE ADULT - SUBJECTIVE AND OBJECTIVE BOX
79yF was admitted on 11-06    Patient is a 79y old  Female who presents with a chief complaint of UTI, generalized weakness inability to ambulate (10 Nov 2024 12:45)    HPI:  78 yo F  w/ PMHx of AS s/p TAVR ( Bioprosthetic valve) , AFib on Xarelto, temporal arteritis, HLD, HTN presents complaining of generalized weakness and fatigue since Saturday. Per daughter at bedside she had a fall,  was seen here on SAT after a fall and discharged home.+ Reports R shoulder pain. Since returning home, patient unable to ambulate secondary to weakness. Has been sleeping more with poor appetite, not her baseline. Denies chest pain, shortness of breath, fever, but endorses R chronic shoulder pain secondary to rotator cuff injury.  Per daughter she has ligamentous tear on R shoulder     IN ED  VITALS: /47 HR 76 RR 18 temp 97F   LABS: H/H: 7.7/26 rbc 2.76 RDW 19.5 Retic count 16.4    BUN 38  ALKpo4 216     UA turbid protein 30 large LE + nitrite  many bacteria    s/p iv ofirmevx1, Rocephin x1 lidocaine patch , 500cc in ER    (06 Nov 2024 17:21)      Imaging performed:  CT HEAD:  No acute intracranial hemorrhage, mass effect, or midline shift. Chronic small vessel disease. If there is continued concern for acute neurologic compromise, recommend MRI of the brain for further evaluation.    TTE 7/5/24  Mild left ventricular hypertrophy. Left ventricular systolic function is normal with an ejection fraction of 55 %  There is severe (grade 3) left ventricular diastolic dysfunction S/p bioprosthetic aortic valve. Likely MIQUEL. Severe tricuspid regurgitation. artery systolic pressure is 48 mmHg, consistent with echocardiographic evidence of pulmonary hypertension Small right pleural effusion noted.    MRI brain 11/08/2024  Acute/subacute right-sided corona radiata lacunar infarction with associated cytotoxic edema.  No acute intracranial hemorrhage.  Additional chronic lacunar infarct in the left corona radiata and mild chronic white matter microvascular type changes.    CTA brain and neck 11/09/2024  HEAD CT:  1. Previously seen small acute/subacute lacunar infarct within the right radial/basal ganglia junction was better assessed on MR modality.  2. No acute intracranial hemorrhage, mass effect, or shift of the midline structures.    CTA NECK AND HEAD:  1. No large vessel occlusion or major stenosis.    REVIEW OF SYSTEMS  Constitutional - No fever, No weight loss, No fatigue  HEENT - No eye pain, No visual disturbances, No difficulty hearing, No tinnitus, No vertigo, No neck pain  Respiratory - No cough, No wheezing, No shortness of breath  Cardiovascular - No chest pain, No palpitations  Gastrointestinal - No abdominal pain, No nausea, No vomiting, No diarrhea, No constipation  Genitourinary - No dysuria, No frequency, No hematuria, No incontinence  Neurological - No headaches, No memory loss, No loss of strength, No numbness, No tremors  Skin - No itching, No rashes, No lesions   Endocrine - No temperature intolerance  Musculoskeletal - No joint pain, No joint swelling, No muscle pain  Psychiatric - No depression, No anxiety    VITALS  T(C): 36.9 (11-10-24 @ 07:52), Max: 38.8 (11-10-24 @ 00:40)  HR: 71 (11-10-24 @ 07:52) (71 - 97)  BP: 132/66 (11-10-24 @ 07:52) (119/62 - 135/63)  RR: 18 (11-10-24 @ 07:52) (18 - 18)  SpO2: 98% (11-10-24 @ 07:52) (94% - 98%)  Wt(kg): --    PAST MEDICAL & SURGICAL HISTORY  History of Cardiac Arrhythmia    Hypertension    Hyperlipidemia    Atrial Fibrillation    Aortic stenosis    Nonrheumatic aortic valve stenosis    High cholesterol    H/O temporal arteritis    Arrhythmia    S/P Exploratory Laparotomy    S/P Exploratory Laparotomy    H/O prior ablation treatment    History of temporal artery biopsy    S/P foot surgery        SOCIAL HISTORY - as per documentation/history  Smoking - None  EtOH - None  Drugs - None    FUNCTIONAL HISTORY  Lives   Independent    CURRENT FUNCTIONAL STATUS      FAMILY HISTORY   FH: lung cancer (Father)        RECENT LABS - Reviewed  CBC Full  -  ( 10 Nov 2024 08:06 )  WBC Count : 6.29 K/uL  RBC Count : 3.08 M/uL  Hemoglobin : 8.7 g/dL  Hematocrit : 27.6 %  Platelet Count - Automated : 350 K/uL  Mean Cell Volume : 89.6 fl  Mean Cell Hemoglobin : 28.2 pg  Mean Cell Hemoglobin Concentration : 31.5 g/dL  Auto Neutrophil # : x  Auto Lymphocyte # : x  Auto Monocyte # : x  Auto Eosinophil # : x  Auto Basophil # : x  Auto Neutrophil % : x  Auto Lymphocyte % : x  Auto Monocyte % : x  Auto Eosinophil % : x  Auto Basophil % : x    11-10    139  |  102  |  37[H]  ----------------------------<  113[H]  3.7   |  31  |  0.90    Ca    9.0      10 Nov 2024 08:06  Phos  3.4     11-10  Mg     1.9     11-10    TPro  6.2  /  Alb  1.9[L]  /  TBili  0.5  /  DBili  x   /  AST  26  /  ALT  21  /  AlkPhos  249[H]  11-10    Urinalysis Basic - ( 10 Nov 2024 08:06 )    Color: x / Appearance: x / SG: x / pH: x  Gluc: 113 mg/dL / Ketone: x  / Bili: x / Urobili: x   Blood: x / Protein: x / Nitrite: x   Leuk Esterase: x / RBC: x / WBC x   Sq Epi: x / Non Sq Epi: x / Bacteria: x        ALLERGIES  adhesives (Rash)  No Known Drug Allergies      MEDICATIONS   acetaminophen     Tablet .. 650 milliGRAM(s) Oral every 6 hours PRN  apixaban 5 milliGRAM(s) Oral every 12 hours  aspirin enteric coated 81 milliGRAM(s) Oral daily  atorvastatin 10 milliGRAM(s) Oral at bedtime  ferrous    sulfate 325 milliGRAM(s) Oral daily  folic acid 1 milliGRAM(s) Oral daily  gabapentin 300 milliGRAM(s) Oral two times a day  influenza  Vaccine (HIGH DOSE) 0.5 milliLiter(s) IntraMuscular once  lactated ringers. 500 milliLiter(s) IV Continuous <Continuous>  magnesium oxide 400 milliGRAM(s) Oral three times a day with meals  melatonin 3 milliGRAM(s) Oral at bedtime PRN  meropenem Injectable 1000 milliGRAM(s) IV Push every 8 hours  oxyCODONE    IR 5 milliGRAM(s) Oral every 6 hours PRN  polyethylene glycol 3350 17 Gram(s) Oral daily PRN  potassium chloride   Powder 20 milliEquivalent(s) Oral two times a day  senna 2 Tablet(s) Oral at bedtime PRN      ----------------------------------------------------------------------------------------  PHYSICAL EXAM  Constitutional - NAD, Comfortable  HEENT - NCAT, EOMI  Neck - Supple, No limited ROM  Chest - Breathing comfortably, No wheezing  Cardiovascular - S1S2   Abdomen - Soft   Extremities - No C/C/E, No calf tenderness   Neurologic Exam -                    Cognitive - AAO to self, place, date, year, situation     Communication - Fluent, No dysarthria     Cranial Nerves - CN 2-12 intact     Motor - No focal deficits                    LEFT    UE - ShAB 5/5, EF 5/5, EE 5/5, WE 5/5,  5/5                    RIGHT UE - ShAB 5/5, EF 5/5, EE 5/5, WE 5/5,  5/5                    LEFT    LE - HF 5/5, KE 5/5, DF 5/5, PF 5/5                    RIGHT LE - HF 5/5, KE 5/5, DF 5/5, PF 5/5        Sensory - Intact to LT     Reflexes - DTR Intact, No primitive reflexive     Coordination - FTN intact     OculoVestibular - No saccades, No nystagmus, VOR         Balance - WNL Static  Psychiatric - Mood stable, Affect WNL  ----------------------------------------------------------------------------------------  ASSESSMENT/PLAN  79yFemale with functional deficits after  Pain - Tylenol  DVT PPX - SCDs  Rehab -    Recommend ACUTE inpatient rehabilitation for the functional deficits consisting of 3 hours of therapy/day & 24 hour RN/daily PMR physician for comorbid medical management. Patient will be able to tolerate 3 hours a day.   Recommend [facility], patient DOES NOT meet acute inpatient rehabilitation criteria. Patient needs a more prolonged stay to achieve transition to community.    Expect patient to achieve functional goals for DC HOME with OUTPATIENT   Expect patient to achieve functional goals for DC HOME with HOME CARE   Follow up with [facility] - Call [phone] for an appointment    Will continue to follow. Functional progress will determine ongoing rehab dispo recommendations, which may change.    Continue bedside therapy as well as OOB throughout the day with mobilization by staff to maintain cardiopulmonary function and prevention of secondary complications related to debility.     Discussed with rehab team.  79yF was admitted on 11-06    Patient is a 79y old  Female who presents with a chief complaint of UTI, generalized weakness inability to ambulate (10 Nov 2024 12:45)    HPI:  78 yo F  w/ PMHx of AS s/p TAVR ( Bioprosthetic valve) , AFib on Xarelto, temporal arteritis, HLD, HTN presents complaining of generalized weakness and fatigue since Saturday. Per daughter at bedside she had a fall,  was seen here on SAT after a fall and discharged home.+ Reports R shoulder pain. Since returning home, patient unable to ambulate secondary to weakness. Has been sleeping more with poor appetite, not her baseline. Denies chest pain, shortness of breath, fever, but endorses R chronic shoulder pain secondary to rotator cuff injury.  Per daughter she has ligamentous tear on R shoulder     IN ED  VITALS: /47 HR 76 RR 18 temp 97F   LABS: H/H: 7.7/26 rbc 2.76 RDW 19.5 Retic count 16.4    BUN 38  ALKpo4 216     UA turbid protein 30 large LE + nitrite  many bacteria    s/p iv ofirmevx1, Rocephin x1 lidocaine patch , 500cc in ER    (06 Nov 2024 17:21)      Imaging performed:  CT HEAD:  No acute intracranial hemorrhage, mass effect, or midline shift. Chronic small vessel disease. If there is continued concern for acute neurologic compromise, recommend MRI of the brain for further evaluation.    TTE 7/5/24  Mild left ventricular hypertrophy. Left ventricular systolic function is normal with an ejection fraction of 55 %  There is severe (grade 3) left ventricular diastolic dysfunction S/p bioprosthetic aortic valve. Likely MIQUEL. Severe tricuspid regurgitation. artery systolic pressure is 48 mmHg, consistent with echocardiographic evidence of pulmonary hypertension Small right pleural effusion noted.    MRI brain 11/08/2024  Acute/subacute right-sided corona radiata lacunar infarction with associated cytotoxic edema.  No acute intracranial hemorrhage.  Additional chronic lacunar infarct in the left corona radiata and mild chronic white matter microvascular type changes.    CTA brain and neck 11/09/2024  HEAD CT:  1. Previously seen small acute/subacute lacunar infarct within the right radial/basal ganglia junction was better assessed on MR modality.  2. No acute intracranial hemorrhage, mass effect, or shift of the midline structures.    CTA NECK AND HEAD:  1. No large vessel occlusion or major stenosis.    REVIEW OF SYSTEMS  Constitutional - No fever, No weight loss, No fatigue  HEENT - No eye pain, No visual disturbances, No difficulty hearing, No tinnitus, No vertigo, No neck pain  Respiratory - No cough, No wheezing, No shortness of breath  Cardiovascular - No chest pain, No palpitations  Gastrointestinal - No abdominal pain, No nausea, No vomiting, No diarrhea, No constipation  Genitourinary - No dysuria, No frequency, No hematuria, No incontinence  Neurological - No headaches, No memory loss, No loss of strength, No numbness, No tremors  Skin - No itching, No rashes, No lesions   Endocrine - No temperature intolerance  Musculoskeletal - No joint pain, No joint swelling, No muscle pain  Psychiatric - No depression, No anxiety    VITALS  T(C): 36.9 (11-10-24 @ 07:52), Max: 38.8 (11-10-24 @ 00:40)  HR: 71 (11-10-24 @ 07:52) (71 - 97)  BP: 132/66 (11-10-24 @ 07:52) (119/62 - 135/63)  RR: 18 (11-10-24 @ 07:52) (18 - 18)  SpO2: 98% (11-10-24 @ 07:52) (94% - 98%)  Wt(kg): --    PAST MEDICAL & SURGICAL HISTORY  History of Cardiac Arrhythmia    Hypertension    Hyperlipidemia    Atrial Fibrillation    Aortic stenosis    Nonrheumatic aortic valve stenosis    High cholesterol    H/O temporal arteritis    Arrhythmia    S/P Exploratory Laparotomy    S/P Exploratory Laparotomy    H/O prior ablation treatment    History of temporal artery biopsy    S/P foot surgery        SOCIAL HISTORY - as per documentation/history  Smoking - None  EtOH - None  Drugs - None    FUNCTIONAL HISTORY  Lives with son in a private home. 2 DARY[employer] platform 2 Steps 0 HR. BR 2nd floor RHR    CURRENT FUNCTIONAL STATUS  supine to sit moderate assist  Sit to stand moderate noel    FAMILY HISTORY   FH: lung cancer (Father)        RECENT LABS - Reviewed  CBC Full  -  ( 10 Nov 2024 08:06 )  WBC Count : 6.29 K/uL  RBC Count : 3.08 M/uL  Hemoglobin : 8.7 g/dL  Hematocrit : 27.6 %  Platelet Count - Automated : 350 K/uL  Mean Cell Volume : 89.6 fl  Mean Cell Hemoglobin : 28.2 pg  Mean Cell Hemoglobin Concentration : 31.5 g/dL  Auto Neutrophil # : x  Auto Lymphocyte # : x  Auto Monocyte # : x  Auto Eosinophil # : x  Auto Basophil # : x  Auto Neutrophil % : x  Auto Lymphocyte % : x  Auto Monocyte % : x  Auto Eosinophil % : x  Auto Basophil % : x    11-10    139  |  102  |  37[H]  ----------------------------<  113[H]  3.7   |  31  |  0.90    Ca    9.0      10 Nov 2024 08:06  Phos  3.4     11-10  Mg     1.9     11-10    TPro  6.2  /  Alb  1.9[L]  /  TBili  0.5  /  DBili  x   /  AST  26  /  ALT  21  /  AlkPhos  249[H]  11-10    Urinalysis Basic - ( 10 Nov 2024 08:06 )    Color: x / Appearance: x / SG: x / pH: x  Gluc: 113 mg/dL / Ketone: x  / Bili: x / Urobili: x   Blood: x / Protein: x / Nitrite: x   Leuk Esterase: x / RBC: x / WBC x   Sq Epi: x / Non Sq Epi: x / Bacteria: x        ALLERGIES  adhesives (Rash)  No Known Drug Allergies      MEDICATIONS   acetaminophen     Tablet .. 650 milliGRAM(s) Oral every 6 hours PRN  apixaban 5 milliGRAM(s) Oral every 12 hours  aspirin enteric coated 81 milliGRAM(s) Oral daily  atorvastatin 10 milliGRAM(s) Oral at bedtime  ferrous    sulfate 325 milliGRAM(s) Oral daily  folic acid 1 milliGRAM(s) Oral daily  gabapentin 300 milliGRAM(s) Oral two times a day  influenza  Vaccine (HIGH DOSE) 0.5 milliLiter(s) IntraMuscular once  lactated ringers. 500 milliLiter(s) IV Continuous <Continuous>  magnesium oxide 400 milliGRAM(s) Oral three times a day with meals  melatonin 3 milliGRAM(s) Oral at bedtime PRN  meropenem Injectable 1000 milliGRAM(s) IV Push every 8 hours  oxyCODONE    IR 5 milliGRAM(s) Oral every 6 hours PRN  polyethylene glycol 3350 17 Gram(s) Oral daily PRN  potassium chloride   Powder 20 milliEquivalent(s) Oral two times a day  senna 2 Tablet(s) Oral at bedtime PRN      ----------------------------------------------------------------------------------------  PHYSICAL EXAM  Constitutional - NAD, Comfortable  HEENT - NCAT, EOMI  Neck - Supple, No limited ROM  Chest - Breathing comfortably, No wheezing  Cardiovascular - S1S2   Abdomen - Soft   Extremities - No C/C/E, No calf tenderness   Neurologic Exam -                    Cognitive - AAO to self, place, date, year, situation     Communication - Fluent, No dysarthria     Cranial Nerves - CN 2-12 intact     Motor - No focal deficits                    LEFT    UE - ShAB 3/5, EF 3/5, EE 3/5, WE 3/5,  3/5                    RIGHT UE - ShAB 2/5,pain swelling. EF 3/5, EE 3/5, WE 3/5,  3/5                    LEFT    LE - HF 3/5, KE 3/5, DF 3/5, PF 3/5                    RIGHT LE - HF 3/5, KE 3/5, DF 3/5, PF 3/5       Sensory - Intact to LT     Reflexes - DTR Intact, No primitive reflexive     Coordination - FTN left intact right poor     OculoVestibular - No saccades, No nystagmus, VOR         Balance - fair Static  Psychiatric - Mood stable, Affect WNL  ----------------------------------------------------------------------------------------

## 2024-11-10 NOTE — CONSULT NOTE ADULT - ASSESSMENT
ASSESSMENT/PLAN  79yFemale with functional deficits after Acute/subacute right-sided corona radiata lacunar infarction with associated cytotoxic edema.  Pain - Tylenol  DVT PPX - SCDs  Rehab -    Recommend ACUTE inpatient rehabilitation for the functional deficits consisting of 3 hours of therapy/day & 24 hour RN/daily PMR physician for comorbid medical management. Patient will be able to tolerate 3 hours a day.     The patient prefers [facility] for rehab.    Will continue to follow. Functional progress will determine ongoing rehab dispo recommendations, which may change.    Continue bedside therapy as well as OOB throughout the day with mobilization by staff to maintain cardiopulmonary function and prevention of secondary complications related to debility.

## 2024-11-10 NOTE — PROGRESS NOTE ADULT - ASSESSMENT
80 y/o Female with h/o AS s/p TAVR (Bioprosthetic valve), AFib on Xarelto, temporal arteritis, HLD, HTN, right chronic shoulder pain secondary to rotator cuff injury, prior UTI with ESBL organism was admitted on 11/6 for increased generalized weakness and fatigue since Saturday. Per daughter she had a fall, was seen in ER after a fall and discharged home. She reported R shoulder pain. Since returning home, patient unable to ambulate secondary to weakness. Has been sleeping more with poor appetite, not her baseline. Denies fever or chills at home. In ER she was noted with pyuria and received ceftriaxone, then meropenem.    #Pyuria.   #UTI with ESBL KLPN.   #AS s/p TAVR.   #Acute/subacute right-sided corona radiata lacunar infarction/ CVA.  #metabolic encephalopathy   #febrile syndrome ?cause ?infection ?drug fever  -cultures reviewed  -on meropenem 1 gm IV q8h # 2  -tolerating abx well so far; no side effects noted  -no oral abx choice; she will need IV abx therapy  -continue abx coverage   -contact isolation  -monitor temps  -f/u CBC  -supportive care  2. Other issues:   -care per medicine    IV to PO abx token dose not apply

## 2024-11-10 NOTE — PROGRESS NOTE ADULT - SUBJECTIVE AND OBJECTIVE BOX
HOSPITALIST ATTENDING PROGRESS NOTE    Chart and meds reviewed.  Patient seen and examined.    CC/ HPI Patient is a 79y old  Female who presents with a chief complaint of UTI, generalized weakness inability to ambulate (10 Nov 2024 12:48)      Subjective: Seen with brother at bedside and again with son.  Patient complains of pain in right shoulder.     All other systems reviewed and found to be negative with the exception of what has been described above.    MEDICATIONS:  MEDICATIONS  (STANDING):  apixaban 5 milliGRAM(s) Oral every 12 hours  aspirin enteric coated 81 milliGRAM(s) Oral daily  atorvastatin 10 milliGRAM(s) Oral at bedtime  ferrous    sulfate 325 milliGRAM(s) Oral daily  folic acid 1 milliGRAM(s) Oral daily  gabapentin 300 milliGRAM(s) Oral two times a day  influenza  Vaccine (HIGH DOSE) 0.5 milliLiter(s) IntraMuscular once  lactated ringers. 500 milliLiter(s) (50 mL/Hr) IV Continuous <Continuous>  lidocaine   4% Patch 1 Patch Transdermal daily  magnesium oxide 400 milliGRAM(s) Oral three times a day with meals  meropenem Injectable 1000 milliGRAM(s) IV Push every 8 hours  potassium chloride   Powder 20 milliEquivalent(s) Oral two times a day      Vital Signs Last 24 Hrs  T(C): 37.1 (10 Nov 2024 16:06), Max: 38.8 (10 Nov 2024 00:40)  T(F): 98.8 (10 Nov 2024 16:06), Max: 101.9 (10 Nov 2024 00:40)  HR: 77 (10 Nov 2024 16:06) (71 - 97)  BP: 148/58 (10 Nov 2024 16:06) (121/60 - 148/58)  BP(mean): --  RR: 18 (10 Nov 2024 16:06) (18 - 18)  SpO2: 98% (10 Nov 2024 16:06) (94% - 98%)    Parameters below as of 10 Nov 2024 16:06  Patient On (Oxygen Delivery Method): room air        I&O's Summary    09 Nov 2024 07:01  -  10 Nov 2024 07:00  --------------------------------------------------------  IN: 0 mL / OUT: 154 mL / NET: -154 mL    10 Nov 2024 07:01  -  10 Nov 2024 17:24  --------------------------------------------------------  IN: 990 mL / OUT: 1270 mL / NET: -280 mL        CAPILLARY BLOOD GLUCOSE    PHYSICAL EXAM:    Constitutional: NAD, alert but sleepy, thin  HEENT:  EOMI, Normal Hearing, MMM  Neck: Soft and supple, No LAD, No JVD  Respiratory: Breath sounds are clear bilaterally, No wheezing, rales or rhonchi  Cardiovascular: S1 and S2, regular rate and rhythm, no Murmurs, gallops or rubs  Gastrointestinal: Bowel Sounds present, soft, nontender, nondistended, no guarding, no rebound  Extremities: No peripheral edema  Vascular: 2+ peripheral pulses  Neurological: A/O x 3, no focal deficits  Musculoskeletal: 5/5 strength b/l upper and lower extremities  Skin: + multiple ecchymotic areas over left forehead and upper and lower extremities      LABS: All Labs Reviewed:    Hemoglobin: 9.6 g/dL (11.10.24 @ 16:59)   Hemoglobin: 8.7 g/dL (11.10.24 @ 08:06)   Hemoglobin: 10.0 g/dL (11.09.24 @ 10:22)   Hemoglobin: 9.4 g/dL (11.08.24 @ 18:54)   Hemoglobin: 8.4 g/dL (11.08.24 @ 12:07)   Hemoglobin: 9.2 g/dL (11.07.24 @ 18:49)   Hemoglobin: 6.3:  g/dL (11.07.24 @ 07:36)                         9.6    x     )-----------( x        ( 10 Nov 2024 16:59 )             30.9     11-10    139  |  102  |  37[H]  ----------------------------<  113[H]  3.7   |  31  |  0.90    Ca    9.0      10 Nov 2024 08:06  Phos  3.4     11-10  Mg     1.9     11-10    TPro  6.2  /  Alb  1.9[L]  /  TBili  0.5  /  DBili  x   /  AST  26  /  ALT  21  /  AlkPhos  249[H]  11-10          Blood Culture: 11-06 @ 16:36  Organism Klebsiella pneumoniae ESBL  Gram Stain Blood -- Gram Stain --  Specimen Source .Urine None  Culture-Blood --        RADIOLOGY/EKG:    DVT PPX:    ADVANCED DIRECTIVE:    DISPOSITION: DC Planning to Acute rehab (family prefers) vs. [facility]    Total time spent:  50 minutes

## 2024-11-10 NOTE — PROGRESS NOTE ADULT - SUBJECTIVE AND OBJECTIVE BOX
Date of service: 11-10-24 @ 12:46    Lying in bed in NAD  Febrile to 101.9F  Feels weak    ROS: denies dizziness, no HA, no SOB or cough, no abdominal pain, no diarrhea or constipation; no legs pain, no rashes    MEDICATIONS  (STANDING):  apixaban 5 milliGRAM(s) Oral every 12 hours  aspirin enteric coated 81 milliGRAM(s) Oral daily  atorvastatin 10 milliGRAM(s) Oral at bedtime  ferrous    sulfate 325 milliGRAM(s) Oral daily  folic acid 1 milliGRAM(s) Oral daily  gabapentin 300 milliGRAM(s) Oral two times a day  influenza  Vaccine (HIGH DOSE) 0.5 milliLiter(s) IntraMuscular once  lactated ringers. 500 milliLiter(s) (50 mL/Hr) IV Continuous <Continuous>  magnesium oxide 400 milliGRAM(s) Oral three times a day with meals  meropenem Injectable 1000 milliGRAM(s) IV Push every 8 hours  potassium chloride   Powder 20 milliEquivalent(s) Oral two times a day    Vital Signs Last 24 Hrs  T(C): 36.9 (10 Nov 2024 07:52), Max: 38.8 (10 Nov 2024 00:40)  T(F): 98.4 (10 Nov 2024 07:52), Max: 101.9 (10 Nov 2024 00:40)  HR: 71 (10 Nov 2024 07:52) (71 - 97)  BP: 132/66 (10 Nov 2024 07:52) (119/62 - 135/63)  BP(mean): --  RR: 18 (10 Nov 2024 07:52) (18 - 18)  SpO2: 98% (10 Nov 2024 07:52) (94% - 98%)    Parameters below as of 10 Nov 2024 07:52  Patient On (Oxygen Delivery Method): room air     Physical exam:    Constitutional:  No acute distress  HEENT: NC/AT, EOMI, PERRLA, conjunctivae clear; ears and nose atraumatic; pharynx benign  Neck: supple; thyroid not palpable  Back: no tenderness  Respiratory: respiratory effort normal; clear to auscultation  Cardiovascular: S1S2 regular, no murmurs  Abdomen: soft, not tender, not distended, positive BS; no liver or spleen organomegaly  Genitourinary: no suprapubic tenderness  Lymphatic: no LN palpable  Musculoskeletal: no muscle tenderness, no joint swelling or tenderness  Extremities: no pedal edema  Neurological/ Psychiatric: Alert, judgement and insight impaired; moving all extremities  Skin: no rashes; no palpable lesions    Labs: reviewed                        8.7    6.29  )-----------( 350      ( 10 Nov 2024 08:06 )             27.6     11-10    139  |  102  |  37[H]  ----------------------------<  113[H]  3.7   |  31  |  0.90    Ca    9.0      10 Nov 2024 08:06  Phos  3.4     11-10  Mg     1.9     11-10    TPro  6.2  /  Alb  1.9[L]  /  TBili  0.5  /  DBili  x   /  AST  26  /  ALT  21  /  AlkPhos  249[H]  11-10    Ferritin: 114 ng/mL (11-07-24 @ 07:36)                        10.0   6.69  )-----------( 377      ( 09 Nov 2024 10:22 )             30.9     11-09    139  |  104  |  29[H]  ----------------------------<  127[H]  3.1[L]   |  28  |  1.03    Ca    9.3      09 Nov 2024 10:22  Mg     1.7     11-09    Culture - Urine (collected 06 Nov 2024 16:36)  Source: .Urine None  Final Report (09 Nov 2024 10:00):    >100,000 CFU/ml Klebsiella pneumoniae ESBL  Organism: Klebsiella pneumoniae ESBL (09 Nov 2024 10:01)  Organism: Klebsiella pneumoniae ESBL (09 Nov 2024 10:01)      Method Type: LUIS ARMANDO      -  Ampicillin: R >16 These ampicillin results predict results for amoxicillin      -  Ampicillin/Sulbactam: I 16/8      -  Aztreonam: R 8      -  Cefazolin: R >16 For uncomplicated UTI with K. pneumoniae, E. coli, or P. mirablis: LUIS ARMANDO <=16 is sensitive and LUIS ARMANDO >=32 is resistant. This also predicts results for oral agents cefaclor, cefdinir, cefpodoxime, cefprozil, cefuroxime axetil, cephalexin and locarbef for uncomplicated UTI. Note that some isolates may be susceptible to these agents while testing resistant to cefazolin.      -  Cefepime: R >16      -  Ceftriaxone: R >32      -  Cefuroxime: R >16      -  Ciprofloxacin: I 0.5      -  Ertapenem: S <=0.5      -  Gentamicin: S <=2      -  Imipenem: S <=1      -  Levofloxacin: S <=0.5      -  Meropenem: S <=1      -  Nitrofurantoin: S <=32 Should not be used to treat pyelonephritis      -  Piperacillin/Tazobactam: S <=8      -  Tobramycin: S <=2      -  Trimethoprim/Sulfamethoxazole: R >2/38    Radiology: all available radiological tests reviewed    < from: MR Head No Cont (11.08.24 @ 17:34) >  IMPRESSION: Acute/subacute right-sided corona radiata lacunar infarction with associated cytotoxic edema.  No acute intracranial hemorrhage.  Additional chronic lacunar infarct in the left corona radiata and mild chronic white matter microvascular type changes.  < end of copied text >    < from: CT Humerus No Cont, Right (11.06.24 @ 21:46) >  No fracture or dislocation.  Heterogeneous soft tissue density around the right humeral head . Leading differential is hematoma. Neoplastic condition is less likely but not   excluded. Consider nonvascular ultrasound for the right shoulder.  < end of copied text >      Advanced directives addressed: full resuscitation

## 2024-11-10 NOTE — PROGRESS NOTE ADULT - ASSESSMENT
80 yo F  w/ PMHx of AS s/p TAVR ( Bioprosthetic valve) , AFib on Xarelto, temporal arteritis, HLD, HTN presents complaining of generalized weakness and fatigue since Saturday. Per daughter at bedside she had a fall,  was seen here on SAT after a fall and discharged home.+ Reports R shoulder pain.     # UTI  - doesn't meet sepsis criteria on admission  - UA turbid protein 30 large LE + nitrite  many bacteria  - s/p iv ofirmevx1, Rocephin x1 lidocaine patch , 500cc NS in ER   - Continue Rocephin for now follow cx - Urine cx: > 100k Kleb -> ESBL, stop ceftriaxone and start meropenem x 3 days, to be completed 11/12/24 after 9 doses    # generalized weakness s/p fall  # deconditioning/ poor appetite  # R shoulder pain   - Acute rehab (family prefers) vs. [facility]  - CT HEAD:  No acute intracranial hemorrhage, mass effect, or midline shift. Chronic small vessel disease. If there is continued concern for acute neurologic compromise, recommend MRI of the brain for further evaluation.  - per daughter progressive decline since Saturday s/p fall with R shoulder pain   - X- ray R shoulder no visible fracture pending official read   - follow MRI brain - Acute/subacute right-sided corona radiata lacunar infarction   with associated cytotoxic edema.  - CT shoulder : density around right humeral head, right shoulder sono ordered  - STAT atorvastatin 40mg ,   - Hold ASA 81 and Xarelto and plan to restart 11/9 if h/h stable  - F/u lipid panel, A1c  - aspiration and seizure precautions  - fall risk protocol  - PT/OT consult  - Neuro checks q4  - follow orthostatics   - Recent TTE EF 55% grade 3 diastolic dysfunction   - Nutrition consult   - SW consult     # Bioprosthetic aortic valve TAVR  # Grade 3 diastolic dysfunction   - TTE 7/5/24  Mild left ventricular hypertrophy. Left ventricular systolic function is normal with an ejection fraction of 55 %  There is severe (grade 3) left ventricular diastolic dysfunction S/p bioprosthetic aortic valve. Likely MIQUEL. Severe tricuspid regurgitation. artery systolic pressure is 48 mmHg, consistent with echocardiographic evidence of pulmonary hypertension Small right pleural effusion noted.      # anemia   - s/p 2 units prbc  - h/h at 18:30  -  H/H: 7.7/26   -> 6.3/20.9  - baseline 7.3 last hb   - Monitor for signs/symptoms of bleeding  - Monitor daily CBC.    # A- fib  - Not sure if pt is on coreg for rate control   - Continue home Xarelto     # HTN  - on losartan and HCTZ   - BP labile hold losartan    # LE edema chronic  - on HCTZ ( will give once daily for now) reaccess in am - stopped and given Lasix 20 mg IV daily x 2 days  - follow LE doppler U/S      # DVT pro  - XARELTO daily - held for drop in h/h s/p 2 units prbc, restarted on Eliquis and ASA 11/9 - discussed with Dr. Perkins and Dr. Lovell  - h/h stable - continue Eliquis and ASA

## 2024-11-11 LAB
ALBUMIN SERPL ELPH-MCNC: 1.9 G/DL — LOW (ref 3.3–5)
ALP SERPL-CCNC: 250 U/L — HIGH (ref 40–120)
ALT FLD-CCNC: 20 U/L — SIGNIFICANT CHANGE UP (ref 12–78)
ANION GAP SERPL CALC-SCNC: 3 MMOL/L — LOW (ref 5–17)
AST SERPL-CCNC: 26 U/L — SIGNIFICANT CHANGE UP (ref 15–37)
BASOPHILS # BLD AUTO: 0.05 K/UL — SIGNIFICANT CHANGE UP (ref 0–0.2)
BASOPHILS NFR BLD AUTO: 0.6 % — SIGNIFICANT CHANGE UP (ref 0–2)
BILIRUB SERPL-MCNC: 0.6 MG/DL — SIGNIFICANT CHANGE UP (ref 0.2–1.2)
BUN SERPL-MCNC: 39 MG/DL — HIGH (ref 7–23)
CALCIUM SERPL-MCNC: 9 MG/DL — SIGNIFICANT CHANGE UP (ref 8.5–10.1)
CHLORIDE SERPL-SCNC: 101 MMOL/L — SIGNIFICANT CHANGE UP (ref 96–108)
CO2 SERPL-SCNC: 33 MMOL/L — HIGH (ref 22–31)
CREAT SERPL-MCNC: 0.97 MG/DL — SIGNIFICANT CHANGE UP (ref 0.5–1.3)
EGFR: 59 ML/MIN/1.73M2 — LOW
EOSINOPHIL # BLD AUTO: 0.15 K/UL — SIGNIFICANT CHANGE UP (ref 0–0.5)
EOSINOPHIL NFR BLD AUTO: 1.9 % — SIGNIFICANT CHANGE UP (ref 0–6)
FLUAV AG NPH QL: SIGNIFICANT CHANGE UP
FLUBV AG NPH QL: SIGNIFICANT CHANGE UP
GLUCOSE SERPL-MCNC: 111 MG/DL — HIGH (ref 70–99)
HCT VFR BLD CALC: 26 % — LOW (ref 34.5–45)
HCT VFR BLD CALC: 27.1 % — LOW (ref 34.5–45)
HGB BLD-MCNC: 8 G/DL — LOW (ref 11.5–15.5)
HGB BLD-MCNC: 8.5 G/DL — LOW (ref 11.5–15.5)
IMM GRANULOCYTES NFR BLD AUTO: 0.5 % — SIGNIFICANT CHANGE UP (ref 0–0.9)
LACTATE SERPL-SCNC: 0.6 MMOL/L — LOW (ref 0.7–2)
LYMPHOCYTES # BLD AUTO: 1.13 K/UL — SIGNIFICANT CHANGE UP (ref 1–3.3)
LYMPHOCYTES # BLD AUTO: 14.6 % — SIGNIFICANT CHANGE UP (ref 13–44)
MAGNESIUM SERPL-MCNC: 2.1 MG/DL — SIGNIFICANT CHANGE UP (ref 1.6–2.6)
MCHC RBC-ENTMCNC: 28.3 PG — SIGNIFICANT CHANGE UP (ref 27–34)
MCHC RBC-ENTMCNC: 31.4 G/DL — LOW (ref 32–36)
MCV RBC AUTO: 90.3 FL — SIGNIFICANT CHANGE UP (ref 80–100)
MONOCYTES # BLD AUTO: 1.27 K/UL — HIGH (ref 0–0.9)
MONOCYTES NFR BLD AUTO: 16.4 % — HIGH (ref 2–14)
NEUTROPHILS # BLD AUTO: 5.1 K/UL — SIGNIFICANT CHANGE UP (ref 1.8–7.4)
NEUTROPHILS NFR BLD AUTO: 66 % — SIGNIFICANT CHANGE UP (ref 43–77)
PHOSPHATE SERPL-MCNC: 2.8 MG/DL — SIGNIFICANT CHANGE UP (ref 2.5–4.5)
PLATELET # BLD AUTO: 361 K/UL — SIGNIFICANT CHANGE UP (ref 150–400)
POTASSIUM SERPL-MCNC: 4.3 MMOL/L — SIGNIFICANT CHANGE UP (ref 3.5–5.3)
POTASSIUM SERPL-SCNC: 4.3 MMOL/L — SIGNIFICANT CHANGE UP (ref 3.5–5.3)
PROLACTIN SERPL-MCNC: 12.1 NG/ML — SIGNIFICANT CHANGE UP (ref 3.4–24.1)
PROT SERPL-MCNC: 6.3 GM/DL — SIGNIFICANT CHANGE UP (ref 6–8.3)
RBC # BLD: 3 M/UL — LOW (ref 3.8–5.2)
RBC # FLD: 18.6 % — HIGH (ref 10.3–14.5)
RSV RNA NPH QL NAA+NON-PROBE: SIGNIFICANT CHANGE UP
SARS-COV-2 RNA SPEC QL NAA+PROBE: SIGNIFICANT CHANGE UP
SODIUM SERPL-SCNC: 137 MMOL/L — SIGNIFICANT CHANGE UP (ref 135–145)
WBC # BLD: 7.62 K/UL — SIGNIFICANT CHANGE UP (ref 3.8–10.5)
WBC # FLD AUTO: 7.62 K/UL — SIGNIFICANT CHANGE UP (ref 3.8–10.5)

## 2024-11-11 PROCEDURE — 73200 CT UPPER EXTREMITY W/O DYE: CPT | Mod: 26,RT

## 2024-11-11 PROCEDURE — 71275 CT ANGIOGRAPHY CHEST: CPT | Mod: 26

## 2024-11-11 PROCEDURE — 99233 SBSQ HOSP IP/OBS HIGH 50: CPT

## 2024-11-11 PROCEDURE — 76376 3D RENDER W/INTRP POSTPROCES: CPT | Mod: 26

## 2024-11-11 PROCEDURE — 71045 X-RAY EXAM CHEST 1 VIEW: CPT | Mod: 26

## 2024-11-11 RX ORDER — HYDROMORPHONE HCL/0.9% NACL/PF 6 MG/30 ML
0.2 PATIENT CONTROLLED ANALGESIA SYRINGE INTRAVENOUS ONCE
Refills: 0 | Status: DISCONTINUED | OUTPATIENT
Start: 2024-11-11 | End: 2024-11-11

## 2024-11-11 RX ORDER — ACETAMINOPHEN 500 MG
1000 TABLET ORAL ONCE
Refills: 0 | Status: COMPLETED | OUTPATIENT
Start: 2024-11-11 | End: 2024-11-11

## 2024-11-11 RX ADMIN — MEROPENEM 1000 MILLIGRAM(S): 1 INJECTION INTRAVENOUS at 22:51

## 2024-11-11 RX ADMIN — OXYCODONE HYDROCHLORIDE 5 MILLIGRAM(S): 30 TABLET ORAL at 23:30

## 2024-11-11 RX ADMIN — Medication 20 MILLIEQUIVALENT(S): at 11:38

## 2024-11-11 RX ADMIN — MAGNESIUM OXIDE 400 MILLIGRAM(S): 400 TABLET ORAL at 15:51

## 2024-11-11 RX ADMIN — Medication 10 MILLIGRAM(S): at 22:51

## 2024-11-11 RX ADMIN — LIDOCAINE HYDROCHLORIDE 1 PATCH: 40 SOLUTION TOPICAL at 11:39

## 2024-11-11 RX ADMIN — Medication 400 MILLIGRAM(S): at 06:16

## 2024-11-11 RX ADMIN — MAGNESIUM OXIDE 400 MILLIGRAM(S): 400 TABLET ORAL at 11:39

## 2024-11-11 RX ADMIN — GABAPENTIN 300 MILLIGRAM(S): 300 CAPSULE ORAL at 11:38

## 2024-11-11 RX ADMIN — Medication 400 MILLIGRAM(S): at 15:41

## 2024-11-11 RX ADMIN — LIDOCAINE HYDROCHLORIDE 1 PATCH: 40 SOLUTION TOPICAL at 22:40

## 2024-11-11 RX ADMIN — MEROPENEM 1000 MILLIGRAM(S): 1 INJECTION INTRAVENOUS at 06:16

## 2024-11-11 RX ADMIN — LIDOCAINE HYDROCHLORIDE 1 PATCH: 40 SOLUTION TOPICAL at 02:00

## 2024-11-11 RX ADMIN — GABAPENTIN 300 MILLIGRAM(S): 300 CAPSULE ORAL at 22:51

## 2024-11-11 RX ADMIN — MEROPENEM 1000 MILLIGRAM(S): 1 INJECTION INTRAVENOUS at 15:42

## 2024-11-11 RX ADMIN — Medication 0.2 MILLIGRAM(S): at 14:09

## 2024-11-11 RX ADMIN — Medication 325 MILLIGRAM(S): at 11:39

## 2024-11-11 RX ADMIN — APIXABAN 5 MILLIGRAM(S): 5 TABLET, FILM COATED ORAL at 11:38

## 2024-11-11 RX ADMIN — LIDOCAINE HYDROCHLORIDE 1 PATCH: 40 SOLUTION TOPICAL at 19:44

## 2024-11-11 RX ADMIN — OXYCODONE HYDROCHLORIDE 5 MILLIGRAM(S): 30 TABLET ORAL at 22:50

## 2024-11-11 RX ADMIN — FOLIC ACID 1 MILLIGRAM(S): 1 TABLET ORAL at 11:38

## 2024-11-11 NOTE — CHART NOTE - NSCHARTNOTEFT_GEN_A_CORE
NIGHT ACP / HOSPITALIST     80 y/o F h/o AS s/p TAVR (Bioprosthetic valve), AFib on Xarelto, temporal arteritis, HLD, HTN, right chronic shoulder pain 2/2 rotator cuff injury, and prior UTI with ESBL organism. Presented to  on 11/6 for increased generalized weakness and fatigue. Per charts, daughter stated she had a fall,  was seen here on Saturday after a fall and discharged home.+ Reports R shoulder pain. Admitted for UTI (Urine cx: > 100k Kleb -> ESBL,)  -continues on Meropenem until 11/12/24 - per ID recs. CTH showing the acute infarct in the R centrum semiovale.  Per Neuro likely cardioembolic stroke in setting of holding AC due to acute anemia after trauma and R shoulder x-ray w/o fractures. Called by RN as pt triggered a MEWS score of 8. Now requiring oxygen , 2LNC for O2 sat of 89 on RA, Temp of 101.4,  and RR of 20. BPs now trending down from baseline of 130's SBP to 106 SBP.     PHYSICAL EXAM:   Constitutional: fatigued appearing  Respiratory: Breath sounds mildly diminished bilaterally, No wheezing, rales or rhonchi  Cardiovascular: S1 and S2, tachycardic   Extremities: No peripheral edema  Vascular: 2+ peripheral pulses  Neurological: alert, oriented to name, and place, ( normally AOx 4)   skin - mult ecchymosis    PLAN:  IV Tylenol 1GM    STAT CXR  STAT RVP  STAT Labs - pan cultured  Continue Meropenem   Please f/u labs, swab and imaging     Erika Haynes, ANP-BC  Dept of Medicine   VIA TEAMS

## 2024-11-11 NOTE — CONSULT NOTE ADULT - ASSESSMENT
80 y/o Female with h/o AS s/p TAVR (Bioprosthetic valve), AFib on Xarelto, temporal arteritis, HLD, HTN, right chronic shoulder pain secondary to rotator cuff injury, prior UTI with ESBL organism was admitted on 11/6 for increased generalized weakness and fatigue since Saturday. Per daughter she had a fall, was seen in ER after a fall and discharged home. She reported R shoulder pain. Since returning home, patient unable to ambulate secondary to weakness. Has been sleeping more with poor appetite, not her baseline. Denies fever or chills at home. In ER she was noted with pyuria and received ceftriaxone, then meropenem.    #Traumatic fall (11/2/2024)  #Rt shoulder-intramuscular hematoma  #Afib (Eliquis)    Plan:  No acute surgical intervention at this time.   CT & xray imaging without acute fractures.   11/9 UE ultrasound: 6.0 cm complex collection in the soft tissues of the right anterior shoulder most compatible with hematoma.  Cont to hold AC therapy and trend HH levels  Monitor for signs/symptoms of bleeding  Cont analgesic and antiemetic therapies  GI/DVT (VTE) ppx    Case and plan discussed with surgical attending. Will follow

## 2024-11-11 NOTE — PROVIDER CONTACT NOTE (CHANGE IN STATUS NOTIFICATION) - SITUATION
NP notified of patient temp, low O2 sat. MEWS score of 7 continued
Patient set off MEWS alert in the computer, BECKY James made aware

## 2024-11-11 NOTE — PROGRESS NOTE ADULT - ASSESSMENT
78 y/o Female with h/o AS s/p TAVR (Bioprosthetic valve), AFib on Xarelto, temporal arteritis, HLD, HTN, right chronic shoulder pain secondary to rotator cuff injury, prior UTI with ESBL organism was admitted on 11/6 for increased generalized weakness and fatigue since Saturday. Per daughter she had a fall, was seen in ER after a fall and discharged home. She reported R shoulder pain. Since returning home, patient unable to ambulate secondary to weakness. Has been sleeping more with poor appetite, not her baseline. Denies fever or chills at home. In ER she was noted with pyuria and received ceftriaxone, then meropenem.    #Pyuria.   #UTI with ESBL KLPN.   #AS s/p TAVR.   #Acute/subacute right-sided corona radiata lacunar infarction/ CVA.  #metabolic encephalopathy   #febrile syndrome ?cause ?infection ?drug fever   -cultures reviewed  -fever is persistent   -on meropenem 1 gm IV q8h # 3  -tolerating abx well so far; no side effects noted  -repeat BC x 2   -complete meropenem today   -contact isolation  -monitor temps  -f/u CBC  -supportive care  2. Other issues:   -care per medicine    IV to PO abx token dose not apply

## 2024-11-11 NOTE — PROGRESS NOTE ADULT - ASSESSMENT
78 yo F  w/ PMHx of AS s/p TAVR ( Bioprosthetic valve) , AFib on Xarelto, temporal arteritis, HLD, HTN presents complaining of generalized weakness and fatigue since Saturday. 11/6 after a fall, with reported generalized weakness for several days, found to have a small stroke on MRI brain.  Now with increase enlargement of intramuscular hematoma        # R shoulder pain s/p fall   # nondisplaced fracture of the anterior right second rib  # intramuscular hematoma   -CT R shoulder 11/11 Increased enlargement as well as increased heterogeneity within the clavicular part of the deltoid muscle as well as coracobrachialis muscle belly. Mild soft tissue edema surrounding the muscular enlargement  - Trauma  team consulted ---> No acute surgical intervention at this time.   - 11/9 UE ultrasound: 6.0 cm complex collection in the soft tissues of the right anterior shoulder most compatible with hematoma.  - Cont to hold AC , trend HH levels  - Monitor for signs/symptoms of bleeding  - pain regimen ( lidocaine patch Tylenol, oxy)     # Fever with acute hypoxic resp failure   Likely multifactorial ( reactive to intramuscular hematoma 2/2 pain )  - Lactate negative   - CXR:  Question rather mild CHF in the upper lobes at this time.  - CT Angio chest 11/11 negative for PE     # UTI   Urine cx: > 100k Kleb -> ESBL,  - Continue meropenem x 3 days, to be completed 11/12/24 after 9 doses    #  small stroke on MRI brain  # Imaging showing the acute infarct in the R centrum semiovale.   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  - atorvastatin 40mg  - fall risk protocol  - PT/OT consult  - Neuro checks q4   - Recent TTE EF 55% grade 3 diastolic dysfunction   - A1c 5.8, lipid panel wnl  - neuro , PM&R following -->  Recommend ACUTE inpatient rehabilitation for the functional deficits consisting of 3 hours of therapy/day & 24 hour RN/daily PMR physician for comorbid medical management. Patient will be able to tolerate 3 hours a day[facility]    # Bioprosthetic aortic valve TAVR  # Grade 3 diastolic dysfunction   - TTE 7/5/24  Mild left ventricular hypertrophy. Left ventricular systolic function is normal with an ejection fraction of 55 %  There is severe (grade 3) left ventricular diastolic dysfunction S/p bioprosthetic aortic valve. Likely MIQUEL. Severe tricuspid regurgitation. artery systolic pressure is 48 mmHg, consistent with echocardiographic evidence of pulmonary hypertension Small right pleural effusion noted.  - Eliquis on hold in the setting of hematoma     # anemia requiring blood transfusion ( in the setting of intramuscular hematoma)   - s/p 2 units prbc  - h/h 8.5<---9.6  - Monitor for signs/symptoms of bleeding  - Monitor daily CBC.    # A- fib  - Not sure if pt is on coreg for rate control   - XARELTO switched to ELIQUIS now on HOLD     # HTN  - on losartan and HCTZ   - BP labile hold losartan    # LE edema chronic  - on HCTZ  stopped and given Lasix 20 mg IV daily   - LE doppler U/S negative for clot    # DVT pro  Eliquis on HOLD in the setting of intramuscular hematoma       GOC: FULL CODE   DISPO: ACUTE inpatient rehabilitation. patient prefers [facility] for rehab.  Discussed in detail with patient and son at bedside labs imaging and further care plan

## 2024-11-11 NOTE — PROGRESS NOTE ADULT - SUBJECTIVE AND OBJECTIVE BOX
Patient is a 79y old  Female who presents with a chief complaint of UTI, generalized weakness inability to ambulate (11 Nov 2024 19:09)      INTERVAL HPI/OVERNIGHT EVENTS:  fever t-max 102.3 with hypoxia 2 L NC hypotension, CT CHEST r/o PE negative for PE  . c/o R shoulder pain + ecchymosis limited ROM  Reports pain is getting worse with arm swelling     MEDICATIONS  (STANDING):  atorvastatin 10 milliGRAM(s) Oral at bedtime  ferrous    sulfate 325 milliGRAM(s) Oral daily  folic acid 1 milliGRAM(s) Oral daily  furosemide    Tablet 20 milliGRAM(s) Oral daily  gabapentin 300 milliGRAM(s) Oral two times a day  influenza  Vaccine (HIGH DOSE) 0.5 milliLiter(s) IntraMuscular once  lidocaine   4% Patch 1 Patch Transdermal daily  meropenem Injectable 1000 milliGRAM(s) IV Push every 8 hours    MEDICATIONS  (PRN):  acetaminophen     Tablet .. 650 milliGRAM(s) Oral every 6 hours PRN Mild Pain (1 - 3)  melatonin 3 milliGRAM(s) Oral at bedtime PRN Insomnia  oxyCODONE    IR 5 milliGRAM(s) Oral every 6 hours PRN Severe Pain (7 - 10)  polyethylene glycol 3350 17 Gram(s) Oral daily PRN Constipation  senna 2 Tablet(s) Oral at bedtime PRN Constipation      Allergies    adhesives (Rash)  No Known Drug Allergies    Intolerances        REVIEW OF SYSTEMS:  CONSTITUTIONAL: ++fever or chills  HEENT:  No headache, no sore throat  RESPIRATORY: No cough, wheezing, ++shortness of breath  CARDIOVASCULAR: No chest pain, palpitations  GASTROINTESTINAL: No abd pain, nausea, vomiting, or diarrhea  GENITOURINARY: No dysuria, frequency, or hematuria  NEUROLOGICAL: no focal weakness or dizziness  MUSCULOSKELETAL: + R shoulder upper arm pain swelling     Vital Signs Last 24 Hrs  T(C): 36.2 (11 Nov 2024 15:02), Max: 39.1 (11 Nov 2024 06:50)  T(F): 97.2 (11 Nov 2024 15:02), Max: 102.3 (11 Nov 2024 06:50)  HR: 86 (11 Nov 2024 15:02) (78 - 104)  BP: 125/57 (11 Nov 2024 15:02) (91/61 - 139/64)  BP(mean): --  RR: 18 (11 Nov 2024 15:02) (18 - 18)  SpO2: 95% (11 Nov 2024 15:02) (87% - 98%)    Parameters below as of 11 Nov 2024 15:02  Patient On (Oxygen Delivery Method): nasal cannula  O2 Flow (L/min): 2      PHYSICAL EXAM:  GENERAL: on 2 L NC   HEENT:  anicteric, moist mucous membranes  CHEST/LUNG:  CTA b/l, no rales, wheezes, or rhonchi  HEART:  RRR, S1, S2  ABDOMEN:  BS+, soft, nontender, nondistended  EXTREMITIES: + R shoulder upper arm pain swelling + ecchymosis, + Limited ROM    NERVOUS SYSTEM: answers questions and follows commands appropriately    LABS:                        8.5    7.62  )-----------( 361      ( 11 Nov 2024 07:20 )             27.1     CBC Full  -  ( 11 Nov 2024 07:20 )  WBC Count : 7.62 K/uL  Hemoglobin : 8.5 g/dL  Hematocrit : 27.1 %  Platelet Count - Automated : 361 K/uL  Mean Cell Volume : 90.3 fl  Mean Cell Hemoglobin : 28.3 pg  Mean Cell Hemoglobin Concentration : 31.4 g/dL  Auto Neutrophil # : 5.10 K/uL  Auto Lymphocyte # : 1.13 K/uL  Auto Monocyte # : 1.27 K/uL  Auto Eosinophil # : 0.15 K/uL  Auto Basophil # : 0.05 K/uL  Auto Neutrophil % : 66.0 %  Auto Lymphocyte % : 14.6 %  Auto Monocyte % : 16.4 %  Auto Eosinophil % : 1.9 %  Auto Basophil % : 0.6 %    11 Nov 2024 07:52    137    |  101    |  39     ----------------------------<  111    4.3     |  33     |  0.97     Ca    9.0        11 Nov 2024 07:52  Phos  2.8       11 Nov 2024 07:52  Mg     2.1       11 Nov 2024 07:52    TPro  6.3    /  Alb  1.9    /  TBili  0.6    /  DBili  x      /  AST  26     /  ALT  20     /  AlkPhos  250    11 Nov 2024 07:52      Urinalysis Basic - ( 11 Nov 2024 07:52 )    Color: x / Appearance: x / SG: x / pH: x  Gluc: 111 mg/dL / Ketone: x  / Bili: x / Urobili: x   Blood: x / Protein: x / Nitrite: x   Leuk Esterase: x / RBC: x / WBC x   Sq Epi: x / Non Sq Epi: x / Bacteria: x      CAPILLARY BLOOD GLUCOSE    Culture - Urine (collected 11-06-24 @ 16:36)  Source: .Urine None  Final Report (11-09-24 @ 10:00):    >100,000 CFU/ml Klebsiella pneumoniae ESBL  Organism: Klebsiella pneumoniae ESBL (11-09-24 @ 10:01)  Organism: Klebsiella pneumoniae ESBL (11-09-24 @ 10:01)      Method Type: LUIS ARMANDO      -  Ampicillin: R >16 These ampicillin results predict results for amoxicillin      -  Ampicillin/Sulbactam: I 16/8      -  Aztreonam: R 8      -  Cefazolin: R >16 For uncomplicated UTI with K. pneumoniae, E. coli, or P. mirablis: LUIS ARMANDO <=16 is sensitive and LUIS ARMANDO >=32 is resistant. This also predicts results for oral agents cefaclor, cefdinir, cefpodoxime, cefprozil, cefuroxime axetil, cephalexin and locarbef for uncomplicated UTI. Note that some isolates may be susceptible to these agents while testing resistant to cefazolin.      -  Cefepime: R >16      -  Ceftriaxone: R >32      -  Cefuroxime: R >16      -  Ciprofloxacin: I 0.5      -  Ertapenem: S <=0.5      -  Gentamicin: S <=2      -  Imipenem: S <=1      -  Levofloxacin: S <=0.5      -  Meropenem: S <=1      -  Nitrofurantoin: S <=32 Should not be used to treat pyelonephritis      -  Piperacillin/Tazobactam: S <=8      -  Tobramycin: S <=2      -  Trimethoprim/Sulfamethoxazole: R >2/38    Urinalysis with Rflx Culture (collected 11-06-24 @ 16:36)        RADIOLOGY & ADDITIONAL TESTS:  < from: CT 3D Reconstruct w/o Workstation (11.11.24 @ 14:39) >  IMPRESSION:  1.  Questionable nondisplaced fracture of theanterior right second rib,   however this could be secondary to motion artifact. Correlation with   point tenderness is advised.  2.  Increased enlargement as well as increased heterogeneity within the   clavicular part of the deltoid muscle as well as coracobrachialis muscle   belly. Mild soft tissue edema surrounding the muscular enlargement. This   is likely corresponding to intramuscular hematoma given recent history of   fall. However underlying infectious etiology cannot be excluded if there   is concern for infection. Clinical correlation is advised.    --- End of Report ---    JAYNA HUMPHRIES DO; Attending Radiologist  This document has been electronically signed. Nov 11 2024  3:00PM    < end of copied text >  < from: Xray Chest 1 View- PORTABLE-Urgent (Xray Chest 1 View- PORTABLE-Urgent .) (11.11.24 @ 09:07) >    IMPRESSION: Question rather mild CHF in the upper lobes at this time.    --- End of Report ---      LUANN TAYLOR MD; Attending Radiologist  This document has been electronically signed. Nov 11 2024 11:38AM    < end of copied text >  < from: CT Angio Chest PE Protocol w/ IV Cont (11.11.24 @ 09:37) >  IMPRESSION:    No pulmonary embolism.    --- End of Report ---          SUSAN LENZ MD; Resident Radiologist  This document has been electronically signed.  MO BROWN M.D., ATTENDING RADIOLOGIST  This document has been electronically signed. Nov 11 2024 11:25AM    < end of copied text >    Personally reviewed.     Consultant(s) Notes Reviewed:  [x] YES  [ ] NO

## 2024-11-11 NOTE — PROGRESS NOTE ADULT - NS ATTEND AMEND GEN_ALL_CORE FT
Discussed patient with Mariann Bernal and examined at bedside.     NEURO:   Awake, alert, oriented and normal language.    Pupils 3-2mm, symmetric, full VF's,. normal EOM, no facial weakness, no dysarthria.   MOTOR:  is 4+/5 on the R, and 5/5 on the L.  Hip flexors and knee extensors 5/5 symmetric    MRI brain images reviewed: small area of diffusion restriction, R centrum semiovale.                 AP : 79 year old woman with afib on Xarelto, aortic stenosis s/p TAVR, history of temporal arteritis, HTN, HLD, presenting to  on 11/6 after a fall, with reported generalized weakness for several days, found to have a small stroke on MRI brain.  Exam is limited by recent trauma.  The motor strength on the R is diminished compared to the L.  Imaging showing the acute infarct in the R centrum semiovale.    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  Need to rule out any carotid disease.      -restarted on DOAC, eliquis.  Thus far tolerating.    -ASA DC'd.    -continue on statin at current dose.   -PT/OT/SLP  -treatment of fever per primary team.    -neurology to follow.  Please page or call with any acute neuro changes, or other issues we can help address.

## 2024-11-11 NOTE — PROGRESS NOTE ADULT - ASSESSMENT
79 year old woman with afib on Xarelto, aortic stenosis s/p TAVR, history of temporal arteritis, HTN, HLD, presenting to  on 11/6 after a fall, with reported generalized weakness for several days, found to have a small stroke on MRI brain.  Exam is limited by recent trauma.  The motor strength on the R is diminished compared to the L.  Imaging showing the acute infarct in the R centrum semiovale.    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  CTA H&N is neg for LVO or significant stenosis.        -Continue Eliquis, can DC ASA from a neuroperspective  -LDL is at goas 40,continue on statin at current dose.  A1c 5.8-would consider pre-diabetic managment  -PT/OT/SLP  -outpatient follow up with Neurology  -will sign off-please page or call for any questions    D/W Dr. Perkins, patient and her daughter at bedside 79 year old woman with afib on Xarelto, aortic stenosis s/p TAVR, history of temporal arteritis, HTN, HLD, presenting to  on 11/6 after a fall, with reported generalized weakness for several days, found to have a small stroke on MRI brain.  Exam is limited by recent trauma.  The motor strength on the R is diminished compared to the L.  Imaging showing the acute infarct in the R centrum semiovale.    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  CTA H&N is neg for LVO or significant stenosis.        -Continue Eliquis, can DC ASA from a neuroperspective  -LDL is at goal 40,continue on statin at current dose.  A1c 5.8-would consider pre-diabetic managment  -PT/OT/SLP  -outpatient follow up with Neurology  -will sign off-please page or call for any questions    D/W Dr. Perkins, patient and her daughter at bedside

## 2024-11-11 NOTE — PROGRESS NOTE ADULT - SUBJECTIVE AND OBJECTIVE BOX
Date of service: 11-11-24 @ 14:16    Lying in bed in NAD  Weak looking  Febrile to 102.3F    ROS: limited, dose not seem in pain    MEDICATIONS  (STANDING):  acetaminophen   IVPB .. 1000 milliGRAM(s) IV Intermittent once  apixaban 5 milliGRAM(s) Oral every 12 hours  atorvastatin 10 milliGRAM(s) Oral at bedtime  ferrous    sulfate 325 milliGRAM(s) Oral daily  folic acid 1 milliGRAM(s) Oral daily  furosemide    Tablet 20 milliGRAM(s) Oral daily  gabapentin 300 milliGRAM(s) Oral two times a day  influenza  Vaccine (HIGH DOSE) 0.5 milliLiter(s) IntraMuscular once  lidocaine   4% Patch 1 Patch Transdermal daily  magnesium oxide 400 milliGRAM(s) Oral three times a day with meals  meropenem Injectable 1000 milliGRAM(s) IV Push every 8 hours    Vital Signs Last 24 Hrs  T(C): 37.2 (11 Nov 2024 07:50), Max: 39.1 (11 Nov 2024 06:50)  T(F): 99 (11 Nov 2024 07:50), Max: 102.3 (11 Nov 2024 06:50)  HR: 85 (11 Nov 2024 07:50) (77 - 104)  BP: 91/61 (11 Nov 2024 07:50) (91/61 - 148/58)  BP(mean): --  RR: 18 (11 Nov 2024 07:50) (18 - 18)  SpO2: 93% (11 Nov 2024 07:50) (87% - 98%)    Parameters below as of 11 Nov 2024 07:50  Patient On (Oxygen Delivery Method): nasal cannula  O2 Flow (L/min): 2       Physical exam:    Constitutional:  No acute distress  HEENT: NC/AT, EOMI, PERRLA, conjunctivae clear; ears and nose atraumatic; pharynx benign  Neck: supple; thyroid not palpable  Back: no tenderness  Respiratory: respiratory effort normal; clear to auscultation  Cardiovascular: S1S2 regular, no murmurs  Abdomen: soft, not tender, not distended, positive BS; no liver or spleen organomegaly  Genitourinary: no suprapubic tenderness  Lymphatic: no LN palpable  Musculoskeletal: no muscle tenderness, no joint swelling or tenderness  Extremities: no pedal edema  Neurological/ Psychiatric: Alert, judgement and insight impaired; moving all extremities  Skin: no rashes; no palpable lesions    Labs: reviewed                        8.5    7.62  )-----------( 361      ( 11 Nov 2024 07:20 )             27.1     11-11    137  |  101  |  39[H]  ----------------------------<  111[H]  4.3   |  33[H]  |  0.97    Ca    9.0      11 Nov 2024 07:52  Phos  2.8     11-11  Mg     2.1     11-11    TPro  6.3  /  Alb  1.9[L]  /  TBili  0.6  /  DBili  x   /  AST  26  /  ALT  20  /  AlkPhos  250[H]  11-11    Ferritin: 114 ng/mL (11-07-24 @ 07:36)                        8.7    6.29  )-----------( 350      ( 10 Nov 2024 08:06 )             27.6     11-10    139  |  102  |  37[H]  ----------------------------<  113[H]  3.7   |  31  |  0.90    Ca    9.0      10 Nov 2024 08:06  Phos  3.4     11-10  Mg     1.9     11-10    TPro  6.2  /  Alb  1.9[L]  /  TBili  0.5  /  DBili  x   /  AST  26  /  ALT  21  /  AlkPhos  249[H]  11-10    Ferritin: 114 ng/mL (11-07-24 @ 07:36)                        10.0   6.69  )-----------( 377      ( 09 Nov 2024 10:22 )             30.9     11-09    139  |  104  |  29[H]  ----------------------------<  127[H]  3.1[L]   |  28  |  1.03    Ca    9.3      09 Nov 2024 10:22  Mg     1.7     11-09    Culture - Urine (collected 06 Nov 2024 16:36)  Source: .Urine None  Final Report (09 Nov 2024 10:00):    >100,000 CFU/ml Klebsiella pneumoniae ESBL  Organism: Klebsiella pneumoniae ESBL (09 Nov 2024 10:01)  Organism: Klebsiella pneumoniae ESBL (09 Nov 2024 10:01)      Method Type: LUIS ARMANDO      -  Ampicillin: R >16 These ampicillin results predict results for amoxicillin      -  Ampicillin/Sulbactam: I 16/8      -  Aztreonam: R 8      -  Cefazolin: R >16 For uncomplicated UTI with K. pneumoniae, E. coli, or P. mirablis: LUIS ARMANDO <=16 is sensitive and LUIS ARMANDO >=32 is resistant. This also predicts results for oral agents cefaclor, cefdinir, cefpodoxime, cefprozil, cefuroxime axetil, cephalexin and locarbef for uncomplicated UTI. Note that some isolates may be susceptible to these agents while testing resistant to cefazolin.      -  Cefepime: R >16      -  Ceftriaxone: R >32      -  Cefuroxime: R >16      -  Ciprofloxacin: I 0.5      -  Ertapenem: S <=0.5      -  Gentamicin: S <=2      -  Imipenem: S <=1      -  Levofloxacin: S <=0.5      -  Meropenem: S <=1      -  Nitrofurantoin: S <=32 Should not be used to treat pyelonephritis      -  Piperacillin/Tazobactam: S <=8      -  Tobramycin: S <=2      -  Trimethoprim/Sulfamethoxazole: R >2/38    Radiology: all available radiological tests reviewed    < from: MR Head No Cont (11.08.24 @ 17:34) >  IMPRESSION: Acute/subacute right-sided corona radiata lacunar infarction with associated cytotoxic edema.  No acute intracranial hemorrhage.  Additional chronic lacunar infarct in the left corona radiata and mild chronic white matter microvascular type changes.  < end of copied text >    < from: CT Humerus No Cont, Right (11.06.24 @ 21:46) >  No fracture or dislocation.  Heterogeneous soft tissue density around the right humeral head . Leading differential is hematoma. Neoplastic condition is less likely but not   excluded. Consider nonvascular ultrasound for the right shoulder.  < end of copied text >      Advanced directives addressed: full resuscitation

## 2024-11-11 NOTE — PROGRESS NOTE ADULT - SUBJECTIVE AND OBJECTIVE BOX
CHIEF COMPLAINT: Patient is a 79y old  Female who presents with a chief complaint of UTI, generalized weakness inability to ambulate (06 Nov 2024 17:21)      HPI:  78 yo F  w/ PMHx of AS s/p TAVR ( Bioprosthetic valve) , AFib on Xarelto, temporal arteritis, HLD, HTN presents complaining of generalized weakness and fatigue since Saturday. Per daughter at bedside she had a fall,  was seen here on SAT after a fall and discharged home.+ Reports R shoulder pain. Since returning home, patient unable to ambulate secondary to weakness. Has been sleeping more with poor appetite, not her baseline. Denies chest pain, shortness of breath, fever, but endorses R chronic shoulder pain secondary to rotator cuff injury.  Per daughter she has ligamentous tear on R shoulder     IN ED  VITALS: /47 HR 76 RR 18 temp 97F   LABS: H/H: 7.7/26 rbc 2.76 RDW 19.5 Retic count 16.4    BUN 38  ALKpo4 216     UA turbid protein 30 large LE + nitrite  many bacteria    CT HEAD:  No acute intracranial hemorrhage, mass effect, or midline shift. Chronic small vessel disease. If there is continued concern for acute neurologic compromise, recommend MRI of the brain for further evaluation.    TTE 7/5/24  Mild left ventricular hypertrophy. Left ventricular systolic function is normal with an ejection fraction of 55 %  There is severe (grade 3) left ventricular diastolic dysfunction S/p bioprosthetic aortic valve. Likely MIQUEL. Severe tricuspid regurgitation. artery systolic pressure is 48 mmHg, consistent with echocardiographic evidence of pulmonary hypertension Small right pleural effusion noted.    s/p iv ofirmevx1, Rocephin x1 lidocaine patch , 500cc in ER    (06 Nov 2024 17:21)    11/8/24-- s/p transfusion- still feeling weak    11/11/24- head imaging reveals CVA ( subacute)    PMHx: PAST MEDICAL & SURGICAL HISTORY:  History of Cardiac Arrhythmia      Hypertension      Atrial Fibrillation      Aortic stenosis      Nonrheumatic aortic valve stenosis      High cholesterol      H/O temporal arteritis      S/P Exploratory Laparotomy  1966      H/O prior ablation treatment      History of temporal artery biopsy      S/P foot surgery    Vital Signs Last 24 Hrs  T(C): 39.1 (11 Nov 2024 06:50), Max: 39.1 (11 Nov 2024 06:50)  T(F): 102.3 (11 Nov 2024 06:50), Max: 102.3 (11 Nov 2024 06:50)  HR: 97 (11 Nov 2024 06:50) (71 - 104)  BP: 106/48 (11 Nov 2024 06:50) (106/48 - 148/58)  BP(mean): --  RR: 18 (11 Nov 2024 06:50) (18 - 18)  SpO2: 87% (11 Nov 2024 06:50) (87% - 98%)    Parameters below as of 11 Nov 2024 06:50  Patient On (Oxygen Delivery Method): room air  O2 Flow (L/min): 95      PHYSICAL EXAM:   Constitutional: fatigued appearing  Respiratory: Breath sounds are clear bilaterally, No wheezing, rales or rhonchi  Cardiovascular: S1 and S2, regular rate and rhythm,Mikala  Extremities: No peripheral edema  Vascular: 2+ peripheral pulses  Neurological: alert, oriented to name,and place, ( normally AOx 4)   skin - mult ecchymosis      MEDICATIONS  (STANDING):  apixaban 5 milliGRAM(s) Oral every 12 hours  aspirin enteric coated 81 milliGRAM(s) Oral daily  atorvastatin 10 milliGRAM(s) Oral at bedtime  ferrous    sulfate 325 milliGRAM(s) Oral daily  folic acid 1 milliGRAM(s) Oral daily  furosemide    Tablet 20 milliGRAM(s) Oral daily  gabapentin 300 milliGRAM(s) Oral two times a day  influenza  Vaccine (HIGH DOSE) 0.5 milliLiter(s) IntraMuscular once  lactated ringers. 500 milliLiter(s) (50 mL/Hr) IV Continuous <Continuous>  lidocaine   4% Patch 1 Patch Transdermal daily  magnesium oxide 400 milliGRAM(s) Oral three times a day with meals  meropenem Injectable 1000 milliGRAM(s) IV Push every 8 hours  potassium chloride   Powder 20 milliEquivalent(s) Oral two times a day          LABS: All Labs Reviewed:                                         9.6    x     )-----------( x        ( 10 Nov 2024 16:59 )             30.9       EKG:< from: 12 Lead ECG (11.02.24 @ 17:40) >    Diagnosis Line Atrial fibrillation  Left axis deviation  Anteroseptal infarct (cited on or before 08-FEB-2016)  Abnormal ECG  When compared with ECG of 02-SEP-2019 16:15,  Questionable change in QRS duration  Confirmed by MACCARO,M.D., SILVIA ([id number]) on 11/3/2024 10:58:52 AM    < end of copied text >    < from: TTE W or WO Ultrasound Enhancing Agent (11.07.24 @ 16:07) >    CONCLUSIONS:      1. Left ventricular systolic function is normal with an ejection fraction visually estimated at 55 to 60 %.   2. Mild left ventricular hypertrophy.   3. Enlarged right ventricular cavity size and borderline reduced right ventricular systolic function.   4. Left atrium is moderately dilated.   5. The right atrium is severely dilated.   6. A Transcatheter deployed (TAVR) valve replacement is present in the aortic position The prosthetic valve is well seated with normal function. Mild paravalvular regurgitation.   7. Mild mitral regurgitation.   8. Severe tricuspid regurgitation.   9. Severe pulmonary hypertension.    ________________________________________________________________________________________  FINDINGS:     Left Ventricle:  The left ventricular cavity is normal in size. Left ventricular wall thickness is mildly increased. Abnormal (paradoxical) septal motion consistent with conduction delay. Left ventricular systolic function is normal with an ejection fraction visually estimated at 55 to 60%. Mild left ventricular hypertrophy. The left ventricular diastolic function is indeterminate.     Right Ventricle:  The right ventricular cavity is enlarged in size and right ventricular systolic function is borderline reduced. Tricuspid annular plane systolic excursion (TAPSE) is 1.7 cm (normal >=1.7 cm).     Left Atrium:  The left atrium is moderately dilated with an indexed volume of 47.36 ml/m².     Right Atrium:  The right atrium is severely dilated with an indexed volume of 48.37 ml/m².     Interatrial Septum:  The interatrial septum appears intact.     Aortic Valve:  A a transcatheter deployed (TAVR) is present in the aortic position. The prosthetic valve is well seated with normal function. There is mild paravalvular regurgitation.     Mitral Valve:  Structurally normal mitral valve with normal leaflet excursion. There is mitral valve thickening of the anterior and posterior leaflets. There is mild calcification of the mitral valve annulus. There is mild mitral regurgitation.     Tricuspid Valve:  Structurally normal tricuspid valve with normal leaflet excursion. There is severe tricuspid regurgitation. Estimated pulmonary artery systolic pressure is 73 mmHg, consistent with severe pulmonary hypertension.     Pulmonic Valve:  The pulmonic valve was not well visualized. There is trace pulmonic regurgitation.     Aorta:  The aortic root at the sinuses of Valsalva is normal in size, measuring 3.00 cm (indexed 1.78 cm/m²). The ascending aorta is normal in size, measuring 3.30 cm (indexed 1.96 cm/m²).     Pericardium:  No pericardial effusion seen.     Systemic Veins:  The inferior vena cava is dilated (dilated >2.1cm) with abnormal inspiratory collapse (abnormal <50%) consistent with elevated right atrial pressure (~15, range 10-20mmHg).  ____________________________________________________________________    < end of copied text >     CHIEF COMPLAINT: Patient is a 79y old  Female who presents with a chief complaint of UTI, generalized weakness inability to ambulate (06 Nov 2024 17:21)      HPI:  80 yo F  w/ PMHx of AS s/p TAVR ( Bioprosthetic valve) , AFib on Xarelto, temporal arteritis, HLD, HTN presents complaining of generalized weakness and fatigue since Saturday. Per daughter at bedside she had a fall,  was seen here on SAT after a fall and discharged home.+ Reports R shoulder pain. Since returning home, patient unable to ambulate secondary to weakness. Has been sleeping more with poor appetite, not her baseline. Denies chest pain, shortness of breath, fever, but endorses R chronic shoulder pain secondary to rotator cuff injury.  Per daughter she has ligamentous tear on R shoulder     IN ED  VITALS: /47 HR 76 RR 18 temp 97F   LABS: H/H: 7.7/26 rbc 2.76 RDW 19.5 Retic count 16.4    BUN 38  ALKpo4 216     UA turbid protein 30 large LE + nitrite  many bacteria    CT HEAD:  No acute intracranial hemorrhage, mass effect, or midline shift. Chronic small vessel disease. If there is continued concern for acute neurologic compromise, recommend MRI of the brain for further evaluation.    TTE 7/5/24  Mild left ventricular hypertrophy. Left ventricular systolic function is normal with an ejection fraction of 55 %  There is severe (grade 3) left ventricular diastolic dysfunction S/p bioprosthetic aortic valve. Likely MIQUEL. Severe tricuspid regurgitation. artery systolic pressure is 48 mmHg, consistent with echocardiographic evidence of pulmonary hypertension Small right pleural effusion noted.    s/p iv ofirmevx1, Rocephin x1 lidocaine patch , 500cc in ER    (06 Nov 2024 17:21)    11/8/24-- s/p transfusion- still feeling weak    11/11/24- head imaging reveals CVA ( subacute), fever overnight    PMHx: PAST MEDICAL & SURGICAL HISTORY:  History of Cardiac Arrhythmia      Hypertension      Atrial Fibrillation      Aortic stenosis      Nonrheumatic aortic valve stenosis      High cholesterol      H/O temporal arteritis      S/P Exploratory Laparotomy  1966      H/O prior ablation treatment      History of temporal artery biopsy      S/P foot surgery    Vital Signs Last 24 Hrs  T(C): 39.1 (11 Nov 2024 06:50), Max: 39.1 (11 Nov 2024 06:50)  T(F): 102.3 (11 Nov 2024 06:50), Max: 102.3 (11 Nov 2024 06:50)  HR: 97 (11 Nov 2024 06:50) (71 - 104)  BP: 106/48 (11 Nov 2024 06:50) (106/48 - 148/58)  BP(mean): --  RR: 18 (11 Nov 2024 06:50) (18 - 18)  SpO2: 87% (11 Nov 2024 06:50) (87% - 98%)    Parameters below as of 11 Nov 2024 06:50  Patient On (Oxygen Delivery Method): room air  O2 Flow (L/min): 95      PHYSICAL EXAM:   Constitutional: fatigued appearing  Respiratory: Breath sounds are clear bilaterally, No wheezing, rales or rhonchi  Cardiovascular: S1 and S2, regular rate and rhythm,Mikala  Extremities: No peripheral edema  Vascular: 2+ peripheral pulses  Neurological: alert, oriented to name,and place, ( normally AOx 4)   skin - mult ecchymosis      MEDICATIONS  (STANDING):  apixaban 5 milliGRAM(s) Oral every 12 hours  aspirin enteric coated 81 milliGRAM(s) Oral daily  atorvastatin 10 milliGRAM(s) Oral at bedtime  ferrous    sulfate 325 milliGRAM(s) Oral daily  folic acid 1 milliGRAM(s) Oral daily  furosemide    Tablet 20 milliGRAM(s) Oral daily  gabapentin 300 milliGRAM(s) Oral two times a day  influenza  Vaccine (HIGH DOSE) 0.5 milliLiter(s) IntraMuscular once  lactated ringers. 500 milliLiter(s) (50 mL/Hr) IV Continuous <Continuous>  lidocaine   4% Patch 1 Patch Transdermal daily  magnesium oxide 400 milliGRAM(s) Oral three times a day with meals  meropenem Injectable 1000 milliGRAM(s) IV Push every 8 hours  potassium chloride   Powder 20 milliEquivalent(s) Oral two times a day          LABS: All Labs Reviewed:                                         9.6    x     )-----------( x        ( 10 Nov 2024 16:59 )             30.9       EKG:< from: 12 Lead ECG (11.02.24 @ 17:40) >    Diagnosis Line Atrial fibrillation  Left axis deviation  Anteroseptal infarct (cited on or before 08-FEB-2016)  Abnormal ECG  When compared with ECG of 02-SEP-2019 16:15,  Questionable change in QRS duration  Confirmed by REI CEE PAUL ([id number]) on 11/3/2024 10:58:52 AM    < end of copied text >    < from: TTE W or WO Ultrasound Enhancing Agent (11.07.24 @ 16:07) >    CONCLUSIONS:      1. Left ventricular systolic function is normal with an ejection fraction visually estimated at 55 to 60 %.   2. Mild left ventricular hypertrophy.   3. Enlarged right ventricular cavity size and borderline reduced right ventricular systolic function.   4. Left atrium is moderately dilated.   5. The right atrium is severely dilated.   6. A Transcatheter deployed (TAVR) valve replacement is present in the aortic position The prosthetic valve is well seated with normal function. Mild paravalvular regurgitation.   7. Mild mitral regurgitation.   8. Severe tricuspid regurgitation.   9. Severe pulmonary hypertension.    ________________________________________________________________________________________  FINDINGS:     Left Ventricle:  The left ventricular cavity is normal in size. Left ventricular wall thickness is mildly increased. Abnormal (paradoxical) septal motion consistent with conduction delay. Left ventricular systolic function is normal with an ejection fraction visually estimated at 55 to 60%. Mild left ventricular hypertrophy. The left ventricular diastolic function is indeterminate.     Right Ventricle:  The right ventricular cavity is enlarged in size and right ventricular systolic function is borderline reduced. Tricuspid annular plane systolic excursion (TAPSE) is 1.7 cm (normal >=1.7 cm).     Left Atrium:  The left atrium is moderately dilated with an indexed volume of 47.36 ml/m².     Right Atrium:  The right atrium is severely dilated with an indexed volume of 48.37 ml/m².     Interatrial Septum:  The interatrial septum appears intact.     Aortic Valve:  A a transcatheter deployed (TAVR) is present in the aortic position. The prosthetic valve is well seated with normal function. There is mild paravalvular regurgitation.     Mitral Valve:  Structurally normal mitral valve with normal leaflet excursion. There is mitral valve thickening of the anterior and posterior leaflets. There is mild calcification of the mitral valve annulus. There is mild mitral regurgitation.     Tricuspid Valve:  Structurally normal tricuspid valve with normal leaflet excursion. There is severe tricuspid regurgitation. Estimated pulmonary artery systolic pressure is 73 mmHg, consistent with severe pulmonary hypertension.     Pulmonic Valve:  The pulmonic valve was not well visualized. There is trace pulmonic regurgitation.     Aorta:  The aortic root at the sinuses of Valsalva is normal in size, measuring 3.00 cm (indexed 1.78 cm/m²). The ascending aorta is normal in size, measuring 3.30 cm (indexed 1.96 cm/m²).     Pericardium:  No pericardial effusion seen.     Systemic Veins:  The inferior vena cava is dilated (dilated >2.1cm) with abnormal inspiratory collapse (abnormal <50%) consistent with elevated right atrial pressure (~15, range 10-20mmHg).  ____________________________________________________________________    < end of copied text >

## 2024-11-11 NOTE — PROGRESS NOTE ADULT - ASSESSMENT
79 F with hx of Aortic valve replacement (TAVR), AF on AC, presents with weakness       Anemia- severe anemia--s/p transfusion with improvement in Hb-- keep Hb ~9 , will need iron supplementation      Afib--resumed AC with eliquis    CVA- no significant vessel occlusion unclear if due to medication failure-. temporary hold of anticoagulation due to severe anemia-- consider neuro and heme evaluations to determine optimal AC     UTI-- being treated with abx    Pulm HTN - higher than before-- will benefit from gentle diuresis continue with  lasix      pain control     supportive care will follow / PT   79 F with hx of Aortic valve replacement (TAVR), AF on AC, presents with weakness       Anemia- severe anemia--s/p transfusion with improvement in Hb-- keep Hb ~9 , will need iron supplementation      Afib--resumed AC with eliquis    CVA- no significant vessel occlusion unclear if due to medication failure-. temporary hold of anticoagulation due to severe anemia-- consider neuro and heme evaluations to determine optimal AC     UTI-- being treated with abx-- ID following    Pulm HTN - higher than before-- will benefit from gentle diuresis continue with  lasix      pain control     supportive care will follow / PT

## 2024-11-11 NOTE — PROGRESS NOTE ADULT - SUBJECTIVE AND OBJECTIVE BOX
NO acute events overnight, no new complaints.  CTA H&N is neg for LVO or significant stenosis.  She is on antibiotics for a UTI and had a fever 102.3 overnight.    ROS: As stated above otherwise neg.    MEDICATIONS  (STANDING):  apixaban 5 milliGRAM(s) Oral every 12 hours  atorvastatin 10 milliGRAM(s) Oral at bedtime  ferrous    sulfate 325 milliGRAM(s) Oral daily  folic acid 1 milliGRAM(s) Oral daily  furosemide    Tablet 20 milliGRAM(s) Oral daily  gabapentin 300 milliGRAM(s) Oral two times a day  influenza  Vaccine (HIGH DOSE) 0.5 milliLiter(s) IntraMuscular once  lidocaine   4% Patch 1 Patch Transdermal daily  magnesium oxide 400 milliGRAM(s) Oral three times a day with meals  meropenem Injectable 1000 milliGRAM(s) IV Push every 8 hours  potassium chloride   Powder 20 milliEquivalent(s) Oral two times a day      Vital Signs Last 24 Hrs  T(C): 37.2 (11 Nov 2024 07:50), Max: 39.1 (11 Nov 2024 06:50)  T(F): 99 (11 Nov 2024 07:50), Max: 102.3 (11 Nov 2024 06:50)  HR: 85 (11 Nov 2024 07:50) (77 - 104)  BP: 91/61 (11 Nov 2024 07:50) (91/61 - 148/58)  BP(mean): --  RR: 18 (11 Nov 2024 07:50) (18 - 18)  SpO2: 93% (11 Nov 2024 07:50) (87% - 98%)    Parameters below as of 11 Nov 2024 07:50  Patient On (Oxygen Delivery Method): nasal cannula  O2 Flow (L/min): 2    NEURO:   Awake, alert, oriented to month, date, year, normal naming, repetition, fluency  Pupils 3-2mm, symmetric, full VF's,. normal EOM, no facial weakness, no dysarthria.   MOTOR: limited by recent trauma and pain.   is 4+/5 on the R, and 5/5 on the L.  Hip flexors and knee extensors 5/5 symmetric  SENSORY: intact symmetrically to light touch  COORDINATION: HTS intact B/L, Cannot test RUE 2/2 pain, LUE no ataxia                        8.5    7.62  )-----------( 361      ( 11 Nov 2024 07:20 )             27.1     11-11    137  |  101  |  39[H]  ----------------------------<  111[H]  4.3   |  33[H]  |  0.97    Ca    9.0      11 Nov 2024 07:52  Phos  2.8     11-11  Mg     2.1     11-11    TPro  6.3  /  Alb  1.9[L]  /  TBili  0.6  /  DBili  x   /  AST  26  /  ALT  20  /  AlkPhos  250[H]  11-11 11-07 Chol 102 LDL 40 HDL 48[L] Trig 64  A1c 5.8    Radiology report:  < from: CT Angio Neck w/ IV Cont (11.10.24 @ 09:27) >  IMPRESSION:    HEAD CT:  1. Previously seen small acute/subacute lacunar infarct within the right   radial/basal ganglia junction was better assessed on MR modality.  2. No acute intracranial hemorrhage, mass effect, or shift of the midline   structures.    CTA NECK AND HEAD:  1. No large vessel occlusion or major stenosis.    < from: MR Head No Cont (11.08.24 @ 17:34) >    IMPRESSION: Acute/subacute right-sided corona radiata lacunar infarction   with associated cytotoxic edema.    No acute intracranial hemorrhage.    Additional chronic lacunar infarct in the left corona radiata and mild   chronic white matter microvascular type changes.    < from: TTE W or WO Ultrasound Enhancing Agent (11.07.24 @ 16:07) >    CONCLUSIONS:      1. Left ventricular systolic function is normal with an ejection fraction visually estimated at 55 to 60 %.   2. Mild left ventricular hypertrophy.   3. Enlarged right ventricular cavity size and borderline reduced right ventricular systolic function.   4. Left atrium is moderately dilated.   5. The right atrium is severely dilated.   6. A Transcatheter deployed (TAVR) valve replacement is present in the aortic position The prosthetic valve is well seated with normal function. Mild paravalvular regurgitation.   7. Mild mitral regurgitation.   8. Severe tricuspid regurgitation.   9. Severe pulmonary hypertension.

## 2024-11-11 NOTE — CONSULT NOTE ADULT - SUBJECTIVE AND OBJECTIVE BOX
CC:Patient is a 79y old  Female who presents with a chief complaint of UTI, generalized weakness inability to ambulate (11 Nov 2024 14:16)    Time Notified: 1800  Time Seen: 1830    HPI:   80 y/o Female with h/o AS s/p TAVR (Bioprosthetic valve), AFib on Xarelto, temporal arteritis, HLD, HTN, right chronic shoulder pain secondary to rotator cuff injury, prior UTI with ESBL organism was admitted on 11/6 for increased generalized weakness and fatigue since Saturday. Per daughter she had a fall, was seen in ER after a fall and discharged home. She reported R shoulder pain. Since returning home, patient unable to ambulate secondary to weakness. Has been sleeping more with poor appetite, not her baseline. Denies fever or chills at home. In ER she was noted with pyuria and received ceftriaxone, then meropenem.    Subjective:  Pt seen and examined at bedside. Pt is AAOx3, in no acute distress. Endorsing mild pain over Rt shoulder to active ROM. Pt denied c/o fever, chills, chest pain, SOB, abd pain, N/V/D, extremity  dysfunction, hemoptysis, hematemesis, hematuria, hematochezia, headache, diplopia, vertigo, dizziness.     ROS: as above otherwise negative    PMH:  AS s/p TAVR (Bioprosthetic valve), AFib on Xarelto, temporal arteritis, HLD, HTN, right chronic shoulder pain secondary to rotator cuff injury, prior UTI with ESBL organism     PSH:  H/O prior ablation treatment   History of temporal artery biopsy   S/P Exploratory Laparotomy 1966  S/P foot surgery.     adhesives (Rash)  No Known Drug Allergies    SH: Denies toxic habits   FH: No pertinent FMHx     Vital Signs Last 24 Hrs  T(C): 36.2 (11 Nov 2024 15:02), Max: 39.1 (11 Nov 2024 06:50)  T(F): 97.2 (11 Nov 2024 15:02), Max: 102.3 (11 Nov 2024 06:50)  HR: 86 (11 Nov 2024 15:02) (78 - 104)  BP: 125/57 (11 Nov 2024 15:02) (91/61 - 139/64)  BP(mean): --  RR: 18 (11 Nov 2024 15:02) (18 - 18)  SpO2: 95% (11 Nov 2024 15:02) (87% - 98%)    Parameters below as of 11 Nov 2024 15:02  Patient On (Oxygen Delivery Method): nasal cannula  O2 Flow (L/min): 2      Labs:               8.5    7.62  )-----------( 361      ( 11 Nov 2024 07:20 )             27.1     CBC Full  -  ( 11 Nov 2024 07:20 )  WBC Count : 7.62 K/uL  RBC Count : 3.00 M/uL  Hemoglobin : 8.5 g/dL  Hematocrit : 27.1 %  Platelet Count - Automated : 361 K/uL  Mean Cell Volume : 90.3 fl  Mean Cell Hemoglobin : 28.3 pg  Mean Cell Hemoglobin Concentration : 31.4 g/dL  Auto Neutrophil # : 5.10 K/uL  Auto Lymphocyte # : 1.13 K/uL  Auto Monocyte # : 1.27 K/uL  Auto Eosinophil # : 0.15 K/uL  Auto Basophil # : 0.05 K/uL  Auto Neutrophil % : 66.0 %  Auto Lymphocyte % : 14.6 %  Auto Monocyte % : 16.4 %  Auto Eosinophil % : 1.9 %  Auto Basophil % : 0.6 %    11-11    137  |  101  |  39[H]  ----------------------------<  111[H]  4.3   |  33[H]  |  0.97    Ca    9.0      11 Nov 2024 07:52  Phos  2.8     11-11  Mg     2.1     11-11    TPro  6.3  /  Alb  1.9[L]  /  TBili  0.6  /  DBili  x   /  AST  26  /  ALT  20  /  AlkPhos  250[H]  11-11    LIVER FUNCTIONS - ( 11 Nov 2024 07:52 )  Alb: 1.9 g/dL / Pro: 6.3 gm/dL / ALK PHOS: 250 U/L / ALT: 20 U/L / AST: 26 U/L / GGT: x                 Meds:  acetaminophen     Tablet .. 650 milliGRAM(s) Oral every 6 hours PRN  apixaban 5 milliGRAM(s) Oral every 12 hours  atorvastatin 10 milliGRAM(s) Oral at bedtime  ferrous    sulfate 325 milliGRAM(s) Oral daily  folic acid 1 milliGRAM(s) Oral daily  furosemide    Tablet 20 milliGRAM(s) Oral daily  gabapentin 300 milliGRAM(s) Oral two times a day  influenza  Vaccine (HIGH DOSE) 0.5 milliLiter(s) IntraMuscular once  lidocaine   4% Patch 1 Patch Transdermal daily  melatonin 3 milliGRAM(s) Oral at bedtime PRN  meropenem Injectable 1000 milliGRAM(s) IV Push every 8 hours  oxyCODONE    IR 5 milliGRAM(s) Oral every 6 hours PRN  polyethylene glycol 3350 17 Gram(s) Oral daily PRN  senna 2 Tablet(s) Oral at bedtime PRN      Radiology:  c< from: CT Shoulder No Cont, Right (11.11.24 @ 14:39) >  IMPRESSION:  1.  Questionable nondisplaced fracture of theanterior right second rib,   however this could be secondary to motion artifact. Correlation with   point tenderness is advised.  2.  Increased enlargement as well as increased heterogeneity within the   clavicular part of the deltoid muscle as well as coracobrachialis muscle   belly. Mild soft tissue edema surrounding the muscular enlargement. This   is likely corresponding to intramuscular hematoma given recent history of   fall. However underlying infectious etiology cannot be excluded if there   is concern for infection. Clinical correlation is advised.    --- End of Report ---    < end of copied text >  < from: US Extremity Nonvasc Limited, Right (11.09.24 @ 13:06) >  IMPRESSION:  6.0 cm complex collection in the soft tissues of the right anterior   shoulder most compatible with hematoma. If there is clinical concern for   rotator cuff orglenohumeral joint injury, recommend right shoulder MRI.        --- End of Report ---    < end of copied text >        Physical exam:  GCS of 15  Airway is patent  Breathing is symmetric and unlabored  Neuro: CNII-XII grossly intact  Psych: normal affect  HEENT: Normocephalic, atraumatic, WILIAN, EOM wnl, no otorrhea or hemotympanum b/l, no epistaxis or d/c b/l nares, no craniofacial bony pathology or tenderness b/l  Neck: No crepitus, no ecchymosis, no hematoma, to exam, no JVD, no tracheal deviation  Cspine/thoracolumbrosacral spine: no gross bony pathology or tenderness to exam  Cardiovascular: S1S2 Present  Chest: +Rt anterior shoulder hematoma with ecchymosis overlying anterior/lateral deltoid region. No gross rib pathology or tenderness to exam. No sternal pathology or tenderness to exam. No crepitus, no ecchymosis, no hematoma. No penetrating thorcoabdominal trauma  Respiratory:  Respiratory Effort normal; no wheezes, rales or rhonchi to exam  ABD: bowel sounds (+), soft, nontender, non distended, no rebound, no guarding, no rigidity, no skin changes to exam. No pelvic instability to exam, no skin changes  Musculoskeletal: Pt has palpable b/l radial, femoral, dorsalis pedis pulses. All digits are warm and well perfused. No gross long bone pathology or tenderness to exam. Pt demonstrates grossly intact sensoromotor function. Pt has good capillary refill to digits, no calf edema or tenderness to exam.  Skin: no lesions or rashes to exam

## 2024-11-12 LAB
ANION GAP SERPL CALC-SCNC: 4 MMOL/L — LOW (ref 5–17)
BUN SERPL-MCNC: 54 MG/DL — HIGH (ref 7–23)
CALCIUM SERPL-MCNC: 8.7 MG/DL — SIGNIFICANT CHANGE UP (ref 8.5–10.1)
CHLORIDE SERPL-SCNC: 100 MMOL/L — SIGNIFICANT CHANGE UP (ref 96–108)
CO2 SERPL-SCNC: 32 MMOL/L — HIGH (ref 22–31)
CREAT SERPL-MCNC: 1.13 MG/DL — SIGNIFICANT CHANGE UP (ref 0.5–1.3)
EGFR: 49 ML/MIN/1.73M2 — LOW
GLUCOSE SERPL-MCNC: 130 MG/DL — HIGH (ref 70–99)
HCT VFR BLD CALC: 25.4 % — LOW (ref 34.5–45)
HCT VFR BLD CALC: 30.2 % — LOW (ref 34.5–45)
HGB BLD-MCNC: 7.9 G/DL — LOW (ref 11.5–15.5)
HGB BLD-MCNC: 9.4 G/DL — LOW (ref 11.5–15.5)
MCHC RBC-ENTMCNC: 28.2 PG — SIGNIFICANT CHANGE UP (ref 27–34)
MCHC RBC-ENTMCNC: 31.1 G/DL — LOW (ref 32–36)
MCV RBC AUTO: 90.7 FL — SIGNIFICANT CHANGE UP (ref 80–100)
PLATELET # BLD AUTO: 361 K/UL — SIGNIFICANT CHANGE UP (ref 150–400)
POTASSIUM SERPL-MCNC: 4.4 MMOL/L — SIGNIFICANT CHANGE UP (ref 3.5–5.3)
POTASSIUM SERPL-SCNC: 4.4 MMOL/L — SIGNIFICANT CHANGE UP (ref 3.5–5.3)
RBC # BLD: 2.8 M/UL — LOW (ref 3.8–5.2)
RBC # FLD: 18.4 % — HIGH (ref 10.3–14.5)
SODIUM SERPL-SCNC: 136 MMOL/L — SIGNIFICANT CHANGE UP (ref 135–145)
WBC # BLD: 8.26 K/UL — SIGNIFICANT CHANGE UP (ref 3.8–10.5)
WBC # FLD AUTO: 8.26 K/UL — SIGNIFICANT CHANGE UP (ref 3.8–10.5)

## 2024-11-12 PROCEDURE — 99233 SBSQ HOSP IP/OBS HIGH 50: CPT

## 2024-11-12 RX ORDER — APIXABAN 5 MG/1
2.5 TABLET, FILM COATED ORAL EVERY 12 HOURS
Refills: 0 | Status: DISCONTINUED | OUTPATIENT
Start: 2024-11-12 | End: 2024-11-13

## 2024-11-12 RX ORDER — THIAMINE HCL 100 MG
100 TABLET ORAL DAILY
Refills: 0 | Status: DISCONTINUED | OUTPATIENT
Start: 2024-11-12 | End: 2024-11-13

## 2024-11-12 RX ADMIN — GABAPENTIN 300 MILLIGRAM(S): 300 CAPSULE ORAL at 21:31

## 2024-11-12 RX ADMIN — LIDOCAINE HYDROCHLORIDE 1 PATCH: 40 SOLUTION TOPICAL at 16:05

## 2024-11-12 RX ADMIN — FOLIC ACID 1 MILLIGRAM(S): 1 TABLET ORAL at 10:48

## 2024-11-12 RX ADMIN — Medication 650 MILLIGRAM(S): at 01:30

## 2024-11-12 RX ADMIN — LIDOCAINE HYDROCHLORIDE 1 PATCH: 40 SOLUTION TOPICAL at 10:47

## 2024-11-12 RX ADMIN — OXYCODONE HYDROCHLORIDE 5 MILLIGRAM(S): 30 TABLET ORAL at 10:47

## 2024-11-12 RX ADMIN — Medication 10 MILLIGRAM(S): at 21:32

## 2024-11-12 RX ADMIN — Medication 3 MILLIGRAM(S): at 21:32

## 2024-11-12 RX ADMIN — GABAPENTIN 300 MILLIGRAM(S): 300 CAPSULE ORAL at 10:47

## 2024-11-12 RX ADMIN — Medication 325 MILLIGRAM(S): at 10:48

## 2024-11-12 RX ADMIN — OXYCODONE HYDROCHLORIDE 5 MILLIGRAM(S): 30 TABLET ORAL at 23:52

## 2024-11-12 RX ADMIN — Medication 650 MILLIGRAM(S): at 00:56

## 2024-11-12 RX ADMIN — Medication 20 MILLIGRAM(S): at 10:48

## 2024-11-12 RX ADMIN — Medication 650 MILLIGRAM(S): at 16:05

## 2024-11-12 RX ADMIN — APIXABAN 2.5 MILLIGRAM(S): 5 TABLET, FILM COATED ORAL at 21:32

## 2024-11-12 RX ADMIN — OXYCODONE HYDROCHLORIDE 5 MILLIGRAM(S): 30 TABLET ORAL at 11:15

## 2024-11-12 RX ADMIN — Medication 650 MILLIGRAM(S): at 14:00

## 2024-11-12 NOTE — PROGRESS NOTE ADULT - NUTRITIONAL ASSESSMENT
This patient has been assessed with a concern for Malnutrition and has been determined to have a diagnosis/diagnoses of Moderate protein-calorie malnutrition.    This patient is being managed with:   Diet DASH/TLC-  Sodium & Cholesterol Restricted  Entered: Nov 6 2024  7:00PM    The following pending diet order is being considered for treatment of Moderate protein-calorie malnutrition:  Diet Regular-  Supplement Feeding Modality:  Oral  Ensure Plus High Protein Cans or Servings Per Day:  2       Frequency:  Two Times a day  Entered: Nov 7 2024 10:56AM  

## 2024-11-12 NOTE — PROGRESS NOTE ADULT - SUBJECTIVE AND OBJECTIVE BOX
Patient is a 79y old  Female who presents with a chief complaint of UTI, generalized weakness inability to ambulate (12 Nov 2024 12:15)      INTERVAL HPI/OVERNIGHT EVENTS: no events overnight. R shoulder pain is much control, pt able to sit on the recliner. Hb stable, will resume Eliquis     MEDICATIONS  (STANDING):  apixaban 2.5 milliGRAM(s) Oral every 12 hours  atorvastatin 10 milliGRAM(s) Oral at bedtime  ferrous    sulfate 325 milliGRAM(s) Oral daily  folic acid 1 milliGRAM(s) Oral daily  furosemide    Tablet 20 milliGRAM(s) Oral daily  gabapentin 300 milliGRAM(s) Oral two times a day  influenza  Vaccine (HIGH DOSE) 0.5 milliLiter(s) IntraMuscular once  lidocaine   4% Patch 1 Patch Transdermal daily    MEDICATIONS  (PRN):  acetaminophen     Tablet .. 650 milliGRAM(s) Oral every 6 hours PRN Mild Pain (1 - 3)  melatonin 3 milliGRAM(s) Oral at bedtime PRN Insomnia  oxyCODONE    IR 5 milliGRAM(s) Oral every 6 hours PRN Severe Pain (7 - 10)  polyethylene glycol 3350 17 Gram(s) Oral daily PRN Constipation  senna 2 Tablet(s) Oral at bedtime PRN Constipation      Allergies    adhesives (Rash)  No Known Drug Allergies    Intolerances        REVIEW OF SYSTEMS:  CONSTITUTIONAL: No fever or chills  HEENT:  No headache, no sore throat  RESPIRATORY: No cough, wheezing, or shortness of breath  CARDIOVASCULAR: No chest pain, palpitations  GASTROINTESTINAL: No abd pain, nausea, vomiting, or diarrhea  GENITOURINARY: No dysuria, frequency, or hematuria  NEUROLOGICAL: no focal weakness or dizziness  MUSCULOSKELETAL: + R shoulder / arm pain     Vital Signs Last 24 Hrs  T(C): 37.2 (12 Nov 2024 21:07), Max: 37.2 (12 Nov 2024 21:07)  T(F): 99 (12 Nov 2024 21:07), Max: 99 (12 Nov 2024 21:07)  HR: 81 (12 Nov 2024 21:07) (72 - 83)  BP: 124/55 (12 Nov 2024 21:07) (93/55 - 142/61)  BP(mean): --  RR: 18 (12 Nov 2024 21:07) (18 - 18)  SpO2: 95% (12 Nov 2024 21:07) (91% - 97%)    Parameters below as of 12 Nov 2024 21:07  Patient On (Oxygen Delivery Method): room air        PHYSICAL EXAM:  GENERAL: NAD  HEENT:  anicteric, moist mucous membranes  CHEST/LUNG:  CTA b/l, no rales, wheezes, or rhonchi  HEART:  RRR, S1, S2  ABDOMEN:  BS+, soft, nontender, nondistended  EXTREMITIES: +R arm shoulder ecchymosis swelling limited ROM  NERVOUS SYSTEM: answers questions and follows commands appropriately    LABS:                        9.4    x     )-----------( x        ( 12 Nov 2024 19:28 )             30.2     CBC Full  -  ( 12 Nov 2024 19:28 )  WBC Count : x  Hemoglobin : 9.4 g/dL  Hematocrit : 30.2 %  Platelet Count - Automated : x  Mean Cell Volume : x  Mean Cell Hemoglobin : x  Mean Cell Hemoglobin Concentration : x  Auto Neutrophil # : x  Auto Lymphocyte # : x  Auto Monocyte # : x  Auto Eosinophil # : x  Auto Basophil # : x  Auto Neutrophil % : x  Auto Lymphocyte % : x  Auto Monocyte % : x  Auto Eosinophil % : x  Auto Basophil % : x    12 Nov 2024 07:12    136    |  100    |  54     ----------------------------<  130    4.4     |  32     |  1.13     Ca    8.7        12 Nov 2024 07:12        Urinalysis Basic - ( 12 Nov 2024 07:12 )    Color: x / Appearance: x / SG: x / pH: x  Gluc: 130 mg/dL / Ketone: x  / Bili: x / Urobili: x   Blood: x / Protein: x / Nitrite: x   Leuk Esterase: x / RBC: x / WBC x   Sq Epi: x / Non Sq Epi: x / Bacteria: x      CAPILLARY BLOOD GLUCOSE            Culture - Blood (collected 11-11-24 @ 15:35)  Source: .Blood BLOOD  Preliminary Report (11-12-24 @ 19:01):    No growth at 24 hours    Culture - Blood (collected 11-11-24 @ 15:34)  Source: .Blood BLOOD  Preliminary Report (11-12-24 @ 19:01):    No growth at 24 hours    Culture - Blood (collected 11-11-24 @ 07:52)  Source: .Blood BLOOD  Preliminary Report (11-12-24 @ 13:01):    No growth at 24 hours    Culture - Blood (collected 11-11-24 @ 07:44)  Source: .Blood BLOOD  Preliminary Report (11-12-24 @ 13:01):    No growth at 24 hours    Culture - Urine (collected 11-06-24 @ 16:36)  Source: .Urine None  Final Report (11-09-24 @ 10:00):    >100,000 CFU/ml Klebsiella pneumoniae ESBL  Organism: Klebsiella pneumoniae ESBL (11-09-24 @ 10:01)  Organism: Klebsiella pneumoniae ESBL (11-09-24 @ 10:01)      Method Type: LUIS ARMANDO      -  Ampicillin: R >16 These ampicillin results predict results for amoxicillin      -  Ampicillin/Sulbactam: I 16/8      -  Aztreonam: R 8      -  Cefazolin: R >16 For uncomplicated UTI with K. pneumoniae, E. coli, or P. mirablis: LUIS ARMANDO <=16 is sensitive and LUIS ARMANDO >=32 is resistant. This also predicts results for oral agents cefaclor, cefdinir, cefpodoxime, cefprozil, cefuroxime axetil, cephalexin and locarbef for uncomplicated UTI. Note that some isolates may be susceptible to these agents while testing resistant to cefazolin.      -  Cefepime: R >16      -  Ceftriaxone: R >32      -  Cefuroxime: R >16      -  Ciprofloxacin: I 0.5      -  Ertapenem: S <=0.5      -  Gentamicin: S <=2      -  Imipenem: S <=1      -  Levofloxacin: S <=0.5      -  Meropenem: S <=1      -  Nitrofurantoin: S <=32 Should not be used to treat pyelonephritis      -  Piperacillin/Tazobactam: S <=8      -  Tobramycin: S <=2      -  Trimethoprim/Sulfamethoxazole: R >2/38    Urinalysis with Rflx Culture (collected 11-06-24 @ 16:36)        RADIOLOGY & ADDITIONAL TESTS:  < from: CT 3D Reconstruct w/o Workstation (11.11.24 @ 14:39) >    IMPRESSION:  1.  Questionable nondisplaced fracture of theanterior right second rib,   however this could be secondary to motion artifact. Correlation with   point tenderness is advised.  2.  Increased enlargement as well as increased heterogeneity within the   clavicular part of the deltoid muscle as well as coracobrachialis muscle   belly. Mild soft tissue edema surrounding the muscular enlargement. This   is likely corresponding to intramuscular hematoma given recent history of   fall. However underlying infectious etiology cannot be excluded if there   is concern for infection. Clinical correlation is advised.    --- End of Report ---        JAYNA HUMPHRIES DO; Attending Radiologist  This document has been electronically signed. Nov 11 2024  3:00PM    < end of copied text >    Personally reviewed.     Consultant(s) Notes Reviewed:  [x] YES  [ ] NO

## 2024-11-12 NOTE — PROGRESS NOTE ADULT - ASSESSMENT
80 yo F  w/ PMHx of AS s/p TAVR ( Bioprosthetic valve) , AFib on Xarelto, temporal arteritis, HLD, HTN presents complaining of generalized weakness and fatigue since Saturday. 11/6 after a fall, with reported generalized weakness for several days, found to have a small stroke on MRI brain.  Now with increase enlargement of intramuscular hematoma        # R shoulder pain s/p fall   # nondisplaced fracture of the anterior right second rib  # intramuscular hematoma   -CT R shoulder 11/11 Increased enlargement as well as increased heterogeneity within the clavicular part of the deltoid muscle as well as coracobrachialis muscle belly. Mild soft tissue edema surrounding the muscular enlargement  - Trauma  team consulted ---> No acute surgical intervention at this time.   - 11/9 UE ultrasound: 6.0 cm complex collection in the soft tissues of the right anterior shoulder most compatible with hematoma  - hb stable, lower dose Eliquis resumed   - pain regimen ( lidocaine patch Tylenol, oxy)     # Fever with acute hypoxic resp failure RESOLVED   Likely multifactorial ( reactive to intramuscular hematoma 2/2 pain )  - Lactate negative   - CXR:  Question rather mild CHF in the upper lobes at this time.  - CT Angio chest 11/11 negative for PE     # UTI   Urine cx: > 100k Kleb -> ESBL,  - completed meropenem x 3 days    #  small stroke on MRI brain  # Imaging showing the acute infarct in the R centrum semiovale.   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  - atorvastatin 40mg  - fall risk protocol  - PT/OT consult  - Neuro checks q4   - Recent TTE EF 55% grade 3 diastolic dysfunction   - A1c 5.8, lipid panel wnl  - neuro , PM&R following -->  Recommend ACUTE inpatient rehabilitation for the functional deficits consisting of 3 hours of therapy/day & 24 hour RN/daily PMR physician for comorbid medical management. Patient will be able to tolerate 3 hours a day. patient prefers [facility] for rehab.    # Bioprosthetic aortic valve TAVR  # Grade 3 diastolic dysfunction   - TTE 7/5/24  Mild left ventricular hypertrophy. Left ventricular systolic function is normal with an ejection fraction of 55 %  There is severe (grade 3) left ventricular diastolic dysfunction S/p bioprosthetic aortic valve. Likely MIQUEL. Severe tricuspid regurgitation. artery systolic pressure is 48 mmHg, consistent with echocardiographic evidence of pulmonary hypertension Small right pleural effusion noted.  - Eliquis lower dose resumed     # anemia requiring blood transfusion ( in the setting of intramuscular hematoma)   - s/p 2 units prbc  - hb stable   - Monitor for signs/symptoms of bleeding  - Monitor daily CBC.    # A- fib  - Not sure if pt is on coreg for rate control   - XARELTO switched to ELIQUIS     # HTN  - on losartan and HCTZ   - BP labile hold losartan    # LE edema chronic  - on HCTZ  stopped and given Lasix 20 mg IV daily   - LE doppler U/S negative for clot    # Moderate protein-calorie malnutrition  - Ensure Plus High Protein Cans, thiamine daily       # DVT pro  lower dose Eliquis resumed       GOC: FULL CODE   DISPO: ACUTE inpatient rehabilitation. patient prefers [facility] for rehab.  Discussed in detail with patient and family  at bedside labs imaging and further care plan

## 2024-11-12 NOTE — PROGRESS NOTE ADULT - SUBJECTIVE AND OBJECTIVE BOX
CC:Patient is a 79y old  Female who presents with a chief complaint of UTI, generalized weakness inability to ambulate (11 Nov 2024 19:09)    Overnight: No acute events     Subjective:  Pt seen and examined at bedside. Pt AAOx3, in no acute distress. Reporting improving active ROM with Rt arm/shoulder. Pt denied c/o fever, chills, chest pain, SOB, abd pain, N/V/D, hemoptysis, hematemesis, hematuria, hematochezia, headache, diplopia, vertigo, dizziness.    ROS:  otherwise as abovementioned ROS    Vital Signs Last 24 Hrs  T(C): 36.3 (12 Nov 2024 08:12), Max: 36.7 (11 Nov 2024 21:16)  T(F): 97.3 (12 Nov 2024 08:12), Max: 98.1 (11 Nov 2024 21:16)  HR: 81 (12 Nov 2024 10:49) (72 - 86)  BP: 142/61 (12 Nov 2024 10:49) (94/72 - 142/61)  BP(mean): --  RR: 18 (12 Nov 2024 10:49) (18 - 18)  SpO2: 97% (12 Nov 2024 10:49) (94% - 97%)    Parameters below as of 12 Nov 2024 10:49  Patient On (Oxygen Delivery Method): room air        Labs:                        7.9    8.26  )-----------( 361      ( 12 Nov 2024 07:12 )             25.4     CBC Full  -  ( 12 Nov 2024 07:12 )  WBC Count : 8.26 K/uL  RBC Count : 2.80 M/uL  Hemoglobin : 7.9 g/dL  Hematocrit : 25.4 %  Platelet Count - Automated : 361 K/uL  Mean Cell Volume : 90.7 fl  Mean Cell Hemoglobin : 28.2 pg  Mean Cell Hemoglobin Concentration : 31.1 g/dL  Auto Neutrophil # : x  Auto Lymphocyte # : x  Auto Monocyte # : x  Auto Eosinophil # : x  Auto Basophil # : x  Auto Neutrophil % : x  Auto Lymphocyte % : x  Auto Monocyte % : x  Auto Eosinophil % : x  Auto Basophil % : x    11-12    136  |  100  |  54[H]  ----------------------------<  130[H]  4.4   |  32[H]  |  1.13    Ca    8.7      12 Nov 2024 07:12  Phos  2.8     11-11  Mg     2.1     11-11    TPro  6.3  /  Alb  1.9[L]  /  TBili  0.6  /  DBili  x   /  AST  26  /  ALT  20  /  AlkPhos  250[H]  11-11    LIVER FUNCTIONS - ( 11 Nov 2024 07:52 )  Alb: 1.9 g/dL / Pro: 6.3 gm/dL / ALK PHOS: 250 U/L / ALT: 20 U/L / AST: 26 U/L / GGT: x                 Meds:  acetaminophen     Tablet .. 650 milliGRAM(s) Oral every 6 hours PRN  atorvastatin 10 milliGRAM(s) Oral at bedtime  ferrous    sulfate 325 milliGRAM(s) Oral daily  folic acid 1 milliGRAM(s) Oral daily  furosemide    Tablet 20 milliGRAM(s) Oral daily  gabapentin 300 milliGRAM(s) Oral two times a day  influenza  Vaccine (HIGH DOSE) 0.5 milliLiter(s) IntraMuscular once  lidocaine   4% Patch 1 Patch Transdermal daily  melatonin 3 milliGRAM(s) Oral at bedtime PRN  oxyCODONE    IR 5 milliGRAM(s) Oral every 6 hours PRN  polyethylene glycol 3350 17 Gram(s) Oral daily PRN  senna 2 Tablet(s) Oral at bedtime PRN      Radiology:  c< from: CT Shoulder No Cont, Right (11.11.24 @ 14:39) >  IMPRESSION:  1.  Questionable nondisplaced fracture of theanterior right second rib,   however this could be secondary to motion artifact. Correlation with   point tenderness is advised.  2.  Increased enlargement as well as increased heterogeneity within the   clavicular part of the deltoid muscle as well as coracobrachialis muscle   belly. Mild soft tissue edema surrounding the muscular enlargement. This   is likely corresponding to intramuscular hematoma given recent history of   fall. However underlying infectious etiology cannot be excluded if there   is concern for infection. Clinical correlation is advised.    --- End of Report ---    < end of copied text >  < from: US Extremity Nonvasc Limited, Right (11.09.24 @ 13:06) >  IMPRESSION:  6.0 cm complex collection in the soft tissues of the right anterior   shoulder most compatible with hematoma. If there is clinical concern for   rotator cuff orglenohumeral joint injury, recommend right shoulder MRI.        --- End of Report ---    < end of copied text >        Physical exam:  GCS of 15  Airway is patent  Breathing is symmetric and unlabored  Neuro: CNII-XII grossly intact  Psych: normal affect  Cardiovascular: S1S2 Present  Chest: +Rt anterior shoulder hematoma with ecchymosis overlying anterior/lateral deltoid region. No gross rib pathology or tenderness to exam. No sternal pathology or tenderness to exam. No crepitus, no ecchymosis, no hematoma.   Respiratory:  Respiratory Effort normal; no wheezes, rales or rhonchi to exam  ABD: bowel sounds (+), soft, nontender, non distended, no rebound, no guarding, no rigidity, no skin changes to exam.   Musculoskeletal: Pt has palpable b/l radial, femoral, dorsalis pedis pulses. All digits are warm and well perfused. No gross long bone pathology or tenderness to exam. Pt demonstrates grossly intact sensoromotor function. Pt has good capillary refill to digits, no calf edema or tenderness to exam.  Skin: no lesions or rashes to exam

## 2024-11-12 NOTE — PROGRESS NOTE ADULT - ASSESSMENT
78 y/o Female with h/o AS s/p TAVR (Bioprosthetic valve), AFib on Xarelto, temporal arteritis, HLD, HTN, right chronic shoulder pain secondary to rotator cuff injury, prior UTI with ESBL organism was admitted on 11/6 for increased generalized weakness and fatigue since Saturday. Per daughter she had a fall, was seen in ER after a fall and discharged home. She reported R shoulder pain. Since returning home, patient unable to ambulate secondary to weakness. Has been sleeping more with poor appetite, not her baseline. Denies fever or chills at home. In ER she was noted with pyuria and received ceftriaxone, then meropenem.    #Traumatic fall (11/2/2024)  #Rt shoulder-subacute intramuscular hematoma  #Afib (Eliquis)    Plan:  No acute surgical intervention at this time.   CT & xray imaging without acute fractures.   11/9 UE ultrasound: 6.0 cm complex collection in the soft tissues of the right anterior shoulder most compatible with hematoma.  REC resuming Eliquis 2.5 BID and cont trend HH levels. Titrate back to full-dosing once HH remains stable.  Monitor for signs/symptoms of bleeding  Cont analgesic and antiemetic therapies  PT eval and daily PT mobility activity      Case and plan discussed with surgical attending.   No acute surgical intervention, will sign off at this time.   Cont medical mgmt and please reconsult as needed.

## 2024-11-13 ENCOUNTER — TRANSCRIPTION ENCOUNTER (OUTPATIENT)
Age: 79
End: 2024-11-13

## 2024-11-13 VITALS
DIASTOLIC BLOOD PRESSURE: 66 MMHG | RESPIRATION RATE: 18 BRPM | OXYGEN SATURATION: 97 % | SYSTOLIC BLOOD PRESSURE: 137 MMHG | HEART RATE: 86 BPM | TEMPERATURE: 98 F

## 2024-11-13 LAB
ALBUMIN SERPL ELPH-MCNC: 2 G/DL — LOW (ref 3.3–5)
ALP SERPL-CCNC: 301 U/L — HIGH (ref 40–120)
ALT FLD-CCNC: 30 U/L — SIGNIFICANT CHANGE UP (ref 12–78)
ANION GAP SERPL CALC-SCNC: 5 MMOL/L — SIGNIFICANT CHANGE UP (ref 5–17)
AST SERPL-CCNC: 36 U/L — SIGNIFICANT CHANGE UP (ref 15–37)
BILIRUB SERPL-MCNC: 0.5 MG/DL — SIGNIFICANT CHANGE UP (ref 0.2–1.2)
BUN SERPL-MCNC: 47 MG/DL — HIGH (ref 7–23)
CALCIUM SERPL-MCNC: 9 MG/DL — SIGNIFICANT CHANGE UP (ref 8.5–10.1)
CHLORIDE SERPL-SCNC: 100 MMOL/L — SIGNIFICANT CHANGE UP (ref 96–108)
CO2 SERPL-SCNC: 31 MMOL/L — SIGNIFICANT CHANGE UP (ref 22–31)
CREAT SERPL-MCNC: 1.1 MG/DL — SIGNIFICANT CHANGE UP (ref 0.5–1.3)
EGFR: 51 ML/MIN/1.73M2 — LOW
GLUCOSE SERPL-MCNC: 110 MG/DL — HIGH (ref 70–99)
HCT VFR BLD CALC: 27.9 % — LOW (ref 34.5–45)
HGB BLD-MCNC: 8.8 G/DL — LOW (ref 11.5–15.5)
MCHC RBC-ENTMCNC: 28.7 PG — SIGNIFICANT CHANGE UP (ref 27–34)
MCHC RBC-ENTMCNC: 31.5 G/DL — LOW (ref 32–36)
MCV RBC AUTO: 90.9 FL — SIGNIFICANT CHANGE UP (ref 80–100)
PLATELET # BLD AUTO: 434 K/UL — HIGH (ref 150–400)
POTASSIUM SERPL-MCNC: 4.3 MMOL/L — SIGNIFICANT CHANGE UP (ref 3.5–5.3)
POTASSIUM SERPL-SCNC: 4.3 MMOL/L — SIGNIFICANT CHANGE UP (ref 3.5–5.3)
PROT SERPL-MCNC: 6.7 GM/DL — SIGNIFICANT CHANGE UP (ref 6–8.3)
RBC # BLD: 3.07 M/UL — LOW (ref 3.8–5.2)
RBC # FLD: 18.3 % — HIGH (ref 10.3–14.5)
SODIUM SERPL-SCNC: 136 MMOL/L — SIGNIFICANT CHANGE UP (ref 135–145)
WBC # BLD: 9.14 K/UL — SIGNIFICANT CHANGE UP (ref 3.8–10.5)
WBC # FLD AUTO: 9.14 K/UL — SIGNIFICANT CHANGE UP (ref 3.8–10.5)

## 2024-11-13 PROCEDURE — 99239 HOSP IP/OBS DSCHRG MGMT >30: CPT

## 2024-11-13 PROCEDURE — 99232 SBSQ HOSP IP/OBS MODERATE 35: CPT

## 2024-11-13 PROCEDURE — 99223 1ST HOSP IP/OBS HIGH 75: CPT

## 2024-11-13 RX ORDER — APIXABAN 5 MG/1
1 TABLET, FILM COATED ORAL
Qty: 60 | Refills: 0
Start: 2024-11-13 | End: 2024-12-12

## 2024-11-13 RX ORDER — OXYCODONE HYDROCHLORIDE 30 MG/1
1 TABLET ORAL
Qty: 0 | Refills: 0 | DISCHARGE
Start: 2024-11-13

## 2024-11-13 RX ORDER — ACETAMINOPHEN 500 MG
2 TABLET ORAL
Qty: 0 | Refills: 0 | DISCHARGE
Start: 2024-11-13

## 2024-11-13 RX ORDER — FUROSEMIDE 40 MG
1 TABLET ORAL
Qty: 0 | Refills: 0 | DISCHARGE
Start: 2024-11-13

## 2024-11-13 RX ORDER — LIDOCAINE HYDROCHLORIDE 40 MG/ML
1 SOLUTION TOPICAL
Qty: 0 | Refills: 0 | DISCHARGE
Start: 2024-11-13

## 2024-11-13 RX ORDER — RIVAROXABAN 20 MG/1
1 TABLET, FILM COATED ORAL
Refills: 0 | DISCHARGE

## 2024-11-13 RX ORDER — THIAMINE HCL 100 MG
1 TABLET ORAL
Qty: 0 | Refills: 0 | DISCHARGE
Start: 2024-11-13

## 2024-11-13 RX ADMIN — OXYCODONE HYDROCHLORIDE 5 MILLIGRAM(S): 30 TABLET ORAL at 14:25

## 2024-11-13 RX ADMIN — Medication 20 MILLIGRAM(S): at 11:01

## 2024-11-13 RX ADMIN — Medication 325 MILLIGRAM(S): at 11:01

## 2024-11-13 RX ADMIN — Medication 100 MILLIGRAM(S): at 11:01

## 2024-11-13 RX ADMIN — LIDOCAINE HYDROCHLORIDE 1 PATCH: 40 SOLUTION TOPICAL at 11:01

## 2024-11-13 RX ADMIN — GABAPENTIN 300 MILLIGRAM(S): 300 CAPSULE ORAL at 11:01

## 2024-11-13 RX ADMIN — APIXABAN 2.5 MILLIGRAM(S): 5 TABLET, FILM COATED ORAL at 11:01

## 2024-11-13 RX ADMIN — FOLIC ACID 1 MILLIGRAM(S): 1 TABLET ORAL at 11:01

## 2024-11-13 NOTE — CONSULT NOTE ADULT - REASON FOR ADMISSION
UTI, generalized weakness inability to ambulate

## 2024-11-13 NOTE — DISCHARGE NOTE PROVIDER - DETAILS OF MALNUTRITION DIAGNOSIS/DIAGNOSES
This patient has been assessed with a concern for Malnutrition and was treated during this hospitalization for the following Nutrition diagnosis/diagnoses:     -  11/07/2024: Moderate protein-calorie malnutrition

## 2024-11-13 NOTE — DISCHARGE NOTE NURSING/CASE MANAGEMENT/SOCIAL WORK - NSDCVIVACCINE_GEN_ALL_CORE_FT
Tdap; 02-Nov-2024 18:18; Marjorie Amaral (RN); [employer]; [id number] (Exp. Date: 21-May-2026); IntraMuscular; Deltoid Left.; 0.5 milliLiter(s); VIS (VIS Published: 09-May-2013, VIS Presented: 02-Nov-2024);

## 2024-11-13 NOTE — DISCHARGE NOTE PROVIDER - PROVIDER TOKENS
PROVIDER:[TOKEN:[[id number]],FOLLOWUP:[1 week],ESTABLISHEDPATIENT:[T]],PROVIDER:[TOKEN:[[id number]],FOLLOWUP:[1 week],ESTABLISHEDPATIENT:[T]],PROVIDER:[TOKEN:[[id number]],FOLLOWUP:[1 week],ESTABLISHEDPATIENT:[T]]

## 2024-11-13 NOTE — DISCHARGE NOTE NURSING/CASE MANAGEMENT/SOCIAL WORK - FINANCIAL ASSISTANCE
[facility] provides services at a reduced cost to those who are determined to be eligible through [facility]’s financial assistance program. Information regarding [facility]’s financial assistance program can be found by going to [email or web address] or by calling [phone].

## 2024-11-13 NOTE — PROGRESS NOTE ADULT - ASSESSMENT
ASSESSMENT/PLAN  79yFemale with functional deficits after Acute/subacute right-sided corona radiata lacunar infarction with associated cytotoxic edema.  Pain - Tylenol  DVT PPX - SCDs  Rehab -    Recommend ACUTE inpatient rehabilitation for the functional deficits consisting of 3 hours of therapy/day & 24 hour RN/daily PMR physician for comorbid medical management. Patient will be able to tolerate 3 hours a day.     The patient prefers [facility] for rehab.    Will continue to follow. Functional progress will determine ongoing rehab dispo recommendations, which may change.    Continue bedside therapy as well as OOB throughout the day with mobilization by staff to maintain cardiopulmonary function and prevention of secondary complications related to debility.      ASSESSMENT/PLAN  79yFemale with functional deficits after Acute/subacute right-sided corona radiata lacunar infarction with associated cytotoxic edema.  Pain - Tylenol  DVT PPX - SCDs  Rehab -     The patient needs subacute rehab. The patient will need a longer period of time to re-establish premorbid level of function   The patient prefers [facility] for rehab.    Will continue to follow. Functional progress will determine ongoing rehab dispo recommendations, which may change.    Continue bedside therapy as well as OOB throughout the day with mobilization by staff to maintain cardiopulmonary function and prevention of secondary complications related to debility.

## 2024-11-13 NOTE — CONSULT NOTE ADULT - CONSULT REASON
Cardiac mgmt
Rt shoulder hematoma s/p fall
Evaluate for acute inpatient rehab
Anemia
Stroke on MRI
pyuria

## 2024-11-13 NOTE — DISCHARGE NOTE PROVIDER - NSDCFUSCHEDAPPT_GEN_ALL_CORE_FT
[facility]  [facility]  Scheduled Appointment: 11/27/2024

## 2024-11-13 NOTE — DISCHARGE NOTE PROVIDER - NSDCFUADDAPPT_GEN_ALL_CORE_FT
APPTS ARE READY TO BE MADE: [X] YES    Best Family or Patient Contact (if needed):    Additional Information about above appointments (if needed):    1: tina bee   2:NEURO Lj Perkins (MD)   3: PM &R Christos Gomez (MD)  - heme Dr schultz  APPTS ARE READY TO BE MADE: [X] YES    Best Family or Patient Contact (if needed):    Additional Information about above appointments (if needed):    1: tina bee   2:NEURO Lj Perkins (MD)   3: PM &R Christos Gomez (MD)  - heme Dr schultz         Patient is being discharged to [facility]. Caregiver will arrange follow up.

## 2024-11-13 NOTE — DISCHARGE NOTE PROVIDER - NSDCMRMEDTOKEN_GEN_ALL_CORE_FT
acetaminophen 325 mg oral tablet: 2 tab(s) orally every 6 hours As needed Mild Pain (1 - 3)  atorvastatin 10 mg oral tablet: 1 tab(s) orally once a day  Colace 100 mg oral capsule: 1 cap(s) orally once a day  Cranberry oral tablet: 1 tab(s) orally once a day  Eliquis 5 mg oral tablet: 1 tab(s) orally 2 times a day  ferrous sulfate 325 mg (65 mg elemental iron) oral tablet: 1 tab(s) orally once a day  folic acid 1 mg oral tablet: 1 tab(s) orally once a day  furosemide 20 mg oral tablet: 1 tab(s) orally once a day  gabapentin 300 mg oral capsule: 1 cap(s) orally 2 times a day  lidocaine 4% topical film: Apply topically to affected area once a day  oxyCODONE 5 mg oral tablet: 1 tab(s) orally every 6 hours As needed Severe Pain (7 - 10)  Premarin 0.625 mg oral tablet: 1 tab(s) orally once a day  thiamine 100 mg oral tablet: 1 tab(s) orally once a day

## 2024-11-13 NOTE — DISCHARGE NOTE PROVIDER - CARE PROVIDER_API CALL
Sridhar Lovell  Cardiovascular Disease  [address], [address]  [place] [address]  Phone: [phone]  Fax: [phone]  Established Patient  Follow Up Time: 1 week    Lj Perkins  Neurology  [address], [address]  [place] [address]  Phone: [phone]  Fax: [phone]  Established Patient  Follow Up Time: 1 week    Evelyne Finney  Hematology  [address], [place] [address]  Phone: [phone]  Fax: [phone]  Established Patient  Follow Up Time: 1 week

## 2024-11-13 NOTE — PROGRESS NOTE ADULT - SUBJECTIVE AND OBJECTIVE BOX
CHIEF COMPLAINT: Patient is a 79y old  Female who presents with a chief complaint of UTI, generalized weakness inability to ambulate (06 Nov 2024 17:21)      HPI:  78 yo F  w/ PMHx of AS s/p TAVR ( Bioprosthetic valve) , AFib on Xarelto, temporal arteritis, HLD, HTN presents complaining of generalized weakness and fatigue since Saturday. Per daughter at bedside she had a fall,  was seen here on SAT after a fall and discharged home.+ Reports R shoulder pain. Since returning home, patient unable to ambulate secondary to weakness. Has been sleeping more with poor appetite, not her baseline. Denies chest pain, shortness of breath, fever, but endorses R chronic shoulder pain secondary to rotator cuff injury.  Per daughter she has ligamentous tear on R shoulder     IN ED  VITALS: /47 HR 76 RR 18 temp 97F   LABS: H/H: 7.7/26 rbc 2.76 RDW 19.5 Retic count 16.4    BUN 38  ALKpo4 216     UA turbid protein 30 large LE + nitrite  many bacteria    CT HEAD:  No acute intracranial hemorrhage, mass effect, or midline shift. Chronic small vessel disease. If there is continued concern for acute neurologic compromise, recommend MRI of the brain for further evaluation.    TTE 7/5/24  Mild left ventricular hypertrophy. Left ventricular systolic function is normal with an ejection fraction of 55 %  There is severe (grade 3) left ventricular diastolic dysfunction S/p bioprosthetic aortic valve. Likely MIQUEL. Severe tricuspid regurgitation. artery systolic pressure is 48 mmHg, consistent with echocardiographic evidence of pulmonary hypertension Small right pleural effusion noted.    s/p iv ofirmevx1, Rocephin x1 lidocaine patch , 500cc in ER    (06 Nov 2024 17:21)    11/8/24-- s/p transfusion- still feeling weak    11/11/24- head imaging reveals CVA ( subacute), fever overnight    11/13/24- weak but more alert-- severe right shoulder pain       PMHx: PAST MEDICAL & SURGICAL HISTORY:  History of Cardiac Arrhythmia      Hypertension      Atrial Fibrillation      Aortic stenosis      Nonrheumatic aortic valve stenosis      High cholesterol      H/O temporal arteritis      S/P Exploratory Laparotomy  1966      H/O prior ablation treatment      History of temporal artery biopsy      S/P foot surgery    Vital Signs Last 24 Hrs  T(C): 37.2 (13 Nov 2024 08:09), Max: 37.5 (12 Nov 2024 22:45)  T(F): 99 (13 Nov 2024 08:09), Max: 99.5 (12 Nov 2024 22:45)  HR: 94 (13 Nov 2024 08:09) (81 - 96)  BP: 116/58 (13 Nov 2024 08:09) (93/55 - 124/55)  BP(mean): 80 (12 Nov 2024 22:45) (80 - 80)  RR: 18 (13 Nov 2024 08:09) (18 - 18)  SpO2: 91% (13 Nov 2024 08:09) (91% - 95%)    Parameters below as of 13 Nov 2024 08:09  Patient On (Oxygen Delivery Method): room air      PHYSICAL EXAM:   Constitutional: fatigued appearing  Respiratory: Breath sounds are clear bilaterally, No wheezing, rales or rhonchi  Cardiovascular: S1 and S2, regular rate and rhythm,Mikala  Extremities: No peripheral edema  Vascular: 2+ peripheral pulses  Neurological: alert, oriented to name,and place, ( normally AOx 4)   skin - mult ecchymosis    MEDICATIONS  (STANDING):  apixaban 2.5 milliGRAM(s) Oral every 12 hours  atorvastatin 10 milliGRAM(s) Oral at bedtime  ferrous    sulfate 325 milliGRAM(s) Oral daily  folic acid 1 milliGRAM(s) Oral daily  furosemide    Tablet 20 milliGRAM(s) Oral daily  gabapentin 300 milliGRAM(s) Oral two times a day  influenza  Vaccine (HIGH DOSE) 0.5 milliLiter(s) IntraMuscular once  lidocaine   4% Patch 1 Patch Transdermal daily  thiamine 100 milliGRAM(s) Oral daily      LABS: All Labs Reviewed:                                      8.8    9.14  )-----------( 434      ( 13 Nov 2024 07:18 )             27.9   11-13    136  |  100  |  47[H]  ----------------------------<  110[H]  4.3   |  31  |  1.10    Ca    9.0      13 Nov 2024 07:18    TPro  6.7  /  Alb  2.0[L]  /  TBili  0.5  /  DBili  x   /  AST  36  /  ALT  30  /  AlkPhos  301[H]  11-13    EKG:< from: 12 Lead ECG (11.02.24 @ 17:40) >    Diagnosis Line Atrial fibrillation  Left axis deviation  Anteroseptal infarct (cited on or before 08-FEB-2016)  Abnormal ECG  When compared with ECG of 02-SEP-2019 16:15,  Questionable change in QRS duration  Confirmed by REI CEE PAUL ([id number]) on 11/3/2024 10:58:52 AM    < end of copied text >    < from: TTE W or WO Ultrasound Enhancing Agent (11.07.24 @ 16:07) >    CONCLUSIONS:      1. Left ventricular systolic function is normal with an ejection fraction visually estimated at 55 to 60 %.   2. Mild left ventricular hypertrophy.   3. Enlarged right ventricular cavity size and borderline reduced right ventricular systolic function.   4. Left atrium is moderately dilated.   5. The right atrium is severely dilated.   6. A Transcatheter deployed (TAVR) valve replacement is present in the aortic position The prosthetic valve is well seated with normal function. Mild paravalvular regurgitation.   7. Mild mitral regurgitation.   8. Severe tricuspid regurgitation.   9. Severe pulmonary hypertension.    ________________________________________________________________________________________  FINDINGS:     Left Ventricle:  The left ventricular cavity is normal in size. Left ventricular wall thickness is mildly increased. Abnormal (paradoxical) septal motion consistent with conduction delay. Left ventricular systolic function is normal with an ejection fraction visually estimated at 55 to 60%. Mild left ventricular hypertrophy. The left ventricular diastolic function is indeterminate.     Right Ventricle:  The right ventricular cavity is enlarged in size and right ventricular systolic function is borderline reduced. Tricuspid annular plane systolic excursion (TAPSE) is 1.7 cm (normal >=1.7 cm).     Left Atrium:  The left atrium is moderately dilated with an indexed volume of 47.36 ml/m².     Right Atrium:  The right atrium is severely dilated with an indexed volume of 48.37 ml/m².     Interatrial Septum:  The interatrial septum appears intact.     Aortic Valve:  A a transcatheter deployed (TAVR) is present in the aortic position. The prosthetic valve is well seated with normal function. There is mild paravalvular regurgitation.     Mitral Valve:  Structurally normal mitral valve with normal leaflet excursion. There is mitral valve thickening of the anterior and posterior leaflets. There is mild calcification of the mitral valve annulus. There is mild mitral regurgitation.     Tricuspid Valve:  Structurally normal tricuspid valve with normal leaflet excursion. There is severe tricuspid regurgitation. Estimated pulmonary artery systolic pressure is 73 mmHg, consistent with severe pulmonary hypertension.     Pulmonic Valve:  The pulmonic valve was not well visualized. There is trace pulmonic regurgitation.     Aorta:  The aortic root at the sinuses of Valsalva is normal in size, measuring 3.00 cm (indexed 1.78 cm/m²). The ascending aorta is normal in size, measuring 3.30 cm (indexed 1.96 cm/m²).     Pericardium:  No pericardial effusion seen.     Systemic Veins:  The inferior vena cava is dilated (dilated >2.1cm) with abnormal inspiratory collapse (abnormal <50%) consistent with elevated right atrial pressure (~15, range 10-20mmHg).  ____________________________________________________________________    < end of copied text >

## 2024-11-13 NOTE — DISCHARGE NOTE NURSING/CASE MANAGEMENT/SOCIAL WORK - NSDCFUADDAPPT_GEN_ALL_CORE_FT
APPTS ARE READY TO BE MADE: [X] YES    Best Family or Patient Contact (if needed):    Additional Information about above appointments (if needed):    1: tina bee   2:NEURO Lj Perkins (MD)   3: PM &R Christos Gomez (MD)  - heme Dr schultz

## 2024-11-13 NOTE — PROGRESS NOTE ADULT - SUBJECTIVE AND OBJECTIVE BOX
79yF was admitted on 11-06    Patient is a 79y old  Female who presents with a chief complaint of UTI, generalized weakness inability to ambulate (10 Nov 2024 12:45)    HPI:  80 yo F  w/ PMHx of AS s/p TAVR ( Bioprosthetic valve) , AFib on Xarelto, temporal arteritis, HLD, HTN presents complaining of generalized weakness and fatigue since Saturday. Per daughter at bedside she had a fall,  was seen here on SAT after a fall and discharged home.+ Reports R shoulder pain. Since returning home, patient unable to ambulate secondary to weakness. Has been sleeping more with poor appetite, not her baseline. Denies chest pain, shortness of breath, fever, but endorses R chronic shoulder pain secondary to rotator cuff injury.  Per daughter she has ligamentous tear on R shoulder     IN ED  VITALS: /47 HR 76 RR 18 temp 97F   LABS: H/H: 7.7/26 rbc 2.76 RDW 19.5 Retic count 16.4    BUN 38  ALKpo4 216     UA turbid protein 30 large LE + nitrite  many bacteria    s/p iv ofirmevx1, Rocephin x1 lidocaine patch , 500cc in ER    (06 Nov 2024 17:21)      Imaging performed:  CT HEAD:  No acute intracranial hemorrhage, mass effect, or midline shift. Chronic small vessel disease. If there is continued concern for acute neurologic compromise, recommend MRI of the brain for further evaluation.    TTE 7/5/24  Mild left ventricular hypertrophy. Left ventricular systolic function is normal with an ejection fraction of 55 %  There is severe (grade 3) left ventricular diastolic dysfunction S/p bioprosthetic aortic valve. Likely MIQUEL. Severe tricuspid regurgitation. artery systolic pressure is 48 mmHg, consistent with echocardiographic evidence of pulmonary hypertension Small right pleural effusion noted.    MRI brain 11/08/2024  Acute/subacute right-sided corona radiata lacunar infarction with associated cytotoxic edema.  No acute intracranial hemorrhage.  Additional chronic lacunar infarct in the left corona radiata and mild chronic white matter microvascular type changes.    CTA brain and neck 11/09/2024  HEAD CT:  1. Previously seen small acute/subacute lacunar infarct within the right radial/basal ganglia junction was better assessed on MR modality.  2. No acute intracranial hemorrhage, mass effect, or shift of the midline structures.    CTA NECK AND HEAD:  1. No large vessel occlusion or major stenosis.    REVIEW OF SYSTEMS  Constitutional - No fever, No weight loss, No fatigue  HEENT - No eye pain, No visual disturbances, No difficulty hearing, No tinnitus, No vertigo, No neck pain  Respiratory - No cough, No wheezing, No shortness of breath  Cardiovascular - No chest pain, No palpitations  Gastrointestinal - No abdominal pain, No nausea, No vomiting, No diarrhea, No constipation  Genitourinary - No dysuria, No frequency, No hematuria, No incontinence  Neurological - No headaches, No memory loss, No loss of strength, No numbness, No tremors  Skin - No itching, No rashes, No lesions   Endocrine - No temperature intolerance  Musculoskeletal - No joint pain, No joint swelling, No muscle pain  Psychiatric - No depression, No anxiety    VITALS  T(C): 37.2 (13 Nov 2024 08:09), Max: 37.5 (12 Nov 2024 22:45)  T(F): 99 (13 Nov 2024 08:09), Max: 99.5 (12 Nov 2024 22:45)  HR: 94 (13 Nov 2024 08:09) (81 - 96)  BP: 116/58 (13 Nov 2024 08:09) (93/55 - 142/61)  BP(mean): 80 (12 Nov 2024 22:45) (80 - 80)  RR: 18 (13 Nov 2024 08:09) (18 - 18)  SpO2: 91% (13 Nov 2024 08:09) (91% - 97%)    PAST MEDICAL & SURGICAL HISTORY  History of Cardiac Arrhythmia    Hypertension    Hyperlipidemia    Atrial Fibrillation    Aortic stenosis    Nonrheumatic aortic valve stenosis    High cholesterol    H/O temporal arteritis    Arrhythmia    S/P Exploratory Laparotomy    S/P Exploratory Laparotomy    H/O prior ablation treatment    History of temporal artery biopsy    S/P foot surgery    SOCIAL HISTORY - as per documentation/history  Smoking - None  EtOH - None  Drugs - None    FUNCTIONAL HISTORY  Lives with son in a private home. 2 D[employer] platform 2 Steps 0 HR. BR 2nd floor RHR    CURRENT FUNCTIONAL STATUS  supine to sit moderate assist  Sit to stand moderate noel    FAMILY HISTORY   FH: lung cancer (Father)        RECENT LABS - Reviewed  CBC Full  -  ( 10 Nov 2024 08:06 )  WBC Count : 6.29 K/uL  RBC Count : 3.08 M/uL  Hemoglobin : 8.7 g/dL  Hematocrit : 27.6 %  Platelet Count - Automated : 350 K/uL  Mean Cell Volume : 89.6 fl  Mean Cell Hemoglobin : 28.2 pg  Mean Cell Hemoglobin Concentration : 31.5 g/dL  Auto Neutrophil # : x  Auto Lymphocyte # : x  Auto Monocyte # : x  Auto Eosinophil # : x  Auto Basophil # : x  Auto Neutrophil % : x  Auto Lymphocyte % : x  Auto Monocyte % : x  Auto Eosinophil % : x  Auto Basophil % : x    11-10    139  |  102  |  37[H]  ----------------------------<  113[H]  3.7   |  31  |  0.90    Ca    9.0      10 Nov 2024 08:06  Phos  3.4     11-10  Mg     1.9     11-10    TPro  6.2  /  Alb  1.9[L]  /  TBili  0.5  /  DBili  x   /  AST  26  /  ALT  21  /  AlkPhos  249[H]  11-10    Urinalysis Basic - ( 10 Nov 2024 08:06 )    Color: x / Appearance: x / SG: x / pH: x  Gluc: 113 mg/dL / Ketone: x  / Bili: x / Urobili: x   Blood: x / Protein: x / Nitrite: x   Leuk Esterase: x / RBC: x / WBC x   Sq Epi: x / Non Sq Epi: x / Bacteria: x        ALLERGIES  adhesives (Rash)  No Known Drug Allergies      MEDICATIONS   acetaminophen     Tablet .. 650 milliGRAM(s) Oral every 6 hours PRN  apixaban 5 milliGRAM(s) Oral every 12 hours  aspirin enteric coated 81 milliGRAM(s) Oral daily  atorvastatin 10 milliGRAM(s) Oral at bedtime  ferrous    sulfate 325 milliGRAM(s) Oral daily  folic acid 1 milliGRAM(s) Oral daily  gabapentin 300 milliGRAM(s) Oral two times a day  influenza  Vaccine (HIGH DOSE) 0.5 milliLiter(s) IntraMuscular once  lactated ringers. 500 milliLiter(s) IV Continuous <Continuous>  magnesium oxide 400 milliGRAM(s) Oral three times a day with meals  melatonin 3 milliGRAM(s) Oral at bedtime PRN  meropenem Injectable 1000 milliGRAM(s) IV Push every 8 hours  oxyCODONE    IR 5 milliGRAM(s) Oral every 6 hours PRN  polyethylene glycol 3350 17 Gram(s) Oral daily PRN  potassium chloride   Powder 20 milliEquivalent(s) Oral two times a day  senna 2 Tablet(s) Oral at bedtime PRN      ----------------------------------------------------------------------------------------  PHYSICAL EXAM  Constitutional - NAD, Comfortable  HEENT - NCAT, EOMI  Neck - Supple, No limited ROM  Chest - Breathing comfortably, No wheezing  Cardiovascular - S1S2   Abdomen - Soft   Extremities - No C/C/E, No calf tenderness   Neurologic Exam -                    Cognitive - AAO to self, place, date, year, situation     Communication - Fluent, No dysarthria     Cranial Nerves - CN 2-12 intact     Motor - No focal deficits                    LEFT    UE - ShAB 3/5, EF 3/5, EE 3/5, WE 3/5,  3/5                    RIGHT UE - ShAB 2/5,pain swelling. EF 3/5, EE 3/5, WE 3/5,  3/5                    LEFT    LE - HF 3/5, KE 3/5, DF 3/5, PF 3/5                    RIGHT LE - HF 3/5, KE 3/5, DF 3/5, PF 3/5       Sensory - Intact to LT     Reflexes - DTR Intact, No primitive reflexive     Coordination - FTN left intact right poor     OculoVestibular - No saccades, No nystagmus, VOR         Balance - fair Static  Psychiatric - Mood stable, Affect WNL  ----------------------------------------------------------------------------------------   79yF was admitted on 11-06    Patient is a 79y old  Female who presents with a chief complaint of UTI, generalized weakness inability to ambulate (10 Nov 2024 12:45)    HPI:  80 yo F  w/ PMHx of AS s/p TAVR ( Bioprosthetic valve) , AFib on Xarelto, temporal arteritis, HLD, HTN presents complaining of generalized weakness and fatigue since Saturday. Per daughter at bedside she had a fall,  was seen here on SAT after a fall and discharged home.+ Reports R shoulder pain. Since returning home, patient unable to ambulate secondary to weakness. Has been sleeping more with poor appetite, not her baseline. Denies chest pain, shortness of breath, fever, but endorses R chronic shoulder pain secondary to rotator cuff injury.  Per daughter she has ligamentous tear on R shoulder     IN ED  VITALS: /47 HR 76 RR 18 temp 97F   LABS: H/H: 7.7/26 rbc 2.76 RDW 19.5 Retic count 16.4    BUN 38  ALKpo4 216     UA turbid protein 30 large LE + nitrite  many bacteria    s/p iv ofirmevx1, Rocephin x1 lidocaine patch , 500cc in ER    (06 Nov 2024 17:21)      Imaging performed:  CT HEAD:  No acute intracranial hemorrhage, mass effect, or midline shift. Chronic small vessel disease. If there is continued concern for acute neurologic compromise, recommend MRI of the brain for further evaluation.    TTE 7/5/24  Mild left ventricular hypertrophy. Left ventricular systolic function is normal with an ejection fraction of 55 %  There is severe (grade 3) left ventricular diastolic dysfunction S/p bioprosthetic aortic valve. Likely MIQUEL. Severe tricuspid regurgitation. artery systolic pressure is 48 mmHg, consistent with echocardiographic evidence of pulmonary hypertension Small right pleural effusion noted.    MRI brain 11/08/2024  Acute/subacute right-sided corona radiata lacunar infarction with associated cytotoxic edema.  No acute intracranial hemorrhage.  Additional chronic lacunar infarct in the left corona radiata and mild chronic white matter microvascular type changes.    CTA brain and neck 11/09/2024  HEAD CT:  1. Previously seen small acute/subacute lacunar infarct within the right radial/basal ganglia junction was better assessed on MR modality.  2. No acute intracranial hemorrhage, mass effect, or shift of the midline structures.    CTA NECK AND HEAD:  1. No large vessel occlusion or major stenosis.    CT Right shoulder 11/11/2024  1. Questionable nondisplaced fracture of the anterior right second rib, however this could be secondary to motion artifact. Correlation with point tenderness is advised.  2. Increased enlargement as well as increased heterogeneity within the clavicular part of the deltoid muscle as well as coracobrachialis muscle belly. Mild soft tissue edema surrounding the muscular enlargement. This is likely corresponding to intramuscular hematoma given recent history of fall. However underlying infectious etiology cannot be excluded if there is concern for infection. Clinical correlation is advised.      REVIEW OF SYSTEMS  Constitutional - No fever, No weight loss, No fatigue  HEENT - No eye pain, No visual disturbances, No difficulty hearing, No tinnitus, No vertigo, No neck pain  Respiratory - No cough, No wheezing, No shortness of breath  Cardiovascular - No chest pain, No palpitations  Gastrointestinal - No abdominal pain, No nausea, No vomiting, No diarrhea, No constipation  Genitourinary - No dysuria, No frequency, No hematuria, No incontinence  Neurological - No headaches, No memory loss, No loss of strength, No numbness, No tremors  Skin - No itching, No rashes, No lesions   Endocrine - No temperature intolerance  Musculoskeletal - No joint pain, No joint swelling, No muscle pain  Psychiatric - No depression, No anxiety    VITALS  T(C): 37.2 (13 Nov 2024 08:09), Max: 37.5 (12 Nov 2024 22:45)  T(F): 99 (13 Nov 2024 08:09), Max: 99.5 (12 Nov 2024 22:45)  HR: 94 (13 Nov 2024 08:09) (81 - 96)  BP: 116/58 (13 Nov 2024 08:09) (93/55 - 142/61)  BP(mean): 80 (12 Nov 2024 22:45) (80 - 80)  RR: 18 (13 Nov 2024 08:09) (18 - 18)  SpO2: 91% (13 Nov 2024 08:09) (91% - 97%)    PAST MEDICAL & SURGICAL HISTORY  History of Cardiac Arrhythmia    Hypertension    Hyperlipidemia    Atrial Fibrillation    Aortic stenosis    Nonrheumatic aortic valve stenosis    High cholesterol    H/O temporal arteritis    Arrhythmia    S/P Exploratory Laparotomy    S/P Exploratory Laparotomy    H/O prior ablation treatment    History of temporal artery biopsy    S/P foot surgery    SOCIAL HISTORY - as per documentation/history  Smoking - None  EtOH - None  Drugs - None    FUNCTIONAL HISTORY  Lives with son in a private home. 2 [employer] platform 2 Steps 0 HR. BR 2nd floor RHR    CURRENT FUNCTIONAL STATUS  supine to sit moderate assist  Sit to stand moderate assist    FAMILY HISTORY   FH: lung cancer (Father)    RECENT LABS - Reviewed  CBC Full  -  ( 10 Nov 2024 08:06 )  WBC Count : 6.29 K/uL  RBC Count : 3.08 M/uL  Hemoglobin : 8.7 g/dL  Hematocrit : 27.6 %  Platelet Count - Automated : 350 K/uL  Mean Cell Volume : 89.6 fl  Mean Cell Hemoglobin : 28.2 pg  Mean Cell Hemoglobin Concentration : 31.5 g/dL  Auto Neutrophil # : x  Auto Lymphocyte # : x  Auto Monocyte # : x  Auto Eosinophil # : x  Auto Basophil # : x  Auto Neutrophil % : x  Auto Lymphocyte % : x  Auto Monocyte % : x  Auto Eosinophil % : x  Auto Basophil % : x    11-10    139  |  102  |  37[H]  ----------------------------<  113[H]  3.7   |  31  |  0.90    Ca    9.0      10 Nov 2024 08:06  Phos  3.4     11-10  Mg     1.9     11-10    TPro  6.2  /  Alb  1.9[L]  /  TBili  0.5  /  DBili  x   /  AST  26  /  ALT  21  /  AlkPhos  249[H]  11-10    Urinalysis Basic - ( 10 Nov 2024 08:06 )    Color: x / Appearance: x / SG: x / pH: x  Gluc: 113 mg/dL / Ketone: x  / Bili: x / Urobili: x   Blood: x / Protein: x / Nitrite: x   Leuk Esterase: x / RBC: x / WBC x   Sq Epi: x / Non Sq Epi: x / Bacteria: x    ALLERGIES  adhesives (Rash)  No Known Drug Allergies    MEDICATIONS   acetaminophen     Tablet .. 650 milliGRAM(s) Oral every 6 hours PRN  apixaban 5 milliGRAM(s) Oral every 12 hours  aspirin enteric coated 81 milliGRAM(s) Oral daily  atorvastatin 10 milliGRAM(s) Oral at bedtime  ferrous    sulfate 325 milliGRAM(s) Oral daily  folic acid 1 milliGRAM(s) Oral daily  gabapentin 300 milliGRAM(s) Oral two times a day  influenza  Vaccine (HIGH DOSE) 0.5 milliLiter(s) IntraMuscular once  lactated ringers. 500 milliLiter(s) IV Continuous <Continuous>  magnesium oxide 400 milliGRAM(s) Oral three times a day with meals  melatonin 3 milliGRAM(s) Oral at bedtime PRN  meropenem Injectable 1000 milliGRAM(s) IV Push every 8 hours  oxyCODONE    IR 5 milliGRAM(s) Oral every 6 hours PRN  polyethylene glycol 3350 17 Gram(s) Oral daily PRN  potassium chloride   Powder 20 milliEquivalent(s) Oral two times a day  senna 2 Tablet(s) Oral at bedtime PRN      ----------------------------------------------------------------------------------------  PHYSICAL EXAM  Constitutional - NAD, Comfortable  HEENT - NCAT, EOMI  Neck - Supple, No limited ROM  Chest - Breathing comfortably, No wheezing  Cardiovascular - S1S2   Abdomen - Soft   Extremities - No C/C/E, No calf tenderness   Neurologic Exam -                    Cognitive - AAO to self, place, date, year, situation     Communication - Fluent, No dysarthria     Cranial Nerves - CN 2-12 intact     Motor - No focal deficits                    LEFT    UE - ShAB 3/5, EF 3/5, EE 3/5, WE 3/5,  3/5                    RIGHT UE - ShAB 2/5,pain swelling. EF 3/5, EE 3/5, WE 3/5,  3/5                    LEFT    LE - HF 3/5, KE 3/5, DF 3/5, PF 3/5                    RIGHT LE - HF 3/5, KE 3/5, DF 3/5, PF 3/5       Sensory - Intact to LT     Reflexes - DTR Intact, No primitive reflexive     Coordination - FTN left intact right poor     OculoVestibular - No saccades, No nystagmus, VOR         Balance - fair Static  Psychiatric - Mood stable, Affect WNL  ----------------------------------------------------------------------------------------

## 2024-11-13 NOTE — DISCHARGE NOTE NURSING/CASE MANAGEMENT/SOCIAL WORK - PATIENT PORTAL LINK FT
You can access the FollowMyHealth Patient Portal offered by [facility] by registering at the following website: [email or web address]. By joining [facility]’s FollowMyHealth portal, you will also be able to view your health information using other applications (apps) compatible with our system.

## 2024-11-13 NOTE — DISCHARGE NOTE PROVIDER - HOSPITAL COURSE
HPI:  78 yo F  w/ PMHx of AS s/p TAVR ( Bioprosthetic valve) , AFib on Xarelto, temporal arteritis, HLD, HTN presents complaining of generalized weakness and fatigue since Saturday. Per daughter at bedside she had a fall,  was seen here on SAT after a fall and discharged home.+ Reports R shoulder pain. Since returning home, patient unable to ambulate secondary to weakness. Has been sleeping more with poor appetite, not her baseline. Denies chest pain, shortness of breath, fever, but endorses R chronic shoulder pain secondary to rotator cuff injury.  Per daughter she has ligamentous tear on R shoulder     IN ED  VITALS: /47 HR 76 RR 18 temp 97F   LABS: H/H: 7.7/26 rbc 2.76 RDW 19.5 Retic count 16.4    BUN 38  ALKpo4 216     UA turbid protein 30 large LE + nitrite  many bacteria    CT HEAD:  No acute intracranial hemorrhage, mass effect, or midline shift. Chronic small vessel disease. If there is continued concern for acute neurologic compromise, recommend MRI of the brain for further evaluation.    TTE 7/5/24  Mild left ventricular hypertrophy. Left ventricular systolic function is normal with an ejection fraction of 55 %  There is severe (grade 3) left ventricular diastolic dysfunction S/p bioprosthetic aortic valve. Likely MIQUEL. Severe tricuspid regurgitation. artery systolic pressure is 48 mmHg, consistent with echocardiographic evidence of pulmonary hypertension Small right pleural effusion noted.    s/p iv ofirmevx1, Rocephin x1 lidocaine patch , 500cc in ER    (06 Nov 2024 17:21)      ---  HOSPITAL COURSE: Patient admitted s/p fall with R shoulder pain s/p fall   # nondisplaced fracture of the anterior right second rib  # intramuscular hematoma   -CT R shoulder 11/11 Increased enlargement as well as increased heterogeneity within the clavicular part of the deltoid muscle as well as coracobrachialis muscle belly. Mild soft tissue edema surrounding the muscular enlargement  - Trauma  team consulted ---> No acute surgical intervention at this time.   - 11/9 UE ultrasound: 6.0 cm complex collection in the soft tissues of the right anterior shoulder most compatible with hematoma  - hb stable, lower dose Eliquis resumed   - pain regimen ( lidocaine patch Tylenol, oxy)     # Fever with acute hypoxic resp failure RESOLVED   Likely multifactorial ( reactive to intramuscular hematoma 2/2 pain )  - Lactate negative   - CXR:  Question rather mild CHF in the upper lobes at this time.  - CT Angio chest 11/11 negative for PE     # UTI   Urine cx: > 100k Kleb -> ESBL,  - completed meropenem x 3 days    # small stroke on MRI brain  # Imaging showing the acute infarct in the R centrum semiovale.   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  - atorvastatin 40mg  - Neuro checks q4   - Recent TTE EF 55% grade 3 diastolic dysfunction   - A1c 5.8, lipid panel wnl  - neuro , PM&R following -->  [facility] for rehab.    # anemia requiring blood transfusion ( in the setting of intramuscular hematoma)   - s/p 2 units prbc  - hb stable on the day of discharge, with close follow up with primary care, cardiologist and hematologist     Patient discharged to [facility]   ---  CONSULTANTS:   PM &R Christos Gomez)  Trauma Chi Scruggs (DO)   ID Liam Mayen)  NEURO Lj Perkins)

## 2024-11-13 NOTE — DISCHARGE NOTE PROVIDER - ATTENDING DISCHARGE PHYSICAL EXAMINATION:
PHYSICAL EXAM:  GENERAL: Frail cachetic   HEENT:  anicteric, moist mucous membranes  CHEST/LUNG:  CTA b/l, no rales, wheezes, or rhonchi  HEART:  RRR, S1, S2  ABDOMEN:  BS+, soft, nontender, nondistended  EXTREMITIES: +R arm shoulder ecchymosis swelling with  limited ROM  NERVOUS SYSTEM: answers questions and follows commands appropriately

## 2024-11-13 NOTE — CONSULT NOTE ADULT - ASSESSMENT
This is a 79 year old female with a history  AS s/p TAVR ( Bioprosthetic valve) , AFib on Xarelto, temporal arteritis, HLD, HTN.  She is admitted s/p fall with a RUE hematoma.     She has had a chronic normocytic anemia since June, even prior to her TAVR.    Now worsened s/p bleed/hematoma, required 2 units of PRBC on 11/7.  Work up with low retic count, iron deficiency.      Suspect related to bleed/iron deficiency, + CKD stage III.    Would check her stool occult blood, last GI work up 1 year ago.  Would also check her immunoelectrophoresis along with LDH/haptoglobin.   Echo with TAVR well seated, mild regurgitation.     If her Hg remains low, she likely will need a bone marrow biopsy.     Outpatient follow up.     Thank you.    Evelyne Fniney D.O.   Hematology/Oncology  [facility]

## 2024-11-13 NOTE — DISCHARGE NOTE PROVIDER - NSDCCPCAREPLAN_GEN_ALL_CORE_FT
PRINCIPAL DISCHARGE DIAGNOSIS  Diagnosis: Generalized weakness  Assessment and Plan of Treatment: You came in after multiple falls, with generalized weakness inability to ambulate. We did CT and MRI   we did CT right shoulder that shows intramuscular hematoma.  We stopped your home xarelto as your hemoglobin went down to 6.3 requiring 2 units of blood transfusion.   We consulted trauma team, cardiologist and PM&R   no surgical interventions per trauma  You were started on low dose eliquis 2.5mg twice daily and we closely monitor your blood work   PLEASE Follow up with cardiologist , hematologist and primary care physician with in 1 week of discharge      SECONDARY DISCHARGE DIAGNOSES  Diagnosis: Intramuscular hematoma  Assessment and Plan of Treatment: You have intramuscular hematoma after a fall    Also nondisplaced fracture of the anterior right second rib  -CT R shoulder 11/11 Increased enlargement as well as increased heterogeneity within the clavicular part of the deltoid muscle as well as coracobrachialis muscle belly. Mild soft tissue edema surrounding the muscular enlargement  - Trauma  team consulted ---> No acute surgical intervention at this time.   PLEASE Follow up with cardiologist , hematologist and primary care physician with in 1 week of discharge      Diagnosis: Anemia requiring transfusions  Assessment and Plan of Treatment: You were previously diagnosed with anemia. This means that you have a low number of red blood cells in your blood. Please continue to take your home medications and follow up with your hematologist for further management. Return to ED or call your doctor immediatley if you develop beleeding, dizziness, feel feint, palpaitations, chest pain.  You recieved 2 units of blood transfusion on this admission, hemoglobin today is 8.8  PLEASE repeat blood work in 24 hours at the rehab as patient is started on eliquis and close monitoring of R arm pain    Diagnosis: E-coli UTI  Assessment and Plan of Treatment: Your urine culture + > 100k Kleb -> ESBL,  - You have completed course of  meropenem x 3 days    Diagnosis: Stroke  Assessment and Plan of Treatment: small stroke on MRI brain   Imaging showing the acute infarct in the R centrum semiovale.   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  -  Recent TTE EF 55% grade 3 diastolic dysfunction   - HA1c 5.8, lipid panel normal  neurology consulted, started on eliquis twice daily   Follow with neurologist with in 1 week of discharge       Diagnosis: H/O prosthetic heart valve  Assessment and Plan of Treatment: take eliquis as prescribed    Diagnosis: Chronic atrial fibrillation  Assessment and Plan of Treatment: Atrial fibrillation is a condition where the different parts of the heart do not beat in time with each other. These changes can lead to serious issues, including clots that form in the heart and changes in the heart rate and rhythm where your heart might beat too fast. It is important that you take your prescribed medications as instructed to avoid clots forming and to make sure your heart does not beat too fast. It is also very important that you follow up with your outpatient cardiologist after discharge within a week to make sure your medications are working effectively.      Diagnosis: HTN (hypertension)  Assessment and Plan of Treatment: Hypertension, or high blood pressure, is a condition where your blood pressures are too high. Over time, this can lead to problems with your heart, kidney, blood vessels, and other organs. Uncontrolled hypertension can also lead to major adverse events like strokes and bleeds. During your hospitalization, your blood pressures were constantly monitored and treated with medication. It is very important that you continue your medications as prescribed and make a follow up appointment with your primary care physician to address changes made to your regimen as soon as you leave the hospital.  PLEASE donot take your losartan and hydrochlorothiazide as BP is stable       Diagnosis: Moderate protein-calorie malnutrition  Assessment and Plan of Treatment: patient seen by nutritionist   Add ensure + HP shake BID   Encourage protein-rich foods, maximize food preferences   Multivitamin w/ minerals daily   Consider adding thiamine 100 mg daily       Diagnosis: Lower extremity edema  Assessment and Plan of Treatment: please take lasix 1 tablet daily for lower extermity edema   lower legs doppler negative for DVT

## 2024-11-13 NOTE — DISCHARGE NOTE NURSING/CASE MANAGEMENT/SOCIAL WORK - NSDCPEFALRISK_GEN_ALL_CORE
For information on Fall & Injury Prevention, visit: [email or web address] OR  [email or web address] OR  https://www.cdc.gov/steadi/patient.html

## 2024-11-13 NOTE — PROGRESS NOTE ADULT - PROVIDER SPECIALTY LIST ADULT
Cardiology
Hospitalist
Infectious Disease
Neurology
Trauma Surgery
Hospitalist
Infectious Disease
Physiatry
Cardiology
Cardiology
Hospitalist
(3) walks occasionally

## 2024-11-13 NOTE — PROGRESS NOTE ADULT - REASON FOR ADMISSION
UTI, generalized weakness inability to ambulate

## 2024-11-13 NOTE — PROGRESS NOTE ADULT - ASSESSMENT
79 F with hx of Aortic valve replacement (TAVR), AF on AC, presents with weakness       Anemia- severe anemia--s/p transfusion with improvement in Hb-- keep Hb ~9 , will need iron supplementation  -- recommend heme evaluation to assist with choice and dose of anticoagulation as well as could and remedy for anemia--- consideration for iron infusion     Afib--resumed AC with eliquis    CVA- no significant vessel occlusion unclear if due to medication failure-. temporary hold of anticoagulation due to severe anemia-- consider neuro and heme evaluations to determine optimal AC -- recommend aspirin 81    UTI-- being treated with abx-- ID following    Pulm HTN - higher than before-- will benefit from gentle diuresis continue with  lasix      pain control     supportive care will follow / PT

## 2024-11-13 NOTE — CONSULT NOTE ADULT - SUBJECTIVE AND OBJECTIVE BOX
HPI:  80 yo F  w/ PMHx of AS s/p TAVR ( Bioprosthetic valve) , AFib on Xarelto, temporal arteritis, HLD, HTN presents complaining of generalized weakness and fatigue since Saturday. Per daughter at bedside she had a fall,  was seen here on SAT after a fall and discharged home.+ Reports R shoulder pain. Since returning home, patient unable to ambulate secondary to weakness. Has been sleeping more with poor appetite, not her baseline. Denies chest pain, shortness of breath, fever, but endorses R chronic shoulder pain secondary to rotator cuff injury.  Per daughter she has ligamentous tear on R shoulder     IN ED  VITALS: /47 HR 76 RR 18 temp 97F   LABS: H/H: 7.7/26 rbc 2.76 RDW 19.5 Retic count 16.4    BUN 38  ALKpo4 216     UA turbid protein 30 large LE + nitrite  many bacteria    CT HEAD:  No acute intracranial hemorrhage, mass effect, or midline shift. Chronic small vessel disease. If there is continued concern for acute neurologic compromise, recommend MRI of the brain for further evaluation.    TTE 7/5/24  Mild left ventricular hypertrophy. Left ventricular systolic function is normal with an ejection fraction of 55 %  There is severe (grade 3) left ventricular diastolic dysfunction S/p bioprosthetic aortic valve. Likely MIQUEL. Severe tricuspid regurgitation. artery systolic pressure is 48 mmHg, consistent with echocardiographic evidence of pulmonary hypertension Small right pleural effusion noted.    s/p iv ofirmevx1, Rocephin x1 lidocaine patch , 500cc in ER    (06 Nov 2024 17:21)        Heme history- Her Hg has been low since June 2024.  Prior to that her Hg has been ranging from 11.5-12.  She underwent a percutaneous TF-TAVR on 7/5 by Dr. Ferraro, on discharge 7/9 her Hg was 7.9.  On 7/18 it was 10.6, on 10/10 it was 7.3.  She is admitted s/p fall, right shoulder pain.  Found to have UTI/ESBL.  Echo with (TAVR) valve replacement is present in the aortic position, mild paravalvular regurgitation.  Severe tricuspid regurgitation, severe pulmonary hypertension.    She was found to have a RUE intramuscular hematoma. -CT R shoulder 11/11 Increased enlargement as well as increased heterogeneity within the clavicular part of the deltoid muscle as well as coracobrachialis muscle belly. Mild soft tissue edema surrounding the muscular enlargement  - 11/9 UE ultrasound: 6.0 cm complex collection in the soft tissues of the right anterior shoulder most compatible with hematoma    MRI with acute infarct in the R centrum semiovale.     She required 2 units of PRBC on 10/11.       Allergies    adhesives (Rash)  No Known Drug Allergies    Intolerances        MEDICATIONS  (STANDING):  apixaban 2.5 milliGRAM(s) Oral every 12 hours  atorvastatin 10 milliGRAM(s) Oral at bedtime  ferrous    sulfate 325 milliGRAM(s) Oral daily  folic acid 1 milliGRAM(s) Oral daily  furosemide    Tablet 20 milliGRAM(s) Oral daily  gabapentin 300 milliGRAM(s) Oral two times a day  influenza  Vaccine (HIGH DOSE) 0.5 milliLiter(s) IntraMuscular once  lidocaine   4% Patch 1 Patch Transdermal daily  thiamine 100 milliGRAM(s) Oral daily    MEDICATIONS  (PRN):  acetaminophen     Tablet .. 650 milliGRAM(s) Oral every 6 hours PRN Mild Pain (1 - 3)  melatonin 3 milliGRAM(s) Oral at bedtime PRN Insomnia  oxyCODONE    IR 5 milliGRAM(s) Oral every 6 hours PRN Severe Pain (7 - 10)  polyethylene glycol 3350 17 Gram(s) Oral daily PRN Constipation  senna 2 Tablet(s) Oral at bedtime PRN Constipation      PAST MEDICAL & SURGICAL HISTORY:  History of Cardiac Arrhythmia      Hypertension      Atrial Fibrillation      Aortic stenosis      Nonrheumatic aortic valve stenosis      High cholesterol      H/O temporal arteritis      S/P Exploratory Laparotomy  1966      H/O prior ablation treatment      History of temporal artery biopsy      S/P foot surgery          FAMILY HISTORY:  FH: lung cancer (Father)        SOCIAL HISTORY: No EtOH, no tobacco    REVIEW OF SYSTEMS:    CONSTITUTIONAL: No weakness, fevers or chills  EYES/ENT: No visual changes;  No vertigo or throat pain   NECK: No pain or stiffness  RESPIRATORY: No cough, wheezing, hemoptysis; No shortness of breath  CARDIOVASCULAR: No chest pain or palpitations  GASTROINTESTINAL: No abdominal or epigastric pain. No nausea, vomiting, or hematemesis; No diarrhea or constipation. No melena or hematochezia.  GENITOURINARY: No dysuria, frequency or hematuria  NEUROLOGICAL: No numbness or weakness  SKIN: No itching, burning, rashes, or lesions   All other review of systems is negative unless indicated above.        T(F): 97.7 (11-13-24 @ 15:37), Max: 99.5 (11-12-24 @ 22:45)  HR: 86 (11-13-24 @ 15:37)  BP: 137/66 (11-13-24 @ 15:37)  RR: 18 (11-13-24 @ 15:37)  SpO2: 97% (11-13-24 @ 15:37)  Wt(kg): --    GENERAL: NAD, well-developed  HEAD:  Atraumatic, Normocephalic  EYES: EOMI, PERRLA, conjunctiva and sclera clear  NECK: Supple, No JVD  CHEST/LUNG: Clear to auscultation bilaterally; No wheeze  HEART: Regular rate and rhythm; No murmurs, rubs, or gallops  ABDOMEN: Soft, Nontender, Nondistended; Bowel sounds present  EXTREMITIES:  2+ Peripheral Pulses, No clubbing, cyanosis, or edema  NEUROLOGY: non-focal  SKIN: No rashes or lesions                          8.8    9.14  )-----------( 434      ( 13 Nov 2024 07:18 )             27.9       11-13    136  |  100  |  47[H]  ----------------------------<  110[H]  4.3   |  31  |  1.10    Ca    9.0      13 Nov 2024 07:18    TPro  6.7  /  Alb  2.0[L]  /  TBili  0.5  /  DBili  x   /  AST  36  /  ALT  30  /  AlkPhos  301[H]  11-13           HPI:  80 yo F  w/ PMHx of AS s/p TAVR ( Bioprosthetic valve) , AFib on Xarelto, temporal arteritis, HLD, HTN presents complaining of generalized weakness and fatigue since Saturday. Per daughter at bedside she had a fall,  was seen here on SAT after a fall and discharged home.+ Reports R shoulder pain. Since returning home, patient unable to ambulate secondary to weakness. Has been sleeping more with poor appetite, not her baseline. Denies chest pain, shortness of breath, fever, but endorses R chronic shoulder pain secondary to rotator cuff injury.  Per daughter she has ligamentous tear on R shoulder     IN ED  VITALS: /47 HR 76 RR 18 temp 97F   LABS: H/H: 7.7/26 rbc 2.76 RDW 19.5 Retic count 16.4    BUN 38  ALKpo4 216     UA turbid protein 30 large LE + nitrite  many bacteria    CT HEAD:  No acute intracranial hemorrhage, mass effect, or midline shift. Chronic small vessel disease. If there is continued concern for acute neurologic compromise, recommend MRI of the brain for further evaluation.    TTE 7/5/24  Mild left ventricular hypertrophy. Left ventricular systolic function is normal with an ejection fraction of 55 %  There is severe (grade 3) left ventricular diastolic dysfunction S/p bioprosthetic aortic valve. Likely MQIUEL. Severe tricuspid regurgitation. artery systolic pressure is 48 mmHg, consistent with echocardiographic evidence of pulmonary hypertension Small right pleural effusion noted.    s/p iv ofirmevx1, Rocephin x1 lidocaine patch , 500cc in ER    (06 Nov 2024 17:21)        Heme history- Her Hg has been low since June 2024.  Prior to that her Hg has been ranging from 11.5-12.  She underwent a percutaneous TF-TAVR on 7/5 by Dr. Ferraro, on discharge 7/9 her Hg was 7.9.  On 7/18 it was 10.6, on 10/10 it was 7.3.  She is admitted s/p fall, right shoulder pain.  Found to have UTI/ESBL.  Echo with (TAVR) valve replacement is present in the aortic position, mild paravalvular regurgitation.  Severe tricuspid regurgitation, severe pulmonary hypertension.    She was found to have a RUE intramuscular hematoma. -CT R shoulder 11/11 Increased enlargement as well as increased heterogeneity within the clavicular part of the deltoid muscle as well as coracobrachialis muscle belly. Mild soft tissue edema surrounding the muscular enlargement  - 11/9 UE ultrasound: 6.0 cm complex collection in the soft tissues of the right anterior shoulder most compatible with hematoma    MRI with acute infarct in the R centrum semiovale.     She required 2 units of PRBC on 10/7.       Allergies    adhesives (Rash)  No Known Drug Allergies    Intolerances        MEDICATIONS  (STANDING):  apixaban 2.5 milliGRAM(s) Oral every 12 hours  atorvastatin 10 milliGRAM(s) Oral at bedtime  ferrous    sulfate 325 milliGRAM(s) Oral daily  folic acid 1 milliGRAM(s) Oral daily  furosemide    Tablet 20 milliGRAM(s) Oral daily  gabapentin 300 milliGRAM(s) Oral two times a day  influenza  Vaccine (HIGH DOSE) 0.5 milliLiter(s) IntraMuscular once  lidocaine   4% Patch 1 Patch Transdermal daily  thiamine 100 milliGRAM(s) Oral daily    MEDICATIONS  (PRN):  acetaminophen     Tablet .. 650 milliGRAM(s) Oral every 6 hours PRN Mild Pain (1 - 3)  melatonin 3 milliGRAM(s) Oral at bedtime PRN Insomnia  oxyCODONE    IR 5 milliGRAM(s) Oral every 6 hours PRN Severe Pain (7 - 10)  polyethylene glycol 3350 17 Gram(s) Oral daily PRN Constipation  senna 2 Tablet(s) Oral at bedtime PRN Constipation      PAST MEDICAL & SURGICAL HISTORY:  History of Cardiac Arrhythmia      Hypertension      Atrial Fibrillation      Aortic stenosis      Nonrheumatic aortic valve stenosis      High cholesterol      H/O temporal arteritis      S/P Exploratory Laparotomy  1966      H/O prior ablation treatment      History of temporal artery biopsy      S/P foot surgery          FAMILY HISTORY:  FH: lung cancer (Father)        SOCIAL HISTORY: No EtOH, no tobacco    REVIEW OF SYSTEMS:  All other review of systems is negative unless indicated above.        T(F): 97.7 (11-13-24 @ 15:37), Max: 99.5 (11-12-24 @ 22:45)  HR: 86 (11-13-24 @ 15:37)  BP: 137/66 (11-13-24 @ 15:37)  RR: 18 (11-13-24 @ 15:37)  SpO2: 97% (11-13-24 @ 15:37)  Wt(kg): --    GENERAL: NAD, frail appearing  HEAD:  Atraumatic, Normocephalic  EYES: EOMI, anicteric  CHEST/LUNG: grossly clear  HEART: RRR  ABDOMEN: Soft, Nontender, Nondistended  EXTREMITIES:  no edema  SKIN: scattered ecchymosis, RUE swelling                          8.8    9.14  )-----------( 434      ( 13 Nov 2024 07:18 )             27.9       11-13    136  |  100  |  47[H]  ----------------------------<  110[H]  4.3   |  31  |  1.10    Ca    9.0      13 Nov 2024 07:18    TPro  6.7  /  Alb  2.0[L]  /  TBili  0.5  /  DBili  x   /  AST  36  /  ALT  30  /  AlkPhos  301[H]  11-13

## 2024-11-13 NOTE — DISCHARGE NOTE PROVIDER - CARE PROVIDERS DIRECT ADDRESSES
,DirectAddress_Unknown,[email or web address]

## 2024-11-16 LAB
CULTURE RESULTS: SIGNIFICANT CHANGE UP
SPECIMEN SOURCE: SIGNIFICANT CHANGE UP

## 2024-11-20 PROBLEM — Z12.11 COLON CANCER SCREENING: Status: ACTIVE | Noted: 2024-11-20

## 2024-11-20 PROBLEM — Z01.419 ENCOUNTER FOR ANNUAL ROUTINE GYNECOLOGICAL EXAMINATION: Status: ACTIVE | Noted: 2024-11-20

## 2024-11-20 PROBLEM — Z12.4 CERVICAL CANCER SCREENING: Status: ACTIVE | Noted: 2024-11-20

## 2024-11-20 PROCEDURE — 71260 CT THORAX DX C+: CPT | Mod: MH

## 2024-11-20 PROCEDURE — 74177 CT ABD & PELVIS W/CONTRAST: CPT | Mod: MH

## 2024-11-21 DIAGNOSIS — N18.30 CHRONIC KIDNEY DISEASE, STAGE 3 UNSPECIFIED: ICD-10-CM

## 2024-11-21 DIAGNOSIS — E78.00 PURE HYPERCHOLESTEROLEMIA, UNSPECIFIED: ICD-10-CM

## 2024-11-21 DIAGNOSIS — Z79.01 LONG TERM (CURRENT) USE OF ANTICOAGULANTS: ICD-10-CM

## 2024-11-21 DIAGNOSIS — D63.1 ANEMIA IN CHRONIC KIDNEY DISEASE: ICD-10-CM

## 2024-11-21 DIAGNOSIS — M31.6 OTHER GIANT CELL ARTERITIS: ICD-10-CM

## 2024-11-21 DIAGNOSIS — N39.0 URINARY TRACT INFECTION, SITE NOT SPECIFIED: ICD-10-CM

## 2024-11-21 DIAGNOSIS — S60.221A CONTUSION OF RIGHT HAND, INITIAL ENCOUNTER: ICD-10-CM

## 2024-11-21 DIAGNOSIS — G93.41 METABOLIC ENCEPHALOPATHY: ICD-10-CM

## 2024-11-21 DIAGNOSIS — I63.9 CEREBRAL INFARCTION, UNSPECIFIED: ICD-10-CM
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
## 2024-11-21 DIAGNOSIS — G93.6 CEREBRAL EDEMA: ICD-10-CM

## 2024-11-21 DIAGNOSIS — E44.0 MODERATE PROTEIN-CALORIE MALNUTRITION: ICD-10-CM

## 2024-11-21 DIAGNOSIS — I49.9 CARDIAC ARRHYTHMIA, UNSPECIFIED: ICD-10-CM

## 2024-11-21 DIAGNOSIS — I12.9 HYPERTENSIVE CHRONIC KIDNEY DISEASE WITH STAGE 1 THROUGH STAGE 4 CHRONIC KIDNEY DISEASE, OR UNSPECIFIED CHRONIC KIDNEY DISEASE: ICD-10-CM

## 2024-11-21 DIAGNOSIS — I48.20 CHRONIC ATRIAL FIBRILLATION, UNSPECIFIED: ICD-10-CM

## 2024-11-27 ENCOUNTER — APPOINTMENT (OUTPATIENT)
Dept: OBGYN | Facility: CLINIC | Age: 79
End: 2024-11-27

## 2024-12-02 RX ORDER — SUCRALFATE 1 G/1
1 TABLET ORAL
Refills: 0 | DISCHARGE
Start: 2024-12-02 | End: 2024-12-11

## 2024-12-03 ENCOUNTER — NON-APPOINTMENT (OUTPATIENT)
Age: 79
End: 2024-12-03

## 2024-12-03 DIAGNOSIS — Z12.11 ENCOUNTER FOR SCREENING FOR MALIGNANT NEOPLASM OF COLON: ICD-10-CM

## 2024-12-03 DIAGNOSIS — L90.5 SCAR CONDITIONS AND FIBROSIS OF SKIN: ICD-10-CM

## 2024-12-03 DIAGNOSIS — I70.1 ATHEROSCLEROSIS OF RENAL ARTERY: ICD-10-CM

## 2024-12-03 DIAGNOSIS — Z12.39 ENCOUNTER FOR OTHER SCREENING FOR MALIGNANT NEOPLASM OF BREAST: ICD-10-CM

## 2024-12-03 DIAGNOSIS — Z87.19 PERSONAL HISTORY OF OTHER DISEASES OF THE DIGESTIVE SYSTEM: ICD-10-CM

## 2024-12-03 DIAGNOSIS — L72.11 PILAR CYST: ICD-10-CM

## 2024-12-03 DIAGNOSIS — L72.0 EPIDERMAL CYST: ICD-10-CM

## 2024-12-03 DIAGNOSIS — Z86.007 PERSONAL HISTORY OF IN-SITU NEOPLASM OF SKIN: ICD-10-CM

## 2024-12-03 DIAGNOSIS — Z87.2 PERSONAL HISTORY OF DISEASES OF THE SKIN AND SUBCUTANEOUS TISSUE: ICD-10-CM

## 2024-12-03 DIAGNOSIS — Z86.03 PERSONAL HISTORY OF NEOPLASM OF UNCERTAIN BEHAVIOR: ICD-10-CM

## 2024-12-03 DIAGNOSIS — D69.2 OTHER NONTHROMBOCYTOPENIC PURPURA: ICD-10-CM

## 2024-12-03 DIAGNOSIS — Z86.79 PERSONAL HISTORY OF OTHER DISEASES OF THE CIRCULATORY SYSTEM: ICD-10-CM

## 2024-12-03 DIAGNOSIS — K21.9 GASTRO-ESOPHAGEAL REFLUX DISEASE W/OUT ESOPHAGITIS: ICD-10-CM

## 2024-12-03 DIAGNOSIS — L82.0 INFLAMED SEBORRHEIC KERATOSIS: ICD-10-CM

## 2024-12-03 DIAGNOSIS — Z01.419 ENCOUNTER FOR GYNECOLOGICAL EXAMINATION (GENERAL) (ROUTINE) W/OUT ABNORMAL FINDINGS: ICD-10-CM

## 2024-12-03 DIAGNOSIS — E78.00 PURE HYPERCHOLESTEROLEMIA, UNSPECIFIED: ICD-10-CM

## 2024-12-03 DIAGNOSIS — Z86.018 PERSONAL HISTORY OF OTHER BENIGN NEOPLASM: ICD-10-CM

## 2024-12-03 DIAGNOSIS — Z12.4 ENCOUNTER FOR SCREENING FOR MALIGNANT NEOPLASM OF CERVIX: ICD-10-CM

## 2024-12-03 DIAGNOSIS — M79.672 PAIN IN LEFT FOOT: ICD-10-CM

## 2024-12-03 DIAGNOSIS — M21.40 FLAT FOOT [PES PLANUS] (ACQUIRED), UNSPECIFIED FOOT: ICD-10-CM

## 2024-12-09 ENCOUNTER — INPATIENT (INPATIENT)
Facility: HOSPITAL | Age: 79
LOS: 7 days | Discharge: HOME HEALTH | DRG: 812 | End: 2024-12-17
Attending: INTERNAL MEDICINE | Admitting: HOSPITALIST
Payer: MEDICARE

## 2024-12-09 VITALS
DIASTOLIC BLOOD PRESSURE: 55 MMHG | RESPIRATION RATE: 18 BRPM | HEART RATE: 70 BPM | HEIGHT: 64 IN | TEMPERATURE: 98 F | OXYGEN SATURATION: 96 % | WEIGHT: 149.91 LBS | SYSTOLIC BLOOD PRESSURE: 128 MMHG

## 2024-12-09 DIAGNOSIS — I48.91 UNSPECIFIED ATRIAL FIBRILLATION: ICD-10-CM

## 2024-12-09 DIAGNOSIS — E78.00 PURE HYPERCHOLESTEROLEMIA, UNSPECIFIED: ICD-10-CM

## 2024-12-09 DIAGNOSIS — E44.0 MODERATE PROTEIN-CALORIE MALNUTRITION: ICD-10-CM

## 2024-12-09 DIAGNOSIS — D58.9 HEREDITARY HEMOLYTIC ANEMIA, UNSPECIFIED: ICD-10-CM

## 2024-12-09 DIAGNOSIS — R26.9 UNSPECIFIED ABNORMALITIES OF GAIT AND MOBILITY: ICD-10-CM

## 2024-12-09 DIAGNOSIS — Z86.73 PERSONAL HISTORY OF TRANSIENT ISCHEMIC ATTACK (TIA), AND CEREBRAL INFARCTION WITHOUT RESIDUAL DEFICITS: ICD-10-CM

## 2024-12-09 DIAGNOSIS — I11.0 HYPERTENSIVE HEART DISEASE WITH HEART FAILURE: ICD-10-CM

## 2024-12-09 DIAGNOSIS — Z98.890 OTHER SPECIFIED POSTPROCEDURAL STATES: Chronic | ICD-10-CM

## 2024-12-09 DIAGNOSIS — D62 ACUTE POSTHEMORRHAGIC ANEMIA: ICD-10-CM

## 2024-12-09 DIAGNOSIS — E87.6 HYPOKALEMIA: ICD-10-CM

## 2024-12-09 DIAGNOSIS — T38.0X5A ADVERSE EFFECT OF GLUCOCORTICOIDS AND SYNTHETIC ANALOGUES, INITIAL ENCOUNTER: ICD-10-CM

## 2024-12-09 DIAGNOSIS — I48.20 CHRONIC ATRIAL FIBRILLATION, UNSPECIFIED: ICD-10-CM

## 2024-12-09 DIAGNOSIS — K21.9 GASTRO-ESOPHAGEAL REFLUX DISEASE WITHOUT ESOPHAGITIS: ICD-10-CM

## 2024-12-09 DIAGNOSIS — E78.5 HYPERLIPIDEMIA, UNSPECIFIED: ICD-10-CM

## 2024-12-09 DIAGNOSIS — D64.9 ANEMIA, UNSPECIFIED: ICD-10-CM

## 2024-12-09 DIAGNOSIS — I50.32 CHRONIC DIASTOLIC (CONGESTIVE) HEART FAILURE: ICD-10-CM

## 2024-12-09 DIAGNOSIS — M31.6 OTHER GIANT CELL ARTERITIS: ICD-10-CM
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
## 2024-12-09 DIAGNOSIS — Z79.01 LONG TERM (CURRENT) USE OF ANTICOAGULANTS: ICD-10-CM
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
## 2024-12-09 DIAGNOSIS — T82.03XA LEAKAGE OF HEART VALVE PROSTHESIS, INITIAL ENCOUNTER: ICD-10-CM
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
## 2024-12-09 LAB
ALBUMIN SERPL ELPH-MCNC: 2.4 G/DL — LOW (ref 3.3–5)
ALP SERPL-CCNC: 285 U/L — HIGH (ref 40–120)
ALT FLD-CCNC: 12 U/L — SIGNIFICANT CHANGE UP (ref 12–78)
ANION GAP SERPL CALC-SCNC: 4 MMOL/L — LOW (ref 5–17)
ANISOCYTOSIS BLD QL: SIGNIFICANT CHANGE UP
APTT BLD: 39.9 SEC — HIGH (ref 24.5–35.6)
AST SERPL-CCNC: 21 U/L — SIGNIFICANT CHANGE UP (ref 15–37)
BASOPHILS # BLD AUTO: 0.07 K/UL — SIGNIFICANT CHANGE UP (ref 0–0.2)
BASOPHILS NFR BLD AUTO: 0.8 % — SIGNIFICANT CHANGE UP (ref 0–2)
BILIRUB SERPL-MCNC: 0.9 MG/DL — SIGNIFICANT CHANGE UP (ref 0.2–1.2)
BLD GP AB SCN SERPL QL: SIGNIFICANT CHANGE UP
BUN SERPL-MCNC: 21 MG/DL — SIGNIFICANT CHANGE UP (ref 7–23)
CALCIUM SERPL-MCNC: 9 MG/DL — SIGNIFICANT CHANGE UP (ref 8.5–10.1)
CHLORIDE SERPL-SCNC: 104 MMOL/L — SIGNIFICANT CHANGE UP (ref 96–108)
CO2 SERPL-SCNC: 29 MMOL/L — SIGNIFICANT CHANGE UP (ref 22–31)
CREAT SERPL-MCNC: 0.88 MG/DL — SIGNIFICANT CHANGE UP (ref 0.5–1.3)
DACRYOCYTES BLD QL SMEAR: SLIGHT — SIGNIFICANT CHANGE UP
EGFR: 67 ML/MIN/1.73M2 — SIGNIFICANT CHANGE UP
ELLIPTOCYTES BLD QL SMEAR: SLIGHT — SIGNIFICANT CHANGE UP
EOSINOPHIL # BLD AUTO: 0.79 K/UL — HIGH (ref 0–0.5)
EOSINOPHIL NFR BLD AUTO: 9.1 % — HIGH (ref 0–6)
GLUCOSE SERPL-MCNC: 104 MG/DL — HIGH (ref 70–99)
HCT VFR BLD CALC: 23.6 % — LOW (ref 34.5–45)
HCT VFR BLD CALC: 24.2 % — LOW (ref 34.5–45)
HGB BLD-MCNC: 7 G/DL — CRITICAL LOW (ref 11.5–15.5)
HGB BLD-MCNC: 7.2 G/DL — LOW (ref 11.5–15.5)
HYPOCHROMIA BLD QL: SLIGHT — SIGNIFICANT CHANGE UP
IMM GRANULOCYTES NFR BLD AUTO: 0.6 % — SIGNIFICANT CHANGE UP (ref 0–0.9)
INR BLD: 1.73 RATIO — HIGH (ref 0.85–1.16)
LDH SERPL L TO P-CCNC: 398 U/L — HIGH (ref 84–241)
LIDOCAIN IGE QN: 36 U/L — SIGNIFICANT CHANGE UP (ref 13–75)
LYMPHOCYTES # BLD AUTO: 1.29 K/UL — SIGNIFICANT CHANGE UP (ref 1–3.3)
LYMPHOCYTES # BLD AUTO: 14.9 % — SIGNIFICANT CHANGE UP (ref 13–44)
MANUAL SMEAR VERIFICATION: SIGNIFICANT CHANGE UP
MCHC RBC-ENTMCNC: 28.1 PG — SIGNIFICANT CHANGE UP (ref 27–34)
MCHC RBC-ENTMCNC: 28.2 PG — SIGNIFICANT CHANGE UP (ref 27–34)
MCHC RBC-ENTMCNC: 29.7 G/DL — LOW (ref 32–36)
MCHC RBC-ENTMCNC: 29.8 G/DL — LOW (ref 32–36)
MCV RBC AUTO: 94.5 FL — SIGNIFICANT CHANGE UP (ref 80–100)
MCV RBC AUTO: 95.2 FL — SIGNIFICANT CHANGE UP (ref 80–100)
MONOCYTES # BLD AUTO: 1.09 K/UL — HIGH (ref 0–0.9)
MONOCYTES NFR BLD AUTO: 12.6 % — SIGNIFICANT CHANGE UP (ref 2–14)
NEUTROPHILS # BLD AUTO: 5.35 K/UL — SIGNIFICANT CHANGE UP (ref 1.8–7.4)
NEUTROPHILS NFR BLD AUTO: 62 % — SIGNIFICANT CHANGE UP (ref 43–77)
PLAT MORPH BLD: NORMAL — SIGNIFICANT CHANGE UP
PLATELET # BLD AUTO: 290 K/UL — SIGNIFICANT CHANGE UP (ref 150–400)
PLATELET # BLD AUTO: 301 K/UL — SIGNIFICANT CHANGE UP (ref 150–400)
POIKILOCYTOSIS BLD QL AUTO: SLIGHT — SIGNIFICANT CHANGE UP
POTASSIUM SERPL-MCNC: 4.3 MMOL/L — SIGNIFICANT CHANGE UP (ref 3.5–5.3)
POTASSIUM SERPL-SCNC: 4.3 MMOL/L — SIGNIFICANT CHANGE UP (ref 3.5–5.3)
PROT SERPL-MCNC: 7.1 GM/DL — SIGNIFICANT CHANGE UP (ref 6–8.3)
PROTHROM AB SERPL-ACNC: 19.8 SEC — HIGH (ref 9.9–13.4)
RBC # BLD: 2.48 M/UL — LOW (ref 3.8–5.2)
RBC # BLD: 2.56 M/UL — LOW (ref 3.8–5.2)
RBC # BLD: 2.56 M/UL — LOW (ref 3.8–5.2)
RBC # FLD: 20.7 % — HIGH (ref 10.3–14.5)
RBC # FLD: 21 % — HIGH (ref 10.3–14.5)
RBC BLD AUTO: ABNORMAL
RETICS #: 108.8 K/UL — SIGNIFICANT CHANGE UP (ref 25–125)
RETICS/RBC NFR: 4.3 % — HIGH (ref 0.5–2.5)
SODIUM SERPL-SCNC: 137 MMOL/L — SIGNIFICANT CHANGE UP (ref 135–145)
WBC # BLD: 8.64 K/UL — SIGNIFICANT CHANGE UP (ref 3.8–10.5)
WBC # BLD: 9.89 K/UL — SIGNIFICANT CHANGE UP (ref 3.8–10.5)
WBC # FLD AUTO: 8.64 K/UL — SIGNIFICANT CHANGE UP (ref 3.8–10.5)
WBC # FLD AUTO: 9.89 K/UL — SIGNIFICANT CHANGE UP (ref 3.8–10.5)

## 2024-12-09 PROCEDURE — 85610 PROTHROMBIN TIME: CPT

## 2024-12-09 PROCEDURE — 88305 TISSUE EXAM BY PATHOLOGIST: CPT

## 2024-12-09 PROCEDURE — 83010 ASSAY OF HAPTOGLOBIN QUANT: CPT

## 2024-12-09 PROCEDURE — 86860 RBC ANTIBODY ELUTION: CPT

## 2024-12-09 PROCEDURE — 80048 BASIC METABOLIC PNL TOTAL CA: CPT

## 2024-12-09 PROCEDURE — 85018 HEMOGLOBIN: CPT

## 2024-12-09 PROCEDURE — 81455 SO/HL 51/>GSAP DNA/DNA&RNA: CPT

## 2024-12-09 PROCEDURE — 36415 COLL VENOUS BLD VENIPUNCTURE: CPT

## 2024-12-09 PROCEDURE — 81450 HL NEO GSAP 5-50DNA/DNA&RNA: CPT

## 2024-12-09 PROCEDURE — 93010 ELECTROCARDIOGRAM REPORT: CPT

## 2024-12-09 PROCEDURE — P9040: CPT

## 2024-12-09 PROCEDURE — 97535 SELF CARE MNGMENT TRAINING: CPT | Mod: GO

## 2024-12-09 PROCEDURE — 88237 TISSUE CULTURE BONE MARROW: CPT

## 2024-12-09 PROCEDURE — 97166 OT EVAL MOD COMPLEX 45 MIN: CPT | Mod: GO

## 2024-12-09 PROCEDURE — 86870 RBC ANTIBODY IDENTIFICATION: CPT

## 2024-12-09 PROCEDURE — 85730 THROMBOPLASTIN TIME PARTIAL: CPT

## 2024-12-09 PROCEDURE — 74177 CT ABD & PELVIS W/CONTRAST: CPT | Mod: MC

## 2024-12-09 PROCEDURE — 97530 THERAPEUTIC ACTIVITIES: CPT | Mod: GP

## 2024-12-09 PROCEDURE — 93306 TTE W/DOPPLER COMPLETE: CPT

## 2024-12-09 PROCEDURE — 88264 CHROMOSOME ANALYSIS 20-25: CPT

## 2024-12-09 PROCEDURE — 83550 IRON BINDING TEST: CPT

## 2024-12-09 PROCEDURE — 85027 COMPLETE CBC AUTOMATED: CPT

## 2024-12-09 PROCEDURE — 83615 LACTATE (LD) (LDH) ENZYME: CPT

## 2024-12-09 PROCEDURE — 86923 COMPATIBILITY TEST ELECTRIC: CPT

## 2024-12-09 PROCEDURE — 80074 ACUTE HEPATITIS PANEL: CPT

## 2024-12-09 PROCEDURE — 38222 DX BONE MARROW BX & ASPIR: CPT | Mod: RT

## 2024-12-09 PROCEDURE — 86880 COOMBS TEST DIRECT: CPT

## 2024-12-09 PROCEDURE — 86038 ANTINUCLEAR ANTIBODIES: CPT

## 2024-12-09 PROCEDURE — 99285 EMERGENCY DEPT VISIT HI MDM: CPT

## 2024-12-09 PROCEDURE — 85014 HEMATOCRIT: CPT

## 2024-12-09 PROCEDURE — 88313 SPECIAL STAINS GROUP 2: CPT

## 2024-12-09 PROCEDURE — 82746 ASSAY OF FOLIC ACID SERUM: CPT

## 2024-12-09 PROCEDURE — 84443 ASSAY THYROID STIM HORMONE: CPT

## 2024-12-09 PROCEDURE — 97116 GAIT TRAINING THERAPY: CPT | Mod: GP

## 2024-12-09 PROCEDURE — 83540 ASSAY OF IRON: CPT

## 2024-12-09 PROCEDURE — 80053 COMPREHEN METABOLIC PANEL: CPT

## 2024-12-09 PROCEDURE — 99223 1ST HOSP IP/OBS HIGH 75: CPT

## 2024-12-09 PROCEDURE — 88342 IMHCHEM/IMCYTCHM 1ST ANTB: CPT

## 2024-12-09 PROCEDURE — 77012 CT SCAN FOR NEEDLE BIOPSY: CPT

## 2024-12-09 PROCEDURE — 81001 URINALYSIS AUTO W/SCOPE: CPT

## 2024-12-09 PROCEDURE — 88280 CHROMOSOME KARYOTYPE STUDY: CPT

## 2024-12-09 PROCEDURE — 82977 ASSAY OF GGT: CPT

## 2024-12-09 PROCEDURE — 81451 HL NEO GSAP 5-50 RNA ALYS: CPT

## 2024-12-09 PROCEDURE — 87205 SMEAR GRAM STAIN: CPT

## 2024-12-09 PROCEDURE — 83880 ASSAY OF NATRIURETIC PEPTIDE: CPT

## 2024-12-09 PROCEDURE — 85097 BONE MARROW INTERPRETATION: CPT

## 2024-12-09 PROCEDURE — 88360 TUMOR IMMUNOHISTOCHEM/MANUAL: CPT

## 2024-12-09 PROCEDURE — 86255 FLUORESCENT ANTIBODY SCREEN: CPT

## 2024-12-09 PROCEDURE — 88341 IMHCHEM/IMCYTCHM EA ADD ANTB: CPT

## 2024-12-09 PROCEDURE — 84439 ASSAY OF FREE THYROXINE: CPT

## 2024-12-09 PROCEDURE — 86381 MITOCHONDRIAL ANTIBODY EACH: CPT

## 2024-12-09 PROCEDURE — 97162 PT EVAL MOD COMPLEX 30 MIN: CPT | Mod: GP

## 2024-12-09 PROCEDURE — P9016: CPT

## 2024-12-09 PROCEDURE — 88184 FLOWCYTOMETRY/ TC 1 MARKER: CPT

## 2024-12-09 PROCEDURE — 76376 3D RENDER W/INTRP POSTPROCES: CPT

## 2024-12-09 PROCEDURE — 36430 TRANSFUSION BLD/BLD COMPNT: CPT

## 2024-12-09 PROCEDURE — 88185 FLOWCYTOMETRY/TC ADD-ON: CPT

## 2024-12-09 PROCEDURE — 97110 THERAPEUTIC EXERCISES: CPT | Mod: GO

## 2024-12-09 PROCEDURE — 82607 VITAMIN B-12: CPT

## 2024-12-09 PROCEDURE — 85045 AUTOMATED RETICULOCYTE COUNT: CPT

## 2024-12-09 RX ORDER — POTASSIUM CHLORIDE 600 MG/1
1 TABLET, EXTENDED RELEASE ORAL
Refills: 0 | DISCHARGE

## 2024-12-09 RX ORDER — GABAPENTIN 300 MG/1
300 CAPSULE ORAL
Refills: 0 | Status: DISCONTINUED | OUTPATIENT
Start: 2024-12-09 | End: 2024-12-17

## 2024-12-09 RX ORDER — MAGNESIUM, ALUMINUM HYDROXIDE 200-225/5
30 SUSPENSION, ORAL (FINAL DOSE FORM) ORAL EVERY 4 HOURS
Refills: 0 | Status: DISCONTINUED | OUTPATIENT
Start: 2024-12-09 | End: 2024-12-17

## 2024-12-09 RX ORDER — ACETAMINOPHEN, DIPHENHYDRAMINE HCL, PHENYLEPHRINE HCL 325; 25; 5 MG/1; MG/1; MG/1
3 TABLET ORAL AT BEDTIME
Refills: 0 | Status: DISCONTINUED | OUTPATIENT
Start: 2024-12-09 | End: 2024-12-17

## 2024-12-09 RX ORDER — POTASSIUM CHLORIDE 600 MG/1
20 TABLET, EXTENDED RELEASE ORAL DAILY
Refills: 0 | Status: DISCONTINUED | OUTPATIENT
Start: 2024-12-09 | End: 2024-12-17

## 2024-12-09 RX ORDER — NALOXONE HCL 0.4 MG/ML
0.4 AMPUL (ML) INJECTION ONCE
Refills: 0 | Status: DISCONTINUED | OUTPATIENT
Start: 2024-12-09 | End: 2024-12-17

## 2024-12-09 RX ORDER — APIXABAN 2.5 MG/1
5 TABLET, FILM COATED ORAL EVERY 12 HOURS
Refills: 0 | Status: DISCONTINUED | OUTPATIENT
Start: 2024-12-09 | End: 2024-12-17

## 2024-12-09 RX ORDER — ONDANSETRON HYDROCHLORIDE 4 MG/1
4 TABLET, FILM COATED ORAL EVERY 8 HOURS
Refills: 0 | Status: DISCONTINUED | OUTPATIENT
Start: 2024-12-09 | End: 2024-12-17

## 2024-12-09 RX ORDER — METHOCARBAMOL 500 MG/1
500 TABLET, FILM COATED ORAL AT BEDTIME
Refills: 0 | Status: DISCONTINUED | OUTPATIENT
Start: 2024-12-09 | End: 2024-12-09

## 2024-12-09 RX ORDER — ACETAMINOPHEN 500MG 500 MG/1
2 TABLET, COATED ORAL
Refills: 0 | DISCHARGE

## 2024-12-09 RX ORDER — GLUCOSAMINE SULFATE DIPOT CHLR 500 MG
1 CAPSULE ORAL DAILY
Refills: 0 | Status: DISCONTINUED | OUTPATIENT
Start: 2024-12-09 | End: 2024-12-17

## 2024-12-09 RX ORDER — INFLUENZA VIRUS VACCINE 15; 15; 15; 15 UG/.5ML; UG/.5ML; UG/.5ML; UG/.5ML
0.5 SUSPENSION INTRAMUSCULAR ONCE
Refills: 0 | Status: DISCONTINUED | OUTPATIENT
Start: 2024-12-09 | End: 2024-12-17

## 2024-12-09 RX ORDER — TRAMADOL HYDROCHLORIDE 300 MG/1
0.5 CAPSULE ORAL
Refills: 0 | DISCHARGE

## 2024-12-09 RX ORDER — ACETAMINOPHEN 500MG 500 MG/1
650 TABLET, COATED ORAL EVERY 6 HOURS
Refills: 0 | Status: DISCONTINUED | OUTPATIENT
Start: 2024-12-09 | End: 2024-12-17

## 2024-12-09 RX ORDER — FUROSEMIDE 40 MG/1
30 TABLET ORAL DAILY
Refills: 0 | Status: DISCONTINUED | OUTPATIENT
Start: 2024-12-09 | End: 2024-12-12

## 2024-12-09 RX ORDER — ACETAMINOPHEN 500MG 500 MG/1
975 TABLET, COATED ORAL EVERY 8 HOURS
Refills: 0 | Status: DISCONTINUED | OUTPATIENT
Start: 2024-12-09 | End: 2024-12-17

## 2024-12-09 RX ORDER — SUCRALFATE 1 G/1
1 TABLET ORAL
Refills: 0 | Status: DISCONTINUED | OUTPATIENT
Start: 2024-12-09 | End: 2024-12-17

## 2024-12-09 RX ORDER — LIDOCAINE 40 MG/G
1 CREAM TOPICAL EVERY 24 HOURS
Refills: 0 | Status: DISCONTINUED | OUTPATIENT
Start: 2024-12-09 | End: 2024-12-17

## 2024-12-09 RX ORDER — METHOCARBAMOL 500 MG/1
250 TABLET, FILM COATED ORAL AT BEDTIME
Refills: 0 | Status: DISCONTINUED | OUTPATIENT
Start: 2024-12-09 | End: 2024-12-17

## 2024-12-09 RX ORDER — PANTOPRAZOLE SODIUM 40 MG/1
40 TABLET, DELAYED RELEASE ORAL
Refills: 0 | Status: DISCONTINUED | OUTPATIENT
Start: 2024-12-09 | End: 2024-12-17

## 2024-12-09 RX ORDER — METHOCARBAMOL 500 MG/1
0.5 TABLET, FILM COATED ORAL
Refills: 0 | DISCHARGE

## 2024-12-09 RX ORDER — ESTROGENS, CONJUGATED 0.3 MG/1
0.62 TABLET, FILM COATED ORAL DAILY
Refills: 0 | Status: DISCONTINUED | OUTPATIENT
Start: 2024-12-09 | End: 2024-12-17

## 2024-12-09 RX ORDER — DOCUSATE SODIUM 100 MG
1 CAPSULE ORAL
Refills: 0 | DISCHARGE

## 2024-12-09 RX ORDER — PANTOPRAZOLE SODIUM 40 MG/1
1 TABLET, DELAYED RELEASE ORAL
Refills: 0 | DISCHARGE

## 2024-12-09 RX ORDER — TRAMADOL HYDROCHLORIDE 300 MG/1
25 CAPSULE ORAL EVERY 4 HOURS
Refills: 0 | Status: DISCONTINUED | OUTPATIENT
Start: 2024-12-09 | End: 2024-12-09

## 2024-12-09 RX ORDER — ESTROGENS, CONJUGATED 0.3 MG/1
1 TABLET, FILM COATED ORAL
Refills: 0 | DISCHARGE

## 2024-12-09 RX ADMIN — SUCRALFATE 1 GRAM(S): 1 TABLET ORAL at 21:08

## 2024-12-09 RX ADMIN — Medication 10 MILLIGRAM(S): at 21:08

## 2024-12-09 RX ADMIN — GABAPENTIN 300 MILLIGRAM(S): 300 CAPSULE ORAL at 21:08

## 2024-12-09 RX ADMIN — LIDOCAINE 1 PATCH: 40 CREAM TOPICAL at 21:07

## 2024-12-09 RX ADMIN — ACETAMINOPHEN 500MG 650 MILLIGRAM(S): 500 TABLET, COATED ORAL at 20:19

## 2024-12-09 RX ADMIN — APIXABAN 5 MILLIGRAM(S): 2.5 TABLET, FILM COATED ORAL at 21:08

## 2024-12-09 NOTE — H&P ADULT - NSHPPHYSICALEXAM_GEN_ALL_CORE
Vital Signs Last 24 Hrs  T(C): 36.8 (09 Dec 2024 13:11), Max: 36.8 (09 Dec 2024 13:11)  T(F): 98.2 (09 Dec 2024 13:11), Max: 98.2 (09 Dec 2024 13:11)  HR: 73 (09 Dec 2024 15:14) (70 - 73)  BP: 142/83 (09 Dec 2024 15:14) (128/55 - 142/83)  BP(mean): 98 (09 Dec 2024 15:14) (98 - 98)  RR: 14 (09 Dec 2024 15:14) (14 - 18)  SpO2: 99% (09 Dec 2024 15:14) (96% - 99%)    Parameters below as of 09 Dec 2024 15:14  Patient On (Oxygen Delivery Method): room air

## 2024-12-09 NOTE — ED ADULT TRIAGE NOTE - CHIEF COMPLAINT QUOTE
Pt presents to ER from [facility] c/o abnormal labs. Pt reports she was altered today that blood work from 2 days PTA revealed hemoglobin of 6.8. Denies blood in stool/SOB. Hx of 2 blood transfusions in the past.

## 2024-12-09 NOTE — ED PROVIDER NOTE - OBJECTIVE STATEMENT
Patient is a 79 year-old-female with history of anemia, AS s/p TAVR, atrial fibrillation on Eliquis, CHF, CVA, HTN and HLD presents with low H/H from [facility]. Patient reports that she had bloodwork done which showed that her hemoglobin was 6.8 and sent here. Denies bloody/black stool or bloody vomitus. Reports that she has had blood transfusion in the past but of unknown cause.

## 2024-12-09 NOTE — ED PROVIDER NOTE - PHYSICAL EXAMINATION
Vitals: I have reviewed the patients vital signs  General: Non-toxic appearing  HEENT: Atraumatic, normocephalic, airway patent  Eyes: EOMI, tracking appropriately  Neck: no tracheal deviation  Chest/Lungs: symmetric chest rise, speaking in complete sentences, no WOB  Heart: skin and extremities well perfused, regular rate and rhythm  Abdomen: soft, nontender and nondistended   Rectal: no rectal bleeding noted, brown stool noted   Neuro: A+Ox3,  CN grossly intact  MSK: strength at baseline in all extremities, no muscle wasting or atrophy  Skin: no new emergent lesions

## 2024-12-09 NOTE — PATIENT PROFILE ADULT - FALL HARM RISK - HARM RISK INTERVENTIONS

## 2024-12-09 NOTE — H&P ADULT - ASSESSMENT
79 year old female w anemia, AS s/p TAVR, AFIB on eliquis, grade 3 diastolic dysfx, CVA , R 2nd rib fx, hx temporal arteritis, HLD, HTN BIBEMS to ER from [facility] c/o abnormal labs from 2 days PTA Hb 6.8      #Anemia : acute  no melena by hx or by physical exam, rectal Guaiac negative  #s/p recent iron infusions x 2  prior hx transfusion during  adm  1. admit to med  2. send UA to evaluate for any sign of blood loss  3. retic count, haptoglobin. LDH tonight  4. HEME consult      #AS s/p TAVR  #Grade 3 diastolic dysfunction  1. lasix 30 mg qd  2. daily weight      #AFib  1. restarting eliquis      #CVA : recent   thought cardioembolic  AC changed from xarelto to eliquis  #HLD  1. continuing AC  2. statin      #R shoulder pain/hematoma  #R 2nd rib fx  #Unsteady gait  1. PT consult to prevent deconditioning  2. acetaminophen 975 TID  3. methacarbamol 250 mg q HS  4. lidocaine patch  5. tramadol prn  6. gabapentin      #GERD  1. omaprazole interchange to protonix  2. carafate TID was empiric addition      #HTN  1. monitor BP        #VTE  on AC        #GOC  pt is a full code        #80 minutes

## 2024-12-09 NOTE — ED PROVIDER NOTE - PROGRESS NOTE DETAILS
GUAIAC result:Negative  Internal QC: "pass"  Card lot #:[id number]  Expiration date: 3/31/25 GUAIAC result: Negative  Internal QC: "pass"  Card lot #:[id number]  Expiration date: 3/31/25

## 2024-12-09 NOTE — ED PROVIDER NOTE - CLINICAL SUMMARY MEDICAL DECISION MAKING FREE TEXT BOX
Patient is a 79 year-old-female with history of anemia, AS s/p TAVR, atrial fibrillation on Eliquis, CHF, CVA, HTN and HLD presents with low H/H from [facility]. Patient reports that she had bloodwork done which showed that her hemoglobin was 6.8 and sent here. Denies bloody/black stool or bloody vomitus. Reports that she has had blood transfusion in the past but of unknown cause. Exam is unremarkable. Concerning for recurrent anemia of unknown origin. Plan to check H/H and perform guaiac for further evaluation. Plan for admission for further management.

## 2024-12-09 NOTE — H&P ADULT - HISTORY OF PRESENT ILLNESS
79 year old female w anemia, AS s/p TAVR, AFIB on Xarelto, grade 3 diastolic dysfx, CVA , temporal arteritis, HLD, HTN BIBEMS to ER from [facility] c/o abnormal labs from 2 days PTA Hb 6.8.     Denies blood in stool/SOB    Adm 11-06 to  admitted s/p fall with R shoulder pain s/p fall   # nondisplaced fracture of the anterior right second rib  # intramuscular hematoma R shoulder, lower dose AC recommended  #Fever with acute hypoxic resp failure RESOLVED CTA neg PE  Likely multifactorial (reactive to intramuscular hematoma 2/2 pain)  #UTI  #CVA : MRI acute infarct R centrum semiovale; cardioembolic stroke in setting of holding AC due to acute anemia after trauma.  #Grade 3 diastolic dysfunction on TTE EF 55%  #Anemia due to IM hematoma s/p PRBC x 2, seen by Heme       In ED /55   HR 70   RR 18   T 98.2   96% sat RA  Hb 7, guaiac neg          PAST MEDICAL HX  AS aortic stenosis   Atrial Fibrillation   CVA  MRI acute infarct  R centrum semiovale  H/O temporal arteritis   HLD hyperlipidemia  HTN hypertension   Nonrheumatic aortic valve stenosis.     PAST SURGICAL HX  H/O prior ablation treatment   History of temporal artery biopsy   S/P Exploratory Laparotomy 1966  S/P foot surgery         FAMILY HX  lung cancer : Father        SOCIAL HX  at [facility] for rehab  nonsmoker  no alcohol   79 year old female w anemia, AS s/p TAVR, AFIB oneliquis, grade 3 diastolic dysfx, CVA , R 2nd rib fx, hx temporal arteritis, HLD, HTN BIBEMS to ER from [facility] c/o abnormal labs from 2 days PTA Hb 6.8.       Adm 11-06 to  admitted s/p fall with R shoulder pain s/p fall   # nondisplaced fracture of the anterior right second rib  # intramuscular hematoma R shoulder, lower dose AC recommended  #Fever with acute hypoxic resp failure RESOLVED CTA neg PE  Likely multifactorial (reactive to intramuscular hematoma 2/2 pain)  #UTI tx w merrem  #CVA : MRI acute infarct R centrum semiovale; cardioembolic stroke in setting of holding AC due to acute anemia after trauma.  #Grade 3 diastolic dysfunction on TTE EF 55%  #Anemia due to IM hematoma s/p PRBC x 2, seen by Heme       At [facility] Fe 23/ 229 UIBC /251 TIBC; eliquis held since Hb 7.1 to 6.8; given IV iron x 2  pt denied hematuria, black stools,       In ED /55   HR 70   RR 18   T 98.2   96% sat RA  Hb 7, guaiac neg          PAST MEDICAL HX  AS aortic stenosis   Atrial Fibrillation   CVA  MRI acute infarct  R centrum semiovale  H/O temporal arteritis   HLD hyperlipidemia  HTN hypertension   Nonrheumatic aortic valve stenosis.     PAST SURGICAL HX  TAVR   H/O prior ablation treatment   History of temporal artery biopsy   S/P Exploratory Laparotomy 1966  S/P foot surgery         FAMILY HX  lung cancer : Father        SOCIAL HX  at [facility] for rehab  nonsmoker  no alcohol

## 2024-12-09 NOTE — PHARMACOTHERAPY INTERVENTION NOTE - COMMENTS
Medication history complete, patient is from [facility], reviewed and confirmed medication with list provided by facility.

## 2024-12-10 LAB
ADD ON TEST-SPECIMEN IN LAB: SIGNIFICANT CHANGE UP
ADD ON TEST-SPECIMEN IN LAB: SIGNIFICANT CHANGE UP
ALBUMIN SERPL ELPH-MCNC: 2.3 G/DL — LOW (ref 3.3–5)
ALLERGY+IMMUNOLOGY DIAG STUDY NOTE: SIGNIFICANT CHANGE UP
ALP SERPL-CCNC: 261 U/L — HIGH (ref 40–120)
ALT FLD-CCNC: 12 U/L — SIGNIFICANT CHANGE UP (ref 12–78)
ANION GAP SERPL CALC-SCNC: 5 MMOL/L — SIGNIFICANT CHANGE UP (ref 5–17)
APPEARANCE UR: ABNORMAL
AST SERPL-CCNC: 24 U/L — SIGNIFICANT CHANGE UP (ref 15–37)
BACTERIA # UR AUTO: ABNORMAL /HPF
BILIRUB SERPL-MCNC: 0.9 MG/DL — SIGNIFICANT CHANGE UP (ref 0.2–1.2)
BILIRUB UR-MCNC: NEGATIVE — SIGNIFICANT CHANGE UP
BUN SERPL-MCNC: 20 MG/DL — SIGNIFICANT CHANGE UP (ref 7–23)
CALCIUM SERPL-MCNC: 9.6 MG/DL — SIGNIFICANT CHANGE UP (ref 8.5–10.1)
CAST: 1 /LPF — SIGNIFICANT CHANGE UP (ref 0–4)
CHLORIDE SERPL-SCNC: 104 MMOL/L — SIGNIFICANT CHANGE UP (ref 96–108)
CO2 SERPL-SCNC: 27 MMOL/L — SIGNIFICANT CHANGE UP (ref 22–31)
COLOR SPEC: YELLOW — SIGNIFICANT CHANGE UP
CREAT SERPL-MCNC: 0.84 MG/DL — SIGNIFICANT CHANGE UP (ref 0.5–1.3)
DAT C3-SP REAG RBC QL: SIGNIFICANT CHANGE UP
DAT IGG-SP REAG RBC-IMP: ABNORMAL
DIFF PNL FLD: NEGATIVE — SIGNIFICANT CHANGE UP
DIR ANTIGLOB POLYSPECIFIC INTERPRETATION: ABNORMAL
EGFR: 71 ML/MIN/1.73M2 — SIGNIFICANT CHANGE UP
FOLATE SERPL-MCNC: >20 NG/ML — SIGNIFICANT CHANGE UP
GLUCOSE SERPL-MCNC: 84 MG/DL — SIGNIFICANT CHANGE UP (ref 70–99)
GLUCOSE UR QL: NEGATIVE MG/DL — SIGNIFICANT CHANGE UP
HAPTOGLOB SERPL-MCNC: <20 MG/DL — LOW (ref 34–200)
HAPTOGLOB SERPL-MCNC: <20 MG/DL — LOW (ref 34–200)
HCT VFR BLD CALC: 21.6 % — LOW (ref 34.5–45)
HGB BLD-MCNC: 6.5 G/DL — CRITICAL LOW (ref 11.5–15.5)
IRON SATN MFR SERPL: 16 % — SIGNIFICANT CHANGE UP (ref 14–50)
IRON SATN MFR SERPL: 45 UG/DL — SIGNIFICANT CHANGE UP (ref 30–160)
KETONES UR-MCNC: ABNORMAL MG/DL
LDH SERPL L TO P-CCNC: 357 U/L — HIGH (ref 84–241)
LEUKOCYTE ESTERASE UR-ACNC: ABNORMAL
MCHC RBC-ENTMCNC: 28.1 PG — SIGNIFICANT CHANGE UP (ref 27–34)
MCHC RBC-ENTMCNC: 30.1 G/DL — LOW (ref 32–36)
MCV RBC AUTO: 93.5 FL — SIGNIFICANT CHANGE UP (ref 80–100)
NITRITE UR-MCNC: NEGATIVE — SIGNIFICANT CHANGE UP
PH UR: 5 — SIGNIFICANT CHANGE UP (ref 5–8)
PLATELET # BLD AUTO: 288 K/UL — SIGNIFICANT CHANGE UP (ref 150–400)
POTASSIUM SERPL-MCNC: 3.8 MMOL/L — SIGNIFICANT CHANGE UP (ref 3.5–5.3)
POTASSIUM SERPL-SCNC: 3.8 MMOL/L — SIGNIFICANT CHANGE UP (ref 3.5–5.3)
PROT SERPL-MCNC: 6.7 GM/DL — SIGNIFICANT CHANGE UP (ref 6–8.3)
PROT UR-MCNC: SIGNIFICANT CHANGE UP MG/DL
RBC # BLD: 2.31 M/UL — LOW (ref 3.8–5.2)
RBC # FLD: 21 % — HIGH (ref 10.3–14.5)
RBC CASTS # UR COMP ASSIST: 0 /HPF — SIGNIFICANT CHANGE UP (ref 0–4)
RETICS #: 98.6 K/UL — SIGNIFICANT CHANGE UP (ref 25–125)
RETICS/RBC NFR: 4.3 % — HIGH (ref 0.5–2.5)
SODIUM SERPL-SCNC: 136 MMOL/L — SIGNIFICANT CHANGE UP (ref 135–145)
SP GR SPEC: 1.02 — SIGNIFICANT CHANGE UP (ref 1–1.03)
SQUAMOUS # UR AUTO: 2 /HPF — SIGNIFICANT CHANGE UP (ref 0–5)
T4 FREE SERPL-MCNC: 1.15 NG/DL — SIGNIFICANT CHANGE UP (ref 0.93–1.7)
TIBC SERPL-MCNC: 278 UG/DL — SIGNIFICANT CHANGE UP (ref 220–430)
TSH SERPL-MCNC: 7.25 UU/ML — HIGH (ref 0.34–4.82)
UIBC SERPL-MCNC: 233 UG/DL — SIGNIFICANT CHANGE UP (ref 110–370)
UROBILINOGEN FLD QL: 0.2 MG/DL — SIGNIFICANT CHANGE UP (ref 0.2–1)
VIT B12 SERPL-MCNC: 912 PG/ML — SIGNIFICANT CHANGE UP (ref 232–1245)
WBC # BLD: 8.4 K/UL — SIGNIFICANT CHANGE UP (ref 3.8–10.5)
WBC # FLD AUTO: 8.4 K/UL — SIGNIFICANT CHANGE UP (ref 3.8–10.5)
WBC UR QL: 136 /HPF — HIGH (ref 0–5)

## 2024-12-10 PROCEDURE — 99233 SBSQ HOSP IP/OBS HIGH 50: CPT

## 2024-12-10 PROCEDURE — 99222 1ST HOSP IP/OBS MODERATE 55: CPT | Mod: FS

## 2024-12-10 PROCEDURE — 99223 1ST HOSP IP/OBS HIGH 75: CPT | Mod: FS

## 2024-12-10 RX ORDER — FUROSEMIDE 40 MG/1
20 TABLET ORAL ONCE
Refills: 0 | Status: COMPLETED | OUTPATIENT
Start: 2024-12-10 | End: 2024-12-10

## 2024-12-10 RX ORDER — POLYETHYLENE GLYCOL 3350 17 G/17G
17 POWDER, FOR SOLUTION ORAL DAILY
Refills: 0 | Status: DISCONTINUED | OUTPATIENT
Start: 2024-12-10 | End: 2024-12-17

## 2024-12-10 RX ADMIN — FUROSEMIDE 20 MILLIGRAM(S): 40 TABLET ORAL at 15:49

## 2024-12-10 RX ADMIN — GABAPENTIN 300 MILLIGRAM(S): 300 CAPSULE ORAL at 21:18

## 2024-12-10 RX ADMIN — Medication 1 MILLIGRAM(S): at 10:42

## 2024-12-10 RX ADMIN — POLYETHYLENE GLYCOL 3350 17 GRAM(S): 17 POWDER, FOR SOLUTION ORAL at 18:51

## 2024-12-10 RX ADMIN — SUCRALFATE 1 GRAM(S): 1 TABLET ORAL at 15:46

## 2024-12-10 RX ADMIN — LIDOCAINE 1 PATCH: 40 CREAM TOPICAL at 21:14

## 2024-12-10 RX ADMIN — ACETAMINOPHEN 500MG 975 MILLIGRAM(S): 500 TABLET, COATED ORAL at 15:44

## 2024-12-10 RX ADMIN — ACETAMINOPHEN 500MG 975 MILLIGRAM(S): 500 TABLET, COATED ORAL at 21:17

## 2024-12-10 RX ADMIN — PANTOPRAZOLE SODIUM 40 MILLIGRAM(S): 40 TABLET, DELAYED RELEASE ORAL at 05:41

## 2024-12-10 RX ADMIN — ACETAMINOPHEN 500MG 975 MILLIGRAM(S): 500 TABLET, COATED ORAL at 05:41

## 2024-12-10 RX ADMIN — APIXABAN 5 MILLIGRAM(S): 2.5 TABLET, FILM COATED ORAL at 10:43

## 2024-12-10 RX ADMIN — GABAPENTIN 300 MILLIGRAM(S): 300 CAPSULE ORAL at 10:44

## 2024-12-10 RX ADMIN — SUCRALFATE 1 GRAM(S): 1 TABLET ORAL at 21:15

## 2024-12-10 RX ADMIN — Medication 10 MILLIGRAM(S): at 21:15

## 2024-12-10 RX ADMIN — SUCRALFATE 1 GRAM(S): 1 TABLET ORAL at 10:42

## 2024-12-10 RX ADMIN — METHOCARBAMOL 250 MILLIGRAM(S): 500 TABLET, FILM COATED ORAL at 21:21

## 2024-12-10 RX ADMIN — ESTROGENS, CONJUGATED 0.62 MILLIGRAM(S): 0.3 TABLET, FILM COATED ORAL at 10:43

## 2024-12-10 RX ADMIN — POTASSIUM CHLORIDE 20 MILLIEQUIVALENT(S): 600 TABLET, EXTENDED RELEASE ORAL at 10:42

## 2024-12-10 NOTE — CONSULT NOTE ADULT - ASSESSMENT
78 y/o F with extensive cardiac PMHx currently readmitted for severe anemia, pending PRBC transfusions.    # Persistent Normocytic Anemia     - Hgb currently 6.5 g/dL     This is a 79 year old female with a history  AS s/p TAVR ( Bioprosthetic valve) , AFib on Xarelto, temporal arteritis, HLD, HTN.  She is admitted s/p fall with a RUE hematoma.     She has had a chronic normocytic anemia since June, even prior to her TAVR.    Now worsened s/p bleed/hematoma, required 2 units of PRBC on 11/7.  Work up with low retic count, iron deficiency.      Suspect related to bleed/iron deficiency, + CKD stage III.    Would check her stool occult blood, last GI work up 1 year ago.  Would also check her immunoelectrophoresis along with LDH/haptoglobin.   Echo with TAVR well seated, mild regurgitation.     If her Hg remains low, she likely will need a bone marrow biopsy. 80 y/o F with extensive cardiac PMHx currently readmitted for severe anemia, pending PRBC transfusions.    # Persistent Normocytic Anemia     - Hgb currently 6.5 g/dL; pending 2U PRBC 12/10  - MCV normal  - She has had a chronic normocytic anemia since about 06/2024 even prior to her more recent TAVR procedure     - Recently admitted about 1 month ago s/p fall with sustained upper extremity injury with hematoma requiring PRBCs  - Did have GI workup in the past, current FOBT pending   - Complete anemia / hemolysis labs ordered / pending but LDH is high and hapto is low   - There is a degree of BOLIVAR as well which is likely contributory   - Discussed with patient the plan to ideally receive BM bx while inpatient to better understand nature of her anemia; she was agreeable  - IR consulted   - Continue to trend serial CBCs while admitted   - Continue supportive measures as necessary         Chester Alejandra PA-C  Hematology Oncology   [facility]  [phone]

## 2024-12-10 NOTE — PHYSICAL THERAPY INITIAL EVALUATION ADULT - RANGE OF MOTION EXAMINATION, REHAB EVAL
Right shoulder limited to 30 degrees, Right elbow  degrees flexion/Left UE ROM was WNL (within normal limits)/Left LE ROM was WNL (within normal limits)/Right LE ROM was WNL (within normal limits)

## 2024-12-10 NOTE — PHYSICAL THERAPY INITIAL EVALUATION ADULT - GENERAL OBSERVATIONS, REHAB EVAL
Pt found seated at bedside chair on 5E in NAD, agreeable to participate in PT Evaluation. Pt is able to perform sit<>stand transfers with CGA to RW. Pt is able to ambulate 75 feet with RW and CGA. Pt returned to bedside chair with chair alarm on, call bell and phone in reach, EVELIO Rodas is present and aware.

## 2024-12-10 NOTE — CONSULT NOTE ADULT - ASSESSMENT
Interventional Radiology    Reason for consult: bmbx        HPI: 78 y/o F with extensive cardiac PMHx currently readmitted for severe anemia,78 y/o F with extensive cardiac PMHx currently readmitted for severe anemia. IR consulted for bone marrow biopsy.     Allergies: adhesives (Rash)  No Known Drug Allergies    Medications (Abx/Cardiac/Anticoagulation/Blood Products)  apixaban: 5 milliGRAM(s) Oral (12-10 @ 10:43)  furosemide   Injectable: 20 milliGRAM(s) IV Push (12-10 @ 15:49)    Data:    T(C): 36.4  HR: 72  BP: 150/65  RR: 18  SpO2: 100%    -WBC 8.40 / HgB 6.5 / Hct 21.6 / Plt 288  -Na 136 / Cl 104 / BUN 20 / Glucose 84  -K 3.8 / CO2 27 / Cr 0.84  -ALT 12 / Alk Phos 261 / T.Bili 0.9  -INR 1.73 / PTT 39.9          Radiology:     Assessment/Plan:  78 y/o F with extensive cardiac PMHx currently readmitted for severe anemia,78 y/o F with extensive cardiac PMHx currently readmitted for severe anemia. IR consulted for bone marrow biopsy.    - Will plan for procedure on 12/11.  - Pt needs to be NPO AMN.  - Please hold All A/C.  - Needs STAT CBC, BMP, Coags, TYPE and Screen in AM.   Interventional Radiology    Reason for consult: bmbx        HPI: 78 y/o F with extensive cardiac PMHx currently readmitted for severe anemia,78 y/o F with extensive cardiac PMHx currently readmitted for severe anemia. IR consulted for bone marrow biopsy.     Allergies: adhesives (Rash)  No Known Drug Allergies    Medications (Abx/Cardiac/Anticoagulation/Blood Products)  apixaban: 5 milliGRAM(s) Oral (12-10 @ 10:43)  furosemide   Injectable: 20 milliGRAM(s) IV Push (12-10 @ 15:49)    Data:    T(C): 36.4  HR: 72  BP: 150/65  RR: 18  SpO2: 100%    -WBC 8.40 / HgB 6.5 / Hct 21.6 / Plt 288  -Na 136 / Cl 104 / BUN 20 / Glucose 84  -K 3.8 / CO2 27 / Cr 0.84  -ALT 12 / Alk Phos 261 / T.Bili 0.9  -INR 1.73 / PTT 39.9          Radiology:     Assessment/Plan:  78 y/o F with extensive cardiac PMHx currently readmitted for severe anemia,78 y/o F with extensive cardiac PMHx currently readmitted for severe anemia. IR consulted for bone marrow biopsy.    - Will plan for procedure on 12/11.  - Pt needs to be NPO AMN.  - hold eliquis for 24 hrs  - Needs STAT CBC, BMP, Coags, TYPE and Screen in AM.

## 2024-12-10 NOTE — CONSULT NOTE ADULT - SUBJECTIVE AND OBJECTIVE BOX
HPI:    78 y/o F with persistent anemia, AS s/p TAVR, AFib on Eliquis, grade 3 diastolic dysfx, CVA 2024, temporal arteritis, HLD, HTN brought to the ED from [facility] for low H/H on outpatient labs.     12/10/24: Seen at bedside accompanied by close friend, no acute distress, agreeable to BM bx after speaking with IR regarding procedure       PAST MEDICAL & SURGICAL HISTORY:    History of Cardiac Arrhythmia    Hypertension    Atrial Fibrillation    Aortic stenosis    Nonrheumatic aortic valve stenosis    High cholesterol    H/O temporal arteritis    S/P Exploratory Laparotomy  1966    H/O prior ablation treatment    History of temporal artery biopsy    S/P foot surgery        MEDICATIONS  (STANDING):    acetaminophen     Tablet .. 975 milliGRAM(s) Oral every 8 hours  apixaban 5 milliGRAM(s) Oral every 12 hours  atorvastatin 10 milliGRAM(s) Oral at bedtime  estrogens    conjugated 0.625 milliGRAM(s) Oral daily  folic acid 1 milliGRAM(s) Oral daily  furosemide    Tablet 30 milliGRAM(s) Oral daily  gabapentin 300 milliGRAM(s) Oral two times a day  influenza  Vaccine (HIGH DOSE) 0.5 milliLiter(s) IntraMuscular once  lidocaine   4% Patch 1 Patch Transdermal every 24 hours  methocarbamol 250 milliGRAM(s) Oral at bedtime  pantoprazole    Tablet 40 milliGRAM(s) Oral before breakfast  potassium chloride    Tablet ER 20 milliEquivalent(s) Oral daily  sucralfate 1 Gram(s) Oral <User Schedule>      MEDICATIONS  (PRN):    acetaminophen     Tablet .. 650 milliGRAM(s) Oral every 6 hours PRN Temp greater or equal to 38C (100.4F), Mild Pain (1 - 3)  aluminum hydroxide/magnesium hydroxide/simethicone Suspension 30 milliLiter(s) Oral every 4 hours PRN Dyspepsia  melatonin 3 milliGRAM(s) Oral at bedtime PRN Insomnia  naloxone Injectable 0.4 milliGRAM(s) IV Push once PRN oversedation  ondansetron Injectable 4 milliGRAM(s) IV Push every 8 hours PRN Nausea and/or Vomiting  traMADol 25 milliGRAM(s) Oral every 4 hours PRN moderate to severe pain      ALLERGIES:    adhesives (Rash)  No Known Drug Allergies        FAMILY HISTORY:    lung cancer (Father)        REVIEW OF SYSTEMS:    Constitutional, Eyes, ENT, Cardiovascular, Respiratory, Gastrointestinal, Genitourinary, Musculoskeletal, Integumentary, Neurological, Psychiatric, Endocrine, Heme/Lymph and Allergic/Immunologic review of systems are otherwise negative except as noted in HPI.       VITALS:    T(C): 36.7 (09 Dec 2024 21:01), Max: 36.8 (09 Dec 2024 13:11)  T(F): 98.1 (09 Dec 2024 21:01), Max: 98.2 (09 Dec 2024 13:11)  HR: 90 (09 Dec 2024 21:) (70 - 90)  BP: 139/61 (09 Dec 2024 21:) (128/55 - 159/77)  BP(mean): 81 (09 Dec 2024 21:) (81 - 98)  RR: 20 (09 Dec 2024 21:) (14 - 20)  SpO2: 93% (09 Dec 2024 21:) (93% - 99%)    Parameters below as of 09 Dec 2024 21:01  Patient On (Oxygen Delivery Method): room air        PHYSICAL:    Constitutional: no acute distress   Eyes: no conjunctival infection, anicteric.   ENT: pharynx is unremarkable  Neck: supple without JVD  Pulmonary: clear to auscultation bilaterally  Cardiac: RRR  Vascular: no calf tenderness, venous stasis changes  Abdomen: normoactive bowel sounds, soft and nontender, no hepatosplenomegaly or masses appreciated.   Lymphatic: no peripheral adenopathy appreciated.   Musculoskeletal: full range of motion and no deformities appreciated.   Skin: normal appearance, no rash  Neurology: awake, alert, oriented; no obvious focal deficits        LABS:    CBC Full  -  ( 10 Dec 2024 08:17 )  WBC Count : 8.40 K/uL  RBC Count : 2.31 M/uL  Hemoglobin : 6.5 g/dL  Hematocrit : 21.6 %  Platelet Count - Automated : 288 K/uL  Mean Cell Volume : 93.5 fl  Mean Cell Hemoglobin : 28.1 pg  Mean Cell Hemoglobin Concentration : 30.1 g/dL  Auto Neutrophil # : x  Auto Lymphocyte # : x  Auto Monocyte # : x  Auto Eosinophil # : x  Auto Basophil # : x  Auto Neutrophil % : x  Auto Lymphocyte % : x  Auto Monocyte % : x  Auto Eosinophil % : x  Auto Basophil % : x    12-10    136  |  104  |  20  ----------------------------<  84  3.8   |  27  |  0.84    Ca    9.6      10 Dec 2024 08:17    TPro  6.7  /  Alb  2.3[L]  /  TBili  0.9  /  DBili  x   /  AST  24  /  ALT  12  /  AlkPhos  261[H]  12-10    PT/INR - ( 09 Dec 2024 14:42 )   PT: 19.8 sec;   INR: 1.73 ratio         PTT - ( 09 Dec 2024 14:42 )  PTT:39.9 sec  Urinalysis Basic - ( 10 Dec 2024 10:16 )    Color: Yellow / Appearance: Cloudy / S.019 / pH: x  Gluc: x / Ketone: Trace mg/dL  / Bili: Negative / Urobili: 0.2 mg/dL   Blood: x / Protein: Trace mg/dL / Nitrite: Negative   Leuk Esterase: Large / RBC: 0 /HPF /  /HPF   Sq Epi: x / Non Sq Epi: 2 /HPF / Bacteria: Many /HPF        RADIOLOGY & ADDITIONAL STUDIES:

## 2024-12-10 NOTE — CONSULT NOTE ADULT - NS ATTEND AMEND GEN_ALL_CORE FT
78 y/o F with extensive cardiac PMHx currently readmitted for persistent normocytic anemia, pending PRBC transfusions. Anemia workup underway / hemolysis labs ordered / but LDH is high and hapto is low. Discussed with patient recommendation for BM bx while inpatient to better understand nature of her anemia. IR consulted for BM biopsy.

## 2024-12-10 NOTE — PHYSICAL THERAPY INITIAL EVALUATION ADULT - PERTINENT HX OF CURRENT PROBLEM, REHAB EVAL
79 year old female w anemia, AS s/p TAVR, AFIB oneliquis, grade 3 diastolic dysfx, CVA , R 2nd rib fx, hx temporal arteritis, HLD, HTN BIBEMS to ER from [facility] c/o abnormal labs from 2 days PTA Hb 6.8.

## 2024-12-10 NOTE — PROGRESS NOTE ADULT - SUBJECTIVE AND OBJECTIVE BOX
Reason for Admission: acute anemia    History of Present Illness:   79 year old female w anemia, AS s/p TAVR, AFIB oneliquis, grade 3 diastolic dysfx, CVA , R 2nd rib fx, hx temporal arteritis, HLD, HTN BIBEMS [facility]/o abnormal labs from 2 days PTA Hb 6.8.     S:  1210: Sitting in chair, awake, alert, states she feels okay but anemia worsening.  Discussed blood transfusion for which pt is in agreement with.  Discussed plan of care and addressed all questions and concerns to the best of my ability.    REVIEW OF SYSTEMS: All other review of systems is negative unless indicated above.    Vital Signs Last 24 Hrs  T(C): 36.4 (10 Dec 2024 13:03), Max: 36.7 (09 Dec 2024 21:01)  T(F): 97.5 (10 Dec 2024 13:03), Max: 98.1 (09 Dec 2024 21:01)  HR: 78 (10 Dec 2024 13:03) (73 - 90)  BP: 124/73 (10 Dec 2024 13:03) (124/73 - 159/77)  BP(mean): 81 (09 Dec 2024 21:01) (81 - 98)  RR: 18 (10 Dec 2024 13:03) (14 - 20)  SpO2: 99% (10 Dec 2024 13:03) (93% - 99%)    Parameters below as of 10 Dec 2024 13:03  Patient On (Oxygen Delivery Method): room air    PHYSICAL EXAM:    Constitutional: NAD, awake and alert  HEENT: PERR, EOMI, Normal Hearing, MMM  Neck: Soft and supple  Respiratory: Breath sounds are clear bilaterally, No wheezing, rales or rhonchi  Cardiovascular: S1 and S2, regular rate and rhythm, no Murmurs, gallops or rubs  Gastrointestinal: Bowel Sounds present, soft, nontender, nondistended, no guarding, no rebound  Extremities: No peripheral edema  Neurological: A/O x 3, no focal deficits in my limited exam      MEDICATIONS  (STANDING):  acetaminophen     Tablet .. 975 milliGRAM(s) Oral every 8 hours  apixaban 5 milliGRAM(s) Oral every 12 hours  atorvastatin 10 milliGRAM(s) Oral at bedtime  estrogens    conjugated 0.625 milliGRAM(s) Oral daily  folic acid 1 milliGRAM(s) Oral daily  furosemide    Tablet 30 milliGRAM(s) Oral daily  furosemide   Injectable 20 milliGRAM(s) IV Push once  gabapentin 300 milliGRAM(s) Oral two times a day  influenza  Vaccine (HIGH DOSE) 0.5 milliLiter(s) IntraMuscular once  lidocaine   4% Patch 1 Patch Transdermal every 24 hours  methocarbamol 250 milliGRAM(s) Oral at bedtime  pantoprazole    Tablet 40 milliGRAM(s) Oral before breakfast  potassium chloride    Tablet ER 20 milliEquivalent(s) Oral daily  sucralfate 1 Gram(s) Oral <User Schedule>    MEDICATIONS  (PRN):  acetaminophen     Tablet .. 650 milliGRAM(s) Oral every 6 hours PRN Temp greater or equal to 38C (100.4F), Mild Pain (1 - 3)  aluminum hydroxide/magnesium hydroxide/simethicone Suspension 30 milliLiter(s) Oral every 4 hours PRN Dyspepsia  melatonin 3 milliGRAM(s) Oral at bedtime PRN Insomnia  naloxone Injectable 0.4 milliGRAM(s) IV Push once PRN oversedation  ondansetron Injectable 4 milliGRAM(s) IV Push every 8 hours PRN Nausea and/or Vomiting  traMADol 25 milliGRAM(s) Oral every 4 hours PRN moderate to severe pain                                6.5    8.40  )-----------( 288      ( 10 Dec 2024 08:17 )             21.6     12-10    136  |  104  |  20  ----------------------------<  84  3.8   |  27  |  0.84    Ca    9.6      10 Dec 2024 08:17    TPro  6.7  /  Alb  2.3[L]  /  TBili  0.9  /  DBili  x   /  AST  24  /  ALT  12  /  AlkPhos  261[H]  12-10    CAPILLARY BLOOD GLUCOSE        LIVER FUNCTIONS - ( 10 Dec 2024 08:17 )  Alb: 2.3 g/dL / Pro: 6.7 gm/dL / ALK PHOS: 261 U/L / ALT: 12 U/L / AST: 24 U/L / GGT: x           PT/INR - ( 09 Dec 2024 14:42 )   PT: 19.8 sec;   INR: 1.73 ratio         PTT - ( 09 Dec 2024 14:42 )  PTT:39.9 sec  Urinalysis Basic - ( 10 Dec 2024 10:16 )    Color: Yellow / Appearance: Cloudy / S.019 / pH: x  Gluc: x / Ketone: Trace mg/dL  / Bili: Negative / Urobili: 0.2 mg/dL   Blood: x / Protein: Trace mg/dL / Nitrite: Negative   Leuk Esterase: Large / RBC: 0 /HPF /  /HPF   Sq Epi: x / Non Sq Epi: 2 /HPF / Bacteria: Many /HPF      Assessment/Plan:  79 year old female w anemia, AS s/p TAVR, AFIB on eliquis, grade 3 diastolic dysfx, CVA , R 2nd rib fx, hx temporal arteritis, HLD, HTN BIBEMS to ER from [facility] c/o abnormal labs from 2 days PTA Hb 6.8.      #Anemia: acute on chronic / iron deficiency / possible acute blood loss from recent trauma:   - no melena by hx or by physical exam, rectal Guaiac negative  - s/p recent iron infusions x 2  - 2u PRBC now with lasix in between  - HEME consult appreciated - bone marrow biopsy pending      #AS s/p TAVR  #Grade 3 diastolic dysfunction  - lasix 30 mg qd        #Chronic AFib:  - eliquis      #CVA : recent   thought cardioembolic  - AC changed from xarelto to eliquis      #HLD  - statin      #R shoulder pain/hematoma / R 2nd rib fx / Unsteady gait:  - PT/OT  - acetaminophen 975 TID  - methacarbamol 250 mg q HS  - lidocaine patch  - tramadol prn  - gabapentin      #GERD  - omaprazole interchange to protonix  - carafate TID was empiric addition      #HTN  - monitor BP        #VTE  - AC      #GOC  - full code

## 2024-12-10 NOTE — PHYSICAL THERAPY INITIAL EVALUATION ADULT - ADDITIONAL COMMENTS
Prior to CVA and Fall in November 2024, patient was completely independent, driving, caring for grandchildren. Since November 2024, she has been at [facility] doing PT and OT, using a Left Frank Cane.   At home she has 5 DARYN, 1 flight of stairs to bedroom/ bathroom

## 2024-12-11 ENCOUNTER — RESULT REVIEW (OUTPATIENT)
Age: 79
End: 2024-12-11

## 2024-12-11 ENCOUNTER — NON-APPOINTMENT (OUTPATIENT)
Age: 79
End: 2024-12-11

## 2024-12-11 LAB
ADD ON TEST-SPECIMEN IN LAB: SIGNIFICANT CHANGE UP
ANION GAP SERPL CALC-SCNC: 3 MMOL/L — LOW (ref 5–17)
APTT BLD: 42.1 SEC — HIGH (ref 24.5–35.6)
BASOPHILS # BLD AUTO: 0.07 K/UL — SIGNIFICANT CHANGE UP (ref 0–0.2)
BASOPHILS NFR BLD AUTO: 0.9 % — SIGNIFICANT CHANGE UP (ref 0–2)
BUN SERPL-MCNC: 24 MG/DL — HIGH (ref 7–23)
CALCIUM SERPL-MCNC: 9.6 MG/DL — SIGNIFICANT CHANGE UP (ref 8.5–10.1)
CHLORIDE SERPL-SCNC: 105 MMOL/L — SIGNIFICANT CHANGE UP (ref 96–108)
CO2 SERPL-SCNC: 29 MMOL/L — SIGNIFICANT CHANGE UP (ref 22–31)
CREAT SERPL-MCNC: 0.84 MG/DL — SIGNIFICANT CHANGE UP (ref 0.5–1.3)
EGFR: 71 ML/MIN/1.73M2 — SIGNIFICANT CHANGE UP
EOSINOPHIL # BLD AUTO: 0.84 K/UL — HIGH (ref 0–0.5)
EOSINOPHIL NFR BLD AUTO: 10.4 % — HIGH (ref 0–6)
GLUCOSE SERPL-MCNC: 95 MG/DL — SIGNIFICANT CHANGE UP (ref 70–99)
HCT VFR BLD CALC: 26.2 % — LOW (ref 34.5–45)
HCT VFR BLD CALC: 29.1 % — LOW (ref 34.5–45)
HGB BLD-MCNC: 8.2 G/DL — LOW (ref 11.5–15.5)
HGB BLD-MCNC: 9.1 G/DL — LOW (ref 11.5–15.5)
IMM GRANULOCYTES NFR BLD AUTO: 0.5 % — SIGNIFICANT CHANGE UP (ref 0–0.9)
INR BLD: 1.47 RATIO — HIGH (ref 0.85–1.16)
LYMPHOCYTES # BLD AUTO: 1.46 K/UL — SIGNIFICANT CHANGE UP (ref 1–3.3)
LYMPHOCYTES # BLD AUTO: 18 % — SIGNIFICANT CHANGE UP (ref 13–44)
MCHC RBC-ENTMCNC: 29.1 PG — SIGNIFICANT CHANGE UP (ref 27–34)
MCHC RBC-ENTMCNC: 31.3 G/DL — LOW (ref 32–36)
MCV RBC AUTO: 93 FL — SIGNIFICANT CHANGE UP (ref 80–100)
MONOCYTES # BLD AUTO: 0.95 K/UL — HIGH (ref 0–0.9)
MONOCYTES NFR BLD AUTO: 11.7 % — SIGNIFICANT CHANGE UP (ref 2–14)
NEUTROPHILS # BLD AUTO: 4.74 K/UL — SIGNIFICANT CHANGE UP (ref 1.8–7.4)
NEUTROPHILS NFR BLD AUTO: 58.5 % — SIGNIFICANT CHANGE UP (ref 43–77)
PLATELET # BLD AUTO: 267 K/UL — SIGNIFICANT CHANGE UP (ref 150–400)
POTASSIUM SERPL-MCNC: 4 MMOL/L — SIGNIFICANT CHANGE UP (ref 3.5–5.3)
POTASSIUM SERPL-SCNC: 4 MMOL/L — SIGNIFICANT CHANGE UP (ref 3.5–5.3)
PROTHROM AB SERPL-ACNC: 16.8 SEC — HIGH (ref 9.9–13.4)
RBC # BLD: 3.13 M/UL — LOW (ref 3.8–5.2)
RBC # FLD: 20.5 % — HIGH (ref 10.3–14.5)
SODIUM SERPL-SCNC: 137 MMOL/L — SIGNIFICANT CHANGE UP (ref 135–145)
WBC # BLD: 8.1 K/UL — SIGNIFICANT CHANGE UP (ref 3.8–10.5)
WBC # FLD AUTO: 8.1 K/UL — SIGNIFICANT CHANGE UP (ref 3.8–10.5)

## 2024-12-11 PROCEDURE — 85097 BONE MARROW INTERPRETATION: CPT

## 2024-12-11 PROCEDURE — 38222 DX BONE MARROW BX & ASPIR: CPT | Mod: RT

## 2024-12-11 PROCEDURE — 88291 CYTO/MOLECULAR REPORT: CPT

## 2024-12-11 PROCEDURE — 99232 SBSQ HOSP IP/OBS MODERATE 35: CPT

## 2024-12-11 PROCEDURE — 88189 FLOWCYTOMETRY/READ 16 & >: CPT

## 2024-12-11 PROCEDURE — 88313 SPECIAL STAINS GROUP 2: CPT | Mod: 26

## 2024-12-11 PROCEDURE — 88341 IMHCHEM/IMCYTCHM EA ADD ANTB: CPT | Mod: 26

## 2024-12-11 PROCEDURE — 74177 CT ABD & PELVIS W/CONTRAST: CPT | Mod: 26

## 2024-12-11 PROCEDURE — 88305 TISSUE EXAM BY PATHOLOGIST: CPT | Mod: 26

## 2024-12-11 PROCEDURE — 88342 IMHCHEM/IMCYTCHM 1ST ANTB: CPT | Mod: 26

## 2024-12-11 PROCEDURE — 77012 CT SCAN FOR NEEDLE BIOPSY: CPT | Mod: 26

## 2024-12-11 PROCEDURE — 99233 SBSQ HOSP IP/OBS HIGH 50: CPT | Mod: FS

## 2024-12-11 PROCEDURE — 88360 TUMOR IMMUNOHISTOCHEM/MANUAL: CPT | Mod: 26

## 2024-12-11 RX ORDER — 0.9 % SODIUM CHLORIDE 0.9 %
1000 INTRAVENOUS SOLUTION INTRAVENOUS
Refills: 0 | Status: DISCONTINUED | OUTPATIENT
Start: 2024-12-11 | End: 2024-12-11

## 2024-12-11 RX ORDER — ONDANSETRON HYDROCHLORIDE 4 MG/1
4 TABLET, FILM COATED ORAL ONCE
Refills: 0 | Status: DISCONTINUED | OUTPATIENT
Start: 2024-12-11 | End: 2024-12-11

## 2024-12-11 RX ORDER — FENTANYL 12 UG/H
50 PATCH, EXTENDED RELEASE TRANSDERMAL
Refills: 0 | Status: DISCONTINUED | OUTPATIENT
Start: 2024-12-11 | End: 2024-12-11

## 2024-12-11 RX ORDER — OXYCODONE HYDROCHLORIDE 30 MG/1
5 TABLET ORAL ONCE
Refills: 0 | Status: DISCONTINUED | OUTPATIENT
Start: 2024-12-11 | End: 2024-12-11

## 2024-12-11 RX ADMIN — GABAPENTIN 300 MILLIGRAM(S): 300 CAPSULE ORAL at 10:00

## 2024-12-11 RX ADMIN — SUCRALFATE 1 GRAM(S): 1 TABLET ORAL at 10:01

## 2024-12-11 RX ADMIN — Medication 1 MILLIGRAM(S): at 10:00

## 2024-12-11 RX ADMIN — ACETAMINOPHEN 500MG 975 MILLIGRAM(S): 500 TABLET, COATED ORAL at 14:01

## 2024-12-11 RX ADMIN — ACETAMINOPHEN 500MG 975 MILLIGRAM(S): 500 TABLET, COATED ORAL at 22:17

## 2024-12-11 RX ADMIN — Medication 10 MILLIGRAM(S): at 22:18

## 2024-12-11 RX ADMIN — APIXABAN 5 MILLIGRAM(S): 2.5 TABLET, FILM COATED ORAL at 10:01

## 2024-12-11 RX ADMIN — ESTROGENS, CONJUGATED 0.62 MILLIGRAM(S): 0.3 TABLET, FILM COATED ORAL at 10:00

## 2024-12-11 RX ADMIN — POLYETHYLENE GLYCOL 3350 17 GRAM(S): 17 POWDER, FOR SOLUTION ORAL at 10:01

## 2024-12-11 RX ADMIN — GABAPENTIN 300 MILLIGRAM(S): 300 CAPSULE ORAL at 22:21

## 2024-12-11 RX ADMIN — POTASSIUM CHLORIDE 20 MILLIEQUIVALENT(S): 600 TABLET, EXTENDED RELEASE ORAL at 10:00

## 2024-12-11 RX ADMIN — SUCRALFATE 1 GRAM(S): 1 TABLET ORAL at 18:26

## 2024-12-11 RX ADMIN — APIXABAN 5 MILLIGRAM(S): 2.5 TABLET, FILM COATED ORAL at 22:18

## 2024-12-11 RX ADMIN — FUROSEMIDE 30 MILLIGRAM(S): 40 TABLET ORAL at 09:59

## 2024-12-11 RX ADMIN — SUCRALFATE 1 GRAM(S): 1 TABLET ORAL at 22:18

## 2024-12-11 NOTE — OCCUPATIONAL THERAPY INITIAL EVALUATION ADULT - LEVEL OF INDEPENDENCE: SCOOT/BRIDGE, REHAB EVAL
OOB with Nursing Staff Topical Sulfur Applications Pregnancy And Lactation Text: This medication is considered safe during pregnancy and breast feeding secondary to limited systemic absorption.

## 2024-12-11 NOTE — OCCUPATIONAL THERAPY INITIAL EVALUATION ADULT - RANGE OF MOTION EXAMINATION, UPPER EXTREMITY
LUE: WNL, RUE: distally WFL at elbow/wrist/hand, pt is able to perform AAROM but limited AROM at this time at shoulder, per pt she was recently cleared from her NWB precautions to WBAT and is able to move the arm more now

## 2024-12-11 NOTE — CONSULT NOTE ADULT - SUBJECTIVE AND OBJECTIVE BOX
HPI:  79 year old female w anemia, AS s/p TAVR, AFIB oneliquis, grade 3 diastolic dysfx, CVA , R 2nd rib fx, hx temporal arteritis, HLD, HTN BIBEMS to ER from [facility] c/o abnormal labs from 2 days PTA Hb 6.8.       Adm 11-06 to  admitted s/p fall with R shoulder pain s/p fall   # nondisplaced fracture of the anterior right second rib  # intramuscular hematoma R shoulder, lower dose AC recommended  #Fever with acute hypoxic resp failure RESOLVED CTA neg PE  Likely multifactorial (reactive to intramuscular hematoma 2/2 pain)  #UTI tx w merrem  #CVA : MRI acute infarct R centrum semiovale; cardioembolic stroke in setting of holding AC due to acute anemia after trauma.  #Grade 3 diastolic dysfunction on TTE EF 55%  #Anemia due to IM hematoma s/p PRBC x 2, seen by Heme       At [facility] Fe 23/ 229 UIBC /251 TIBC; eliquis held since Hb 7.1 to 6.8; given IV iron x 2  pt denied hematuria, black stools,       In ED /55   HR 70   RR 18   T 98.2   96% sat RA  Hb 7, guaiac neg  --------  Has colonoscopies q 2 years with Dr. Torrez in [facility], last about a year ago.        PAST MEDICAL HX  AS aortic stenosis   Atrial Fibrillation   CVA  MRI acute infarct  R centrum semiovale  H/O temporal arteritis   HLD hyperlipidemia  HTN hypertension   Nonrheumatic aortic valve stenosis.     PAST SURGICAL HX  TAVR   H/O prior ablation treatment   History of temporal artery biopsy   S/P Exploratory Laparotomy 1966  S/P foot surgery         FAMILY HX  lung cancer : Father        SOCIAL HX  at [facility] for rehab  nonsmoker  no alcohol   (09 Dec 2024 17:47)          Home Medications:  acetaminophen 325 mg oral tablet: 2 tab(s) orally every 6 hours As needed Mild Pain (1 - 3) (09 Dec 2024 16:40)  acetaminophen 500 mg oral tablet: 2 tab(s) orally 3 times a day MDD:3 gm (09 Dec 2024 16:42)  atorvastatin 10 mg oral tablet: 1 tab(s) orally once a day (at bedtime) (09 Dec 2024 16:45)  docusate sodium 100 mg oral capsule: 1 cap(s) orally once a day (at bedtime) (09 Dec 2024 16:45)  furosemide 20 mg oral tablet: 1.5 tab(s) orally once a day (09 Dec 2024 16:38)  gabapentin 300 mg oral capsule: 1 cap(s) orally 2 times a day (09 Dec 2024 16:40)  lidocaine 4% topical film: Apply topically to affected area once a day through 12/13 (09 Dec 2024 16:39)  methocarbamol 500 mg oral tablet: 0.5 tab(s) orally once a day (at bedtime) (09 Dec 2024 16:42)  pantoprazole 20 mg oral delayed release tablet: 1 tab(s) orally once a day (09 Dec 2024 16:42)  Potassium Chloride (Eqv-Klor-Con M20) 20 mEq oral tablet, extended release: 1 tab(s) orally once a day (09 Dec 2024 16:43)  Premarin 0.625 mg oral tablet: 1 tab(s) orally once a day (09 Dec 2024 16:45)  sucralfate 1 g oral tablet: 1 tab(s) orally 3 times a day x 10 days ***Course Not Complete*** (09 Dec 2024 16:43)  traMADol 50 mg oral tablet: 0.5 tab(s) orally every 4 hours as needed for pain (09 Dec 2024 16:43)      MEDICATIONS  (STANDING):  acetaminophen     Tablet .. 975 milliGRAM(s) Oral every 8 hours  apixaban 5 milliGRAM(s) Oral every 12 hours  atorvastatin 10 milliGRAM(s) Oral at bedtime  estrogens    conjugated 0.625 milliGRAM(s) Oral daily  folic acid 1 milliGRAM(s) Oral daily  furosemide    Tablet 30 milliGRAM(s) Oral daily  gabapentin 300 milliGRAM(s) Oral two times a day  influenza  Vaccine (HIGH DOSE) 0.5 milliLiter(s) IntraMuscular once  lidocaine   4% Patch 1 Patch Transdermal every 24 hours  methocarbamol 250 milliGRAM(s) Oral at bedtime  pantoprazole    Tablet 40 milliGRAM(s) Oral before breakfast  polyethylene glycol 3350 17 Gram(s) Oral daily  potassium chloride    Tablet ER 20 milliEquivalent(s) Oral daily  sucralfate 1 Gram(s) Oral <User Schedule>    MEDICATIONS  (PRN):  acetaminophen     Tablet .. 650 milliGRAM(s) Oral every 6 hours PRN Temp greater or equal to 38C (100.4F), Mild Pain (1 - 3)  aluminum hydroxide/magnesium hydroxide/simethicone Suspension 30 milliLiter(s) Oral every 4 hours PRN Dyspepsia  melatonin 3 milliGRAM(s) Oral at bedtime PRN Insomnia  naloxone Injectable 0.4 milliGRAM(s) IV Push once PRN oversedation  ondansetron Injectable 4 milliGRAM(s) IV Push every 8 hours PRN Nausea and/or Vomiting  traMADol 25 milliGRAM(s) Oral every 4 hours PRN moderate to severe pain      Allergies    adhesives (Rash)  No Known Drug Allergies    Intolerances        SOCIAL HISTORY:    FAMILY HISTORY:  FH: lung cancer (Father)        ROS  As above  Otherwise unremarkable    Vital Signs Last 24 Hrs  T(C): 36.5 (10 Dec 2024 23:46), Max: 36.7 (10 Dec 2024 12:29)  T(F): 97.7 (10 Dec 2024 23:46), Max: 98.1 (10 Dec 2024 12:29)  HR: 70 (10 Dec 2024 23:46) (70 - 86)  BP: 154/59 (10 Dec 2024 23:46) (124/73 - 154/59)  BP(mean): 83 (10 Dec 2024 23:46) (83 - 83)  RR: 16 (10 Dec 2024 23:46) (16 - 18)  SpO2: 96% (10 Dec 2024 23:46) (96% - 100%)    Parameters below as of 10 Dec 2024 23:46  Patient On (Oxygen Delivery Method): room air        Constitutional: NAD, well-developed  Respiratory: CTAB  Cardiovascular: S1 and S2, RRR  Gastrointestinal: BS+, soft, NT/ND  Extremities: No peripheral edema  Psychiatric: Normal mood, normal affect  Skin: No rashes    LABS:                        8.2    x     )-----------( x        ( 11 Dec 2024 00:43 )             26.2     12-10    136  |  104  |  20  ----------------------------<  84  3.8   |  27  |  0.84    Ca    9.6      10 Dec 2024 08:17    TPro  6.7  /  Alb  2.3[L]  /  TBili  0.9  /  DBili  x   /  AST  24  /  ALT  12  /  AlkPhos  261[H]  12-10    PT/INR - ( 09 Dec 2024 14:42 )   PT: 19.8 sec;   INR: 1.73 ratio         PTT - ( 09 Dec 2024 14:42 )  PTT:39.9 sec  LIVER FUNCTIONS - ( 10 Dec 2024 08:17 )  Alb: 2.3 g/dL / Pro: 6.7 gm/dL / ALK PHOS: 261 U/L / ALT: 12 U/L / AST: 24 U/L / GGT: x             RADIOLOGY & ADDITIONAL STUDIES:

## 2024-12-11 NOTE — PROGRESS NOTE ADULT - SUBJECTIVE AND OBJECTIVE BOX
HPI:    80 y/o F with persistent anemia, AS s/p TAVR, AFib on Eliquis, grade 3 diastolic dysfx, CVA 11/2024, temporal arteritis, HLD, HTN brought to the ED from [facility] for low H/H on outpatient labs.     12/10/24: Seen at bedside accompanied by close friend, no acute distress, agreeable to BM bx after speaking with IR regarding procedure     12/11/24: To be seen      PAST MEDICAL & SURGICAL HISTORY:    History of Cardiac Arrhythmia    Hypertension    Atrial Fibrillation    Aortic stenosis    Nonrheumatic aortic valve stenosis    High cholesterol    H/O temporal arteritis    S/P Exploratory Laparotomy  1966    H/O prior ablation treatment    History of temporal artery biopsy    S/P foot surgery        MEDICATIONS  (STANDING):    acetaminophen     Tablet .. 975 milliGRAM(s) Oral every 8 hours  apixaban 5 milliGRAM(s) Oral every 12 hours  atorvastatin 10 milliGRAM(s) Oral at bedtime  estrogens    conjugated 0.625 milliGRAM(s) Oral daily  folic acid 1 milliGRAM(s) Oral daily  furosemide    Tablet 30 milliGRAM(s) Oral daily  gabapentin 300 milliGRAM(s) Oral two times a day  influenza  Vaccine (HIGH DOSE) 0.5 milliLiter(s) IntraMuscular once  lidocaine   4% Patch 1 Patch Transdermal every 24 hours  methocarbamol 250 milliGRAM(s) Oral at bedtime  pantoprazole    Tablet 40 milliGRAM(s) Oral before breakfast  polyethylene glycol 3350 17 Gram(s) Oral daily  potassium chloride    Tablet ER 20 milliEquivalent(s) Oral daily  sucralfate 1 Gram(s) Oral <User Schedule>      MEDICATIONS  (PRN):    acetaminophen     Tablet .. 650 milliGRAM(s) Oral every 6 hours PRN Temp greater or equal to 38C (100.4F), Mild Pain (1 - 3)  aluminum hydroxide/magnesium hydroxide/simethicone Suspension 30 milliLiter(s) Oral every 4 hours PRN Dyspepsia  melatonin 3 milliGRAM(s) Oral at bedtime PRN Insomnia  naloxone Injectable 0.4 milliGRAM(s) IV Push once PRN oversedation  ondansetron Injectable 4 milliGRAM(s) IV Push every 8 hours PRN Nausea and/or Vomiting  traMADol 25 milliGRAM(s) Oral every 4 hours PRN moderate to severe pain        ALLERGIES:    adhesives (Rash)  No Known Drug Allergies        FAMILY HISTORY:    lung cancer (Father)        REVIEW OF SYSTEMS:    Constitutional, Eyes, ENT, Cardiovascular, Respiratory, Gastrointestinal, Genitourinary, Musculoskeletal, Integumentary, Neurological, Psychiatric, Endocrine, Heme/Lymph and Allergic/Immunologic review of systems are otherwise negative except as noted in HPI.       VITALS:    ICU Vital Signs Last 24 Hrs  T(C): 36.6 (11 Dec 2024 09:30), Max: 36.6 (11 Dec 2024 09:30)  T(F): 97.8 (11 Dec 2024 09:30), Max: 97.8 (11 Dec 2024 09:30)  HR: 61 (11 Dec 2024 09:30) (61 - 86)  BP: 142/73 (11 Dec 2024 09:30) (124/73 - 154/59)  BP(mean): 79 (11 Dec 2024 08:05) (79 - 83)  ABP: --  ABP(mean): --  RR: 14 (11 Dec 2024 09:30) (14 - 18)  SpO2: 96% (11 Dec 2024 09:30) (93% - 100%)    O2 Parameters below as of 11 Dec 2024 09:30  Patient On (Oxygen Delivery Method): room air      PHYSICAL:    Constitutional: no acute distress   Eyes: no conjunctival infection, anicteric.   ENT: pharynx is unremarkable  Neck: supple without JVD  Pulmonary: clear to auscultation bilaterally  Cardiac: RRR  Vascular: no calf tenderness, venous stasis changes  Abdomen: normoactive bowel sounds, soft and nontender, no hepatosplenomegaly or masses appreciated.   Lymphatic: no peripheral adenopathy appreciated.   Musculoskeletal: full range of motion and no deformities appreciated.   Skin: normal appearance, no rash  Neurology: awake, alert, oriented; no obvious focal deficits        LABS:                          9.1    8.10  )-----------( 267      ( 11 Dec 2024 10:55 )             29.1     12-11    137  |  105  |  24[H]  ----------------------------<  95  4.0   |  29  |  0.84    Ca    9.6      11 Dec 2024 10:55    TPro  6.7  /  Alb  2.3[L]  /  TBili  0.9  /  DBili  x   /  AST  24  /  ALT  12  /  AlkPhos  261[H]  12-10        RADIOLOGY & ADDITIONAL STUDIES:       HPI:    80 y/o F with persistent anemia, AS s/p TAVR, AFib on Eliquis, grade 3 diastolic dysfx, CVA 11/2024, temporal arteritis, HLD, HTN brought to the ED from [facility] for low H/H on outpatient labs.     12/10/24: Seen at bedside accompanied by close friend, no acute distress, agreeable to BM bx after speaking with IR regarding procedure     12/11/24: S/p BM bx, no acute distress       PAST MEDICAL & SURGICAL HISTORY:    History of Cardiac Arrhythmia    Hypertension    Atrial Fibrillation    Aortic stenosis    Nonrheumatic aortic valve stenosis    High cholesterol    H/O temporal arteritis    S/P Exploratory Laparotomy  1966    H/O prior ablation treatment    History of temporal artery biopsy    S/P foot surgery        MEDICATIONS  (STANDING):    acetaminophen     Tablet .. 975 milliGRAM(s) Oral every 8 hours  apixaban 5 milliGRAM(s) Oral every 12 hours  atorvastatin 10 milliGRAM(s) Oral at bedtime  estrogens    conjugated 0.625 milliGRAM(s) Oral daily  folic acid 1 milliGRAM(s) Oral daily  furosemide    Tablet 30 milliGRAM(s) Oral daily  gabapentin 300 milliGRAM(s) Oral two times a day  influenza  Vaccine (HIGH DOSE) 0.5 milliLiter(s) IntraMuscular once  lidocaine   4% Patch 1 Patch Transdermal every 24 hours  methocarbamol 250 milliGRAM(s) Oral at bedtime  pantoprazole    Tablet 40 milliGRAM(s) Oral before breakfast  polyethylene glycol 3350 17 Gram(s) Oral daily  potassium chloride    Tablet ER 20 milliEquivalent(s) Oral daily  sucralfate 1 Gram(s) Oral <User Schedule>      MEDICATIONS  (PRN):    acetaminophen     Tablet .. 650 milliGRAM(s) Oral every 6 hours PRN Temp greater or equal to 38C (100.4F), Mild Pain (1 - 3)  aluminum hydroxide/magnesium hydroxide/simethicone Suspension 30 milliLiter(s) Oral every 4 hours PRN Dyspepsia  melatonin 3 milliGRAM(s) Oral at bedtime PRN Insomnia  naloxone Injectable 0.4 milliGRAM(s) IV Push once PRN oversedation  ondansetron Injectable 4 milliGRAM(s) IV Push every 8 hours PRN Nausea and/or Vomiting  traMADol 25 milliGRAM(s) Oral every 4 hours PRN moderate to severe pain        ALLERGIES:    adhesives (Rash)  No Known Drug Allergies        FAMILY HISTORY:    lung cancer (Father)        REVIEW OF SYSTEMS:    Constitutional, Eyes, ENT, Cardiovascular, Respiratory, Gastrointestinal, Genitourinary, Musculoskeletal, Integumentary, Neurological, Psychiatric, Endocrine, Heme/Lymph and Allergic/Immunologic review of systems are otherwise negative except as noted in HPI.       VITALS:    ICU Vital Signs Last 24 Hrs  T(C): 36.6 (11 Dec 2024 09:30), Max: 36.6 (11 Dec 2024 09:30)  T(F): 97.8 (11 Dec 2024 09:30), Max: 97.8 (11 Dec 2024 09:30)  HR: 61 (11 Dec 2024 09:30) (61 - 86)  BP: 142/73 (11 Dec 2024 09:30) (124/73 - 154/59)  BP(mean): 79 (11 Dec 2024 08:05) (79 - 83)  ABP: --  ABP(mean): --  RR: 14 (11 Dec 2024 09:30) (14 - 18)  SpO2: 96% (11 Dec 2024 09:30) (93% - 100%)    O2 Parameters below as of 11 Dec 2024 09:30  Patient On (Oxygen Delivery Method): room air      PHYSICAL:    Constitutional: no acute distress   Eyes: no conjunctival infection, anicteric.   ENT: pharynx is unremarkable  Neck: supple without JVD  Pulmonary: clear to auscultation bilaterally  Cardiac: RRR  Vascular: no calf tenderness, venous stasis changes  Abdomen: normoactive bowel sounds, soft and nontender, no hepatosplenomegaly or masses appreciated.   Lymphatic: no peripheral adenopathy appreciated.   Musculoskeletal: full range of motion and no deformities appreciated.   Skin: normal appearance, no rash  Neurology: awake, alert, oriented; no obvious focal deficits        LABS:                          9.1    8.10  )-----------( 267      ( 11 Dec 2024 10:55 )             29.1     12-11    137  |  105  |  24[H]  ----------------------------<  95  4.0   |  29  |  0.84    Ca    9.6      11 Dec 2024 10:55    TPro  6.7  /  Alb  2.3[L]  /  TBili  0.9  /  DBili  x   /  AST  24  /  ALT  12  /  AlkPhos  261[H]  12-10        RADIOLOGY & ADDITIONAL STUDIES:

## 2024-12-11 NOTE — OCCUPATIONAL THERAPY INITIAL EVALUATION ADULT - PERTINENT HX OF CURRENT PROBLEM, REHAB EVAL
Per EMR pt is a 78 y/o female w/ anemia, AS s/p TAVR, AFIB oneliquis, grade 3 diastolic dysfx, CVA , R 2nd rib fx, hx temporal arteritis, HLD, HTN BIBEMS to ER from [facility] c/o abnormal labs from 2 days PTA Hb 6.8. Pt since admission had bone marrow biopsy today 12/11, and rec'd 2 units of blood yesterday, 12/10. EGD tentatively Friday.

## 2024-12-11 NOTE — PROGRESS NOTE ADULT - SUBJECTIVE AND OBJECTIVE BOX
CC: acute anemia    History of Present Illness:   79 year old female w anemia, AS s/p TAVR, AFIB oneliquis, grade 3 diastolic dysfx, CVA , R 2nd rib fx, hx temporal arteritis, HLD, HTN BIBEMS [facility]/o abnormal labs from 2 days PTA Hb 6.8.     S:  12/10: Sitting in chair, awake, alert, states she feels okay but anemia worsening.  Discussed blood transfusion for which pt is in agreement with.  Discussed plan of care and addressed all questions and concerns to the best of my ability.    12/11: Had bone marrow biopsy today.  Tolerated 2 units of blood yesterday.  Denies fever, chills, N, V, abd pain, CP, SOB.      REVIEW OF SYSTEMS: All other review of systems is negative unless indicated above.    Vital Signs Last 24 Hrs  T(C): 36.6 (11 Dec 2024 09:30), Max: 36.6 (11 Dec 2024 09:30)  T(F): 97.8 (11 Dec 2024 09:30), Max: 97.8 (11 Dec 2024 09:30)  HR: 61 (11 Dec 2024 09:30) (61 - 86)  BP: 142/73 (11 Dec 2024 09:30) (124/73 - 154/59)  BP(mean): 79 (11 Dec 2024 08:05) (79 - 83)  RR: 14 (11 Dec 2024 09:30) (14 - 18)  SpO2: 96% (11 Dec 2024 09:30) (93% - 100%)    Parameters below as of 11 Dec 2024 09:30  Patient On (Oxygen Delivery Method): room air        PHYSICAL EXAM:    Constitutional: NAD, awake and alert  HEENT: PERR, EOMI, Normal Hearing, MMM  Neck: Soft and supple  Respiratory: Breath sounds are clear bilaterally, No wheezing, rales or rhonchi  Cardiovascular: S1 and S2, regular rate and rhythm, no Murmurs, gallops or rubs  Gastrointestinal: Bowel Sounds present, soft, nontender, nondistended, no guarding, no rebound  Extremities: No peripheral edema  Neurological: A/O x 3, no focal deficits in my limited exam      MEDICATIONS  (STANDING):  acetaminophen     Tablet .. 975 milliGRAM(s) Oral every 8 hours  apixaban 5 milliGRAM(s) Oral every 12 hours  atorvastatin 10 milliGRAM(s) Oral at bedtime  estrogens    conjugated 0.625 milliGRAM(s) Oral daily  folic acid 1 milliGRAM(s) Oral daily  furosemide    Tablet 30 milliGRAM(s) Oral daily  gabapentin 300 milliGRAM(s) Oral two times a day  influenza  Vaccine (HIGH DOSE) 0.5 milliLiter(s) IntraMuscular once  lidocaine   4% Patch 1 Patch Transdermal every 24 hours  methocarbamol 250 milliGRAM(s) Oral at bedtime  pantoprazole    Tablet 40 milliGRAM(s) Oral before breakfast  polyethylene glycol 3350 17 Gram(s) Oral daily  potassium chloride    Tablet ER 20 milliEquivalent(s) Oral daily  sucralfate 1 Gram(s) Oral <User Schedule>    MEDICATIONS  (PRN):  acetaminophen     Tablet .. 650 milliGRAM(s) Oral every 6 hours PRN Temp greater or equal to 38C (100.4F), Mild Pain (1 - 3)  aluminum hydroxide/magnesium hydroxide/simethicone Suspension 30 milliLiter(s) Oral every 4 hours PRN Dyspepsia  melatonin 3 milliGRAM(s) Oral at bedtime PRN Insomnia  naloxone Injectable 0.4 milliGRAM(s) IV Push once PRN oversedation  ondansetron Injectable 4 milliGRAM(s) IV Push every 8 hours PRN Nausea and/or Vomiting  traMADol 25 milliGRAM(s) Oral every 4 hours PRN moderate to severe pain                                9.1    8.10  )-----------( 267      ( 11 Dec 2024 10:55 )             29.1     12-11    137  |  105  |  24[H]  ----------------------------<  95  4.0   |  29  |  0.84    Ca    9.6      11 Dec 2024 10:55    TPro  6.7  /  Alb  2.3[L]  /  TBili  0.9  /  DBili  x   /  AST  24  /  ALT  12  /  AlkPhos  261[H]  12-10    CAPILLARY BLOOD GLUCOSE        LIVER FUNCTIONS - ( 10 Dec 2024 08:17 )  Alb: 2.3 g/dL / Pro: 6.7 gm/dL / ALK PHOS: 261 U/L / ALT: 12 U/L / AST: 24 U/L / GGT: x           PT/INR - ( 11 Dec 2024 10:55 )   PT: 16.8 sec;   INR: 1.47 ratio         PTT - ( 11 Dec 2024 10:55 )  PTT:42.1 sec  Urinalysis Basic - ( 11 Dec 2024 10:55 )    Color: x / Appearance: x / SG: x / pH: x  Gluc: 95 mg/dL / Ketone: x  / Bili: x / Urobili: x   Blood: x / Protein: x / Nitrite: x   Leuk Esterase: x / RBC: x / WBC x   Sq Epi: x / Non Sq Epi: x / Bacteria: x        Assessment/Plan:  79 year old female w anemia, AS s/p TAVR, AFIB on eliquis, grade 3 diastolic dysfx, CVA , R 2nd rib fx, hx temporal arteritis, HLD, HTN BIBEMS to ER from [facility] c/o abnormal labs from 2 days PTA Hb 6.8.      #Anemia: acute on chronic / iron deficiency / possible acute blood loss from recent trauma:   - no melena by hx or by physical exam, rectal Guaiac negative  - s/p recent iron infusions x 2  - s/p 2u PRBC 12/11  - Hb: 6.5 --> 9.1  - s/p bone marrow biopsy 12/11  - EGD tentatively Friday?       #AS s/p TAVR  #Grade 3 diastolic dysfunction  - lasix 30 mg qd        #Chronic AFib:  - eliquis      #CVA : recent   thought cardioembolic  - AC changed from xarelto to eliquis      #HLD  - statin      #R shoulder pain/hematoma / R 2nd rib fx / Unsteady gait:  - PT/OT  - acetaminophen 975 TID  - methacarbamol 250 mg q HS  - lidocaine patch  - tramadol prn  - gabapentin      #GERD  - omaprazole interchange to protonix  - carafate TID was empiric addition      #HTN  - monitor BP        #VTE  - AC      #GOC  - full code

## 2024-12-11 NOTE — PROGRESS NOTE ADULT - ASSESSMENT
80 y/o F with extensive cardiac PMHx currently readmitted for severe anemia, pending PRBC transfusions.    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, Hapto low, Direct MATILDA IgG / Poly positive only, C3 negative)  - There is a degree of BOLIVAR as well which is likely contributory   - S/p BM bx with IR 12/11 AM  - Plan likely to begin therapy for hemolytic anemia with Prednisone 1-2mg / kg while inpatient but will speak with additional Hematology attendings prior   - CT AP imaging pending; evidence of cirrhosis on prior 10/2024 imaging  - Continue to trend serial CBCs while admitted   - Continue supportive measures as necessary         Chester Alejandra PA-C  Hematology Oncology   [facility] [place]  [phone]

## 2024-12-11 NOTE — OCCUPATIONAL THERAPY INITIAL EVALUATION ADULT - ADDITIONAL COMMENTS
Pt reports she resides in a  with her son and grandson (who is Autistic and her son is typically busy with) with 5 DARYN, FOS to her bedroom/full bathroom. Pt has a tub/shower combo +curtain. Pt reports prior to hospitalizations/rehab she was totally (I) with ADL/IADLs, walked without AD. At rehab she has been walking with a alfonso cane.

## 2024-12-11 NOTE — PROGRESS NOTE ADULT - NS ATTEND AMEND GEN_ALL_CORE FT
80 y/o F with extensive cardiac PMHx currently readmitted for persistent normocytic anemia, pending PRBC transfusions. Anemia workup underway / hemolysis labs ordered / but LDH is high and hapto is low. Discussed with patient recommendation for BM bx while inpatient to better understand nature of her anemia. IR consulted for BM biopsy. 80 y/o F with extensive cardiac PMHx Including recent TAVR over the summer,currently readmitted for persistent normocytic anemia, s/p PRBC transfusions. Anemia workup underway / hemolysis labs ordered  and reviewed. LDH is high and haptoglobin is low. Discussed with patient recommendation for BM bx while inpatient to better understand nature of her anemia  Bone marrow biopsy was performed 12/11/2024.  Reviewed her direct Les IgG which was positive and C3 negative.  The eluate was nonreactive indicating no antibody; autoimmune hemolytic anemia unlikely in this setting.  More likely drug-induced cause for hemolytic anemia.  Could be secondary to furosemide. Would still rule out bleed with guaiac and plan to undergo GI workup with Dr. Dc with EGD and colonoscopy.  Agree with this plan.  Differential for hemolysis also includes shearing from TAVR placed over the summer.  Would request cardiology to consider repeating echocardiogram to evaluate valve/rule out valvular disease/Leakage  with resultant shearing. Would also ask cardiology if can hold furosemide at this time. Bone marrow biopsy results pending for further elucidation of significant anemia.

## 2024-12-11 NOTE — OCCUPATIONAL THERAPY INITIAL EVALUATION ADULT - GENERAL OBSERVATIONS, REHAB EVAL
Pt found seated at bedside chair on 5E in NAD, agreeable to participate in PT Evaluation. Pt is able to perform sit<>stand transfers with CGA to RW. Pt is able to ambulate 75 feet with RW and CGA. Pt returned to bedside chair with chair alarm on, call bell and phone in reach, EVELIO Rodas is present and aware. Pt rec'd/left seated at bedside chair on 5E in NAD, lines intact, items in reach, needs met, in NAD, agreeable to participate in OT Evaluation.

## 2024-12-11 NOTE — CONSULT NOTE ADULT - ASSESSMENT
Imp:  Anemia, guaiac neg  Although it could be a hematologic issue, I think endoscopic eval is still reasonable    Rec:  Offered EGD at end of today or EGD colon Friday (tomorrow endo schedule full)  She will consider  F/U heme evaluation

## 2024-12-12 ENCOUNTER — RESULT REVIEW (OUTPATIENT)
Age: 79
End: 2024-12-12

## 2024-12-12 LAB
ADD ON TEST-SPECIMEN IN LAB: SIGNIFICANT CHANGE UP
ADD ON TEST-SPECIMEN IN LAB: SIGNIFICANT CHANGE UP
ANISOCYTOSIS BLD QL: SLIGHT — SIGNIFICANT CHANGE UP
HAV IGM SER-ACNC: SIGNIFICANT CHANGE UP
HBV CORE IGM SER-ACNC: SIGNIFICANT CHANGE UP
HBV SURFACE AG SER-ACNC: SIGNIFICANT CHANGE UP
HCT VFR BLD CALC: 27.2 % — LOW (ref 34.5–45)
HCV AB S/CO SERPL IA: 0.14 S/CO — SIGNIFICANT CHANGE UP (ref 0–0.99)
HCV AB SERPL-IMP: SIGNIFICANT CHANGE UP
HGB BLD-MCNC: 8.4 G/DL — LOW (ref 11.5–15.5)
LDH SERPL L TO P-CCNC: 402 U/L — HIGH (ref 84–241)
MACROCYTES BLD QL: SLIGHT — SIGNIFICANT CHANGE UP
MANUAL SMEAR VERIFICATION: SIGNIFICANT CHANGE UP
MCHC RBC-ENTMCNC: 28.9 PG — SIGNIFICANT CHANGE UP (ref 27–34)
MCHC RBC-ENTMCNC: 30.9 G/DL — LOW (ref 32–36)
MCV RBC AUTO: 93.5 FL — SIGNIFICANT CHANGE UP (ref 80–100)
OVALOCYTES BLD QL SMEAR: SLIGHT — SIGNIFICANT CHANGE UP
PLAT MORPH BLD: NORMAL — SIGNIFICANT CHANGE UP
PLATELET # BLD AUTO: 258 K/UL — SIGNIFICANT CHANGE UP (ref 150–400)
POLYCHROMASIA BLD QL SMEAR: SLIGHT — SIGNIFICANT CHANGE UP
RBC # BLD: 2.91 M/UL — LOW (ref 3.8–5.2)
RBC # FLD: 20.5 % — HIGH (ref 10.3–14.5)
RBC BLD AUTO: ABNORMAL
WBC # BLD: 7.81 K/UL — SIGNIFICANT CHANGE UP (ref 3.8–10.5)
WBC # FLD AUTO: 7.81 K/UL — SIGNIFICANT CHANGE UP (ref 3.8–10.5)

## 2024-12-12 PROCEDURE — 76376 3D RENDER W/INTRP POSTPROCES: CPT | Mod: 26

## 2024-12-12 PROCEDURE — 99232 SBSQ HOSP IP/OBS MODERATE 35: CPT

## 2024-12-12 PROCEDURE — 99232 SBSQ HOSP IP/OBS MODERATE 35: CPT | Mod: FS

## 2024-12-12 PROCEDURE — 93306 TTE W/DOPPLER COMPLETE: CPT | Mod: 26

## 2024-12-12 RX ORDER — PREDNISONE 20 MG/1
70 TABLET ORAL DAILY
Refills: 0 | Status: DISCONTINUED | OUTPATIENT
Start: 2024-12-12 | End: 2024-12-17

## 2024-12-12 RX ADMIN — Medication 1 MILLIGRAM(S): at 09:19

## 2024-12-12 RX ADMIN — LIDOCAINE 1 PATCH: 40 CREAM TOPICAL at 21:46

## 2024-12-12 RX ADMIN — SUCRALFATE 1 GRAM(S): 1 TABLET ORAL at 05:55

## 2024-12-12 RX ADMIN — ACETAMINOPHEN 500MG 975 MILLIGRAM(S): 500 TABLET, COATED ORAL at 13:22

## 2024-12-12 RX ADMIN — APIXABAN 5 MILLIGRAM(S): 2.5 TABLET, FILM COATED ORAL at 21:42

## 2024-12-12 RX ADMIN — METHOCARBAMOL 250 MILLIGRAM(S): 500 TABLET, FILM COATED ORAL at 21:43

## 2024-12-12 RX ADMIN — ACETAMINOPHEN 500MG 975 MILLIGRAM(S): 500 TABLET, COATED ORAL at 16:30

## 2024-12-12 RX ADMIN — ESTROGENS, CONJUGATED 0.62 MILLIGRAM(S): 0.3 TABLET, FILM COATED ORAL at 09:19

## 2024-12-12 RX ADMIN — SUCRALFATE 1 GRAM(S): 1 TABLET ORAL at 13:22

## 2024-12-12 RX ADMIN — GABAPENTIN 300 MILLIGRAM(S): 300 CAPSULE ORAL at 21:43

## 2024-12-12 RX ADMIN — ACETAMINOPHEN 500MG 975 MILLIGRAM(S): 500 TABLET, COATED ORAL at 05:54

## 2024-12-12 RX ADMIN — ACETAMINOPHEN 500MG 975 MILLIGRAM(S): 500 TABLET, COATED ORAL at 21:42

## 2024-12-12 RX ADMIN — PANTOPRAZOLE SODIUM 40 MILLIGRAM(S): 40 TABLET, DELAYED RELEASE ORAL at 05:54

## 2024-12-12 RX ADMIN — SUCRALFATE 1 GRAM(S): 1 TABLET ORAL at 21:43

## 2024-12-12 RX ADMIN — APIXABAN 5 MILLIGRAM(S): 2.5 TABLET, FILM COATED ORAL at 09:19

## 2024-12-12 RX ADMIN — POTASSIUM CHLORIDE 20 MILLIEQUIVALENT(S): 600 TABLET, EXTENDED RELEASE ORAL at 09:19

## 2024-12-12 RX ADMIN — GABAPENTIN 300 MILLIGRAM(S): 300 CAPSULE ORAL at 09:20

## 2024-12-12 RX ADMIN — FUROSEMIDE 30 MILLIGRAM(S): 40 TABLET ORAL at 09:19

## 2024-12-12 RX ADMIN — ACETAMINOPHEN, DIPHENHYDRAMINE HCL, PHENYLEPHRINE HCL 3 MILLIGRAM(S): 325; 25; 5 TABLET ORAL at 21:43

## 2024-12-12 RX ADMIN — Medication 10 MILLIGRAM(S): at 21:43

## 2024-12-12 RX ADMIN — ACETAMINOPHEN 500MG 650 MILLIGRAM(S): 500 TABLET, COATED ORAL at 03:43

## 2024-12-12 NOTE — CONSULT NOTE ADULT - ASSESSMENT
79 F 79 F with aF, on AC, anemia- CVA, aortic stenosis fall with shoulder and rib trauma presents with worsening anemia and weakness      Anemia Work up in progress- s/p Bm bx, scheduled for GI endoscopy/ colonoscopy recommended maintaining Hb at 9 given cardiac hx    AF , HR controlled - continue with eliquis    echo results from last visit reviewed--- preserved LVEF with moderate diastolic dysfunction , dilated atrial and severe secondary pulmonary HtN--- she will need diuretics -she is on lasix- she may benefit from Farxiga ( once procedures are completed)      keep k+ > 4 and mg > 2, recommend daily BNP levels      There is no absolute cardiac contraindication to having colonoscopy/ endoscopy, but is of higher risk due to pHTN and valvular disease

## 2024-12-12 NOTE — PROGRESS NOTE ADULT - ASSESSMENT
80 y/o F with extensive cardiac PMHx currently readmitted for severe anemia, pending PRBC transfusions.    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, Hapto low, Direct MATILDA IgG / Poly positive only, C3 negative)  - There is a degree of BOLIVAR as well which could be contributory   - S/p BM bx with IR 12/11 pending final path  - CT AP imaging this admission with liver cirrhosis which could be contributory  - Cardio following given recent TAVR this past summer with raised concern for mechanical shearing / paravalvular leak; patient did have echo prior admission with noted paravalvular regurgitation   - At this time, etiology of hemolytic process seems to be multifactorial in setting of mechanical shearing, drug induced (furosemide, NSAIDs / analgesics), underlying BM path   - Continue to trend serial CBCs while admitted   - Continue supportive measures as necessary         Chester Alejandra PA-C  Hematology Oncology   [facility] [place]  [phone]

## 2024-12-12 NOTE — PROGRESS NOTE ADULT - NS ATTEND AMEND GEN_ALL_CORE FT
80 y/o F with extensive cardiac PMHx Including recent TAVR over the summer,currently readmitted for persistent normocytic anemia, s/p PRBC transfusions. Anemia workup underway / hemolysis labs ordered  and reviewed. LDH is high and haptoglobin is low. Discussed with patient recommendation for BM bx while inpatient to better understand nature of her anemia  Bone marrow biopsy was performed 12/11/2024.  Reviewed her direct Les IgG which was positive and C3 negative.  The eluate was nonreactive indicating no antibody; autoimmune hemolytic anemia unlikely in this setting.  More likely drug-induced cause for hemolytic anemia.  Could be secondary to furosemide. Would still rule out bleed with guaiac and plan to undergo GI workup with Dr. Dc with EGD and colonoscopy.  Agree with this plan.  Differential for hemolysis also includes shearing from TAVR placed over the summer.  Would request cardiology to consider repeating echocardiogram to evaluate valve/rule out valvular disease/Leakage  with resultant shearing. Would also ask cardiology if can hold furosemide at this time. Bone marrow biopsy results pending for further elucidation of significant anemia. 78 y/o F with extensive cardiac PMHx Including recent TAVR over the summer, currently readmitted for persistent normocytic anemia, s/p PRBC transfusions. Anemia workup underway / hemolysis labs ordered  and reviewed. LDH is high and haptoglobin is low. She is s/p BM bx 12/11/24 while inpatient to better understand nature of her anemia.  Reviewed her direct Les IgG which was positive and C3 negative.  The eluate was nonreactive indicating no antibody; autoimmune hemolytic anemia unlikely in this setting.  More likely drug-induced cause for hemolytic anemia.  Could be secondary to furosemide. Would still rule out bleed with guaiac and plan to undergo GI workup with Dr. Dc with EGD and colonoscopy.  Agree with this plan.  Differential for hemolysis also includes shearing from TAVR placed over the summer. Discussed with Dr. Lovell cardiology who will consider repeating echocardiogram to evaluate valve/rule out valvular disease/leakage and change furosemide to Bumex or other. S/p bone marrow biopsy results pending for further elucidation of significant anemia. Pt not in room today to discuss plan to start prednisone 1 mg/ kg for evidence of hemolysis. Will discuss with patient in AM and when optimized for discharge, she has an appt on 12/18/25 with VERNON Rod in [place].

## 2024-12-12 NOTE — PROGRESS NOTE ADULT - SUBJECTIVE AND OBJECTIVE BOX
Patient is a 79y old  Female who presents with a chief complaint of acute anemia (12 Dec 2024 06:51)      Subective:  No complaints    PAST MEDICAL & SURGICAL HISTORY:  History of Cardiac Arrhythmia      Hypertension      Atrial Fibrillation      Aortic stenosis      Nonrheumatic aortic valve stenosis      High cholesterol      H/O temporal arteritis      S/P Exploratory Laparotomy  1966      H/O prior ablation treatment      History of temporal artery biopsy      S/P foot surgery          MEDICATIONS  (STANDING):  acetaminophen     Tablet .. 975 milliGRAM(s) Oral every 8 hours  apixaban 5 milliGRAM(s) Oral every 12 hours  atorvastatin 10 milliGRAM(s) Oral at bedtime  estrogens    conjugated 0.625 milliGRAM(s) Oral daily  folic acid 1 milliGRAM(s) Oral daily  furosemide    Tablet 30 milliGRAM(s) Oral daily  gabapentin 300 milliGRAM(s) Oral two times a day  influenza  Vaccine (HIGH DOSE) 0.5 milliLiter(s) IntraMuscular once  lidocaine   4% Patch 1 Patch Transdermal every 24 hours  methocarbamol 250 milliGRAM(s) Oral at bedtime  pantoprazole    Tablet 40 milliGRAM(s) Oral before breakfast  polyethylene glycol 3350 17 Gram(s) Oral daily  potassium chloride    Tablet ER 20 milliEquivalent(s) Oral daily  sucralfate 1 Gram(s) Oral <User Schedule>    MEDICATIONS  (PRN):  acetaminophen     Tablet .. 650 milliGRAM(s) Oral every 6 hours PRN Temp greater or equal to 38C (100.4F), Mild Pain (1 - 3)  aluminum hydroxide/magnesium hydroxide/simethicone Suspension 30 milliLiter(s) Oral every 4 hours PRN Dyspepsia  melatonin 3 milliGRAM(s) Oral at bedtime PRN Insomnia  naloxone Injectable 0.4 milliGRAM(s) IV Push once PRN oversedation  ondansetron Injectable 4 milliGRAM(s) IV Push every 8 hours PRN Nausea and/or Vomiting  traMADol 25 milliGRAM(s) Oral every 4 hours PRN moderate to severe pain      REVIEW OF SYSTEMS:    RESPIRATORY: No shortness of breath  CARDIOVASCULAR: No chest pain  All other review of systems is negative unless indicated above.    Vital Signs Last 24 Hrs  T(C): 36.3 (12 Dec 2024 08:34), Max: 36.9 (11 Dec 2024 23:34)  T(F): 97.4 (12 Dec 2024 08:34), Max: 98.5 (11 Dec 2024 23:34)  HR: 72 (12 Dec 2024 08:34) (61 - 92)  BP: 137/69 (12 Dec 2024 08:34) (137/69 - 150/477)  BP(mean): 789 (11 Dec 2024 16:18) (789 - 789)  RR: 17 (12 Dec 2024 08:34) (14 - 18)  SpO2: 95% (12 Dec 2024 08:34) (94% - 98%)    Parameters below as of 12 Dec 2024 08:34  Patient On (Oxygen Delivery Method): room air        PHYSICAL EXAM:    Constitutional: NAD, well-developed  Respiratory: CTAB  Cardiovascular: S1 and S2, RRR  Gastrointestinal: BS+, soft, NT/ND  Extremities: No peripheral edema  Psychiatric: Normal mood, normal affect    LABS:                        9.1    8.10  )-----------( 267      ( 11 Dec 2024 10:55 )             29.1     12-11    137  |  105  |  24[H]  ----------------------------<  95  4.0   |  29  |  0.84    Ca    9.6      11 Dec 2024 10:55      PT/INR - ( 11 Dec 2024 10:55 )   PT: 16.8 sec;   INR: 1.47 ratio         PTT - ( 11 Dec 2024 10:55 )  PTT:42.1 sec      RADIOLOGY & ADDITIONAL STUDIES:

## 2024-12-12 NOTE — PROGRESS NOTE ADULT - SUBJECTIVE AND OBJECTIVE BOX
CC: acute anemia    History of Present Illness:   79 year old female w anemia, AS s/p TAVR, AFIB oneliquis, grade 3 diastolic dysfx, CVA , R 2nd rib fx, hx temporal arteritis, HLD, HTN BIBEMS to ER from [facility] c/o abnormal labs from 2 days PTA Hb 6.8.     S:  12/10: Sitting in chair, awake, alert, states she feels okay but anemia worsening.  Discussed blood transfusion for which pt is in agreement with.  Discussed plan of care and addressed all questions and concerns to the best of my ability.    12/11: Had bone marrow biopsy today.  Tolerated 2 units of blood yesterday.  Denies fever, chills, N, V, abd pain, CP, SOB.  12/12: Pt awake, alert, comfortable. Hb trending down, discussed with patient.  Discussed plan of care and addressed all questions and concerns to the best of my ability.    REVIEW OF SYSTEMS: All other review of systems is negative unless indicated above.      Vital Signs Last 24 Hrs  T(C): 36.3 (12 Dec 2024 08:34), Max: 36.9 (11 Dec 2024 23:34)  T(F): 97.4 (12 Dec 2024 08:34), Max: 98.5 (11 Dec 2024 23:34)  HR: 72 (12 Dec 2024 08:34) (72 - 92)  BP: 137/69 (12 Dec 2024 08:34) (137/69 - 150/477)  BP(mean): 789 (11 Dec 2024 16:18) (789 - 789)  RR: 17 (12 Dec 2024 08:34) (16 - 17)  SpO2: 95% (12 Dec 2024 08:34) (95% - 98%)    Parameters below as of 12 Dec 2024 08:34  Patient On (Oxygen Delivery Method): room air      PHYSICAL EXAM:    Constitutional: NAD, awake and alert  HEENT: PERR, EOMI, Normal Hearing, MMM  Neck: Soft and supple  Respiratory: Breath sounds are clear bilaterally, No wheezing, rales or rhonchi  Cardiovascular: S1 and S2, regular rate and rhythm, no Murmurs, gallops or rubs  Gastrointestinal: Bowel Sounds present, soft, nontender, nondistended, no guarding, no rebound  Extremities: No peripheral edema  Neurological: A/O x 3, no focal deficits in my limited exam    MEDICATIONS  (STANDING):  acetaminophen     Tablet .. 975 milliGRAM(s) Oral every 8 hours  apixaban 5 milliGRAM(s) Oral every 12 hours  atorvastatin 10 milliGRAM(s) Oral at bedtime  estrogens    conjugated 0.625 milliGRAM(s) Oral daily  folic acid 1 milliGRAM(s) Oral daily  gabapentin 300 milliGRAM(s) Oral two times a day  influenza  Vaccine (HIGH DOSE) 0.5 milliLiter(s) IntraMuscular once  lidocaine   4% Patch 1 Patch Transdermal every 24 hours  methocarbamol 250 milliGRAM(s) Oral at bedtime  pantoprazole    Tablet 40 milliGRAM(s) Oral before breakfast  polyethylene glycol 3350 17 Gram(s) Oral daily  potassium chloride    Tablet ER 20 milliEquivalent(s) Oral daily  sucralfate 1 Gram(s) Oral <User Schedule>    MEDICATIONS  (PRN):  acetaminophen     Tablet .. 650 milliGRAM(s) Oral every 6 hours PRN Temp greater or equal to 38C (100.4F), Mild Pain (1 - 3)  aluminum hydroxide/magnesium hydroxide/simethicone Suspension 30 milliLiter(s) Oral every 4 hours PRN Dyspepsia  melatonin 3 milliGRAM(s) Oral at bedtime PRN Insomnia  naloxone Injectable 0.4 milliGRAM(s) IV Push once PRN oversedation  ondansetron Injectable 4 milliGRAM(s) IV Push every 8 hours PRN Nausea and/or Vomiting  traMADol 25 milliGRAM(s) Oral every 4 hours PRN moderate to severe pain                                8.4    7.81  )-----------( 258      ( 12 Dec 2024 08:20 )             27.2     12-11    137  |  105  |  24[H]  ----------------------------<  95  4.0   |  29  |  0.84    Ca    9.6      11 Dec 2024 10:55      CAPILLARY BLOOD GLUCOSE          PT/INR - ( 11 Dec 2024 10:55 )   PT: 16.8 sec;   INR: 1.47 ratio         PTT - ( 11 Dec 2024 10:55 )  PTT:42.1 sec  Urinalysis Basic - ( 11 Dec 2024 10:55 )    Color: x / Appearance: x / SG: x / pH: x  Gluc: 95 mg/dL / Ketone: x  / Bili: x / Urobili: x   Blood: x / Protein: x / Nitrite: x   Leuk Esterase: x / RBC: x / WBC x   Sq Epi: x / Non Sq Epi: x / Bacteria: x      Assessment/Plan:  79 year old female w anemia, AS s/p TAVR, AFIB on eliquis, grade 3 diastolic dysfx, CVA , R 2nd rib fx, hx temporal arteritis, HLD, HTN BIBEMS to ER from [facility] c/o abnormal labs from 2 days PTA Hb 6.8.      #Anemia: acute on chronic / iron deficiency / concern for possible hemolysis:   - no melena by hx or by physical exam, rectal Guaiac negative  - s/p recent iron infusions x 2  - s/p 2u PRBC 12/11  - Hb: 6.5 --> 9.1 --> 8.4  - s/p bone marrow biopsy 12/11  - guiaic negative, egd/colo low yield, hold off for now  - repeat echo, concern for TAVR associated hemolysis      #AS s/p TAVR  #Grade 3 diastolic dysfunction  - hold lasix for now        #Chronic AFib:  - eliquis      #CVA : recent   thought cardioembolic  - AC changed from xarelto to eliquis      #HLD  - statin      #R shoulder pain/hematoma / R 2nd rib fx / Unsteady gait:  - PT/OT  - acetaminophen 975 TID  - methacarbamol 250 mg q HS  - lidocaine patch  - tramadol prn  - gabapentin      #GERD  - omaprazole interchange to protonix  - carafate TID was empiric addition      #HTN  - monitor BP        #VTE  - AC      #GOC  - full code

## 2024-12-12 NOTE — CONSULT NOTE ADULT - SUBJECTIVE AND OBJECTIVE BOX
CHIEF COMPLAINT: Patient is a 79y old  Female who presents with a chief complaint of acute anemia (11 Dec 2024 12:53)      HPI:  79 year old female w anemia, AS s/p TAVR, AFIB oneliquis, grade 3 diastolic dysfx, CVA , R 2nd rib fx, hx temporal arteritis, HLD, HTN BIBEMS to ER from [facility] c/o abnormal labs from 2 days PTA Hb 6.8.       Adm 11-06 to  admitted s/p fall with R shoulder pain s/p fall   # nondisplaced fracture of the anterior right second rib  # intramuscular hematoma R shoulder, lower dose AC recommended  #Fever with acute hypoxic resp failure RESOLVED CTA neg PE  Likely multifactorial (reactive to intramuscular hematoma 2/2 pain)  #UTI tx w merrem  #CVA : MRI acute infarct R centrum semiovale; cardioembolic stroke in setting of holding AC due to acute anemia after trauma.  #Grade 3 diastolic dysfunction on TTE EF 55%  #Anemia due to IM hematoma s/p PRBC x 2, seen by Heme   At [facility] Fe 23/ 229 UIBC /251 TIBC; eliquis held since Hb 7.1 to 6.8; given IV iron x 2  pt denied hematuria, black stools,     12/12 s/p transfusion with appropriate increase in hb  CT reveals liver cirhosis  heme and GI following  s/p bm bx- results pending      PAST MEDICAL HX  AS aortic stenosis   Atrial Fibrillation   CVA  MRI acute infarct  R centrum semiovale  H/O temporal arteritis   HLD hyperlipidemia  HTN hypertension   Nonrheumatic aortic valve stenosis.            FAMILY HX  lung cancer : Father      SOCIAL HX  at [facility] for rehab  nonsmoker  no alcohol   (09 Dec 2024 17:47)      PMHx: PAST MEDICAL & SURGICAL HISTORY:  History of Cardiac Arrhythmia  Hypertension  Atrial Fibrillation  Nonrheumatic aortic valve stenosis s/p TAVR  High cholesterol  H/O temporal arteritis  S/P Exploratory Laparotomy  1966  H/O prior ablation treatment  History of temporal artery biopsy  S/P foot surgery      Soc Hx: no toxic habits      Allergies: Allergies    adhesives (Rash)  No Known Drug Allergies    Intolerances          Vital Signs Last 24 Hrs  T(C): 36.9 (11 Dec 2024 23:34), Max: 36.9 (11 Dec 2024 23:34)  T(F): 98.5 (11 Dec 2024 23:34), Max: 98.5 (11 Dec 2024 23:34)  HR: 76 (11 Dec 2024 23:34) (61 - 92)  BP: 143/63 (11 Dec 2024 23:34) (142/60 - 150/477)  BP(mean): 789 (11 Dec 2024 16:18) (79 - 789)  RR: 16 (11 Dec 2024 23:34) (14 - 18)  SpO2: 95% (11 Dec 2024 23:34) (93% - 98%)    Parameters below as of 11 Dec 2024 23:34  Patient On (Oxygen Delivery Method): room air        I&O's Summary    10 Dec 2024 07:01  -  11 Dec 2024 07:00  --------------------------------------------------------  IN: 537 mL / OUT: 0 mL / NET: 537 mL    11 Dec 2024 07:01  -  12 Dec 2024 06:52  --------------------------------------------------------  IN: 100 mL / OUT: 0 mL / NET: 100 mL            PHYSICAL EXAM:   Constitutional: NAD, awake and alert, well-developed  HEENT: PERR, EOMI, Normal Hearing, MMM  Neck: Soft and supple, No LAD, No JVD  Respiratory: Breath sounds are clear bilaterally, No wheezing, rales or rhonchi  Cardiovascular: S1 and S2,irregular rate and rhythm, EDWIGE  Extremities: No peripheral edema  Vascular: 2+ peripheral pulses  Neurological: A/O x 3, no focal deficits      MEDICATIONS:  MEDICATIONS  (STANDING):  acetaminophen     Tablet .. 975 milliGRAM(s) Oral every 8 hours  apixaban 5 milliGRAM(s) Oral every 12 hours  atorvastatin 10 milliGRAM(s) Oral at bedtime  estrogens    conjugated 0.625 milliGRAM(s) Oral daily  folic acid 1 milliGRAM(s) Oral daily  furosemide    Tablet 30 milliGRAM(s) Oral daily  gabapentin 300 milliGRAM(s) Oral two times a day  influenza  Vaccine (HIGH DOSE) 0.5 milliLiter(s) IntraMuscular once  lidocaine   4% Patch 1 Patch Transdermal every 24 hours  methocarbamol 250 milliGRAM(s) Oral at bedtime  pantoprazole    Tablet 40 milliGRAM(s) Oral before breakfast  polyethylene glycol 3350 17 Gram(s) Oral daily  potassium chloride    Tablet ER 20 milliEquivalent(s) Oral daily  sucralfate 1 Gram(s) Oral <User Schedule>      LABS: All Labs Reviewed:                        9.1    8.10  )-----------( 267      ( 11 Dec 2024 10:55 )             29.1     12-11    137  |  105  |  24[H]  ----------------------------<  95  4.0   |  29  |  0.84    Ca    9.6      11 Dec 2024 10:55    TPro  6.7  /  Alb  2.3[L]  /  TBili  0.9  /  DBili  x   /  AST  24  /  ALT  12  /  AlkPhos  261[H]  12-10    PT/INR - ( 11 Dec 2024 10:55 )   PT: 16.8 sec;   INR: 1.47 ratio         PTT - ( 11 Dec 2024 10:55 )  PTT:42.1 sec          RADIOLOGY:< from: CT Abdomen and Pelvis w/ IV Cont (12.11.24 @ 12:11) >  FINDINGS:  LOWER CHEST: Cardiomegaly and transcatheter aortic valve repair.    LIVER: Cirrhotic morphology. No focal liver lesion. Heterogeneous   parenchymal attenuation likely due to passive hepatic congestion with   bridging fibrosis. No focal hepatic lesion.  BILE DUCTS: Normal caliber.  GALLBLADDER: Cholelithiasis.  SPLEEN: Within normal limits.  PANCREAS: Within normal limits.  ADRENALS: Within normal limits.  KIDNEYS/URETERS: Bilateral renal scarring. Right renal cyst.    BLADDER: Within normal limits.  REPRODUCTIVE ORGANS: Hysterectomy.    BOWEL: No bowel obstruction. Appendix not visualized. Large amount of   stool throughout the colon.  PERITONEUM/RETROPERITONEUM: Within normal limits.  VESSELS: Atherosclerotic changes. Patent hepatic, portal and superior   mesenteric veins.  LYMPH NODES: No lymphadenopathy.  ABDOMINAL WALL: Within normal limits.  BONES: No acute findings.    IMPRESSION:  Cirrhosis with passive hepatic congestion.  No evidence of hepatocellular carcinoma.          --- End of Report ---      < end of copied text >      EKG:< from: 12 Lead ECG (12.09.24 @ 14:32) >    Diagnosis Line Atrial fibrillation  Left axis deviation  Incomplete right bundle branch block  Minimal voltage criteria for LVH, may be normal variant ( [place] product )  Anteroseptal infarct (cited on or before 08-FEB-2016)  Abnormal ECG  When compared with ECG of 02-NOV-2024 17:40,  Incomplete right bundle branch block is now Present  Confirmedby EDDIE CASTILLO ([id number]) on 12/10/2024 8:24:57 AM    < end of copied text >        ECHO:< from: TTE W or WO Ultrasound Enhancing Agent (11.07.24 @ 16:07) >     Left Ventricle:  The left ventricular cavity is normal in size. Left ventricular wall thickness is mildly increased. Abnormal (paradoxical) septal motion consistent with conduction delay. Left ventricular systolic function is normal with an ejection fraction visually estimated at 55 to 60%. Mild left ventricular hypertrophy. The left ventricular diastolic function is indeterminate.     Right Ventricle:  The right ventricular cavity is enlarged in size and right ventricular systolic function is borderline reduced. Tricuspid annular plane systolic excursion (TAPSE) is 1.7 cm (normal >=1.7 cm).     Left Atrium:  The left atrium is moderately dilated with an indexed volume of 47.36 ml/m².     Right Atrium:  The right atrium is severely dilated with an indexed volume of 48.37 ml/m².     Interatrial Septum:  The interatrial septum appears intact.     Aortic Valve:  A a transcatheter deployed (TAVR) is present in the aortic position. The prosthetic valve is well seated with normal function. There is mild paravalvular regurgitation.     Mitral Valve:  Structurally normal mitral valve with normal leaflet excursion. There is mitral valve thickening of the anterior and posterior leaflets. There is mild calcification of the mitral valve annulus. There is mild mitral regurgitation.     Tricuspid Valve:  Structurally normal tricuspid valve with normal leaflet excursion. There is severe tricuspid regurgitation. Estimated pulmonary artery systolic pressure is 73 mmHg, consistent with severe pulmonary hypertension.     Pulmonic Valve:  The pulmonic valve was not well visualized. There is trace pulmonic regurgitation.     Aorta:  The aortic root at the sinuses of Valsalva is normal in size, measuring 3.00 cm (indexed 1.78 cm/m²). The ascending aorta is normal in size, measuring 3.30 cm (indexed 1.96 cm/m²).     Pericardium:  No pericardial effusion seen.     Systemic Veins:  The inferior vena cava is dilated (dilated >2.1cm) with abnormal inspiratory collapse (abnormal <50%) consistent with elevated right atrial pressure (~15, range 10-20mmHg).  ____________________________________________________________________    < end of copied text >         CHIEF COMPLAINT: Patient is a 79y old  Female who presents with a chief complaint of acute anemia (11 Dec 2024 12:53)      HPI:  79 year old female w anemia, AS s/p TAVR, AFIB oneliquis, grade 3 diastolic dysfx, CVA , R 2nd rib fx, hx temporal arteritis, HLD, HTN BIBEMS to ER from [facility] c/o abnormal labs from 2 days PTA Hb 6.8.       Adm 11-06 to  admitted s/p fall with R shoulder pain s/p fall   # nondisplaced fracture of the anterior right second rib  # intramuscular hematoma R shoulder, lower dose AC recommended  #Fever with acute hypoxic resp failure RESOLVED CTA neg PE  Likely multifactorial (reactive to intramuscular hematoma 2/2 pain)  #UTI tx w merrem  #CVA : MRI acute infarct R centrum semiovale; cardioembolic stroke in setting of holding AC due to acute anemia after trauma.  #Grade 3 diastolic dysfunction on TTE EF 55%  #Anemia due to IM hematoma s/p PRBC x 2, seen by Heme   At [facility] Fe 23/ 229 UIBC /251 TIBC; eliquis held since Hb 7.1 to 6.8; given IV iron x 2  pt denied hematuria, black stools,     12/12 s/p transfusion with appropriate increase in hb  CT reveals liver cirrhosis  heme and GI following  s/p bm bx- results pending      PAST MEDICAL HX  AS aortic stenosis   Atrial Fibrillation   CVA  MRI acute infarct  R centrum semiovale  H/O temporal arteritis   HLD hyperlipidemia  HTN hypertension   Nonrheumatic aortic valve stenosis.            FAMILY HX  lung cancer : Father      SOCIAL HX  at [facility] for rehab  nonsmoker  no alcohol   (09 Dec 2024 17:47)      PMHx: PAST MEDICAL & SURGICAL HISTORY:  History of Cardiac Arrhythmia  Hypertension  Atrial Fibrillation  Nonrheumatic aortic valve stenosis s/p TAVR  High cholesterol  H/O temporal arteritis  S/P Exploratory Laparotomy  1966  H/O prior ablation treatment  History of temporal artery biopsy  S/P foot surgery      Allergies: Allergies    adhesives (Rash)  No Known Drug Allergies    Intolerances          Vital Signs Last 24 Hrs  T(C): 36.9 (11 Dec 2024 23:34), Max: 36.9 (11 Dec 2024 23:34)  T(F): 98.5 (11 Dec 2024 23:34), Max: 98.5 (11 Dec 2024 23:34)  HR: 76 (11 Dec 2024 23:34) (61 - 92)  BP: 143/63 (11 Dec 2024 23:34) (142/60 - 150/477)  BP(mean): 789 (11 Dec 2024 16:18) (79 - 789)  RR: 16 (11 Dec 2024 23:34) (14 - 18)  SpO2: 95% (11 Dec 2024 23:34) (93% - 98%)    Parameters below as of 11 Dec 2024 23:34  Patient On (Oxygen Delivery Method): room air        Vital Signs Last 24 Hrs  T(C): 36.9 (11 Dec 2024 23:34), Max: 36.9 (11 Dec 2024 23:34)  T(F): 98.5 (11 Dec 2024 23:34), Max: 98.5 (11 Dec 2024 23:34)  HR: 76 (11 Dec 2024 23:34) (61 - 92)  BP: 143/63 (11 Dec 2024 23:34) (142/60 - 150/477)  BP(mean): 789 (11 Dec 2024 16:18) (79 - 789)  RR: 16 (11 Dec 2024 23:34) (14 - 18)  SpO2: 95% (11 Dec 2024 23:34) (93% - 98%)    Parameters below as of 11 Dec 2024 23:34  Patient On (Oxygen Delivery Method): room air          PHYSICAL EXAM:   Constitutional: NAD, awake and alert, well-developed  HEENT: PERR, EOMI, Normal Hearing, MMM  Neck: Soft and supple, No LAD, No JVD  Respiratory: Breath sounds are clear bilaterally, No wheezing, rales or rhonchi  Cardiovascular: S1 and S2,irregular rate and rhythm, EDWIGE  Extremities: No peripheral edema  Vascular: 2+ peripheral pulses  Neurological: A/O x 3, no focal deficits      MEDICATIONS:  MEDICATIONS  (STANDING):  acetaminophen     Tablet .. 975 milliGRAM(s) Oral every 8 hours  apixaban 5 milliGRAM(s) Oral every 12 hours  atorvastatin 10 milliGRAM(s) Oral at bedtime  estrogens    conjugated 0.625 milliGRAM(s) Oral daily  folic acid 1 milliGRAM(s) Oral daily  furosemide    Tablet 30 milliGRAM(s) Oral daily  gabapentin 300 milliGRAM(s) Oral two times a day  influenza  Vaccine (HIGH DOSE) 0.5 milliLiter(s) IntraMuscular once  lidocaine   4% Patch 1 Patch Transdermal every 24 hours  methocarbamol 250 milliGRAM(s) Oral at bedtime  pantoprazole    Tablet 40 milliGRAM(s) Oral before breakfast  polyethylene glycol 3350 17 Gram(s) Oral daily  potassium chloride    Tablet ER 20 milliEquivalent(s) Oral daily  sucralfate 1 Gram(s) Oral <User Schedule>      LABS: All Labs Reviewed:                                      9.1    8.10  )-----------( 267      ( 11 Dec 2024 10:55 )             29.1   12-11    137  |  105  |  24[H]  ----------------------------<  95  4.0   |  29  |  0.84    Ca    9.6      11 Dec 2024 10:55    TPro  6.7  /  Alb  2.3[L]  /  TBili  0.9  /  DBili  x   /  AST  24  /  ALT  12  /  AlkPhos  261[H]  12-10       PTT - ( 11 Dec 2024 10:55 )  PTT:42.1 sec          RADIOLOGY:< from: CT Abdomen and Pelvis w/ IV Cont (12.11.24 @ 12:11) >  FINDINGS:  LOWER CHEST: Cardiomegaly and transcatheter aortic valve repair.    LIVER: Cirrhotic morphology. No focal liver lesion. Heterogeneous   parenchymal attenuation likely due to passive hepatic congestion with   bridging fibrosis. No focal hepatic lesion.  BILE DUCTS: Normal caliber.  GALLBLADDER: Cholelithiasis.  SPLEEN: Within normal limits.  PANCREAS: Within normal limits.  ADRENALS: Within normal limits.  KIDNEYS/URETERS: Bilateral renal scarring. Right renal cyst.    BLADDER: Within normal limits.  REPRODUCTIVE ORGANS: Hysterectomy.    BOWEL: No bowel obstruction. Appendix not visualized. Large amount of   stool throughout the colon.  PERITONEUM/RETROPERITONEUM: Within normal limits.  VESSELS: Atherosclerotic changes. Patent hepatic, portal and superior   mesenteric veins.  LYMPH NODES: No lymphadenopathy.  ABDOMINAL WALL: Within normal limits.  BONES: No acute findings.    IMPRESSION:  Cirrhosis with passive hepatic congestion.  No evidence of hepatocellular carcinoma.          --- End of Report ---      < end of copied text >      EKG:< from: 12 Lead ECG (12.09.24 @ 14:32) >    Diagnosis Line Atrial fibrillation  Left axis deviation  Incomplete right bundle branch block  Minimal voltage criteria for LVH, may be normal variant ( [place] product )  Anteroseptal infarct (cited on or before 08-FEB-2016)  Abnormal ECG  When compared with ECG of 02-NOV-2024 17:40,  Incomplete right bundle branch block is now Present  Confirmedby EDDIE CASTILLO ([id number]) on 12/10/2024 8:24:57 AM    < end of copied text >        ECHO:< from: TTE W or WO Ultrasound Enhancing Agent (11.07.24 @ 16:07) >     Left Ventricle:  The left ventricular cavity is normal in size. Left ventricular wall thickness is mildly increased. Abnormal (paradoxical) septal motion consistent with conduction delay. Left ventricular systolic function is normal with an ejection fraction visually estimated at 55 to 60%. Mild left ventricular hypertrophy. The left ventricular diastolic function is indeterminate.     Right Ventricle:  The right ventricular cavity is enlarged in size and right ventricular systolic function is borderline reduced. Tricuspid annular plane systolic excursion (TAPSE) is 1.7 cm (normal >=1.7 cm).     Left Atrium:  The left atrium is moderately dilated with an indexed volume of 47.36 ml/m².     Right Atrium:  The right atrium is severely dilated with an indexed volume of 48.37 ml/m².     Interatrial Septum:  The interatrial septum appears intact.     Aortic Valve:  A a transcatheter deployed (TAVR) is present in the aortic position. The prosthetic valve is well seated with normal function. There is mild paravalvular regurgitation.     Mitral Valve:  Structurally normal mitral valve with normal leaflet excursion. There is mitral valve thickening of the anterior and posterior leaflets. There is mild calcification of the mitral valve annulus. There is mild mitral regurgitation.     Tricuspid Valve:  Structurally normal tricuspid valve with normal leaflet excursion. There is severe tricuspid regurgitation. Estimated pulmonary artery systolic pressure is 73 mmHg, consistent with severe pulmonary hypertension.     Pulmonic Valve:  The pulmonic valve was not well visualized. There is trace pulmonic regurgitation.     Aorta:  The aortic root at the sinuses of Valsalva is normal in size, measuring 3.00 cm (indexed 1.78 cm/m²). The ascending aorta is normal in size, measuring 3.30 cm (indexed 1.96 cm/m²).     Pericardium:  No pericardial effusion seen.     Systemic Veins:  The inferior vena cava is dilated (dilated >2.1cm) with abnormal inspiratory collapse (abnormal <50%) consistent with elevated right atrial pressure (~15, range 10-20mmHg).  ____________________________________________________________________    < end of copied text >

## 2024-12-12 NOTE — PROGRESS NOTE ADULT - ASSESSMENT
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

## 2024-12-12 NOTE — PROGRESS NOTE ADULT - SUBJECTIVE AND OBJECTIVE BOX
HPI:    80 y/o F with persistent anemia, AS s/p TAVR, AFib on Eliquis, grade 3 diastolic dysfx, CVA 11/2024, temporal arteritis, HLD, HTN brought to the ED from [facility] for low H/H on outpatient labs.     12/10/24: Seen at bedside accompanied by close friend, no acute distress, agreeable to BM bx after speaking with IR regarding procedure     12/11/24: S/p BM bx, no acute distress     12/12/24: To be seen      PAST MEDICAL & SURGICAL HISTORY:    History of Cardiac Arrhythmia    Hypertension    Atrial Fibrillation    Aortic stenosis    Nonrheumatic aortic valve stenosis    High cholesterol    H/O temporal arteritis    S/P Exploratory Laparotomy  1966    H/O prior ablation treatment    History of temporal artery biopsy    S/P foot surgery        MEDICATIONS  (STANDING):    acetaminophen     Tablet .. 975 milliGRAM(s) Oral every 8 hours  apixaban 5 milliGRAM(s) Oral every 12 hours  atorvastatin 10 milliGRAM(s) Oral at bedtime  estrogens    conjugated 0.625 milliGRAM(s) Oral daily  folic acid 1 milliGRAM(s) Oral daily  furosemide    Tablet 30 milliGRAM(s) Oral daily  gabapentin 300 milliGRAM(s) Oral two times a day  influenza  Vaccine (HIGH DOSE) 0.5 milliLiter(s) IntraMuscular once  lidocaine   4% Patch 1 Patch Transdermal every 24 hours  methocarbamol 250 milliGRAM(s) Oral at bedtime  pantoprazole    Tablet 40 milliGRAM(s) Oral before breakfast  polyethylene glycol 3350 17 Gram(s) Oral daily  potassium chloride    Tablet ER 20 milliEquivalent(s) Oral daily  sucralfate 1 Gram(s) Oral <User Schedule>      MEDICATIONS  (PRN):    acetaminophen     Tablet .. 650 milliGRAM(s) Oral every 6 hours PRN Temp greater or equal to 38C (100.4F), Mild Pain (1 - 3)  aluminum hydroxide/magnesium hydroxide/simethicone Suspension 30 milliLiter(s) Oral every 4 hours PRN Dyspepsia  melatonin 3 milliGRAM(s) Oral at bedtime PRN Insomnia  naloxone Injectable 0.4 milliGRAM(s) IV Push once PRN oversedation  ondansetron Injectable 4 milliGRAM(s) IV Push every 8 hours PRN Nausea and/or Vomiting  traMADol 25 milliGRAM(s) Oral every 4 hours PRN moderate to severe pain        ALLERGIES:    adhesives (Rash)  No Known Drug Allergies        FAMILY HISTORY:    lung cancer (Father)        REVIEW OF SYSTEMS:    Constitutional, Eyes, ENT, Cardiovascular, Respiratory, Gastrointestinal, Genitourinary, Musculoskeletal, Integumentary, Neurological, Psychiatric, Endocrine, Heme/Lymph and Allergic/Immunologic review of systems are otherwise negative except as noted in HPI.       VITALS:    T(C): 36.3 (12 Dec 2024 08:34), Max: 36.9 (11 Dec 2024 23:34)  T(F): 97.4 (12 Dec 2024 08:34), Max: 98.5 (11 Dec 2024 23:34)  HR: 72 (12 Dec 2024 08:34) (72 - 92)  BP: 137/69 (12 Dec 2024 08:34) (137/69 - 150/477)  BP(mean): 789 (11 Dec 2024 16:18) (789 - 789)  ABP: --  ABP(mean): --  RR: 17 (12 Dec 2024 08:34) (16 - 17)  SpO2: 95% (12 Dec 2024 08:34) (95% - 98%)    O2 Parameters below as of 12 Dec 2024 08:34  Patient On (Oxygen Delivery Method): room air      PHYSICAL:    Constitutional: no acute distress   Eyes: no conjunctival infection, anicteric.   ENT: pharynx is unremarkable  Neck: supple without JVD  Pulmonary: clear to auscultation bilaterally  Cardiac: RRR  Vascular: no calf tenderness, venous stasis changes  Abdomen: normoactive bowel sounds, soft and nontender, no hepatosplenomegaly or masses appreciated.   Lymphatic: no peripheral adenopathy appreciated.   Musculoskeletal: full range of motion and no deformities appreciated.   Skin: normal appearance, no rash  Neurology: awake, alert, oriented; no obvious focal deficits        LABS:                                   9.1    8.10  )-----------( 267      ( 11 Dec 2024 10:55 )             29.1     12-11    137  |  105  |  24[H]  ----------------------------<  95  4.0   |  29  |  0.84    Ca    9.6      11 Dec 2024 10:55                 RADIOLOGY & ADDITIONAL STUDIES:      CT Abdomen and Pelvis w/ IV Cont (12.11.24 @ 12:11    FINDINGS:  LOWER CHEST: Cardiomegaly and transcatheter aortic valve repair.    LIVER: Cirrhotic morphology. No focal liver lesion. Heterogeneous   parenchymal attenuation likely due to passive hepatic congestion with   bridging fibrosis. No focal hepatic lesion.  BILE DUCTS: Normal caliber.  GALLBLADDER: Cholelithiasis.  SPLEEN: Within normal limits.  PANCREAS: Within normal limits.  ADRENALS: Within normal limits.  KIDNEYS/URETERS: Bilateral renal scarring. Right renal cyst.    BLADDER: Within normal limits.  REPRODUCTIVE ORGANS: Hysterectomy.    BOWEL: No bowel obstruction. Appendix not visualized. Large amount of   stool throughout the colon.  PERITONEUM/RETROPERITONEUM: Within normal limits.  VESSELS: Atherosclerotic changes. Patent hepatic, portal and superior   mesenteric veins.  LYMPH NODES: No lymphadenopathy.  ABDOMINAL WALL: Within normal limits.  BONES: No acute findings.    IMPRESSION:  Cirrhosis with passive hepatic congestion.  No evidence of hepatocellular carcinoma.

## 2024-12-13 PROBLEM — Z12.83 SCREENING EXAM FOR SKIN CANCER: Status: RESOLVED | Noted: 2020-09-11 | Resolved: 2024-12-13

## 2024-12-13 PROBLEM — Z86.2 HISTORY OF HEMOLYTIC ANEMIA: Status: RESOLVED | Noted: 2024-12-13 | Resolved: 2024-12-13

## 2024-12-13 PROBLEM — Z92.89 HISTORY OF BLOOD TRANSFUSION: Status: RESOLVED | Noted: 2024-12-13 | Resolved: 2024-12-13

## 2024-12-13 PROBLEM — Z86.2 HISTORY OF ANEMIA: Status: RESOLVED | Noted: 2024-12-13 | Resolved: 2024-12-13

## 2024-12-13 PROBLEM — I27.20 PULMONARY HYPERTENSION, MODERATE TO SEVERE: Status: ACTIVE | Noted: 2024-12-13

## 2024-12-13 PROBLEM — Z86.03 HISTORY OF NEOPLASM OF UNCERTAIN BEHAVIOR OF SKIN: Status: RESOLVED | Noted: 2020-09-11 | Resolved: 2024-12-13

## 2024-12-13 PROBLEM — Z87.2 HISTORY OF SEBORRHEIC KERATOSIS: Status: RESOLVED | Noted: 2017-07-19 | Resolved: 2024-12-13

## 2024-12-13 LAB
ADD ON TEST-SPECIMEN IN LAB: SIGNIFICANT CHANGE UP
GGT SERPL-CCNC: 106 U/L — HIGH (ref 8–40)
HCT VFR BLD CALC: 25.9 % — LOW (ref 34.5–45)
HGB BLD-MCNC: 8.2 G/DL — LOW (ref 11.5–15.5)
MCHC RBC-ENTMCNC: 29.6 PG — SIGNIFICANT CHANGE UP (ref 27–34)
MCHC RBC-ENTMCNC: 31.7 G/DL — LOW (ref 32–36)
MCV RBC AUTO: 93.5 FL — SIGNIFICANT CHANGE UP (ref 80–100)
NT-PROBNP SERPL-SCNC: 1733 PG/ML — HIGH (ref 0–450)
PLATELET # BLD AUTO: 259 K/UL — SIGNIFICANT CHANGE UP (ref 150–400)
RBC # BLD: 2.77 M/UL — LOW (ref 3.8–5.2)
RBC # FLD: 20.3 % — HIGH (ref 10.3–14.5)
WBC # BLD: 7.64 K/UL — SIGNIFICANT CHANGE UP (ref 3.8–10.5)
WBC # FLD AUTO: 7.64 K/UL — SIGNIFICANT CHANGE UP (ref 3.8–10.5)

## 2024-12-13 PROCEDURE — 99232 SBSQ HOSP IP/OBS MODERATE 35: CPT

## 2024-12-13 RX ORDER — TORSEMIDE 10 MG
20 TABLET ORAL DAILY
Refills: 0 | Status: DISCONTINUED | OUTPATIENT
Start: 2024-12-13 | End: 2024-12-17

## 2024-12-13 RX ADMIN — APIXABAN 5 MILLIGRAM(S): 2.5 TABLET, FILM COATED ORAL at 21:11

## 2024-12-13 RX ADMIN — ACETAMINOPHEN 500MG 650 MILLIGRAM(S): 500 TABLET, COATED ORAL at 09:25

## 2024-12-13 RX ADMIN — PREDNISONE 70 MILLIGRAM(S): 20 TABLET ORAL at 09:29

## 2024-12-13 RX ADMIN — ACETAMINOPHEN 500MG 975 MILLIGRAM(S): 500 TABLET, COATED ORAL at 16:09

## 2024-12-13 RX ADMIN — APIXABAN 5 MILLIGRAM(S): 2.5 TABLET, FILM COATED ORAL at 09:28

## 2024-12-13 RX ADMIN — LIDOCAINE 1 PATCH: 40 CREAM TOPICAL at 21:10

## 2024-12-13 RX ADMIN — Medication 10 MILLIGRAM(S): at 21:11

## 2024-12-13 RX ADMIN — POTASSIUM CHLORIDE 20 MILLIEQUIVALENT(S): 600 TABLET, EXTENDED RELEASE ORAL at 09:28

## 2024-12-13 RX ADMIN — SUCRALFATE 1 GRAM(S): 1 TABLET ORAL at 13:22

## 2024-12-13 RX ADMIN — ACETAMINOPHEN 500MG 975 MILLIGRAM(S): 500 TABLET, COATED ORAL at 13:22

## 2024-12-13 RX ADMIN — METHOCARBAMOL 250 MILLIGRAM(S): 500 TABLET, FILM COATED ORAL at 21:11

## 2024-12-13 RX ADMIN — ACETAMINOPHEN 500MG 975 MILLIGRAM(S): 500 TABLET, COATED ORAL at 05:40

## 2024-12-13 RX ADMIN — Medication 1 MILLIGRAM(S): at 09:28

## 2024-12-13 RX ADMIN — ACETAMINOPHEN 500MG 975 MILLIGRAM(S): 500 TABLET, COATED ORAL at 21:10

## 2024-12-13 RX ADMIN — ESTROGENS, CONJUGATED 0.62 MILLIGRAM(S): 0.3 TABLET, FILM COATED ORAL at 09:26

## 2024-12-13 RX ADMIN — SUCRALFATE 1 GRAM(S): 1 TABLET ORAL at 21:11

## 2024-12-13 RX ADMIN — LIDOCAINE 1 PATCH: 40 CREAM TOPICAL at 06:28

## 2024-12-13 RX ADMIN — ACETAMINOPHEN, DIPHENHYDRAMINE HCL, PHENYLEPHRINE HCL 3 MILLIGRAM(S): 325; 25; 5 TABLET ORAL at 21:12

## 2024-12-13 RX ADMIN — GABAPENTIN 300 MILLIGRAM(S): 300 CAPSULE ORAL at 21:11

## 2024-12-13 RX ADMIN — SUCRALFATE 1 GRAM(S): 1 TABLET ORAL at 09:25

## 2024-12-13 RX ADMIN — ACETAMINOPHEN 500MG 650 MILLIGRAM(S): 500 TABLET, COATED ORAL at 11:51

## 2024-12-13 RX ADMIN — PANTOPRAZOLE SODIUM 40 MILLIGRAM(S): 40 TABLET, DELAYED RELEASE ORAL at 05:40

## 2024-12-13 RX ADMIN — GABAPENTIN 300 MILLIGRAM(S): 300 CAPSULE ORAL at 09:28

## 2024-12-13 RX ADMIN — LIDOCAINE 1 PATCH: 40 CREAM TOPICAL at 12:35

## 2024-12-13 RX ADMIN — Medication 20 MILLIGRAM(S): at 09:29

## 2024-12-13 NOTE — PROGRESS NOTE ADULT - SUBJECTIVE AND OBJECTIVE BOX
CHIEF COMPLAINT: Patient is a 79y old  Female who presents with a chief complaint of acute anemia (11 Dec 2024 12:53)      HPI:  79 year old female w anemia, AS s/p TAVR, AFIB oneliquis, grade 3 diastolic dysfx, CVA , R 2nd rib fx, hx temporal arteritis, HLD, HTN BIBEMS to ER from [facility] c/o abnormal labs from 2 days PTA Hb 6.8.       Adm 11-06 to  admitted s/p fall with R shoulder pain s/p fall   # nondisplaced fracture of the anterior right second rib  # intramuscular hematoma R shoulder, lower dose AC recommended  #Fever with acute hypoxic resp failure RESOLVED CTA neg PE  Likely multifactorial (reactive to intramuscular hematoma 2/2 pain)  #UTI tx w merrem  #CVA : MRI acute infarct R centrum semiovale; cardioembolic stroke in setting of holding AC due to acute anemia after trauma.  #Grade 3 diastolic dysfunction on TTE EF 55%  #Anemia due to IM hematoma s/p PRBC x 2, seen by Heme   At [facility] Fe 23/ 229 UIBC /251 TIBC; eliquis held since Hb 7.1 to 6.8; given IV iron x 2  pt denied hematuria, black stools,     12/12 s/p transfusion with appropriate increase in hb  CT reveals liver cirrhosis  heme and GI following  s/p bm bx- results pending    12/13/24-- starting prednisone for hemolytic anemia-  reviewed follwo up echo-- preserved EF, normal functioning TAVR valve, severe pHTN, significant diastolic dysfunction       PAST MEDICAL HX  AS aortic stenosis   Atrial Fibrillation   CVA  MRI acute infarct  R centrum semiovale  H/O temporal arteritis   HLD hyperlipidemia  HTN hypertension   Nonrheumatic aortic valve stenosis.            FAMILY HX  lung cancer : Father      SOCIAL HX  at [facility] for rehab  nonsmoker  no alcohol   (09 Dec 2024 17:47)      PMHx: PAST MEDICAL & SURGICAL HISTORY:  History of Cardiac Arrhythmia  Hypertension  Atrial Fibrillation  Nonrheumatic aortic valve stenosis s/p TAVR  High cholesterol  H/O temporal arteritis  S/P Exploratory Laparotomy  1966  H/O prior ablation treatment  History of temporal artery biopsy  S/P foot surgery      Allergies: Allergies    adhesives (Rash)  No Known Drug Allergies    Intolerances        Vital Signs Last 24 Hrs  T(C): 36.6 (12 Dec 2024 23:30), Max: 36.6 (12 Dec 2024 23:30)  T(F): 97.9 (12 Dec 2024 23:30), Max: 97.9 (12 Dec 2024 23:30)  HR: 78 (12 Dec 2024 23:30) (72 - 80)  BP: 140/60 (12 Dec 2024 23:30) (137/69 - 150/76)  BP(mean): 94 (12 Dec 2024 15:49) (94 - 94)  RR: 17 (12 Dec 2024 23:30) (17 - 17)  SpO2: 95% (12 Dec 2024 23:30) (95% - 100%)    Parameters below as of 12 Dec 2024 23:30  Patient On (Oxygen Delivery Method): room air          PHYSICAL EXAM:   Constitutional: NAD, awake and alert, well-developed  HEENT: PERR, EOMI, Normal Hearing, MMM  Neck: Soft and supple, No LAD, No JVD  Respiratory: Breath sounds are clear bilaterally, No wheezing, rales or rhonchi  Cardiovascular: S1 and S2,irregular rate and rhythm, EDWIGE  Extremities: No peripheral edema  Vascular: 2+ peripheral pulses  Neurological: A/O x 3, no focal deficits      MEDICATIONS  (STANDING):  acetaminophen     Tablet .. 975 milliGRAM(s) Oral every 8 hours  apixaban 5 milliGRAM(s) Oral every 12 hours  atorvastatin 10 milliGRAM(s) Oral at bedtime  estrogens    conjugated 0.625 milliGRAM(s) Oral daily  folic acid 1 milliGRAM(s) Oral daily  gabapentin 300 milliGRAM(s) Oral two times a day  influenza  Vaccine (HIGH DOSE) 0.5 milliLiter(s) IntraMuscular once  lidocaine   4% Patch 1 Patch Transdermal every 24 hours  methocarbamol 250 milliGRAM(s) Oral at bedtime  pantoprazole    Tablet 40 milliGRAM(s) Oral before breakfast  polyethylene glycol 3350 17 Gram(s) Oral daily  potassium chloride    Tablet ER 20 milliEquivalent(s) Oral daily  predniSONE   Tablet 70 milliGRAM(s) Oral daily  sucralfate 1 Gram(s) Oral <User Schedule>  torsemide 20 milliGRAM(s) Oral daily          LABS: All Labs Reviewed:                                     8.4    7.81  )-----------( 258      ( 12 Dec 2024 08:20 )             27.2   12-11    137  |  105  |  24[H]  ----------------------------<  95  4.0   |  29  |  0.84    Ca    9.6      11 Dec 2024 10:55          RADIOLOGY:< from: CT Abdomen and Pelvis w/ IV Cont (12.11.24 @ 12:11) >  FINDINGS:  LOWER CHEST: Cardiomegaly and transcatheter aortic valve repair.    LIVER: Cirrhotic morphology. No focal liver lesion. Heterogeneous   parenchymal attenuation likely due to passive hepatic congestion with   bridging fibrosis. No focal hepatic lesion.  BILE DUCTS: Normal caliber.  GALLBLADDER: Cholelithiasis.  SPLEEN: Within normal limits.  PANCREAS: Within normal limits.  ADRENALS: Within normal limits.  KIDNEYS/URETERS: Bilateral renal scarring. Right renal cyst.    BLADDER: Within normal limits.  REPRODUCTIVE ORGANS: Hysterectomy.    BOWEL: No bowel obstruction. Appendix not visualized. Large amount of   stool throughout the colon.  PERITONEUM/RETROPERITONEUM: Within normal limits.  VESSELS: Atherosclerotic changes. Patent hepatic, portal and superior   mesenteric veins.  LYMPH NODES: No lymphadenopathy.  ABDOMINAL WALL: Within normal limits.  BONES: No acute findings.    IMPRESSION:  Cirrhosis with passive hepatic congestion.  No evidence of hepatocellular carcinoma.          --- End of Report ---      < end of copied text >      EKG:< from: 12 Lead ECG (12.09.24 @ 14:32) >    Diagnosis Line Atrial fibrillation  Left axis deviation  Incomplete right bundle branch block  Minimal voltage criteria for LVH, may be normal variant ( [place] product )  Anteroseptal infarct (cited on or before 08-FEB-2016)  Abnormal ECG  When compared with ECG of 02-NOV-2024 17:40,  Incomplete right bundle branch block is now Present  Confirmedby EDDIE CASTILLO ([id number]) on 12/10/2024 8:24:57 AM    < end of copied text >      < from: TTE W or WO Ultrasound Enhancing Agent (12.12.24 @ 14:18) >    CONCLUSIONS:      1. Estimated pulmonary artery systolic pressure is 62 mmHg, consistent with severe pulmonary hypertension.   2. Severe tricuspid regurgitation.   3. A Transcatheter deployed (TAVR) valve replacement is present in the aortic position The prosthetic valve is well seated. Mild intravalvular regurgitation.   4. Mild to moderate mitral regurgitation.   5. Left ventricular cavity is normal in size. Left ventricular systolic function is normal with an ejection fraction visually estimated at 55 to 60 %.   6. No left ventricular hypertrophy.   7. Enlarged right ventricular cavity size.   8. The right atrium is moderately dilated.   9. Compared to the transthoracic echocardiogram performed on 11/7/2024, there have been no significant interval changes.    ________________________________________________________________________________________  FINDINGS:     Left Ventricle:  The left ventricular cavity is normal in size. Left ventricular wall thickness is normal. Left ventricular systolic function is normal with a calculated ejection fraction of 57 % by 3D with an ejection fraction visually estimated at 55 to 60%. The left ventricular diastolic function is indeterminate.     Right Ventricle:  The right ventricular cavity is enlarged in size and right ventricular systolic function is normal. Tricuspid annular plane systolic excursion (TAPSE) is 2.2 cm (normal >=1.7 cm).     Left Atrium:  The left atrium is normal in size with an indexed volume of 27.06 ml/m².     Right Atrium:  The right atrium is moderately dilated with an indexed volume of 45.77 ml/m² and an indexed area of 14.37 cm²/m².     Interatrial Septum:  The interatrial septum appears intact.     Aortic Valve:  A a transcatheter deployed (TAVR) is present in the aortic position. The prosthetic valve is well seated. The peak transaortic velocity is 2.42 m/s, peak transaortic gradient is 23.4 mmHg and mean transaortic gradient is 11.0 mmHg with an LVOT/aortic valve VTI ratio of 0.61. The effective orifice area is estimated at 2.33 cm² by the continuity equation. There is mild intravalvular regurgitation.     Mitral Valve:  There is normal leaflet mobility of the mitral valve. There is moderate calcification of the mitral valve annulus. There is mild to moderate mitral regurgitation.     Tricuspid Valve:  Structurally normal tricuspid valve with normal leaflet excursion. There is severe tricuspid regurgitation. Estimated pulmonary artery systolic pressure is 62 mmHg, consistent with severe pulmonary hypertension.     Pulmonic Valve:  Structurally normal pulmonic valve with normal leaflet excursion.     Aorta:  The ascending aorta is normal in size, measuring 3.07 cm (indexed 1.78 cm/m²). The aortic arch diameter is normal in size, measuring 2.5 cm (indexed 1.45 cm/m²).     Pericardium:  No pericardial effusion seen.     Systemic Veins:  The inferior vena cava is dilated measuring 2.40 cm in diameter, (dilated >2.1cm) with normal inspiratory collapse (normal >50%) consistent with mildly elevated right atrial pressure (~8, range 5-10mmHg).  ____________________________________________________________________  QUANTITATIVE DATA:  Left Ventricle Measurements: (Indexed to BSA)     IVSd (2D):   0.9 cm  LVPWd (2D):  0.9 cm  LVIDd (2D):  4.5 cm  LVIDs (2D):  3.2 cm  LV Mass:     142 g  82.0 g/m²  Visualized LV EF%: 55 to 60%  3D LV EF%:         57 %     MV E Vmax:    1.55 m/s  MV A Vmax:    0.43 m/s  MV E/A:       3.57  e' lateral:   11.70 cm/s  e' medial:    7.18 cm/s  E/e' lateral: 13.25  E/e' medial:  21.59  E/e' Average: 16.42  MV DT:      169 msec    Aorta Measurements: (Normal range) (Indexed to BSA)     Ao Asc prox: 3.07 cm 1.78 cm/m²  Ao Arch:     2.5 cm            Left Atrium Measurements: (Indexed to BSA)  LA Diam 2D: 4.50 cm         Right Ventricle Measurements: Right Atrial Measurements:     TAPSE:           2.2 cm       RA Vol:            79.00 ml  RV S' Vmax:      11.50 cm/s   RA Vol s, MOD A4C  72.0 ml  RV Base (RVID1): 4.5 cm       RA Vol Index:      45.77 ml/m²                                RA Area s, MOD A4C 23.2 cm²       LVOT / RVOT/ Qp/Qs Data: (Indexed to BSA)  LVOT Diameter:  2.20 cm  LVOT Area:      3.80 cm²  LVOT Vmax:      1.47 m/s  LVOT Vmn:       1.040 m/s  LVOT VTI:       26.90 cm  LVOT peak grad: 9 mmHg  LVOT mean grad: 4.5 mmHg  LVOT SV:102.3 ml  59.24 ml/m²    Aortic Valve Measurements:  AV Vmax:                2.4 m/s  AV Peak Gradient:       23.4 mmHg  AV Mean Gradient:       11.0 mmHg  AV VTI:                 43.8 cm  AV VTI Ratio:           0.61  AoV EOA, Contin:        2.33 cm²  AoV EOA, Contin i:      1.35 cm²/m²  AoV Dimensionless Index 0.61    Mitral Valve Measurements:     MV E Vmax: 1.6 m/s  MV A Vmax: 0.4 m/s  MV E/A:    3.6       Tricuspid Valve Measurements:     TR Vmax:          3.7 m/s  TR Peak Gradient: 53.6 mmHg  RA Pressure:      8 mmHg  PASP:             62 mmHg    ________________________________________________________________________________________  Electronically signed on 12/12/2024 at 9:19:49 PM by Damián Aguirre MD         *** Final ***    < end of copied text >

## 2024-12-13 NOTE — PROGRESS NOTE ADULT - SUBJECTIVE AND OBJECTIVE BOX
HPI:    80 y/o F with persistent anemia, AS s/p TAVR, AFib on Eliquis, grade 3 diastolic dysfx, CVA 11/2024, temporal arteritis, HLD, HTN brought to the ED from [facility] for low H/H on outpatient labs.     12/10/24: Seen at bedside accompanied by close friend, no acute distress, agreeable to BM bx after speaking with IR regarding procedure     12/11/24: S/p BM bx, no acute distress     12/13/24: No acute distress    PAST MEDICAL & SURGICAL HISTORY:    History of Cardiac Arrhythmia    Hypertension    Atrial Fibrillation    Aortic stenosis    Nonrheumatic aortic valve stenosis    High cholesterol    H/O temporal arteritis    S/P Exploratory Laparotomy  1966    H/O prior ablation treatment    History of temporal artery biopsy    S/P foot surgery        MEDICATIONS  (STANDING):    acetaminophen     Tablet .. 975 milliGRAM(s) Oral every 8 hours  apixaban 5 milliGRAM(s) Oral every 12 hours  atorvastatin 10 milliGRAM(s) Oral at bedtime  estrogens    conjugated 0.625 milliGRAM(s) Oral daily  folic acid 1 milliGRAM(s) Oral daily  furosemide    Tablet 30 milliGRAM(s) Oral daily  gabapentin 300 milliGRAM(s) Oral two times a day  influenza  Vaccine (HIGH DOSE) 0.5 milliLiter(s) IntraMuscular once  lidocaine   4% Patch 1 Patch Transdermal every 24 hours  methocarbamol 250 milliGRAM(s) Oral at bedtime  pantoprazole    Tablet 40 milliGRAM(s) Oral before breakfast  polyethylene glycol 3350 17 Gram(s) Oral daily  potassium chloride    Tablet ER 20 milliEquivalent(s) Oral daily  sucralfate 1 Gram(s) Oral <User Schedule>      MEDICATIONS  (PRN):    acetaminophen     Tablet .. 650 milliGRAM(s) Oral every 6 hours PRN Temp greater or equal to 38C (100.4F), Mild Pain (1 - 3)  aluminum hydroxide/magnesium hydroxide/simethicone Suspension 30 milliLiter(s) Oral every 4 hours PRN Dyspepsia  melatonin 3 milliGRAM(s) Oral at bedtime PRN Insomnia  naloxone Injectable 0.4 milliGRAM(s) IV Push once PRN oversedation  ondansetron Injectable 4 milliGRAM(s) IV Push every 8 hours PRN Nausea and/or Vomiting  traMADol 25 milliGRAM(s) Oral every 4 hours PRN moderate to severe pain        ALLERGIES:    adhesives (Rash)  No Known Drug Allergies        FAMILY HISTORY:    lung cancer (Father)        REVIEW OF SYSTEMS:    Constitutional, Eyes, ENT, Cardiovascular, Respiratory, Gastrointestinal, Genitourinary, Musculoskeletal, Integumentary, Neurological, Psychiatric, Endocrine, Heme/Lymph and Allergic/Immunologic review of systems are otherwise negative except as noted in HPI.       VITALS:  ICU Vital Signs Last 24 Hrs  T(C): 36.5 (13 Dec 2024 15:48), Max: 36.6 (12 Dec 2024 23:30)  T(F): 97.7 (13 Dec 2024 15:48), Max: 97.9 (12 Dec 2024 23:30)  HR: 75 (13 Dec 2024 15:48) (75 - 79)  BP: 138/68 (13 Dec 2024 15:48) (138/68 - 145/66)  BP(mean): --  ABP: --  ABP(mean): --  RR: 17 (13 Dec 2024 15:48) (17 - 17)  SpO2: 94% (13 Dec 2024 15:48) (94% - 100%)    O2 Parameters below as of 13 Dec 2024 15:48  Patient On (Oxygen Delivery Method): room air      PHYSICAL:    Constitutional: no acute distress   Eyes: no conjunctival infection, anicteric.   ENT: pharynx is unremarkable  Neck: supple without JVD  Pulmonary: clear to auscultation bilaterally  Cardiac: RRR  Vascular: no calf tenderness, venous stasis changes  Abdomen: normoactive bowel sounds, soft and nontender, no hepatosplenomegaly or masses appreciated.   Lymphatic: no peripheral adenopathy appreciated.   Musculoskeletal: full range of motion and no deformities appreciated.   Skin: normal appearance, no rash  Neurology: awake, alert, oriented; no obvious focal deficits        LABS:                                                8.2    7.64  )-----------( 259      ( 13 Dec 2024 08:21 )             25.9           RADIOLOGY & ADDITIONAL STUDIES:      CT Abdomen and Pelvis w/ IV Cont (12.11.24 @ 12:11    FINDINGS:  LOWER CHEST: Cardiomegaly and transcatheter aortic valve repair.    LIVER: Cirrhotic morphology. No focal liver lesion. Heterogeneous   parenchymal attenuation likely due to passive hepatic congestion with   bridging fibrosis. No focal hepatic lesion.  BILE DUCTS: Normal caliber.  GALLBLADDER: Cholelithiasis.  SPLEEN: Within normal limits.  PANCREAS: Within normal limits.  ADRENALS: Within normal limits.  KIDNEYS/URETERS: Bilateral renal scarring. Right renal cyst.    BLADDER: Within normal limits.  REPRODUCTIVE ORGANS: Hysterectomy.    BOWEL: No bowel obstruction. Appendix not visualized. Large amount of   stool throughout the colon.  PERITONEUM/RETROPERITONEUM: Within normal limits.  VESSELS: Atherosclerotic changes. Patent hepatic, portal and superior   mesenteric veins.  LYMPH NODES: No lymphadenopathy.  ABDOMINAL WALL: Within normal limits.  BONES: No acute findings.    IMPRESSION:  Cirrhosis with passive hepatic congestion.  No evidence of hepatocellular carcinoma.

## 2024-12-13 NOTE — PROGRESS NOTE ADULT - ASSESSMENT
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

## 2024-12-13 NOTE — PROGRESS NOTE ADULT - ASSESSMENT
78 y/o F with extensive cardiac PMHx currently readmitted for severe anemia, pending PRBC transfusions.    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, Hapto low, Direct MATILDA IgG / Poly positive only, C3 negative)  - There is a degree of BOLIVAR as well which could be contributory   - S/p BM bx with IR 12/11 pending final path  - CT AP imaging this admission with liver cirrhosis which could be contributory  - Cardio following given recent TAVR this past summer with raised concern for mechanical shearing / paravalvular leak; patient did have echo prior admission with noted paravalvular regurgitation   - At this time, etiology of hemolytic process seems to be multifactorial in setting of mechanical shearing, drug induced (furosemide, NSAIDs / analgesics), underlying BM path   - reviewed labs to date. LDH is high and haptoglobin is low. Her direct Matilda IgG which was positive and C3 negative.  The eluate was nonreactive indicating no antibody; autoimmune hemolytic anemia unlikely in this setting.  More likely drug-induced cause for hemolytic anemia.  Could be secondary to furosemide. Differential for hemolysis also includes shearing from TAVR placed over the summer. Discussed with Dr. Lovell cardiology s/p echocardiogram and changed furosemide to another diuretic. S/p bone marrow biopsy results pending for further elucidation of significant anemia. Pt started prednisone 1 mg/ kg for evidence of hemolysis. She has an appt on 12/18/25 with VERNON Rod in [place] and plan to taper steroids at that time.  Patient understands plan and is agreeable.  - f/u BM bx 12/11/24  - Continue to trend serial CBCs while admitted

## 2024-12-13 NOTE — PROGRESS NOTE ADULT - ASSESSMENT
79 F with aF, on AC, anemia- CVA, aortic stenosis fall with shoulder and rib trauma presents with worsening anemia and weakness      Anemia Work up in progress- s/p Bm bx, starting steroids,  recommended maintaining Hb at 9 given cardiac hx-- GI workup deferred atthis time     AF , HR controlled - continue with eliquis    echo results reviewed--- preserved LVEF with moderate diastolic dysfunction , dilated atrial and severe secondary pulmonary HtN--- she will need diuretics -she is on torsemide now( off of lasix)  she may benefit from Farxiga ( once procedures are completed)      keep k+ > 4 and mg > 2, recommend daily BNP levels

## 2024-12-13 NOTE — PROGRESS NOTE ADULT - SUBJECTIVE AND OBJECTIVE BOX
CC: acute anemia    History of Present Illness:   79 year old female w anemia, AS s/p TAVR, AFIB oneliquis, grade 3 diastolic dysfx, CVA , R 2nd rib fx, hx temporal arteritis, HLD, HTN BIBEMS to ER from [facility] c/o abnormal labs from 2 days PTA Hb 6.8.     S:  12/10: Sitting in chair, awake, alert, states she feels okay but anemia worsening.  Discussed blood transfusion for which pt is in agreement with.  Discussed plan of care and addressed all questions and concerns to the best of my ability.    12/11: Had bone marrow biopsy today.  Tolerated 2 units of blood yesterday.  Denies fever, chills, N, V, abd pain, CP, SOB.  12/12: Pt awake, alert, comfortable. Hb trending down, discussed with patient.  Discussed plan of care and addressed all questions and concerns to the best of my ability.  12/13:  Status quo, no new events. Starting steroid therapy, all questions addressed.  Denies fever, chills, N, V, abd pain, CP, SOB.    REVIEW OF SYSTEMS: All other review of systems is negative unless indicated above.      Vital Signs Last 24 Hrs  T(C): 36.4 (13 Dec 2024 09:31), Max: 36.6 (12 Dec 2024 23:30)  T(F): 97.6 (13 Dec 2024 09:31), Max: 97.9 (12 Dec 2024 23:30)  HR: 79 (13 Dec 2024 09:31) (78 - 80)  BP: 145/66 (13 Dec 2024 09:31) (140/60 - 150/76)  BP(mean): 94 (12 Dec 2024 15:49) (94 - 94)  RR: 17 (13 Dec 2024 09:31) (17 - 17)  SpO2: 100% (13 Dec 2024 09:31) (95% - 100%)    Parameters below as of 13 Dec 2024 09:31  Patient On (Oxygen Delivery Method): room air        PHYSICAL EXAM:    Constitutional: NAD, awake and alert  HEENT: PERR, EOMI, Normal Hearing, MMM  Neck: Soft and supple  Respiratory: Breath sounds are clear bilaterally, No wheezing, rales or rhonchi  Cardiovascular: S1 and S2, regular rate and rhythm, no Murmurs, gallops or rubs  Gastrointestinal: Bowel Sounds present, soft, nontender, nondistended, no guarding, no rebound  Extremities: No peripheral edema  Neurological: A/O x 3, no focal deficits in my limited exam      MEDICATIONS  (STANDING):  acetaminophen     Tablet .. 975 milliGRAM(s) Oral every 8 hours  apixaban 5 milliGRAM(s) Oral every 12 hours  atorvastatin 10 milliGRAM(s) Oral at bedtime  estrogens    conjugated 0.625 milliGRAM(s) Oral daily  folic acid 1 milliGRAM(s) Oral daily  gabapentin 300 milliGRAM(s) Oral two times a day  influenza  Vaccine (HIGH DOSE) 0.5 milliLiter(s) IntraMuscular once  lidocaine   4% Patch 1 Patch Transdermal every 24 hours  methocarbamol 250 milliGRAM(s) Oral at bedtime  pantoprazole    Tablet 40 milliGRAM(s) Oral before breakfast  polyethylene glycol 3350 17 Gram(s) Oral daily  potassium chloride    Tablet ER 20 milliEquivalent(s) Oral daily  predniSONE   Tablet 70 milliGRAM(s) Oral daily  sucralfate 1 Gram(s) Oral <User Schedule>  torsemide 20 milliGRAM(s) Oral daily    MEDICATIONS  (PRN):  acetaminophen     Tablet .. 650 milliGRAM(s) Oral every 6 hours PRN Temp greater or equal to 38C (100.4F), Mild Pain (1 - 3)  aluminum hydroxide/magnesium hydroxide/simethicone Suspension 30 milliLiter(s) Oral every 4 hours PRN Dyspepsia  melatonin 3 milliGRAM(s) Oral at bedtime PRN Insomnia  naloxone Injectable 0.4 milliGRAM(s) IV Push once PRN oversedation  ondansetron Injectable 4 milliGRAM(s) IV Push every 8 hours PRN Nausea and/or Vomiting  traMADol 25 milliGRAM(s) Oral every 4 hours PRN moderate to severe pain                              8.2    7.64  )-----------( 259      ( 13 Dec 2024 08:21 )             25.9         CAPILLARY BLOOD GLUCOSE      LIVER FUNCTIONS - ( 13 Dec 2024 08:21 )  Alb: x     / Pro: x     / ALK PHOS: x     / ALT: x     / AST: x     / GGT: 106 U/L           Assessment/Plan:  79 year old female w anemia, AS s/p TAVR, AFIB on eliquis, grade 3 diastolic dysfx, CVA , R 2nd rib fx, hx temporal arteritis, HLD, HTN BIBEMS to ER from [facility] c/o abnormal labs from 2 days PTA Hb 6.8.      #Anemia: acute on chronic / iron deficiency / concern for possible hemolysis:   - no melena by hx or by physical exam, rectal Guaiac negative  - s/p recent iron infusions x 2  - s/p 2u PRBC 12/11  - Hb: 6.5 --> 9.1 --> 8.4 --> 8.2  - s/p bone marrow biopsy 12/11  - guiaic negative, egd/colo low yield, hold off for now  - repeat echo noted, no significant changes when compared to 11/7 echo.   - trial of steroids 70mg qd prednisone per heme    #AS s/p TAVR / Grade 3 diastolic dysfunction:  - lasix changed to torsemide  - cardio following    #Chronic AFib:  - eliquis      #CVA : recent   thought cardioembolic  - AC changed from xarelto to eliquis      #HLD  - statin      #R shoulder pain/hematoma / R 2nd rib fx / Unsteady gait:  - PT/OT  - acetaminophen 975 TID  - methacarbamol 250 mg q HS  - lidocaine patch  - tramadol prn  - gabapentin      #GERD  - omaprazole interchange to protonix  - carafate TID was empiric addition      #HTN  - monitor BP        #VTE  - AC      #GOC  - full code    Dispo:  - d/c 1 to 2 days, watch for volume overload on prednisone.

## 2024-12-13 NOTE — PROGRESS NOTE ADULT - SUBJECTIVE AND OBJECTIVE BOX
Patient is a 79y old  Female who presents with a chief complaint of acute anemia (12 Dec 2024 14:39)      Subective:  No bleeding or pain    PAST MEDICAL & SURGICAL HISTORY:  History of Cardiac Arrhythmia      Hypertension      Atrial Fibrillation      Aortic stenosis      Nonrheumatic aortic valve stenosis      High cholesterol      H/O temporal arteritis      S/P Exploratory Laparotomy  1966      H/O prior ablation treatment      History of temporal artery biopsy      S/P foot surgery          MEDICATIONS  (STANDING):  acetaminophen     Tablet .. 975 milliGRAM(s) Oral every 8 hours  apixaban 5 milliGRAM(s) Oral every 12 hours  atorvastatin 10 milliGRAM(s) Oral at bedtime  estrogens    conjugated 0.625 milliGRAM(s) Oral daily  folic acid 1 milliGRAM(s) Oral daily  gabapentin 300 milliGRAM(s) Oral two times a day  influenza  Vaccine (HIGH DOSE) 0.5 milliLiter(s) IntraMuscular once  lidocaine   4% Patch 1 Patch Transdermal every 24 hours  methocarbamol 250 milliGRAM(s) Oral at bedtime  pantoprazole    Tablet 40 milliGRAM(s) Oral before breakfast  polyethylene glycol 3350 17 Gram(s) Oral daily  potassium chloride    Tablet ER 20 milliEquivalent(s) Oral daily  predniSONE   Tablet 70 milliGRAM(s) Oral daily  sucralfate 1 Gram(s) Oral <User Schedule>    MEDICATIONS  (PRN):  acetaminophen     Tablet .. 650 milliGRAM(s) Oral every 6 hours PRN Temp greater or equal to 38C (100.4F), Mild Pain (1 - 3)  aluminum hydroxide/magnesium hydroxide/simethicone Suspension 30 milliLiter(s) Oral every 4 hours PRN Dyspepsia  melatonin 3 milliGRAM(s) Oral at bedtime PRN Insomnia  naloxone Injectable 0.4 milliGRAM(s) IV Push once PRN oversedation  ondansetron Injectable 4 milliGRAM(s) IV Push every 8 hours PRN Nausea and/or Vomiting  traMADol 25 milliGRAM(s) Oral every 4 hours PRN moderate to severe pain      REVIEW OF SYSTEMS:    RESPIRATORY: No shortness of breath  CARDIOVASCULAR: No chest pain  All other review of systems is negative unless indicated above.    Vital Signs Last 24 Hrs  T(C): 36.6 (12 Dec 2024 23:30), Max: 36.6 (12 Dec 2024 23:30)  T(F): 97.9 (12 Dec 2024 23:30), Max: 97.9 (12 Dec 2024 23:30)  HR: 78 (12 Dec 2024 23:30) (72 - 80)  BP: 140/60 (12 Dec 2024 23:30) (137/69 - 150/76)  BP(mean): 94 (12 Dec 2024 15:49) (94 - 94)  RR: 17 (12 Dec 2024 23:30) (17 - 17)  SpO2: 95% (12 Dec 2024 23:30) (95% - 100%)    Parameters below as of 12 Dec 2024 23:30  Patient On (Oxygen Delivery Method): room air        PHYSICAL EXAM:    Constitutional: NAD, well-developed  Respiratory: CTAB  Cardiovascular: S1 and S2, RRR  Gastrointestinal: BS+, soft, NT/ND  Extremities: No peripheral edema  Psychiatric: Normal mood, normal affect    LABS:                        8.4    7.81  )-----------( 258      ( 12 Dec 2024 08:20 )             27.2     12-11    137  |  105  |  24[H]  ----------------------------<  95  4.0   |  29  |  0.84    Ca    9.6      11 Dec 2024 10:55      PT/INR - ( 11 Dec 2024 10:55 )   PT: 16.8 sec;   INR: 1.47 ratio         PTT - ( 11 Dec 2024 10:55 )  PTT:42.1 sec      RADIOLOGY & ADDITIONAL STUDIES:

## 2024-12-14 DIAGNOSIS — T82.03XA LEAKAGE OF HEART VALVE PROSTHESIS, INITIAL ENCOUNTER: ICD-10-CM

## 2024-12-14 DIAGNOSIS — Z95.2 PRESENCE OF PROSTHETIC HEART VALVE: ICD-10-CM

## 2024-12-14 DIAGNOSIS — D58.9 HEREDITARY HEMOLYTIC ANEMIA, UNSPECIFIED: ICD-10-CM

## 2024-12-14 LAB
ANION GAP SERPL CALC-SCNC: 5 MMOL/L — SIGNIFICANT CHANGE UP (ref 5–17)
BUN SERPL-MCNC: 37 MG/DL — HIGH (ref 7–23)
CALCIUM SERPL-MCNC: 9 MG/DL — SIGNIFICANT CHANGE UP (ref 8.5–10.1)
CHLORIDE SERPL-SCNC: 106 MMOL/L — SIGNIFICANT CHANGE UP (ref 96–108)
CO2 SERPL-SCNC: 27 MMOL/L — SIGNIFICANT CHANGE UP (ref 22–31)
CREAT SERPL-MCNC: 0.92 MG/DL — SIGNIFICANT CHANGE UP (ref 0.5–1.3)
EGFR: 63 ML/MIN/1.73M2 — SIGNIFICANT CHANGE UP
GLUCOSE SERPL-MCNC: 121 MG/DL — HIGH (ref 70–99)
HCT VFR BLD CALC: 25.2 % — LOW (ref 34.5–45)
HGB BLD-MCNC: 7.9 G/DL — LOW (ref 11.5–15.5)
MCHC RBC-ENTMCNC: 29.3 PG — SIGNIFICANT CHANGE UP (ref 27–34)
MCHC RBC-ENTMCNC: 31.3 G/DL — LOW (ref 32–36)
MCV RBC AUTO: 93.3 FL — SIGNIFICANT CHANGE UP (ref 80–100)
NT-PROBNP SERPL-SCNC: 2349 PG/ML — HIGH (ref 0–450)
PLATELET # BLD AUTO: 248 K/UL — SIGNIFICANT CHANGE UP (ref 150–400)
POTASSIUM SERPL-MCNC: 2.8 MMOL/L — CRITICAL LOW (ref 3.5–5.3)
POTASSIUM SERPL-SCNC: 2.8 MMOL/L — CRITICAL LOW (ref 3.5–5.3)
RBC # BLD: 2.7 M/UL — LOW (ref 3.8–5.2)
RBC # FLD: 20.3 % — HIGH (ref 10.3–14.5)
SODIUM SERPL-SCNC: 138 MMOL/L — SIGNIFICANT CHANGE UP (ref 135–145)
WBC # BLD: 7.21 K/UL — SIGNIFICANT CHANGE UP (ref 3.8–10.5)
WBC # FLD AUTO: 7.21 K/UL — SIGNIFICANT CHANGE UP (ref 3.8–10.5)

## 2024-12-14 PROCEDURE — 99232 SBSQ HOSP IP/OBS MODERATE 35: CPT

## 2024-12-14 RX ORDER — POTASSIUM CHLORIDE 600 MG/1
20 TABLET, EXTENDED RELEASE ORAL
Refills: 0 | Status: COMPLETED | OUTPATIENT
Start: 2024-12-14 | End: 2024-12-14

## 2024-12-14 RX ADMIN — Medication 10 MILLIGRAM(S): at 22:26

## 2024-12-14 RX ADMIN — ACETAMINOPHEN 500MG 650 MILLIGRAM(S): 500 TABLET, COATED ORAL at 09:16

## 2024-12-14 RX ADMIN — Medication 1 MILLIGRAM(S): at 09:05

## 2024-12-14 RX ADMIN — POTASSIUM CHLORIDE 20 MILLIEQUIVALENT(S): 600 TABLET, EXTENDED RELEASE ORAL at 09:05

## 2024-12-14 RX ADMIN — SUCRALFATE 1 GRAM(S): 1 TABLET ORAL at 09:06

## 2024-12-14 RX ADMIN — PANTOPRAZOLE SODIUM 40 MILLIGRAM(S): 40 TABLET, DELAYED RELEASE ORAL at 06:00

## 2024-12-14 RX ADMIN — LIDOCAINE 1 PATCH: 40 CREAM TOPICAL at 22:25

## 2024-12-14 RX ADMIN — ACETAMINOPHEN, DIPHENHYDRAMINE HCL, PHENYLEPHRINE HCL 3 MILLIGRAM(S): 325; 25; 5 TABLET ORAL at 22:26

## 2024-12-14 RX ADMIN — PREDNISONE 70 MILLIGRAM(S): 20 TABLET ORAL at 09:04

## 2024-12-14 RX ADMIN — APIXABAN 5 MILLIGRAM(S): 2.5 TABLET, FILM COATED ORAL at 22:25

## 2024-12-14 RX ADMIN — Medication 20 MILLIGRAM(S): at 09:06

## 2024-12-14 RX ADMIN — ESTROGENS, CONJUGATED 0.62 MILLIGRAM(S): 0.3 TABLET, FILM COATED ORAL at 09:06

## 2024-12-14 RX ADMIN — GABAPENTIN 300 MILLIGRAM(S): 300 CAPSULE ORAL at 09:08

## 2024-12-14 RX ADMIN — ACETAMINOPHEN 500MG 975 MILLIGRAM(S): 500 TABLET, COATED ORAL at 17:35

## 2024-12-14 RX ADMIN — POTASSIUM CHLORIDE 20 MILLIEQUIVALENT(S): 600 TABLET, EXTENDED RELEASE ORAL at 13:56

## 2024-12-14 RX ADMIN — ACETAMINOPHEN 500MG 975 MILLIGRAM(S): 500 TABLET, COATED ORAL at 22:57

## 2024-12-14 RX ADMIN — GABAPENTIN 300 MILLIGRAM(S): 300 CAPSULE ORAL at 22:25

## 2024-12-14 RX ADMIN — POTASSIUM CHLORIDE 20 MILLIEQUIVALENT(S): 600 TABLET, EXTENDED RELEASE ORAL at 10:44

## 2024-12-14 RX ADMIN — APIXABAN 5 MILLIGRAM(S): 2.5 TABLET, FILM COATED ORAL at 09:06

## 2024-12-14 RX ADMIN — ACETAMINOPHEN 500MG 975 MILLIGRAM(S): 500 TABLET, COATED ORAL at 22:27

## 2024-12-14 RX ADMIN — ACETAMINOPHEN 500MG 975 MILLIGRAM(S): 500 TABLET, COATED ORAL at 06:00

## 2024-12-14 RX ADMIN — SUCRALFATE 1 GRAM(S): 1 TABLET ORAL at 22:25

## 2024-12-14 RX ADMIN — POTASSIUM CHLORIDE 20 MILLIEQUIVALENT(S): 600 TABLET, EXTENDED RELEASE ORAL at 17:36

## 2024-12-14 NOTE — PROGRESS NOTE ADULT - ASSESSMENT
79 F with aF, on AC, anemia- CVA, aortic stenosis fall with shoulder and rib trauma presents with worsening anemia and weakness  Anemia Work up in progress- s/p Bm bx, starting steroids,  recommended maintaining Hb at 9 given cardiac hx-- GI workup deferred atthis time   AF , HR controlled - continue with eliquis  echo results reviewed--- preserved LVEF with moderate diastolic dysfunction , dilated atrial and severe secondary pulmonary HtN--- she will need diuretics -she is on torsemide now( off of lasix)  she may benefit from Farxiga ( once procedures are completed)  keep k+ > 4 and mg > 2, recommend daily BNP levels    12/14/24:  Resting comfortably and 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  No new cardiac symptoms. Contiue as outlined by medicine and hematology etal.  Will follow as treatment progresses.       79 F with aF, on AC, anemia- CVA, aortic stenosis fall with shoulder and rib trauma presents with worsening anemia and weakness  Anemia Work up in progress- s/p Bm bx, starting steroids,  recommended maintaining Hb at 9 given cardiac hx-- GI workup deferred atthis time   AF , HR controlled - continue with eliquis  echo results reviewed--- preserved LVEF with moderate diastolic dysfunction , dilated atrial and severe secondary pulmonary HtN--- she will need diuretics -she is on torsemide now( off of lasix)  she may benefit from Farxiga ( once procedures are completed)  keep k+ > 4 and mg > 2, recommend daily BNP levels    12/14/24:  Resting comfortably and 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  No new cardiac symptoms. Continue as outlined by medicine and hematology etal.  Will follow as treatment progresses.

## 2024-12-14 NOTE — PROGRESS NOTE ADULT - SUBJECTIVE AND OBJECTIVE BOX
CC: acute anemia    History of Present Illness:   79 year old female w anemia, AS s/p TAVR, AFIB oneliquis, grade 3 diastolic dysfx, CVA , R 2nd rib fx, hx temporal arteritis, HLD, HTN BIBEMS to ER from [facility] c/o abnormal labs from 2 days PTA Hb 6.8.     S:  12/10: Sitting in chair, awake, alert, states she feels okay but anemia worsening.  Discussed blood transfusion for which pt is in agreement with.  Discussed plan of care and addressed all questions and concerns to the best of my ability.    12/11: Had bone marrow biopsy today.  Tolerated 2 units of blood yesterday.  Denies fever, chills, N, V, abd pain, CP, SOB.  12/12: Pt awake, alert, comfortable. Hb trending down, discussed with patient.  Discussed plan of care and addressed all questions and concerns to the best of my ability.  12/13: Status quo, no new events. Starting steroid therapy, all questions addressed.  Denies fever, chills, N, V, abd pain, CP, SOB.  12/14: Sitting in chair, states she is voiding a lot since starting torsemide.  Discussed supplementation of potassium.  Discussed plan of care and addressed all questions and concerns to the best of my ability.     REVIEW OF SYSTEMS: All other review of systems is negative unless indicated above.      Vital Signs Last 24 Hrs  T(C): 36.5 (14 Dec 2024 00:00), Max: 36.5 (13 Dec 2024 15:48)  T(F): 97.7 (14 Dec 2024 00:00), Max: 97.7 (13 Dec 2024 15:48)  HR: 96 (14 Dec 2024 00:00) (75 - 96)  BP: 139/79 (14 Dec 2024 00:00) (138/68 - 139/79)  BP(mean): --  RR: 16 (14 Dec 2024 00:00) (16 - 17)  SpO2: 96% (14 Dec 2024 00:00) (94% - 96%)    Parameters below as of 14 Dec 2024 00:00  Patient On (Oxygen Delivery Method): room air        PHYSICAL EXAM:    Constitutional: NAD, awake and alert  HEENT: PERR, EOMI, Normal Hearing, MMM  Neck: Soft and supple  Respiratory: Breath sounds are clear bilaterally, No wheezing, rales or rhonchi  Cardiovascular: S1 and S2, regular rate and rhythm, no Murmurs, gallops or rubs  Gastrointestinal: Bowel Sounds present, soft, nontender, nondistended, no guarding, no rebound  Extremities: + leg edema b/l  Neurological: A/O x 3, no focal deficits in my limited exam      MEDICATIONS  (STANDING):  acetaminophen     Tablet .. 975 milliGRAM(s) Oral every 8 hours  apixaban 5 milliGRAM(s) Oral every 12 hours  atorvastatin 10 milliGRAM(s) Oral at bedtime  estrogens    conjugated 0.625 milliGRAM(s) Oral daily  folic acid 1 milliGRAM(s) Oral daily  gabapentin 300 milliGRAM(s) Oral two times a day  influenza  Vaccine (HIGH DOSE) 0.5 milliLiter(s) IntraMuscular once  lidocaine   4% Patch 1 Patch Transdermal every 24 hours  methocarbamol 250 milliGRAM(s) Oral at bedtime  pantoprazole    Tablet 40 milliGRAM(s) Oral before breakfast  polyethylene glycol 3350 17 Gram(s) Oral daily  potassium chloride    Tablet ER 20 milliEquivalent(s) Oral daily  potassium chloride    Tablet ER 20 milliEquivalent(s) Oral every 2 hours  predniSONE   Tablet 70 milliGRAM(s) Oral daily  sucralfate 1 Gram(s) Oral <User Schedule>  torsemide 20 milliGRAM(s) Oral daily    MEDICATIONS  (PRN):  acetaminophen     Tablet .. 650 milliGRAM(s) Oral every 6 hours PRN Temp greater or equal to 38C (100.4F), Mild Pain (1 - 3)  aluminum hydroxide/magnesium hydroxide/simethicone Suspension 30 milliLiter(s) Oral every 4 hours PRN Dyspepsia  melatonin 3 milliGRAM(s) Oral at bedtime PRN Insomnia  naloxone Injectable 0.4 milliGRAM(s) IV Push once PRN oversedation  ondansetron Injectable 4 milliGRAM(s) IV Push every 8 hours PRN Nausea and/or Vomiting  traMADol 25 milliGRAM(s) Oral every 4 hours PRN moderate to severe pain                                7.9    7.21  )-----------( 248      ( 14 Dec 2024 08:21 )             25.2     12-14    138  |  106  |  37[H]  ----------------------------<  121[H]  2.8[LL]   |  27  |  0.92    Ca    9.0      14 Dec 2024 08:21      CAPILLARY BLOOD GLUCOSE        LIVER FUNCTIONS - ( 13 Dec 2024 08:21 )  Alb: x     / Pro: x     / ALK PHOS: x     / ALT: x     / AST: x     / GGT: 106 U/L         Urinalysis Basic - ( 14 Dec 2024 08:21 )    Color: x / Appearance: x / SG: x / pH: x  Gluc: 121 mg/dL / Ketone: x  / Bili: x / Urobili: x   Blood: x / Protein: x / Nitrite: x   Leuk Esterase: x / RBC: x / WBC x   Sq Epi: x / Non Sq Epi: x / Bacteria: x        Assessment/Plan:  79 year old female w anemia, AS s/p TAVR, AFIB on eliquis, grade 3 diastolic dysfx, CVA , R 2nd rib fx, hx temporal arteritis, HLD, HTN BIBEMS to ER from [facility] c/o abnormal labs from 2 days PTA Hb 6.8.      #Anemia: acute on chronic / iron deficiency / concern for possible hemolysis:   - no melena by hx or by physical exam, rectal Guaiac negative  - s/p recent iron infusions x 2  - s/p 2u PRBC 12/11  - Hb: 6.5 --> 9.1 --> 8.4 --> 8.2 --> 7.9  - s/p bone marrow biopsy 12/11  - guiaic negative, egd/colo low yield, hold off for now  - repeat echo noted, no significant changes when compared to 11/7 echo.   - trial of steroids 70mg qd prednisone per heme, continue   - supplement hypokalemia       #AS s/p TAVR / Grade 3 HFpEF:  - lasix changed to torsemide, monitor  - cardio following    #Chronic AFib:  - eliquis      #CVA : recent   thought cardioembolic  - AC changed from xarelto to eliquis      #HLD  - statin      #R shoulder pain/hematoma / R 2nd rib fx / Unsteady gait:  - PT/OT  - acetaminophen 975 TID  - methacarbamol 250 mg q HS  - lidocaine patch  - tramadol prn  - gabapentin      #GERD  - omaprazole interchange to protonix  - carafate TID was empiric addition      #HTN  - monitor BP        #VTE  - AC      #GOC  - full code    Dispo:  - d/c 1 to 2 days, watch for volume overload on prednisone.               Electronic Signatures:  Ronald Wayne ()  (Signed 13-Dec-2024 13:28)  	Authored: Progress Note, Reason for Admission, Subjective and Objective      Last Updated: 13-Dec-2024 13:28 by Ronald Wayne (DO)

## 2024-12-14 NOTE — PROGRESS NOTE ADULT - SUBJECTIVE AND OBJECTIVE BOX
CHIEF COMPLAINT: Patient is a 79y old  Female who presents with a chief complaint of acute anemia (11 Dec 2024 12:53)      HPI:  79 year old female w anemia, AS s/p TAVR, AFIB oneliquis, grade 3 diastolic dysfx, CVA , R 2nd rib fx, hx temporal arteritis, HLD, HTN BIBEMS to ER from [facility] c/o abnormal labs from 2 days PTA Hb 6.8.       Adm 11-06 to  admitted s/p fall with R shoulder pain s/p fall   # nondisplaced fracture of the anterior right second rib  # intramuscular hematoma R shoulder, lower dose AC recommended  #Fever with acute hypoxic resp failure RESOLVED CTA neg PE  Likely multifactorial (reactive to intramuscular hematoma 2/2 pain)  #UTI tx w merrem  #CVA : MRI acute infarct R centrum semiovale; cardioembolic stroke in setting of holding AC due to acute anemia after trauma.  #Grade 3 diastolic dysfunction on TTE EF 55%  #Anemia due to IM hematoma s/p PRBC x 2, seen by Heme   At [facility] Fe 23/ 229 UIBC /251 TIBC; eliquis held since Hb 7.1 to 6.8; given IV iron x 2  pt denied hematuria, black stools,     12/12 s/p transfusion with appropriate increase in hb  CT reveals liver cirrhosis  heme and GI following  s/p bm bx- results pending    12/13/24-- starting prednisone for hemolytic anemia-  reviewed follow up echo-- preserved EF, normal functioning TAVR valve, severe pHTN, significant diastolic dysfunction     12/14/24:  Resting comfortably and 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  No new cardiac symptoms.        PAST MEDICAL HX  AS aortic stenosis   Atrial Fibrillation   CVA  MRI acute infarct  R centrum semiovale  H/O temporal arteritis   HLD hyperlipidemia  HTN hypertension   Nonrheumatic aortic valve stenosis.       FAMILY HX  lung cancer : Father      SOCIAL HX  at [facility] for rehab  nonsmoker  no alcohol   (09 Dec 2024 17:47)      PMHx: PAST MEDICAL & SURGICAL HISTORY:  History of Cardiac Arrhythmia  Hypertension  Atrial Fibrillation  Nonrheumatic aortic valve stenosis s/p TAVR  High cholesterol  H/O temporal arteritis  S/P Exploratory Laparotomy  1966  H/O prior ablation treatment  History of temporal artery biopsy  S/P foot surgery      Allergies: Allergies  adhesives (Rash)  No Known Drug Allergies      Vital Signs Last 24 Hrs  T(C): 36.5 (14 Dec 2024 00:00), Max: 36.5 (13 Dec 2024 15:48)  T(F): 97.7 (14 Dec 2024 00:00), Max: 97.7 (13 Dec 2024 15:48)  HR: 96 (14 Dec 2024 00:00) (75 - 96)  BP: 139/79 (14 Dec 2024 00:00) (138/68 - 145/66)  RR: 16 (14 Dec 2024 00:00) (16 - 17)  SpO2: 96% (14 Dec 2024 00:00) (94% - 100%): room air      PHYSICAL EXAM:   Constitutional: NAD, awake and alert, well-developed  HEENT: PERR, EOMI, Normal Hearing, MMM  Neck: Soft and supple, No LAD, No JVD  Respiratory: Breath sounds are clear bilaterally, No wheezing, rales or rhonchi  Cardiovascular: S1 and S2,irregular rate and rhythm, EDWIGE  Extremities: No peripheral edema  Vascular: 2+ peripheral pulses  Neurological: A/O x 3, no focal deficits      MEDICATIONS  (STANDING):  acetaminophen     Tablet .. 975 milliGRAM(s) Oral every 8 hours  apixaban 5 milliGRAM(s) Oral every 12 hours  atorvastatin 10 milliGRAM(s) Oral at bedtime  estrogens    conjugated 0.625 milliGRAM(s) Oral daily  folic acid 1 milliGRAM(s) Oral daily  gabapentin 300 milliGRAM(s) Oral two times a day  influenza  Vaccine (HIGH DOSE) 0.5 milliLiter(s) IntraMuscular once  lidocaine   4% Patch 1 Patch Transdermal every 24 hours  methocarbamol 250 milliGRAM(s) Oral at bedtime  pantoprazole    Tablet 40 milliGRAM(s) Oral before breakfast  polyethylene glycol 3350 17 Gram(s) Oral daily  potassium chloride    Tablet ER 20 milliEquivalent(s) Oral daily  predniSONE   Tablet 70 milliGRAM(s) Oral daily  sucralfate 1 Gram(s) Oral <User Schedule>  torsemide 20 milliGRAM(s) Oral daily    MEDICATIONS  (PRN):  acetaminophen     Tablet .. 650 milliGRAM(s) Oral every 6 hours PRN Temp greater or equal to 38C (100.4F), Mild Pain (1 - 3)  aluminum hydroxide/magnesium hydroxide/simethicone Suspension 30 milliLiter(s) Oral every 4 hours PRN Dyspepsia  melatonin 3 milliGRAM(s) Oral at bedtime PRN Insomnia  naloxone Injectable 0.4 milliGRAM(s) IV Push once PRN oversedation  ondansetron Injectable 4 milliGRAM(s) IV Push every 8 hours PRN Nausea and/or Vomiting  traMADol 25 milliGRAM(s) Oral every 4 hours PRN moderate to severe pain      LABS: All Labs Reviewed:                        8.2    7.64  )-----------( 259      ( 13 Dec 2024 08:21 )             25.9                                   8.4    7.81  )-----------( 258      ( 12 Dec 2024 08:20 )             27.2     12-11    137  |  105  |  24[H]  ----------------------------<  95  4.0   |  29  |  0.84    Ca    9.6      11 Dec 2024 10:55      RADIOLOGY:< from: CT Abdomen and Pelvis w/ IV Cont (12.11.24 @ 12:11) >  FINDINGS:  LOWER CHEST: Cardiomegaly and transcatheter aortic valve repair.    LIVER: Cirrhotic morphology. No focal liver lesion. Heterogeneous   parenchymal attenuation likely due to passive hepatic congestion with   bridging fibrosis. No focal hepatic lesion.  BILE DUCTS: Normal caliber.  GALLBLADDER: Cholelithiasis.  SPLEEN: Within normal limits.  PANCREAS: Within normal limits.  ADRENALS: Within normal limits.  KIDNEYS/URETERS: Bilateral renal scarring. Right renal cyst.    BLADDER: Within normal limits.  REPRODUCTIVE ORGANS: Hysterectomy.    BOWEL: No bowel obstruction. Appendix not visualized. Large amount of   stool throughout the colon.  PERITONEUM/RETROPERITONEUM: Within normal limits.  VESSELS: Atherosclerotic changes. Patent hepatic, portal and superior   mesenteric veins.  LYMPH NODES: No lymphadenopathy.  ABDOMINAL WALL: Within normal limits.  BONES: No acute findings.    IMPRESSION:  Cirrhosis with passive hepatic congestion.  No evidence of hepatocellular carcinoma.          --- End of Report ---      < end of copied text >      EKG:< from: 12 Lead ECG (12.09.24 @ 14:32) >    Diagnosis Line Atrial fibrillation  Left axis deviation  Incomplete right bundle branch block  Minimal voltage criteria for LVH, may be normal variant ( [place] product )  Anteroseptal infarct (cited on or before 08-FEB-2016)  Abnormal ECG  When compared with ECG of 02-NOV-2024 17:40,  Incomplete right bundle branch block is now Present  Confirmedby EDDIE CASTILLO ([id number]) on 12/10/2024 8:24:57 AM    < end of copied text >      < from: TTE W or WO Ultrasound Enhancing Agent (12.12.24 @ 14:18) >    CONCLUSIONS:      1. Estimated pulmonary artery systolic pressure is 62 mmHg, consistent with severe pulmonary hypertension.   2. Severe tricuspid regurgitation.   3. A Transcatheter deployed (TAVR) valve replacement is present in the aortic position The prosthetic valve is well seated. Mild intravalvular regurgitation.   4. Mild to moderate mitral regurgitation.   5. Left ventricular cavity is normal in size. Left ventricular systolic function is normal with an ejection fraction visually estimated at 55 to 60 %.   6. No left ventricular hypertrophy.   7. Enlarged right ventricular cavity size.   8. The right atrium is moderately dilated.   9. Compared to the transthoracic echocardiogram performed on 11/7/2024, there have been no significant interval changes.    ________________________________________________________________________________________  FINDINGS:     Left Ventricle:  The left ventricular cavity is normal in size. Left ventricular wall thickness is normal. Left ventricular systolic function is normal with a calculated ejection fraction of 57 % by 3D with an ejection fraction visually estimated at 55 to 60%. The left ventricular diastolic function is indeterminate.     Right Ventricle:  The right ventricular cavity is enlarged in size and right ventricular systolic function is normal. Tricuspid annular plane systolic excursion (TAPSE) is 2.2 cm (normal >=1.7 cm).     Left Atrium:  The left atrium is normal in size with an indexed volume of 27.06 ml/m².     Right Atrium:  The right atrium is moderately dilated with an indexed volume of 45.77 ml/m² and an indexed area of 14.37 cm²/m².     Interatrial Septum:  The interatrial septum appears intact.     Aortic Valve:  A a transcatheter deployed (TAVR) is present in the aortic position. The prosthetic valve is well seated. The peak transaortic velocity is 2.42 m/s, peak transaortic gradient is 23.4 mmHg and mean transaortic gradient is 11.0 mmHg with an LVOT/aortic valve VTI ratio of 0.61. The effective orifice area is estimated at 2.33 cm² by the continuity equation. There is mild intravalvular regurgitation.     Mitral Valve:  There is normal leaflet mobility of the mitral valve. There is moderate calcification of the mitral valve annulus. There is mild to moderate mitral regurgitation.     Tricuspid Valve:  Structurally normal tricuspid valve with normal leaflet excursion. There is severe tricuspid regurgitation. Estimated pulmonary artery systolic pressure is 62 mmHg, consistent with severe pulmonary hypertension.     Pulmonic Valve:  Structurally normal pulmonic valve with normal leaflet excursion.     Aorta:  The ascending aorta is normal in size, measuring 3.07 cm (indexed 1.78 cm/m²). The aortic arch diameter is normal in size, measuring 2.5 cm (indexed 1.45 cm/m²).     Pericardium:  No pericardial effusion seen.     Systemic Veins:  The inferior vena cava is dilated measuring 2.40 cm in diameter, (dilated >2.1cm) with normal inspiratory collapse (normal >50%) consistent with mildly elevated right atrial pressure (~8, range 5-10mmHg).  ____________________________________________________________________  QUANTITATIVE DATA:  Left Ventricle Measurements: (Indexed to BSA)     IVSd (2D):   0.9 cm  LVPWd (2D):  0.9 cm  LVIDd (2D):  4.5 cm  LVIDs (2D):  3.2 cm  LV Mass:     142 g  82.0 g/m²  Visualized LV EF%: 55 to 60%  3D LV EF%:         57 %     MV E Vmax:    1.55 m/s  MV A Vmax:    0.43 m/s  MV E/A:       3.57  e' lateral:   11.70 cm/s  e' medial:    7.18 cm/s  E/e' lateral: 13.25  E/e' medial:  21.59  E/e' Average: 16.42  MV DT:      169 msec    Aorta Measurements: (Normal range) (Indexed to BSA)     Ao Asc prox: 3.07 cm 1.78 cm/m²  Ao Arch:     2.5 cm            Left Atrium Measurements: (Indexed to BSA)  LA Diam 2D: 4.50 cm         Right Ventricle Measurements: Right Atrial Measurements:     TAPSE:           2.2 cm       RA Vol:            79.00 ml  RV S' Vmax:      11.50 cm/s   RA Vol s, MOD A4C  72.0 ml  RV Base (RVID1): 4.5 cm       RA Vol Index:      45.77 ml/m²                                RA Area s, MOD A4C 23.2 cm²       LVOT / RVOT/ Qp/Qs Data: (Indexed to BSA)  LVOT Diameter:  2.20 cm  LVOT Area:      3.80 cm²  LVOT Vmax:      1.47 m/s  LVOT Vmn:       1.040 m/s  LVOT VTI:       26.90 cm  LVOT peak grad: 9 mmHg  LVOT mean grad: 4.5 mmHg  LVOT SV:102.3 ml  59.24 ml/m²    Aortic Valve Measurements:  AV Vmax:                2.4 m/s  AV Peak Gradient:       23.4 mmHg  AV Mean Gradient:       11.0 mmHg  AV VTI:                 43.8 cm  AV VTI Ratio:           0.61  AoV EOA, Contin:        2.33 cm²  AoV EOA, Contin i:      1.35 cm²/m²  AoV Dimensionless Index 0.61    Mitral Valve Measurements:     MV E Vmax: 1.6 m/s  MV A Vmax: 0.4 m/s  MV E/A:    3.6       Tricuspid Valve Measurements:     TR Vmax:          3.7 m/s  TR Peak Gradient: 53.6 mmHg  RA Pressure:      8 mmHg  PASP:             62 mmHg    ________________________________________________________________________________________  Electronically signed on 12/12/2024 at 9:19:49 PM by Damián Aguirre MD         *** Final ***    < end of copied text >     CHIEF COMPLAINT: Patient is a 79y old  Female who presents with a chief complaint of acute anemia (11 Dec 2024 12:53)      HPI:  79 year old female w anemia, AS s/p TAVR, AFIB oneliquis, grade 3 diastolic dysfx, CVA , R 2nd rib fx, hx temporal arteritis, HLD, HTN BIBEMS to ER from [facility] c/o abnormal labs from 2 days PTA Hb 6.8.       Adm 11-06 to  admitted s/p fall with R shoulder pain s/p fall   # nondisplaced fracture of the anterior right second rib  # intramuscular hematoma R shoulder, lower dose AC recommended  #Fever with acute hypoxic resp failure RESOLVED CTA neg PE  Likely multifactorial (reactive to intramuscular hematoma 2/2 pain)  #UTI tx w merrem  #CVA : MRI acute infarct R centrum semiovale; cardioembolic stroke in setting of holding AC due to acute anemia after trauma.  #Grade 3 diastolic dysfunction on TTE EF 55%  #Anemia due to IM hematoma s/p PRBC x 2, seen by Turner   At [facility] Fe 23/ 229 UIBC /251 TIBC; eliquis held since Hb 7.1 to 6.8; given IV iron x 2  pt denied hematuria, black stools,     12/12 s/p transfusion with appropriate increase in hb  CT reveals liver cirrhosis  heme and GI following  s/p bm bx- results pending    12/13/24-- starting prednisone for hemolytic anemia-  reviewed follow up echo-- preserved EF, normal functioning TAVR valve, severe pHTN, significant diastolic dysfunction     12/14/24:  Resting comfortably and 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  No new cardiac symptoms.        PAST MEDICAL HX  AS aortic stenosis   Atrial Fibrillation   CVA  MRI acute infarct  R centrum semiovale  H/O temporal arteritis   HLD hyperlipidemia  HTN hypertension   Nonrheumatic aortic valve stenosis.       FAMILY HX  lung cancer : Father      SOCIAL HX  at [facility] for rehab  nonsmoker  no alcohol   (09 Dec 2024 17:47)      PMHx: PAST MEDICAL & SURGICAL HISTORY:  History of Cardiac Arrhythmia  Hypertension  Atrial Fibrillation  Nonrheumatic aortic valve stenosis s/p TAVR  High cholesterol  H/O temporal arteritis  S/P Exploratory Laparotomy  1966  H/O prior ablation treatment  History of temporal artery biopsy  S/P foot surgery      Allergies: Allergies  adhesives (Rash)  No Known Drug Allergies      Vital Signs Last 24 Hrs  T(C): 36.5 (14 Dec 2024 00:00), Max: 36.5 (13 Dec 2024 15:48)  T(F): 97.7 (14 Dec 2024 00:00), Max: 97.7 (13 Dec 2024 15:48)  HR: 96 (14 Dec 2024 00:00) (75 - 96)  BP: 139/79 (14 Dec 2024 00:00) (138/68 - 145/66)  RR: 16 (14 Dec 2024 00:00) (16 - 17)  SpO2: 96% (14 Dec 2024 00:00) (94% - 100%): room air      PHYSICAL EXAM:   Constitutional: NAD, awake and alert, well-developed  HEENT: PERR, EOMI, Normal Hearing, MMM  Neck: Soft and supple, No LAD, No JVD  Respiratory: Breath sounds are clear bilaterally, No wheezing, rales or rhonchi  Cardiovascular: S1 and S2,irregular rate and rhythm, EDWIGE  Extremities: No peripheral edema  Vascular: 2+ peripheral pulses  Neurological: A/O x 3, no focal deficits      MEDICATIONS  (STANDING):  acetaminophen     Tablet .. 975 milliGRAM(s) Oral every 8 hours  apixaban 5 milliGRAM(s) Oral every 12 hours  atorvastatin 10 milliGRAM(s) Oral at bedtime  estrogens    conjugated 0.625 milliGRAM(s) Oral daily  folic acid 1 milliGRAM(s) Oral daily  gabapentin 300 milliGRAM(s) Oral two times a day  influenza  Vaccine (HIGH DOSE) 0.5 milliLiter(s) IntraMuscular once  lidocaine   4% Patch 1 Patch Transdermal every 24 hours  methocarbamol 250 milliGRAM(s) Oral at bedtime  pantoprazole    Tablet 40 milliGRAM(s) Oral before breakfast  polyethylene glycol 3350 17 Gram(s) Oral daily  potassium chloride    Tablet ER 20 milliEquivalent(s) Oral daily  predniSONE   Tablet 70 milliGRAM(s) Oral daily  sucralfate 1 Gram(s) Oral <User Schedule>  torsemide 20 milliGRAM(s) Oral daily    MEDICATIONS  (PRN):  acetaminophen     Tablet .. 650 milliGRAM(s) Oral every 6 hours PRN Temp greater or equal to 38C (100.4F), Mild Pain (1 - 3)  aluminum hydroxide/magnesium hydroxide/simethicone Suspension 30 milliLiter(s) Oral every 4 hours PRN Dyspepsia  melatonin 3 milliGRAM(s) Oral at bedtime PRN Insomnia  naloxone Injectable 0.4 milliGRAM(s) IV Push once PRN oversedation  ondansetron Injectable 4 milliGRAM(s) IV Push every 8 hours PRN Nausea and/or Vomiting  traMADol 25 milliGRAM(s) Oral every 4 hours PRN moderate to severe pain      LABS: All Labs Reviewed:                        8.2    7.64  )-----------( 259      ( 13 Dec 2024 08:21 )             25.9                                   8.4    7.81  )-----------( 258      ( 12 Dec 2024 08:20 )             27.2     12-11    137  |  105  |  24[H]  ----------------------------<  95  4.0   |  29  |  0.84    Ca    9.6      11 Dec 2024 10:55      RADIOLOGY:< from: CT Abdomen and Pelvis w/ IV Cont (12.11.24 @ 12:11) >  FINDINGS:  LOWER CHEST: Cardiomegaly and transcatheter aortic valve repair.    LIVER: Cirrhotic morphology. No focal liver lesion. Heterogeneous   parenchymal attenuation likely due to passive hepatic congestion with   bridging fibrosis. No focal hepatic lesion.  BILE DUCTS: Normal caliber.  GALLBLADDER: Cholelithiasis.  SPLEEN: Within normal limits.  PANCREAS: Within normal limits.  ADRENALS: Within normal limits.  KIDNEYS/URETERS: Bilateral renal scarring. Right renal cyst.    BLADDER: Within normal limits.  REPRODUCTIVE ORGANS: Hysterectomy.    BOWEL: No bowel obstruction. Appendix not visualized. Large amount of   stool throughout the colon.  PERITONEUM/RETROPERITONEUM: Within normal limits.  VESSELS: Atherosclerotic changes. Patent hepatic, portal and superior   mesenteric veins.  LYMPH NODES: No lymphadenopathy.  ABDOMINAL WALL: Within normal limits.  BONES: No acute findings.    IMPRESSION:  Cirrhosis with passive hepatic congestion.  No evidence of hepatocellular carcinoma.          --- End of Report ---      < end of copied text >      EKG:< from: 12 Lead ECG (12.09.24 @ 14:32) >    Diagnosis Line Atrial fibrillation  Left axis deviation  Incomplete right bundle branch block  Minimal voltage criteria for LVH, may be normal variant ( [place] product )  Anteroseptal infarct (cited on or before 08-FEB-2016)  Abnormal ECG  When compared with ECG of 02-NOV-2024 17:40,  Incomplete right bundle branch block is now Present  Confirmedby EDDIE CASTILLO ([id number]) on 12/10/2024 8:24:57 AM    < end of copied text >      < from: TTE W or WO Ultrasound Enhancing Agent (12.12.24 @ 14:18) >    CONCLUSIONS:      1. Estimated pulmonary artery systolic pressure is 62 mmHg, consistent with severe pulmonary hypertension.   2. Severe tricuspid regurgitation.   3. A Transcatheter deployed (TAVR) valve replacement is present in the aortic position The prosthetic valve is well seated. Mild intravalvular regurgitation.   4. Mild to moderate mitral regurgitation.   5. Left ventricular cavity is normal in size. Left ventricular systolic function is normal with an ejection fraction visually estimated at 55 to 60 %.   6. No left ventricular hypertrophy.   7. Enlarged right ventricular cavity size.   8. The right atrium is moderately dilated.   9. Compared to the transthoracic echocardiogram performed on 11/7/2024, there have been no significant interval changes.    ________________________________________________________________________________________  FINDINGS:     Left Ventricle:  The left ventricular cavity is normal in size. Left ventricular wall thickness is normal. Left ventricular systolic function is normal with a calculated ejection fraction of 57 % by 3D with an ejection fraction visually estimated at 55 to 60%. The left ventricular diastolic function is indeterminate.     Right Ventricle:  The right ventricular cavity is enlarged in size and right ventricular systolic function is normal. Tricuspid annular plane systolic excursion (TAPSE) is 2.2 cm (normal >=1.7 cm).     Left Atrium:  The left atrium is normal in size with an indexed volume of 27.06 ml/m².     Right Atrium:  The right atrium is moderately dilated with an indexed volume of 45.77 ml/m² and an indexed area of 14.37 cm²/m².     Interatrial Septum:  The interatrial septum appears intact.     Aortic Valve:  A a transcatheter deployed (TAVR) is present in the aortic position. The prosthetic valve is well seated. The peak transaortic velocity is 2.42 m/s, peak transaortic gradient is 23.4 mmHg and mean transaortic gradient is 11.0 mmHg with an LVOT/aortic valve VTI ratio of 0.61. The effective orifice area is estimated at 2.33 cm² by the continuity equation. There is mild intravalvular regurgitation.     Mitral Valve:  There is normal leaflet mobility of the mitral valve. There is moderate calcification of the mitral valve annulus. There is mild to moderate mitral regurgitation.     Tricuspid Valve:  Structurally normal tricuspid valve with normal leaflet excursion. There is severe tricuspid regurgitation. Estimated pulmonary artery systolic pressure is 62 mmHg, consistent with severe pulmonary hypertension.     Pulmonic Valve:  Structurally normal pulmonic valve with normal leaflet excursion.     Aorta:  The ascending aorta is normal in size, measuring 3.07 cm (indexed 1.78 cm/m²). The aortic arch diameter is normal in size, measuring 2.5 cm (indexed 1.45 cm/m²).     Pericardium:  No pericardial effusion seen.     Systemic Veins:  The inferior vena cava is dilated measuring 2.40 cm in diameter, (dilated >2.1cm) with normal inspiratory collapse (normal >50%) consistent with mildly elevated right atrial pressure (~8, range 5-10mmHg).  ____________________________________________________________________  QUANTITATIVE DATA:  Left Ventricle Measurements: (Indexed to BSA)     IVSd (2D):   0.9 cm  LVPWd (2D):  0.9 cm  LVIDd (2D):  4.5 cm  LVIDs (2D):  3.2 cm  LV Mass:     142 g  82.0 g/m²  Visualized LV EF%: 55 to 60%  3D LV EF%:         57 %     MV E Vmax:    1.55 m/s  MV A Vmax:    0.43 m/s  MV E/A:       3.57  e' lateral:   11.70 cm/s  e' medial:    7.18 cm/s  E/e' lateral: 13.25  E/e' medial:  21.59  E/e' Average: 16.42  MV DT:      169 msec    Aorta Measurements: (Normal range) (Indexed to BSA)     Ao Asc prox: 3.07 cm 1.78 cm/m²  Ao Arch:     2.5 cm            Left Atrium Measurements: (Indexed to BSA)  LA Diam 2D: 4.50 cm         Right Ventricle Measurements: Right Atrial Measurements:     TAPSE:           2.2 cm       RA Vol:            79.00 ml  RV S' Vmax:      11.50 cm/s   RA Vol s, MOD A4C  72.0 ml  RV Base (RVID1): 4.5 cm       RA Vol Index:      45.77 ml/m²                                RA Area s, MOD A4C 23.2 cm²       LVOT / RVOT/ Qp/Qs Data: (Indexed to BSA)  LVOT Diameter:  2.20 cm  LVOT Area:      3.80 cm²  LVOT Vmax:      1.47 m/s  LVOT Vmn:       1.040 m/s  LVOT VTI:       26.90 cm  LVOT peak grad: 9 mmHg  LVOT mean grad: 4.5 mmHg  LVOT SV:102.3 ml  59.24 ml/m²    Aortic Valve Measurements:  AV Vmax:                2.4 m/s  AV Peak Gradient:       23.4 mmHg  AV Mean Gradient:       11.0 mmHg  AV VTI:                 43.8 cm  AV VTI Ratio:           0.61  AoV EOA, Contin:        2.33 cm²  AoV EOA, Contin i:      1.35 cm²/m²  AoV Dimensionless Index 0.61    Mitral Valve Measurements:     MV E Vmax: 1.6 m/s  MV A Vmax: 0.4 m/s  MV E/A:    3.6       Tricuspid Valve Measurements:     TR Vmax:          3.7 m/s  TR Peak Gradient: 53.6 mmHg  RA Pressure:      8 mmHg  PASP:             62 mmHg    ________________________________________________________________________________________  Electronically signed on 12/12/2024 at 9:19:49 PM by Damián Aguirre MD         *** Final ***    < end of copied text >

## 2024-12-15 LAB
ANA PAT FLD IF-IMP: ABNORMAL
ANA TITR SER: ABNORMAL
ANION GAP SERPL CALC-SCNC: 5 MMOL/L — SIGNIFICANT CHANGE UP (ref 5–17)
BUN SERPL-MCNC: 40 MG/DL — HIGH (ref 7–23)
CALCIUM SERPL-MCNC: 9.2 MG/DL — SIGNIFICANT CHANGE UP (ref 8.5–10.1)
CHLORIDE SERPL-SCNC: 108 MMOL/L — SIGNIFICANT CHANGE UP (ref 96–108)
CO2 SERPL-SCNC: 27 MMOL/L — SIGNIFICANT CHANGE UP (ref 22–31)
CREAT SERPL-MCNC: 1.04 MG/DL — SIGNIFICANT CHANGE UP (ref 0.5–1.3)
EGFR: 55 ML/MIN/1.73M2 — LOW
GLUCOSE SERPL-MCNC: 95 MG/DL — SIGNIFICANT CHANGE UP (ref 70–99)
HCT VFR BLD CALC: 26.8 % — LOW (ref 34.5–45)
HGB BLD-MCNC: 8.3 G/DL — LOW (ref 11.5–15.5)
MCHC RBC-ENTMCNC: 29.2 PG — SIGNIFICANT CHANGE UP (ref 27–34)
MCHC RBC-ENTMCNC: 31 G/DL — LOW (ref 32–36)
MCV RBC AUTO: 94.4 FL — SIGNIFICANT CHANGE UP (ref 80–100)
NT-PROBNP SERPL-SCNC: 1951 PG/ML — HIGH (ref 0–450)
PLATELET # BLD AUTO: 261 K/UL — SIGNIFICANT CHANGE UP (ref 150–400)
POTASSIUM SERPL-MCNC: 3.4 MMOL/L — LOW (ref 3.5–5.3)
POTASSIUM SERPL-SCNC: 3.4 MMOL/L — LOW (ref 3.5–5.3)
RBC # BLD: 2.84 M/UL — LOW (ref 3.8–5.2)
RBC # FLD: 20.7 % — HIGH (ref 10.3–14.5)
SODIUM SERPL-SCNC: 140 MMOL/L — SIGNIFICANT CHANGE UP (ref 135–145)
WBC # BLD: 9.09 K/UL — SIGNIFICANT CHANGE UP (ref 3.8–10.5)
WBC # FLD AUTO: 9.09 K/UL — SIGNIFICANT CHANGE UP (ref 3.8–10.5)

## 2024-12-15 PROCEDURE — 99232 SBSQ HOSP IP/OBS MODERATE 35: CPT

## 2024-12-15 RX ORDER — POTASSIUM CHLORIDE 600 MG/1
20 TABLET, EXTENDED RELEASE ORAL ONCE
Refills: 0 | Status: COMPLETED | OUTPATIENT
Start: 2024-12-15 | End: 2024-12-15

## 2024-12-15 RX ADMIN — ESTROGENS, CONJUGATED 0.62 MILLIGRAM(S): 0.3 TABLET, FILM COATED ORAL at 09:32

## 2024-12-15 RX ADMIN — ACETAMINOPHEN 500MG 975 MILLIGRAM(S): 500 TABLET, COATED ORAL at 05:38

## 2024-12-15 RX ADMIN — POTASSIUM CHLORIDE 20 MILLIEQUIVALENT(S): 600 TABLET, EXTENDED RELEASE ORAL at 09:31

## 2024-12-15 RX ADMIN — APIXABAN 5 MILLIGRAM(S): 2.5 TABLET, FILM COATED ORAL at 09:31

## 2024-12-15 RX ADMIN — APIXABAN 5 MILLIGRAM(S): 2.5 TABLET, FILM COATED ORAL at 21:13

## 2024-12-15 RX ADMIN — Medication 1 MILLIGRAM(S): at 09:31

## 2024-12-15 RX ADMIN — PREDNISONE 70 MILLIGRAM(S): 20 TABLET ORAL at 09:32

## 2024-12-15 RX ADMIN — LIDOCAINE 1 PATCH: 40 CREAM TOPICAL at 21:12

## 2024-12-15 RX ADMIN — ACETAMINOPHEN 500MG 975 MILLIGRAM(S): 500 TABLET, COATED ORAL at 13:13

## 2024-12-15 RX ADMIN — SUCRALFATE 1 GRAM(S): 1 TABLET ORAL at 13:16

## 2024-12-15 RX ADMIN — ACETAMINOPHEN, DIPHENHYDRAMINE HCL, PHENYLEPHRINE HCL 3 MILLIGRAM(S): 325; 25; 5 TABLET ORAL at 21:13

## 2024-12-15 RX ADMIN — GABAPENTIN 300 MILLIGRAM(S): 300 CAPSULE ORAL at 21:13

## 2024-12-15 RX ADMIN — GABAPENTIN 300 MILLIGRAM(S): 300 CAPSULE ORAL at 09:30

## 2024-12-15 RX ADMIN — ACETAMINOPHEN 500MG 975 MILLIGRAM(S): 500 TABLET, COATED ORAL at 21:14

## 2024-12-15 RX ADMIN — SUCRALFATE 1 GRAM(S): 1 TABLET ORAL at 21:13

## 2024-12-15 RX ADMIN — Medication 20 MILLIGRAM(S): at 09:32

## 2024-12-15 RX ADMIN — PANTOPRAZOLE SODIUM 40 MILLIGRAM(S): 40 TABLET, DELAYED RELEASE ORAL at 05:38

## 2024-12-15 RX ADMIN — Medication 10 MILLIGRAM(S): at 21:13

## 2024-12-15 RX ADMIN — SUCRALFATE 1 GRAM(S): 1 TABLET ORAL at 09:32

## 2024-12-15 RX ADMIN — ACETAMINOPHEN 500MG 975 MILLIGRAM(S): 500 TABLET, COATED ORAL at 21:44

## 2024-12-15 NOTE — PROGRESS NOTE ADULT - SUBJECTIVE AND OBJECTIVE BOX
CHIEF COMPLAINT: Patient is a 79y old  Female who presents with a chief complaint of acute anemia (11 Dec 2024 12:53)      HPI:  79 year old female w anemia, AS s/p TAVR, AFIB oneliquis, grade 3 diastolic dysfx, CVA , R 2nd rib fx, hx temporal arteritis, HLD, HTN BIBEMS to ER from [facility] c/o abnormal labs from 2 days PTA Hb 6.8.       Adm 11-06 to  admitted s/p fall with R shoulder pain s/p fall   # nondisplaced fracture of the anterior right second rib  # intramuscular hematoma R shoulder, lower dose AC recommended  #Fever with acute hypoxic resp failure RESOLVED CTA neg PE  Likely multifactorial (reactive to intramuscular hematoma 2/2 pain)  #UTI tx w merrem  #CVA : MRI acute infarct R centrum semiovale; cardioembolic stroke in setting of holding AC due to acute anemia after trauma.  #Grade 3 diastolic dysfunction on TTE EF 55%  #Anemia due to IM hematoma s/p PRBC x 2, seen by Heme   At [facility] Fe 23/ 229 UIBC /251 TIBC; Eliquis held since Hb 7.1 to 6.8; given IV iron x 2  pt denied hematuria, black stools,     12/12 s/p transfusion with appropriate increase in hb  CT reveals liver cirrhosis  heme and GI following  s/p bm bx- results pending    12/13/24-- starting prednisone for hemolytic anemia-  reviewed follow up echo-- preserved EF, normal functioning TAVR valve, severe pHTN, significant diastolic dysfunction     12/14/24:  Resting comfortably and 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  No new cardiac symptoms.      12/15/24:  Feeling good without increased SOB and tolerating steroids so far.  Hgb=8.3 today with little change so far.  K+=3.4.  No new cardiac symptoms.        PAST MEDICAL HX  AS aortic stenosis   Atrial Fibrillation   CVA  MRI acute infarct  R centrum semiovale  H/O temporal arteritis   HLD hyperlipidemia  HTN hypertension   Nonrheumatic aortic valve stenosis.       FAMILY HX  lung cancer : Father      SOCIAL HX  at [facility] for rehab  nonsmoker  no alcohol   (09 Dec 2024 17:47)      PMHx: PAST MEDICAL & SURGICAL HISTORY:  History of Cardiac Arrhythmia  Hypertension  Atrial Fibrillation  Nonrheumatic aortic valve stenosis s/p TAVR  High cholesterol  H/O temporal arteritis  S/P Exploratory Laparotomy  1966  H/O prior ablation treatment  History of temporal artery biopsy  S/P foot surgery      Allergies: Allergies  adhesives (Rash)  No Known Drug Allergies      Vital Signs Last 24 Hrs  T(C): 36.3 (15 Dec 2024 08:00), Max: 36.5 (14 Dec 2024 15:19)  T(F): 97.4 (15 Dec 2024 08:00), Max: 97.7 (14 Dec 2024 15:19)  HR: 58 (15 Dec 2024 08:00) (58 - 73)  BP: 166/86 (15 Dec 2024 08:00) (143/66 - 166/86)  BP(mean): 97 (14 Dec 2024 20:55) (97 - 97)  RR: 16 (15 Dec 2024 08:00) (16 - 18)  SpO2: 97% (15 Dec 2024 08:00) (97% - 98%): room air      PHYSICAL EXAM:   Constitutional: NAD, awake and alert, well-developed  HEENT: PERR, EOMI, Normal Hearing, MMM  Neck: Soft and supple, No LAD, No JVD  Respiratory: Breath sounds are clear bilaterally, No wheezing, rales or rhonchi  Cardiovascular: S1 and S2,irregular rate and rhythm, EDWIGE  Extremities: No peripheral edema  Vascular: 2+ peripheral pulses  Neurological: A/O x 3, no focal deficits      MEDICATIONS  (STANDING):  acetaminophen     Tablet .. 975 milliGRAM(s) Oral every 8 hours  apixaban 5 milliGRAM(s) Oral every 12 hours  atorvastatin 10 milliGRAM(s) Oral at bedtime  estrogens    conjugated 0.625 milliGRAM(s) Oral daily  folic acid 1 milliGRAM(s) Oral daily  gabapentin 300 milliGRAM(s) Oral two times a day  influenza  Vaccine (HIGH DOSE) 0.5 milliLiter(s) IntraMuscular once  lidocaine   4% Patch 1 Patch Transdermal every 24 hours  methocarbamol 250 milliGRAM(s) Oral at bedtime  pantoprazole    Tablet 40 milliGRAM(s) Oral before breakfast  polyethylene glycol 3350 17 Gram(s) Oral daily  potassium chloride    Tablet ER 20 milliEquivalent(s) Oral daily  potassium chloride    Tablet ER 20 milliEquivalent(s) Oral once  predniSONE   Tablet 70 milliGRAM(s) Oral daily  sucralfate 1 Gram(s) Oral <User Schedule>  torsemide 20 milliGRAM(s) Oral daily    MEDICATIONS  (PRN):  acetaminophen     Tablet .. 650 milliGRAM(s) Oral every 6 hours PRN Temp greater or equal to 38C (100.4F), Mild Pain (1 - 3)  aluminum hydroxide/magnesium hydroxide/simethicone Suspension 30 milliLiter(s) Oral every 4 hours PRN Dyspepsia  melatonin 3 milliGRAM(s) Oral at bedtime PRN Insomnia  naloxone Injectable 0.4 milliGRAM(s) IV Push once PRN oversedation  ondansetron Injectable 4 milliGRAM(s) IV Push every 8 hours PRN Nausea and/or Vomiting  traMADol 25 milliGRAM(s) Oral every 4 hours PRN moderate to severe pain      LABS: All Labs Reviewed:                        8.3    9.09  )-----------( 261      ( 15 Dec 2024 08:21 )             26.8                         8.2    7.64  )-----------( 259      ( 13 Dec 2024 08:21 )             25.9                                   8.4    7.81  )-----------( 258      ( 12 Dec 2024 08:20 )             27.2     12-15    140  |  108  |  40[H]  ----------------------------<  95  3.4[L]   |  27  |  1.04    Ca    9.2      15 Dec 2024 08:21      12-11    137  |  105  |  24[H]  ----------------------------<  95  4.0   |  29  |  0.84    Ca    9.6      11 Dec 2024 10:55      RADIOLOGY:< from: CT Abdomen and Pelvis w/ IV Cont (12.11.24 @ 12:11) >  FINDINGS:  LOWER CHEST: Cardiomegaly and transcatheter aortic valve repair.    LIVER: Cirrhotic morphology. No focal liver lesion. Heterogeneous   parenchymal attenuation likely due to passive hepatic congestion with   bridging fibrosis. No focal hepatic lesion.  BILE DUCTS: Normal caliber.  GALLBLADDER: Cholelithiasis.  SPLEEN: Within normal limits.  PANCREAS: Within normal limits.  ADRENALS: Within normal limits.  KIDNEYS/URETERS: Bilateral renal scarring. Right renal cyst.    BLADDER: Within normal limits.  REPRODUCTIVE ORGANS: Hysterectomy.    BOWEL: No bowel obstruction. Appendix not visualized. Large amount of   stool throughout the colon.  PERITONEUM/RETROPERITONEUM: Within normal limits.  VESSELS: Atherosclerotic changes. Patent hepatic, portal and superior   mesenteric veins.  LYMPH NODES: No lymphadenopathy.  ABDOMINAL WALL: Within normal limits.  BONES: No acute findings.    IMPRESSION:  Cirrhosis with passive hepatic congestion.  No evidence of hepatocellular carcinoma.          --- End of Report ---      < end of copied text >      EKG:< from: 12 Lead ECG (12.09.24 @ 14:32) >    Diagnosis Line Atrial fibrillation  Left axis deviation  Incomplete right bundle branch block  Minimal voltage criteria for LVH, may be normal variant ( Dorian product )  Anteroseptal infarct (cited on or before 08-FEB-2016)  Abnormal ECG  When compared with ECG of 02-NOV-2024 17:40,  Incomplete right bundle branch block is now Present  Confirmedby EDDIE CASTILLO ([id number]) on 12/10/2024 8:24:57 AM    < end of copied text >      < from: TTE W or WO Ultrasound Enhancing Agent (12.12.24 @ 14:18) >    CONCLUSIONS:      1. Estimated pulmonary artery systolic pressure is 62 mmHg, consistent with severe pulmonary hypertension.   2. Severe tricuspid regurgitation.   3. A Transcatheter deployed (TAVR) valve replacement is present in the aortic position The prosthetic valve is well seated. Mild intravalvular regurgitation.   4. Mild to moderate mitral regurgitation.   5. Left ventricular cavity is normal in size. Left ventricular systolic function is normal with an ejection fraction visually estimated at 55 to 60 %.   6. No left ventricular hypertrophy.   7. Enlarged right ventricular cavity size.   8. The right atrium is moderately dilated.   9. Compared to the transthoracic echocardiogram performed on 11/7/2024, there have been no significant interval changes.    ________________________________________________________________________________________  FINDINGS:     Left Ventricle:  The left ventricular cavity is normal in size. Left ventricular wall thickness is normal. Left ventricular systolic function is normal with a calculated ejection fraction of 57 % by 3D with an ejection fraction visually estimated at 55 to 60%. The left ventricular diastolic function is indeterminate.     Right Ventricle:  The right ventricular cavity is enlarged in size and right ventricular systolic function is normal. Tricuspid annular plane systolic excursion (TAPSE) is 2.2 cm (normal >=1.7 cm).     Left Atrium:  The left atrium is normal in size with an indexed volume of 27.06 ml/m².     Right Atrium:  The right atrium is moderately dilated with an indexed volume of 45.77 ml/m² and an indexed area of 14.37 cm²/m².     Interatrial Septum:  The interatrial septum appears intact.     Aortic Valve:  A a transcatheter deployed (TAVR) is present in the aortic position. The prosthetic valve is well seated. The peak transaortic velocity is 2.42 m/s, peak transaortic gradient is 23.4 mmHg and mean transaortic gradient is 11.0 mmHg with an LVOT/aortic valve VTI ratio of 0.61. The effective orifice area is estimated at 2.33 cm² by the continuity equation. There is mild intravalvular regurgitation.     Mitral Valve:  There is normal leaflet mobility of the mitral valve. There is moderate calcification of the mitral valve annulus. There is mild to moderate mitral regurgitation.     Tricuspid Valve:  Structurally normal tricuspid valve with normal leaflet excursion. There is severe tricuspid regurgitation. Estimated pulmonary artery systolic pressure is 62 mmHg, consistent with severe pulmonary hypertension.     Pulmonic Valve:  Structurally normal pulmonic valve with normal leaflet excursion.     Aorta:  The ascending aorta is normal in size, measuring 3.07 cm (indexed 1.78 cm/m²). The aortic arch diameter is normal in size, measuring 2.5 cm (indexed 1.45 cm/m²).     Pericardium:  No pericardial effusion seen.     Systemic Veins:  The inferior vena cava is dilated measuring 2.40 cm in diameter, (dilated >2.1cm) with normal inspiratory collapse (normal >50%) consistent with mildly elevated right atrial pressure (~8, range 5-10mmHg).  ____________________________________________________________________  QUANTITATIVE DATA:  Left Ventricle Measurements: (Indexed to BSA)     IVSd (2D):   0.9 cm  LVPWd (2D):  0.9 cm  LVIDd (2D):  4.5 cm  LVIDs (2D):  3.2 cm  LV Mass:     142 g  82.0 g/m²  Visualized LV EF%: 55 to 60%  3D LV EF%:         57 %     MV E Vmax:    1.55 m/s  MV A Vmax:    0.43 m/s  MV E/A:       3.57  e' lateral:   11.70 cm/s  e' medial:    7.18 cm/s  E/e' lateral: 13.25  E/e' medial:  21.59  E/e' Average: 16.42  MV DT:      169 msec    Aorta Measurements: (Normal range) (Indexed to BSA)     Ao Asc prox: 3.07 cm 1.78 cm/m²  Ao Arch:     2.5 cm            Left Atrium Measurements: (Indexed to BSA)  LA Diam 2D: 4.50 cm         Right Ventricle Measurements: Right Atrial Measurements:     TAPSE:           2.2 cm       RA Vol:            79.00 ml  RV S' Vmax:      11.50 cm/s   RA Vol s, MOD A4C  72.0 ml  RV Base (RVID1): 4.5 cm       RA Vol Index:      45.77 ml/m²                                RA Area s, MOD A4C 23.2 cm²       LVOT / RVOT/ Qp/Qs Data: (Indexed to BSA)  LVOT Diameter:  2.20 cm  LVOT Area:      3.80 cm²  LVOT Vmax:      1.47 m/s  LVOT Vmn:       1.040 m/s  LVOT VTI:       26.90 cm  LVOT peak grad: 9 mmHg  LVOT mean grad: 4.5 mmHg  LVOT SV:102.3 ml  59.24 ml/m²    Aortic Valve Measurements:  AV Vmax:                2.4 m/s  AV Peak Gradient:       23.4 mmHg  AV Mean Gradient:       11.0 mmHg  AV VTI:                 43.8 cm  AV VTI Ratio:           0.61  AoV EOA, Contin:        2.33 cm²  AoV EOA, Contin i:      1.35 cm²/m²  AoV Dimensionless Index 0.61    Mitral Valve Measurements:     MV E Vmax: 1.6 m/s  MV A Vmax: 0.4 m/s  MV E/A:    3.6       Tricuspid Valve Measurements:     TR Vmax:          3.7 m/s  TR Peak Gradient: 53.6 mmHg  RA Pressure:      8 mmHg  PASP:             62 mmHg    ________________________________________________________________________________________  Electronically signed on 12/12/2024 at 9:19:49 PM by Damián Aguirre MD         *** Final ***    < end of copied text >

## 2024-12-15 NOTE — PROGRESS NOTE ADULT - ASSESSMENT
79 F with AF, on AC, anemia- CVA, aortic stenosis fall with shoulder and rib trauma presents with worsening anemia and weakness  Anemia Work up in progress- s/p Bm bx, starting steroids,  recommended maintaining Hb at 9 given cardiac hx-- GI workup deferred at this time   AF , HR controlled - continue with Eliquis  echo results reviewed--- preserved LVEF with moderate diastolic dysfunction , dilated atrial and severe secondary pulmonary HTN--- she will need diuretics -she is on torsemide now (off of Lasix)  she may benefit from Farxiga ( once procedures are completed)  keep k+ > 4 and mg > 2, recommend daily BNP levels    12/14/24:  Resting comfortably and 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  No new cardiac symptoms. Continue as outlined by medicine and hematology etal.  Will follow as treatment progresses.      12/15/24:  Feeling good without increased SOB and tolerating steroids so far.  Hgb=8.3 today with little change so far.  K+=3.4.  No new cardiac symptoms.  Replete K+ per medicine.  Continue as outlined by medicine and hematology etal.  Will follow as treatment progresses.

## 2024-12-15 NOTE — PROGRESS NOTE ADULT - SUBJECTIVE AND OBJECTIVE BOX
CC: acute anemia    History of Present Illness:   79 year old female w anemia, AS s/p TAVR, AFIB oneliquis, grade 3 diastolic dysfx, CVA , R 2nd rib fx, hx temporal arteritis, HLD, HTN BIBEMS to ER from [facility] c/o abnormal labs from 2 days PTA Hb 6.8.     S:  12/10: Sitting in chair, awake, alert, states she feels okay but anemia worsening.  Discussed blood transfusion for which pt is in agreement with.  Discussed plan of care and addressed all questions and concerns to the best of my ability.    12/11: Had bone marrow biopsy today.  Tolerated 2 units of blood yesterday.  Denies fever, chills, N, V, abd pain, CP, SOB.  12/12: Pt awake, alert, comfortable. Hb trending down, discussed with patient.  Discussed plan of care and addressed all questions and concerns to the best of my ability.  12/13: Status quo, no new events. Starting steroid therapy, all questions addressed.  Denies fever, chills, N, V, abd pain, CP, SOB.  12/14: Sitting in chair, states she is voiding a lot since starting torsemide.  Discussed supplementation of potassium.  Discussed plan of care and addressed all questions and concerns to the best of my ability.   12/15:  Hb stable today, pt responding to diuretic.  No new events.  Discussed plan of care and addressed all questions and concerns to the best of my ability.    REVIEW OF SYSTEMS: All other review of systems is negative unless indicated above.    Vital Signs Last 24 Hrs  T(C): 36.3 (15 Dec 2024 08:00), Max: 36.5 (14 Dec 2024 15:19)  T(F): 97.4 (15 Dec 2024 08:00), Max: 97.7 (14 Dec 2024 15:19)  HR: 58 (15 Dec 2024 08:00) (58 - 73)  BP: 166/86 (15 Dec 2024 08:00) (143/66 - 166/86)  BP(mean): 97 (14 Dec 2024 20:55) (97 - 97)  RR: 16 (15 Dec 2024 08:00) (16 - 18)  SpO2: 97% (15 Dec 2024 08:00) (97% - 98%)    Parameters below as of 15 Dec 2024 08:00  Patient On (Oxygen Delivery Method): room air        PHYSICAL EXAM:    Constitutional: NAD, awake and alert  HEENT: PERR, EOMI, Normal Hearing, MMM  Neck: Soft and supple  Respiratory: Breath sounds are clear bilaterally, No wheezing, rales or rhonchi  Cardiovascular: S1 and S2, regular rate and rhythm, no Murmurs, gallops or rubs  Gastrointestinal: Bowel Sounds present, soft, nontender, nondistended, no guarding, no rebound  Extremities: + leg edema b/l - improving  Neurological: A/O x 3, no focal deficits in my limited exam      MEDICATIONS  (STANDING):  acetaminophen     Tablet .. 975 milliGRAM(s) Oral every 8 hours  apixaban 5 milliGRAM(s) Oral every 12 hours  atorvastatin 10 milliGRAM(s) Oral at bedtime  estrogens    conjugated 0.625 milliGRAM(s) Oral daily  folic acid 1 milliGRAM(s) Oral daily  gabapentin 300 milliGRAM(s) Oral two times a day  influenza  Vaccine (HIGH DOSE) 0.5 milliLiter(s) IntraMuscular once  lidocaine   4% Patch 1 Patch Transdermal every 24 hours  methocarbamol 250 milliGRAM(s) Oral at bedtime  pantoprazole    Tablet 40 milliGRAM(s) Oral before breakfast  polyethylene glycol 3350 17 Gram(s) Oral daily  potassium chloride    Tablet ER 20 milliEquivalent(s) Oral daily  predniSONE   Tablet 70 milliGRAM(s) Oral daily  sucralfate 1 Gram(s) Oral <User Schedule>  torsemide 20 milliGRAM(s) Oral daily    MEDICATIONS  (PRN):  acetaminophen     Tablet .. 650 milliGRAM(s) Oral every 6 hours PRN Temp greater or equal to 38C (100.4F), Mild Pain (1 - 3)  aluminum hydroxide/magnesium hydroxide/simethicone Suspension 30 milliLiter(s) Oral every 4 hours PRN Dyspepsia  melatonin 3 milliGRAM(s) Oral at bedtime PRN Insomnia  naloxone Injectable 0.4 milliGRAM(s) IV Push once PRN oversedation  ondansetron Injectable 4 milliGRAM(s) IV Push every 8 hours PRN Nausea and/or Vomiting  traMADol 25 milliGRAM(s) Oral every 4 hours PRN moderate to severe pain                                8.3    9.09  )-----------( 261      ( 15 Dec 2024 08:21 )             26.8     12-15    140  |  108  |  40[H]  ----------------------------<  95  3.4[L]   |  27  |  1.04    Ca    9.2      15 Dec 2024 08:21      CAPILLARY BLOOD GLUCOSE            Urinalysis Basic - ( 15 Dec 2024 08:21 )    Color: x / Appearance: x / SG: x / pH: x  Gluc: 95 mg/dL / Ketone: x  / Bili: x / Urobili: x   Blood: x / Protein: x / Nitrite: x   Leuk Esterase: x / RBC: x / WBC x   Sq Epi: x / Non Sq Epi: x / Bacteria: x        Assessment/Plan:  79 year old female w anemia, AS s/p TAVR, AFIB on eliquis, grade 3 diastolic dysfx, CVA , R 2nd rib fx, hx temporal arteritis, HLD, HTN BIBEMS to ER from [facility] c/o abnormal labs from 2 days PTA Hb 6.8.      #Anemia: acute on chronic / iron deficiency / concern for possible hemolysis:   - no melena by hx or by physical exam, rectal Guaiac negative  - s/p recent iron infusions x 2  - s/p 2u PRBC 12/11  - Hb: 6.5 --> 9.1 --> 8.4 --> 8.2 --> 7.9 --> 8.3  - s/p bone marrow biopsy 12/11  - guiaic negative, egd/colo low yield, hold off for now  - repeat echo noted, no significant changes when compared to 11/7 echo.   - trial of steroids 70mg qd prednisone per heme, continue   - supplement hypokalemia       #AS s/p TAVR / Grade 3 HFpEF:  - lasix changed to torsemide, continue and monitor  - cardio following    #Chronic AFib:  - eliquis      #CVA : recent   thought cardioembolic  - AC changed from xarelto to eliquis      #HLD  - statin      #R shoulder pain/hematoma / R 2nd rib fx / Unsteady gait:  - PT/OT  - acetaminophen 975 TID  - methacarbamol 250 mg q HS  - lidocaine patch  - tramadol prn  - gabapentin      #GERD  - omaprazole interchange to protonix  - carafate TID was empiric addition      #HTN  - monitor BP        #VTE  - AC      #GOC  - full code    Dispo:  - d/c tomorrow with outpatient heme and cardio f/u.  D/c home w/ home care.

## 2024-12-16 ENCOUNTER — TRANSCRIPTION ENCOUNTER (OUTPATIENT)
Age: 79
End: 2024-12-16

## 2024-12-16 LAB
ADD ON TEST-SPECIMEN IN LAB: SIGNIFICANT CHANGE UP
ANION GAP SERPL CALC-SCNC: 4 MMOL/L — LOW (ref 5–17)
ANISOCYTOSIS BLD QL: SLIGHT — SIGNIFICANT CHANGE UP
BUN SERPL-MCNC: 44 MG/DL — HIGH (ref 7–23)
CALCIUM SERPL-MCNC: 9 MG/DL — SIGNIFICANT CHANGE UP (ref 8.5–10.1)
CHLORIDE SERPL-SCNC: 108 MMOL/L — SIGNIFICANT CHANGE UP (ref 96–108)
CO2 SERPL-SCNC: 27 MMOL/L — SIGNIFICANT CHANGE UP (ref 22–31)
CREAT SERPL-MCNC: 1.05 MG/DL — SIGNIFICANT CHANGE UP (ref 0.5–1.3)
EGFR: 54 ML/MIN/1.73M2 — LOW
GLUCOSE SERPL-MCNC: 106 MG/DL — HIGH (ref 70–99)
HCT VFR BLD CALC: 27.1 % — LOW (ref 34.5–45)
HEMATOPATHOLOGY REPORT: SIGNIFICANT CHANGE UP
HGB BLD-MCNC: 8.3 G/DL — LOW (ref 11.5–15.5)
MANUAL SMEAR VERIFICATION: SIGNIFICANT CHANGE UP
MCHC RBC-ENTMCNC: 29.1 PG — SIGNIFICANT CHANGE UP (ref 27–34)
MCHC RBC-ENTMCNC: 30.6 G/DL — LOW (ref 32–36)
MCV RBC AUTO: 95.1 FL — SIGNIFICANT CHANGE UP (ref 80–100)
MICROCYTES BLD QL: SLIGHT — SIGNIFICANT CHANGE UP
NT-PROBNP SERPL-SCNC: 2030 PG/ML — HIGH (ref 0–450)
OVALOCYTES BLD QL SMEAR: SLIGHT — SIGNIFICANT CHANGE UP
PLAT MORPH BLD: NORMAL — SIGNIFICANT CHANGE UP
PLATELET # BLD AUTO: 255 K/UL — SIGNIFICANT CHANGE UP (ref 150–400)
POIKILOCYTOSIS BLD QL AUTO: SLIGHT — SIGNIFICANT CHANGE UP
POLYCHROMASIA BLD QL SMEAR: SLIGHT — SIGNIFICANT CHANGE UP
POTASSIUM SERPL-MCNC: 3.4 MMOL/L — LOW (ref 3.5–5.3)
POTASSIUM SERPL-SCNC: 3.4 MMOL/L — LOW (ref 3.5–5.3)
RBC # BLD: 2.85 M/UL — LOW (ref 3.8–5.2)
RBC # FLD: 20.8 % — HIGH (ref 10.3–14.5)
RBC BLD AUTO: ABNORMAL
SCHISTOCYTES BLD QL AUTO: SLIGHT — SIGNIFICANT CHANGE UP
SODIUM SERPL-SCNC: 139 MMOL/L — SIGNIFICANT CHANGE UP (ref 135–145)
TM INTERPRETATION: SIGNIFICANT CHANGE UP
WBC # BLD: 9.55 K/UL — SIGNIFICANT CHANGE UP (ref 3.8–10.5)
WBC # FLD AUTO: 9.55 K/UL — SIGNIFICANT CHANGE UP (ref 3.8–10.5)

## 2024-12-16 PROCEDURE — 99232 SBSQ HOSP IP/OBS MODERATE 35: CPT

## 2024-12-16 RX ORDER — POTASSIUM CHLORIDE 600 MG/1
20 TABLET, EXTENDED RELEASE ORAL ONCE
Refills: 0 | Status: COMPLETED | OUTPATIENT
Start: 2024-12-16 | End: 2024-12-16

## 2024-12-16 RX ADMIN — PREDNISONE 70 MILLIGRAM(S): 20 TABLET ORAL at 09:29

## 2024-12-16 RX ADMIN — APIXABAN 5 MILLIGRAM(S): 2.5 TABLET, FILM COATED ORAL at 22:09

## 2024-12-16 RX ADMIN — GABAPENTIN 300 MILLIGRAM(S): 300 CAPSULE ORAL at 22:09

## 2024-12-16 RX ADMIN — POTASSIUM CHLORIDE 20 MILLIEQUIVALENT(S): 600 TABLET, EXTENDED RELEASE ORAL at 09:38

## 2024-12-16 RX ADMIN — POTASSIUM CHLORIDE 20 MILLIEQUIVALENT(S): 600 TABLET, EXTENDED RELEASE ORAL at 09:29

## 2024-12-16 RX ADMIN — ACETAMINOPHEN 500MG 975 MILLIGRAM(S): 500 TABLET, COATED ORAL at 13:00

## 2024-12-16 RX ADMIN — SUCRALFATE 1 GRAM(S): 1 TABLET ORAL at 09:29

## 2024-12-16 RX ADMIN — ACETAMINOPHEN, DIPHENHYDRAMINE HCL, PHENYLEPHRINE HCL 3 MILLIGRAM(S): 325; 25; 5 TABLET ORAL at 22:09

## 2024-12-16 RX ADMIN — Medication 20 MILLIGRAM(S): at 09:28

## 2024-12-16 RX ADMIN — LIDOCAINE 1 PATCH: 40 CREAM TOPICAL at 22:07

## 2024-12-16 RX ADMIN — ACETAMINOPHEN 500MG 975 MILLIGRAM(S): 500 TABLET, COATED ORAL at 05:43

## 2024-12-16 RX ADMIN — SUCRALFATE 1 GRAM(S): 1 TABLET ORAL at 12:59

## 2024-12-16 RX ADMIN — LIDOCAINE 1 PATCH: 40 CREAM TOPICAL at 22:12

## 2024-12-16 RX ADMIN — GABAPENTIN 300 MILLIGRAM(S): 300 CAPSULE ORAL at 09:38

## 2024-12-16 RX ADMIN — ACETAMINOPHEN 500MG 975 MILLIGRAM(S): 500 TABLET, COATED ORAL at 22:08

## 2024-12-16 RX ADMIN — ESTROGENS, CONJUGATED 0.62 MILLIGRAM(S): 0.3 TABLET, FILM COATED ORAL at 09:29

## 2024-12-16 RX ADMIN — Medication 1 MILLIGRAM(S): at 09:28

## 2024-12-16 RX ADMIN — PANTOPRAZOLE SODIUM 40 MILLIGRAM(S): 40 TABLET, DELAYED RELEASE ORAL at 05:43

## 2024-12-16 RX ADMIN — Medication 10 MILLIGRAM(S): at 22:09

## 2024-12-16 RX ADMIN — ACETAMINOPHEN 500MG 975 MILLIGRAM(S): 500 TABLET, COATED ORAL at 22:50

## 2024-12-16 RX ADMIN — APIXABAN 5 MILLIGRAM(S): 2.5 TABLET, FILM COATED ORAL at 09:29

## 2024-12-16 NOTE — PROGRESS NOTE ADULT - SUBJECTIVE AND OBJECTIVE BOX
CC: acute anemia    History of Present Illness:   79 year old female w anemia, AS s/p TAVR, AFIB oneliquis, grade 3 diastolic dysfx, CVA , R 2nd rib fx, hx temporal arteritis, HLD, HTN BIBEMS to ER from [facility] c/o abnormal labs from 2 days PTA Hb 6.8.     S:  12/10: Sitting in chair, awake, alert, states she feels okay but anemia worsening.  Discussed blood transfusion for which pt is in agreement with.  Discussed plan of care and addressed all questions and concerns to the best of my ability.    12/11: Had bone marrow biopsy today.  Tolerated 2 units of blood yesterday.  Denies fever, chills, N, V, abd pain, CP, SOB.  12/12: Pt awake, alert, comfortable. Hb trending down, discussed with patient.  Discussed plan of care and addressed all questions and concerns to the best of my ability.  12/13: Status quo, no new events. Starting steroid therapy, all questions addressed.  Denies fever, chills, N, V, abd pain, CP, SOB.  12/14: Sitting in chair, states she is voiding a lot since starting torsemide.  Discussed supplementation of potassium.  Discussed plan of care and addressed all questions and concerns to the best of my ability.   12/15:  Hb stable today, pt responding to diuretic.  No new events.  Discussed plan of care and addressed all questions and concerns to the best of my ability.  12/16: Pt not feeling well, states unable to go home today, thinks would be better as not feeling great today.       REVIEW OF SYSTEMS: All other review of systems is negative unless indicated above.    Vital Signs Last 24 Hrs  T(C): 36.6 (16 Dec 2024 09:05), Max: 36.6 (16 Dec 2024 09:05)  T(F): 97.8 (16 Dec 2024 09:05), Max: 97.8 (16 Dec 2024 09:05)  HR: 53 (16 Dec 2024 09:05) (53 - 64)  BP: 127/62 (16 Dec 2024 09:05) (127/62 - 155/86)  BP(mean): 93 (15 Dec 2024 21:09) (93 - 93)  RR: 16 (16 Dec 2024 09:05) (16 - 17)  SpO2: 96% (16 Dec 2024 09:05) (96% - 97%)    Parameters below as of 16 Dec 2024 09:05  Patient On (Oxygen Delivery Method): room air        PHYSICAL EXAM:    Constitutional: NAD, awake and alert  HEENT: PERR, EOMI, Normal Hearing, MMM  Neck: Soft and supple  Respiratory: Breath sounds are clear bilaterally, No wheezing, rales or rhonchi  Cardiovascular: S1 and S2, regular rate and rhythm, no Murmurs, gallops or rubs  Gastrointestinal: Bowel Sounds present, soft, nontender, nondistended, no guarding, no rebound  Extremities: + leg edema b/l - improving  Neurological: A/O x 3, no focal deficits in my limited exam      MEDICATIONS  (STANDING):  acetaminophen     Tablet .. 975 milliGRAM(s) Oral every 8 hours  apixaban 5 milliGRAM(s) Oral every 12 hours  atorvastatin 10 milliGRAM(s) Oral at bedtime  estrogens    conjugated 0.625 milliGRAM(s) Oral daily  folic acid 1 milliGRAM(s) Oral daily  gabapentin 300 milliGRAM(s) Oral two times a day  influenza  Vaccine (HIGH DOSE) 0.5 milliLiter(s) IntraMuscular once  lidocaine   4% Patch 1 Patch Transdermal every 24 hours  methocarbamol 250 milliGRAM(s) Oral at bedtime  pantoprazole    Tablet 40 milliGRAM(s) Oral before breakfast  polyethylene glycol 3350 17 Gram(s) Oral daily  potassium chloride    Tablet ER 20 milliEquivalent(s) Oral daily  predniSONE   Tablet 70 milliGRAM(s) Oral daily  sucralfate 1 Gram(s) Oral <User Schedule>  torsemide 20 milliGRAM(s) Oral daily    MEDICATIONS  (PRN):  acetaminophen     Tablet .. 650 milliGRAM(s) Oral every 6 hours PRN Temp greater or equal to 38C (100.4F), Mild Pain (1 - 3)  aluminum hydroxide/magnesium hydroxide/simethicone Suspension 30 milliLiter(s) Oral every 4 hours PRN Dyspepsia  melatonin 3 milliGRAM(s) Oral at bedtime PRN Insomnia  naloxone Injectable 0.4 milliGRAM(s) IV Push once PRN oversedation  ondansetron Injectable 4 milliGRAM(s) IV Push every 8 hours PRN Nausea and/or Vomiting  traMADol 25 milliGRAM(s) Oral every 4 hours PRN moderate to severe pain                                  8.3    9.55  )-----------( 255      ( 16 Dec 2024 07:23 )             27.1     12-16    139  |  108  |  44[H]  ----------------------------<  106[H]  3.4[L]   |  27  |  1.05    Ca    9.0      16 Dec 2024 07:23      CAPILLARY BLOOD GLUCOSE            Urinalysis Basic - ( 16 Dec 2024 07:23 )    Color: x / Appearance: x / SG: x / pH: x  Gluc: 106 mg/dL / Ketone: x  / Bili: x / Urobili: x   Blood: x / Protein: x / Nitrite: x   Leuk Esterase: x / RBC: x / WBC x   Sq Epi: x / Non Sq Epi: x / Bacteria: x                Assessment/Plan:  79 year old female w anemia, AS s/p TAVR, AFIB on eliquis, grade 3 diastolic dysfx, CVA , R 2nd rib fx, hx temporal arteritis, HLD, HTN BIBEMS to ER from [facility] c/o abnormal labs from 2 days PTA Hb 6.8.      #Anemia: acute on chronic / iron deficiency / concern for possible hemolysis:   - no melena by hx or by physical exam, rectal Guaiac negative  - s/p recent iron infusions x 2  - s/p 2u PRBC 12/11  - Hb: 6.5 --> 9.1 --> 8.4 --> 8.2 --> 7.9 --> 8.3 --> 8.3  - s/p bone marrow biopsy 12/11  - guiaic negative, egd/colo low yield, hold off for now  - repeat echo noted, no significant changes when compared to 11/7 echo.   - trial of steroids 70mg qd prednisone per heme, continue   - supplement hypokalemia       #AS s/p TAVR / Grade 3 HFpEF:  - lasix changed to torsemide, continue and monitor  - cardio following    #Chronic AFib:  - eliquis      #CVA : recent   thought cardioembolic  - AC changed from xarelto to eliquis      #HLD  - statin      #R shoulder pain/hematoma / R 2nd rib fx / Unsteady gait:  - PT/OT  - acetaminophen 975 TID  - methacarbamol 250 mg q HS  - lidocaine patch  - tramadol prn  - gabapentin      #GERD  - omaprazole interchange to protonix  - carafate TID was empiric addition      #HTN  - monitor BP        #VTE  - AC      #GOC  - full code    Dispo:  - d/c tomorrow with outpatient heme and cardio f/u.  D/c home w/ home care.

## 2024-12-16 NOTE — PROGRESS NOTE ADULT - SUBJECTIVE AND OBJECTIVE BOX
CHIEF COMPLAINT: Patient is a 79y old  Female who presents with a chief complaint of acute anemia (11 Dec 2024 12:53)      HPI:  79 year old female w anemia, AS s/p TAVR, AFIB oneliquis, grade 3 diastolic dysfx, CVA , R 2nd rib fx, hx temporal arteritis, HLD, HTN BIBEMS to ER from [facility] c/o abnormal labs from 2 days PTA Hb 6.8.       Adm 11-06 to  admitted s/p fall with R shoulder pain s/p fall   # nondisplaced fracture of the anterior right second rib  # intramuscular hematoma R shoulder, lower dose AC recommended  #Fever with acute hypoxic resp failure RESOLVED CTA neg PE  Likely multifactorial (reactive to intramuscular hematoma 2/2 pain)  #UTI tx w merrem  #CVA : MRI acute infarct R centrum semiovale; cardioembolic stroke in setting of holding AC due to acute anemia after trauma.  #Grade 3 diastolic dysfunction on TTE EF 55%  #Anemia due to IM hematoma s/p PRBC x 2, seen by [facility]   At [facility] Fe 23/ 229 UIBC /251 TIBC; Eliquis held since Hb 7.1 to 6.8; given IV iron x 2  pt denied hematuria, black stools,     12/12 s/p transfusion with appropriate increase in hb  CT reveals liver cirrhosis  heme and GI following  s/p bm bx- results pending    12/13/24-- starting prednisone for hemolytic anemia-  reviewed follow up echo-- preserved EF, normal functioning TAVR valve, severe pHTN, significant diastolic dysfunction     12/14/24:  Resting comfortably and 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  No new cardiac symptoms.      12/15/24:  Feeling good without increased SOB and tolerating steroids so far.  Hgb=8.3 today with little change so far.  K+=3.4.  No new cardiac symptoms.      12/16/24- no acute issues overnight- tolerating steroids and diuretics       PAST MEDICAL HX  AS aortic stenosis   Atrial Fibrillation   CVA  MRI acute infarct  R centrum semiovale  H/O temporal arteritis   HLD hyperlipidemia  HTN hypertension   Nonrheumatic aortic valve stenosis.       FAMILY HX  lung cancer : Father      SOCIAL HX  at [facility] for rehab  nonsmoker  no alcohol   (09 Dec 2024 17:47)      PMHx: PAST MEDICAL & SURGICAL HISTORY:  History of Cardiac Arrhythmia  Hypertension  Atrial Fibrillation  Nonrheumatic aortic valve stenosis s/p TAVR  High cholesterol  H/O temporal arteritis  S/P Exploratory Laparotomy  1966  H/O prior ablation treatment  History of temporal artery biopsy  S/P foot surgery      Allergies: Allergies  adhesives (Rash)  No Known Drug Allergies    Vital Signs Last 24 Hrs  T(C): 36.5 (15 Dec 2024 21:09), Max: 36.5 (15 Dec 2024 21:09)  T(F): 97.7 (15 Dec 2024 21:09), Max: 97.7 (15 Dec 2024 21:09)  HR: 63 (15 Dec 2024 21:09) (58 - 64)  BP: 155/70 (15 Dec 2024 21:09) (155/70 - 166/86)  BP(mean): 93 (15 Dec 2024 21:09) (93 - 93)  RR: 17 (15 Dec 2024 21:09) (16 - 17)  SpO2: 97% (15 Dec 2024 21:09) (96% - 97%)    Parameters below as of 15 Dec 2024 21:09  Patient On (Oxygen Delivery Method): room air        PHYSICAL EXAM:   Constitutional: NAD, awake and alert, well-developed  HEENT: PERR, EOMI, Normal Hearing, MMM  Neck: Soft and supple, No LAD, No JVD  Respiratory: Breath sounds are clear bilaterally, No wheezing, rales or rhonchi  Cardiovascular: S1 and S2,irregular rate and rhythm, EDWIGE  Extremities: No peripheral edema  Vascular: 2+ peripheral pulses  Neurological: A/O x 3, no focal deficits      Vital Signs Last 24 Hrs  T(C): 36.5 (15 Dec 2024 21:09), Max: 36.5 (15 Dec 2024 21:09)  T(F): 97.7 (15 Dec 2024 21:09), Max: 97.7 (15 Dec 2024 21:09)  HR: 63 (15 Dec 2024 21:09) (58 - 64)  BP: 155/70 (15 Dec 2024 21:09) (155/70 - 166/86)  BP(mean): 93 (15 Dec 2024 21:09) (93 - 93)  RR: 17 (15 Dec 2024 21:09) (16 - 17)  SpO2: 97% (15 Dec 2024 21:09) (96% - 97%)    Parameters below as of 15 Dec 2024 21:09  Patient On (Oxygen Delivery Method): room air    MEDICATIONS  (STANDING):  acetaminophen     Tablet .. 975 milliGRAM(s) Oral every 8 hours  apixaban 5 milliGRAM(s) Oral every 12 hours  atorvastatin 10 milliGRAM(s) Oral at bedtime  estrogens    conjugated 0.625 milliGRAM(s) Oral daily  folic acid 1 milliGRAM(s) Oral daily  gabapentin 300 milliGRAM(s) Oral two times a day  influenza  Vaccine (HIGH DOSE) 0.5 milliLiter(s) IntraMuscular once  lidocaine   4% Patch 1 Patch Transdermal every 24 hours  methocarbamol 250 milliGRAM(s) Oral at bedtime  pantoprazole    Tablet 40 milliGRAM(s) Oral before breakfast  polyethylene glycol 3350 17 Gram(s) Oral daily  potassium chloride    Tablet ER 20 milliEquivalent(s) Oral daily  predniSONE   Tablet 70 milliGRAM(s) Oral daily  sucralfate 1 Gram(s) Oral <User Schedule>  torsemide 20 milliGRAM(s) Oral daily          LABS: All Labs Reviewed:                        8.3    9.09  )-----------( 261      ( 15 Dec 2024 08:21 )             26.8                         8.2    7.64  )-----------( 259      ( 13 Dec 2024 08:21 )             25.9                                   8.4    7.81  )-----------( 258      ( 12 Dec 2024 08:20 )             27.2     12-15    140  |  108  |  40[H]  ----------------------------<  95  3.4[L]   |  27  |  1.04    Ca    9.2      15 Dec 2024 08:21      12-11    137  |  105  |  24[H]  ----------------------------<  95  4.0   |  29  |  0.84    Ca    9.6      11 Dec 2024 10:55      RADIOLOGY:< from: CT Abdomen and Pelvis w/ IV Cont (12.11.24 @ 12:11) >  FINDINGS:  LOWER CHEST: Cardiomegaly and transcatheter aortic valve repair.    LIVER: Cirrhotic morphology. No focal liver lesion. Heterogeneous   parenchymal attenuation likely due to passive hepatic congestion with   bridging fibrosis. No focal hepatic lesion.  BILE DUCTS: Normal caliber.  GALLBLADDER: Cholelithiasis.  SPLEEN: Within normal limits.  PANCREAS: Within normal limits.  ADRENALS: Within normal limits.  KIDNEYS/URETERS: Bilateral renal scarring. Right renal cyst.    BLADDER: Within normal limits.  REPRODUCTIVE ORGANS: Hysterectomy.    BOWEL: No bowel obstruction. Appendix not visualized. Large amount of   stool throughout the colon.  PERITONEUM/RETROPERITONEUM: Within normal limits.  VESSELS: Atherosclerotic changes. Patent hepatic, portal and superior   mesenteric veins.  LYMPH NODES: No lymphadenopathy.  ABDOMINAL WALL: Within normal limits.  BONES: No acute findings.    IMPRESSION:  Cirrhosis with passive hepatic congestion.  No evidence of hepatocellular carcinoma.          --- End of Report ---      < end of copied text >      EKG:< from: 12 Lead ECG (12.09.24 @ 14:32) >    Diagnosis Line Atrial fibrillation  Left axis deviation  Incomplete right bundle branch block  Minimal voltage criteria for LVH, may be normal variant ( [place] product )  Anteroseptal infarct (cited on or before 08-FEB-2016)  Abnormal ECG  When compared with ECG of 02-NOV-2024 17:40,  Incomplete right bundle branch block is now Present  Confirmedby EDDIE CASTILLO ([id number]) on 12/10/2024 8:24:57 AM    < end of copied text >      < from: TTE W or WO Ultrasound Enhancing Agent (12.12.24 @ 14:18) >    CONCLUSIONS:      1. Estimated pulmonary artery systolic pressure is 62 mmHg, consistent with severe pulmonary hypertension.   2. Severe tricuspid regurgitation.   3. A Transcatheter deployed (TAVR) valve replacement is present in the aortic position The prosthetic valve is well seated. Mild intravalvular regurgitation.   4. Mild to moderate mitral regurgitation.   5. Left ventricular cavity is normal in size. Left ventricular systolic function is normal with an ejection fraction visually estimated at 55 to 60 %.   6. No left ventricular hypertrophy.   7. Enlarged right ventricular cavity size.   8. The right atrium is moderately dilated.   9. Compared to the transthoracic echocardiogram performed on 11/7/2024, there have been no significant interval changes.    ________________________________________________________________________________________  FINDINGS:     Left Ventricle:  The left ventricular cavity is normal in size. Left ventricular wall thickness is normal. Left ventricular systolic function is normal with a calculated ejection fraction of 57 % by 3D with an ejection fraction visually estimated at 55 to 60%. The left ventricular diastolic function is indeterminate.     Right Ventricle:  The right ventricular cavity is enlarged in size and right ventricular systolic function is normal. Tricuspid annular plane systolic excursion (TAPSE) is 2.2 cm (normal >=1.7 cm).     Left Atrium:  The left atrium is normal in size with an indexed volume of 27.06 ml/m².     Right Atrium:  The right atrium is moderately dilated with an indexed volume of 45.77 ml/m² and an indexed area of 14.37 cm²/m².     Interatrial Septum:  The interatrial septum appears intact.     Aortic Valve:  A a transcatheter deployed (TAVR) is present in the aortic position. The prosthetic valve is well seated. The peak transaortic velocity is 2.42 m/s, peak transaortic gradient is 23.4 mmHg and mean transaortic gradient is 11.0 mmHg with an LVOT/aortic valve VTI ratio of 0.61. The effective orifice area is estimated at 2.33 cm² by the continuity equation. There is mild intravalvular regurgitation.     Mitral Valve:  There is normal leaflet mobility of the mitral valve. There is moderate calcification of the mitral valve annulus. There is mild to moderate mitral regurgitation.     Tricuspid Valve:  Structurally normal tricuspid valve with normal leaflet excursion. There is severe tricuspid regurgitation. Estimated pulmonary artery systolic pressure is 62 mmHg, consistent with severe pulmonary hypertension.     Pulmonic Valve:  Structurally normal pulmonic valve with normal leaflet excursion.     Aorta:  The ascending aorta is normal in size, measuring 3.07 cm (indexed 1.78 cm/m²). The aortic arch diameter is normal in size, measuring 2.5 cm (indexed 1.45 cm/m²).     Pericardium:  No pericardial effusion seen.     Systemic Veins:  The inferior vena cava is dilated measuring 2.40 cm in diameter, (dilated >2.1cm) with normal inspiratory collapse (normal >50%) consistent with mildly elevated right atrial pressure (~8, range 5-10mmHg).  ____________________________________________________________________  QUANTITATIVE DATA:  Left Ventricle Measurements: (Indexed to BSA)     IVSd (2D):   0.9 cm  LVPWd (2D):  0.9 cm  LVIDd (2D):  4.5 cm  LVIDs (2D):  3.2 cm  LV Mass:     142 g  82.0 g/m²  Visualized LV EF%: 55 to 60%  3D LV EF%:         57 %     MV E Vmax:    1.55 m/s  MV A Vmax:    0.43 m/s  MV E/A:       3.57  e' lateral:   11.70 cm/s  e' medial:    7.18 cm/s  E/e' lateral: 13.25  E/e' medial:  21.59  E/e' Average: 16.42  MV DT:      169 msec    Aorta Measurements: (Normal range) (Indexed to BSA)     Ao Asc prox: 3.07 cm 1.78 cm/m²  Ao Arch:     2.5 cm            Left Atrium Measurements: (Indexed to BSA)  LA Diam 2D: 4.50 cm         Right Ventricle Measurements: Right Atrial Measurements:     TAPSE:           2.2 cm       RA Vol:            79.00 ml  RV S' Vmax:      11.50 cm/s   RA Vol s, MOD A4C  72.0 ml  RV Base (RVID1): 4.5 cm       RA Vol Index:      45.77 ml/m²                                RA Area s, MOD A4C 23.2 cm²       LVOT / RVOT/ Qp/Qs Data: (Indexed to BSA)  LVOT Diameter:  2.20 cm  LVOT Area:      3.80 cm²  LVOT Vmax:      1.47 m/s  LVOT Vmn:       1.040 m/s  LVOT VTI:       26.90 cm  LVOT peak grad: 9 mmHg  LVOT mean grad: 4.5 mmHg  LVOT SV:102.3 ml  59.24 ml/m²    Aortic Valve Measurements:  AV Vmax:                2.4 m/s  AV Peak Gradient:       23.4 mmHg  AV Mean Gradient:       11.0 mmHg  AV VTI:                 43.8 cm  AV VTI Ratio:           0.61  AoV EOA, Contin:        2.33 cm²  AoV EOA, Contin i:      1.35 cm²/m²  AoV Dimensionless Index 0.61    Mitral Valve Measurements:     MV E Vmax: 1.6 m/s  MV A Vmax: 0.4 m/s  MV E/A:    3.6       Tricuspid Valve Measurements:     TR Vmax:          3.7 m/s  TR Peak Gradient: 53.6 mmHg  RA Pressure:      8 mmHg  PASP:             62 mmHg    ________________________________________________________________________________________  Electronically signed on 12/12/2024 at 9:19:49 PM by aDmián Aguirre MD         *** Final ***    < end of copied text >

## 2024-12-16 NOTE — PROGRESS NOTE ADULT - ASSESSMENT
79 F with AF, on AC, anemia- CVA, aortic stenosis fall with shoulder and rib trauma presents with worsening anemia and weakness  Anemia Work up in progress- s/p Bm bx, starting steroids,  recommended maintaining Hb at 9 given cardiac hx-- GI workup deferred at this time   AF , HR controlled - continue with Eliquis  echo results reviewed--- preserved LVEF with moderate diastolic dysfunction , dilated atrial and severe secondary pulmonary HTN--- she will need diuretics -she is on torsemide now (off of Lasix)  she may benefit from Farxiga ( once procedures are completed)  keep k+ > 4 and mg > 2, recommend daily BNP levels    12/14/24:  Resting comfortably and 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  No new cardiac symptoms. Continue as outlined by medicine and hematology etal.  Will follow as treatment progresses.      12/15/24:  Feeling good without increased SOB and tolerating steroids so far.  Hgb=8.3 today with little change so far.  K+=3.4.  No new cardiac symptoms.  Replete K+ per medicine.  Continue as outlined by medicine and hematology etal.  Will follow as treatment progresses.        12/16/24- feeling well overall- labs stable albeit suboptimal ( hemoglobin/ BNP)  regarding fluid status-- continue to check BNP , chemistry maintain k + >4 and mg > 2]      DC planning [facility] close outpatient follow up

## 2024-12-16 NOTE — DISCHARGE NOTE NURSING/CASE MANAGEMENT/SOCIAL WORK - PATIENT PORTAL LINK FT
You can access the FollowMyHealth Patient Portal offered by [facility] by registering at the following website: [email or web address]. By joining [employer]’s FollowMyHealth portal, you will also be able to view your health information using other applications (apps) compatible with our system.

## 2024-12-16 NOTE — DIETITIAN INITIAL EVALUATION ADULT - ORAL INTAKE PTA/DIET HISTORY
Lives at home alone. Typically cooks and grocery shops alone. Endorses normally good appetite at home PTA however reports recently has been "forcing" herself to eat. Meeting <75% ENN PTA.

## 2024-12-16 NOTE — DIETITIAN INITIAL EVALUATION ADULT - ADD RECOMMEND
1) C/w regular diet. Will add Ensure + HP shake BID to optimize nutritional needs (provides 350 kcal, 20g protein/ shake)   2) Monitor bowel movements, if no BM for >3 days, consider implementing bowel regimen.  3) MVI w/ minerals daily to ensure 100% RDA met  4) Consider adding thiamine 100 mg daily 2/2 poor PO intake/ malnutrition  5) Consider adding appetite stimulant such as Remeron or Marinol 2/2 chronically poor appetite/ PO intake  6) Obtain weekly wt to track/trend changes  7) Confirm goals of care regarding nutrition support  RD will continue to monitor PO intake, labs, hydration, and wt prn.

## 2024-12-16 NOTE — DIETITIAN INITIAL EVALUATION ADULT - PERTINENT LABORATORY DATA
12-16    139  |  108  |  44[H]  ----------------------------<  106[H]  3.4[L]   |  27  |  1.05    Ca    9.0      16 Dec 2024 07:23    A1C with Estimated Average Glucose Result: 5.8 % (11-07-24 @ 07:36)  A1C with Estimated Average Glucose Result: 5.8 % (06-21-24 @ 10:40)  A1C with Estimated Average Glucose Result: 6.2 % (02-26-24 @ 18:25)

## 2024-12-16 NOTE — DIETITIAN INITIAL EVALUATION ADULT - PERTINENT MEDS FT
MEDICATIONS  (STANDING):  acetaminophen     Tablet .. 975 milliGRAM(s) Oral every 8 hours  apixaban 5 milliGRAM(s) Oral every 12 hours  atorvastatin 10 milliGRAM(s) Oral at bedtime  estrogens    conjugated 0.625 milliGRAM(s) Oral daily  folic acid 1 milliGRAM(s) Oral daily  gabapentin 300 milliGRAM(s) Oral two times a day  influenza  Vaccine (HIGH DOSE) 0.5 milliLiter(s) IntraMuscular once  lidocaine   4% Patch 1 Patch Transdermal every 24 hours  methocarbamol 250 milliGRAM(s) Oral at bedtime  pantoprazole    Tablet 40 milliGRAM(s) Oral before breakfast  polyethylene glycol 3350 17 Gram(s) Oral daily  potassium chloride    Tablet ER 20 milliEquivalent(s) Oral daily  predniSONE   Tablet 70 milliGRAM(s) Oral daily  sucralfate 1 Gram(s) Oral <User Schedule>  torsemide 20 milliGRAM(s) Oral daily    MEDICATIONS  (PRN):  acetaminophen     Tablet .. 650 milliGRAM(s) Oral every 6 hours PRN Temp greater or equal to 38C (100.4F), Mild Pain (1 - 3)  aluminum hydroxide/magnesium hydroxide/simethicone Suspension 30 milliLiter(s) Oral every 4 hours PRN Dyspepsia  melatonin 3 milliGRAM(s) Oral at bedtime PRN Insomnia  naloxone Injectable 0.4 milliGRAM(s) IV Push once PRN oversedation  ondansetron Injectable 4 milliGRAM(s) IV Push every 8 hours PRN Nausea and/or Vomiting  traMADol 25 milliGRAM(s) Oral every 4 hours PRN moderate to severe pain

## 2024-12-16 NOTE — DIETITIAN INITIAL EVALUATION ADULT - OTHER INFO
79 year old female w anemia, AS s/p TAVR, AFIB oneliquis, grade 3 diastolic dysfx, CVA , R 2nd rib fx, hx temporal arteritis, HLD, HTN BIBEMS to ER from [facility] c/o abnormal labs from 2 days PTA Hb 6.8. No melena by hx or by physical exam, rectal Guaiac negative. S/p bone marrow biopsy w/ IR to determine cause of anemia (likely suspected 2/2 hemolysis). Cirrhosis on imaging as per GI note.     Known to RD service, met criteria for PCM on previous admit (Nov 2024). RD visited pt at bed side this morning. Currently on regular diet. Reports still forcing herself to eat d/t poor appetite. Wt hx reviewed: 154# (EMR wt on 11/7/24 w/ 1-2+ edema). Was unable to obtain bed scale wt 2/2 sitting in chair. EMR wt doc'd at 150# w/ mod-severe edema doc'd, likely skewing current wt appearance. UBW stated at 143# however suspected recent wt loss 2/2 poor appetite. Mild to moderate muscle/fat wasting was noted on NFPE at this time. C/w regular diet. Will add Ensure + HP shake BID to optimize nutritional needs (provides 350 kcal, 20g protein/ shake) - pt receptive. See other recs below.

## 2024-12-16 NOTE — DISCHARGE NOTE NURSING/CASE MANAGEMENT/SOCIAL WORK - FINANCIAL ASSISTANCE
[facility] provides services at a reduced cost to those who are determined to be eligible through [facility]’s financial assistance program. Information regarding [facility]’s financial assistance program can be found by going to [email or web address] or by calling [phone].

## 2024-12-16 NOTE — DISCHARGE NOTE NURSING/CASE MANAGEMENT/SOCIAL WORK - NSPROEXTENSIONSOFSELF_GEN_A_NUR
eyeglasses
I will SWITCH the dose or number of times a day I take the medications listed below when I get home from the hospital:  None

## 2024-12-17 ENCOUNTER — TRANSCRIPTION ENCOUNTER (OUTPATIENT)
Age: 79
End: 2024-12-17

## 2024-12-17 VITALS
SYSTOLIC BLOOD PRESSURE: 170 MMHG | TEMPERATURE: 98 F | RESPIRATION RATE: 16 BRPM | DIASTOLIC BLOOD PRESSURE: 85 MMHG | OXYGEN SATURATION: 97 % | HEART RATE: 64 BPM

## 2024-12-17 LAB
ADD ON TEST-SPECIMEN IN LAB: SIGNIFICANT CHANGE UP
ANION GAP SERPL CALC-SCNC: 6 MMOL/L — SIGNIFICANT CHANGE UP (ref 5–17)
BUN SERPL-MCNC: 45 MG/DL — HIGH (ref 7–23)
CALCIUM SERPL-MCNC: 9.1 MG/DL — SIGNIFICANT CHANGE UP (ref 8.5–10.1)
CHLORIDE SERPL-SCNC: 104 MMOL/L — SIGNIFICANT CHANGE UP (ref 96–108)
CO2 SERPL-SCNC: 28 MMOL/L — SIGNIFICANT CHANGE UP (ref 22–31)
CREAT SERPL-MCNC: 1.13 MG/DL — SIGNIFICANT CHANGE UP (ref 0.5–1.3)
EGFR: 49 ML/MIN/1.73M2 — LOW
GLUCOSE SERPL-MCNC: 88 MG/DL — SIGNIFICANT CHANGE UP (ref 70–99)
HCT VFR BLD CALC: 31.9 % — LOW (ref 34.5–45)
HGB BLD-MCNC: 9.9 G/DL — LOW (ref 11.5–15.5)
MCHC RBC-ENTMCNC: 29.5 PG — SIGNIFICANT CHANGE UP (ref 27–34)
MCHC RBC-ENTMCNC: 31 G/DL — LOW (ref 32–36)
MCV RBC AUTO: 94.9 FL — SIGNIFICANT CHANGE UP (ref 80–100)
MITOCHONDRIA AB SER-ACNC: SIGNIFICANT CHANGE UP
NT-PROBNP SERPL-SCNC: 2079 PG/ML — HIGH (ref 0–450)
PLATELET # BLD AUTO: 288 K/UL — SIGNIFICANT CHANGE UP (ref 150–400)
POTASSIUM SERPL-MCNC: 3.6 MMOL/L — SIGNIFICANT CHANGE UP (ref 3.5–5.3)
POTASSIUM SERPL-SCNC: 3.6 MMOL/L — SIGNIFICANT CHANGE UP (ref 3.5–5.3)
RBC # BLD: 3.36 M/UL — LOW (ref 3.8–5.2)
RBC # FLD: 20.8 % — HIGH (ref 10.3–14.5)
SMOOTH MUSCLE AB SER-ACNC: ABNORMAL
SODIUM SERPL-SCNC: 138 MMOL/L — SIGNIFICANT CHANGE UP (ref 135–145)
WBC # BLD: 9.1 K/UL — SIGNIFICANT CHANGE UP (ref 3.8–10.5)
WBC # FLD AUTO: 9.1 K/UL — SIGNIFICANT CHANGE UP (ref 3.8–10.5)

## 2024-12-17 PROCEDURE — 99239 HOSP IP/OBS DSCHRG MGMT >30: CPT

## 2024-12-17 RX ORDER — VALSARTAN 320 MG/1
1 TABLET ORAL
Qty: 30 | Refills: 0
Start: 2024-12-17 | End: 2025-01-15

## 2024-12-17 RX ORDER — TORSEMIDE 10 MG
10 TABLET ORAL DAILY
Refills: 0 | Status: DISCONTINUED | OUTPATIENT
Start: 2024-12-17 | End: 2024-12-17

## 2024-12-17 RX ORDER — TORSEMIDE 10 MG
1 TABLET ORAL
Qty: 30 | Refills: 0
Start: 2024-12-17 | End: 2025-01-15

## 2024-12-17 RX ORDER — PREDNISONE 20 MG/1
7 TABLET ORAL
Qty: 28 | Refills: 0
Start: 2024-12-17 | End: 2024-12-20

## 2024-12-17 RX ORDER — VALSARTAN 320 MG/1
160 TABLET ORAL DAILY
Refills: 0 | Status: DISCONTINUED | OUTPATIENT
Start: 2024-12-17 | End: 2024-12-17

## 2024-12-17 RX ADMIN — GABAPENTIN 300 MILLIGRAM(S): 300 CAPSULE ORAL at 09:21

## 2024-12-17 RX ADMIN — POTASSIUM CHLORIDE 20 MILLIEQUIVALENT(S): 600 TABLET, EXTENDED RELEASE ORAL at 09:24

## 2024-12-17 RX ADMIN — PREDNISONE 70 MILLIGRAM(S): 20 TABLET ORAL at 09:22

## 2024-12-17 RX ADMIN — LIDOCAINE 1 PATCH: 40 CREAM TOPICAL at 10:48

## 2024-12-17 RX ADMIN — LIDOCAINE 1 PATCH: 40 CREAM TOPICAL at 09:26

## 2024-12-17 RX ADMIN — APIXABAN 5 MILLIGRAM(S): 2.5 TABLET, FILM COATED ORAL at 09:23

## 2024-12-17 RX ADMIN — ACETAMINOPHEN 500MG 975 MILLIGRAM(S): 500 TABLET, COATED ORAL at 13:55

## 2024-12-17 RX ADMIN — Medication 1 MILLIGRAM(S): at 09:24

## 2024-12-17 RX ADMIN — ACETAMINOPHEN 500MG 975 MILLIGRAM(S): 500 TABLET, COATED ORAL at 05:28

## 2024-12-17 RX ADMIN — ESTROGENS, CONJUGATED 0.62 MILLIGRAM(S): 0.3 TABLET, FILM COATED ORAL at 09:22

## 2024-12-17 RX ADMIN — Medication 20 MILLIGRAM(S): at 05:49

## 2024-12-17 RX ADMIN — VALSARTAN 160 MILLIGRAM(S): 320 TABLET ORAL at 09:22

## 2024-12-17 NOTE — PROGRESS NOTE ADULT - ASSESSMENT
79 F with AF, on AC, anemia- CVA, aortic stenosis fall with shoulder and rib trauma presents with worsening anemia and weakness  Anemia Work up in progress- s/p Bm bx, starting steroids,  recommended maintaining Hb at 9 given cardiac hx-- GI workup deferred at this time   AF , HR controlled - continue with Eliquis  echo results reviewed--- preserved LVEF with moderate diastolic dysfunction , dilated atrial and severe secondary pulmonary HTN--- she will need diuretics -she is on torsemide now (off of Lasix)  she may benefit from Farxiga ( once procedures are completed)  keep k+ > 4 and mg > 2, recommend daily BNP levels    12/14/24:  Resting comfortably and 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  No new cardiac symptoms. Continue as outlined by medicine and hematology etal.  Will follow as treatment progresses.      12/15/24:  Feeling good without increased SOB and tolerating steroids so far.  Hgb=8.3 today with little change so far.  K+=3.4.  No new cardiac symptoms.  Replete K+ per medicine.  Continue as outlined by medicine and hematology etal.  Will follow as treatment progresses.        12/16/24- feeling well overall- labs stable albeit suboptimal ( hemoglobin/ BNP)  regarding fluid status-- continue to check BNP , chemistry maintain k + >4 and mg > 2]      12/17- increase BP- will add valsartan  increased BUN/ creatine ratio-- recommend decreasing torsemide dose to 10 mg daily  recommend adequate PO fluid intake     DC planning [facility] close outpatient follow up

## 2024-12-17 NOTE — PROGRESS NOTE ADULT - PROBLEM SELECTOR PROBLEM 3
Paravalvular leak of prosthetic heart valve

## 2024-12-17 NOTE — DISCHARGE NOTE PROVIDER - PROVIDER TOKENS
PROVIDER:[TOKEN:[[id number]],FOLLOWUP:[1-3 days]],PROVIDER:[TOKEN:[[id number]],FOLLOWUP:[1-3 days]]

## 2024-12-17 NOTE — PROGRESS NOTE ADULT - PROVIDER SPECIALTY LIST ADULT
Cardiology
Gastroenterology
Hospitalist
Cardiology
Hospitalist
Heme/Onc
Hospitalist
Cardiology
Gastroenterology
Cardiology
Cardiology

## 2024-12-17 NOTE — DISCHARGE NOTE PROVIDER - NSDCCPCAREPLAN_GEN_ALL_CORE_FT
PRINCIPAL DISCHARGE DIAGNOSIS  Diagnosis: Acute anemia  Assessment and Plan of Treatment: Hemolysis  -take steroids as prescribed with short follow up Dr. Milligan for further testing and management and discussion of bone marrow results.  hemoglobin 9.9 today.      SECONDARY DISCHARGE DIAGNOSES  Diagnosis: S/P TAVR (transcatheter aortic valve replacement)  Assessment and Plan of Treatment: Follow up with Dr. Lovell   take torsemide and cardiac meds as prescribed

## 2024-12-17 NOTE — CHART NOTE - NSCHARTNOTEFT_GEN_A_CORE
Rolling Walker:  Patient requires rolling walker due to weakness, debility, deconditioning and anemia for safe ambulation at discharge. Rolling Walker:  Patient requires rolling walker due to weakness, debility, deconditioning and anemia for safe ambulation at discharge.  Needs rolling walker to help/assist with MRADL's.

## 2024-12-17 NOTE — DISCHARGE NOTE PROVIDER - CARE PROVIDER_API CALL
Sridhar Lovell  Cardiovascular Disease  [address], [address]  [place] [address]  Phone: [phone]  Fax: [phone]  Follow Up Time: 1-3 days    Eva Milligan  [facility]  [address], [place] [address]  Phone: [phone]  Fax: [phone]  Follow Up Time: 1-3 days

## 2024-12-17 NOTE — DISCHARGE NOTE PROVIDER - HOSPITAL COURSE
History of Present Illness:   79 year old female w anemia, AS s/p TAVR, AFIB oneliquis, grade 3 diastolic dysfx, CVA , R 2nd rib fx, hx temporal arteritis, HLD, HTN BIBEMS to ER from [facility] c/o abnormal labs from 2 days PTA Hb 6.8.     Hospital course:  Pt admited for acute on chronic anemia, concern for possible hemolytic anemia, etiology could be related to autoimmune vs drug induced vs mechanical due to AVR.  Pt's lasix stopped, changed to torsemide.  She was started on prednisone per heme, 70mg daily.  She received 2 units of blood on this admission, her Hb went from 6.5 to now prior to discharge 9.9.  Pt comfortable HD stable, she will go home on prednisone and close follow up Friday for further hematology eval and management.  She will also follow up with Dr. Lovell for management of diuretics and bp meds.  Pt had a bone marrow biopsy on this admission, results to be discussed with hematology outpatient.    REVIEW OF SYSTEMS: All other review of systems is negative unless indicated above.    Vital Signs Last 24 Hrs  T(C): 36.4 (17 Dec 2024 07:36), Max: 36.6 (16 Dec 2024 15:40)  T(F): 97.6 (17 Dec 2024 07:36), Max: 97.8 (16 Dec 2024 15:40)  HR: 64 (17 Dec 2024 07:36) (61 - 71)  BP: 170/85 (17 Dec 2024 07:36) (153/69 - 177/78)  BP(mean): 105 (17 Dec 2024 07:36) (105 - 105)  RR: 16 (17 Dec 2024 07:36) (16 - 18)  SpO2: 97% (17 Dec 2024 07:36) (95% - 97%)    Parameters below as of 17 Dec 2024 07:36  Patient On (Oxygen Delivery Method): room air    PHYSICAL EXAM:    Constitutional: NAD, awake and alert  HEENT: PERR, EOMI, Normal Hearing, MMM  Neck: Soft and supple  Respiratory: Breath sounds are clear bilaterally, No wheezing, rales or rhonchi  Cardiovascular: S1 and S2, regular rate and rhythm, no Murmurs, gallops or rubs  Gastrointestinal: Bowel Sounds present, soft, nontender, nondistended, no guarding, no rebound  Extremities: + leg edema b/l - improving  Neurological: A/O x 3, no focal deficits in my limited exam    med/labs: Reviewed and interpreted       Assessment/Plan:  79 year old female w anemia, AS s/p TAVR, AFIB on eliquis, grade 3 diastolic dysfx, CVA , R 2nd rib fx, hx temporal arteritis, HLD, HTN BIBEMS to ER from [facility] c/o abnormal labs from 2 days PTA Hb 6.8.      #Anemia: acute on chronic / iron deficiency / concern for possible hemolysis:   - no melena by hx or by physical exam, rectal Guaiac negative  - s/p recent iron infusions x 2  - s/p 2u PRBC 12/11  - Hb: 6.5 --> 9.1 --> 8.4 --> 8.2 --> 7.9 --> 8.3 --> 8.3 --> 9.9  - s/p bone marrow biopsy 12/11  - guiaic negative, egd/colo low yield, hold off for now  - repeat echo noted, no significant changes when compared to 11/7 echo.   - trial of steroids 70mg qd prednisone per heme, continue, appointment Friday  - supplement hypokalemia and d/c with supplementation      #AS s/p TAVR / Grade 3 HFpEF:  - lasix changed to torsemide, continue and monitor  - cardio following    #Chronic AFib:  - eliquis      #CVA : recent   thought cardioembolic  - AC changed from xarelto to eliquis      #HLD  - statin      #R shoulder pain/hematoma / R 2nd rib fx / Unsteady gait:  - PT/OT  - acetaminophen 975 TID  - methacarbamol 250 mg q HS  - lidocaine patch  - tramadol prn  - gabapentin      #GERD  - omaprazole interchange to protonix  - carafate TID was empiric addition      #HTN  - monitor BP, slightly elevated due to steroids.  continue BP meds at home with cardio follow up in couple of days.      Dispo: d/c home with outpatient cardio and heme follow up.  d/c w/ home care.    Attending Statement: 40 minutes spent on total encounter and discharge planning.

## 2024-12-17 NOTE — PROGRESS NOTE ADULT - REASON FOR ADMISSION
acute anemia
Acute Persistent Anemia

## 2024-12-17 NOTE — PROGRESS NOTE ADULT - SUBJECTIVE AND OBJECTIVE BOX
CHIEF COMPLAINT: Patient is a 79y old  Female who presents with a chief complaint of acute anemia (11 Dec 2024 12:53)      HPI:  79 year old female w anemia, AS s/p TAVR, AFIB oneliquis, grade 3 diastolic dysfx, CVA , R 2nd rib fx, hx temporal arteritis, HLD, HTN BIBEMS to ER from [facility] c/o abnormal labs from 2 days PTA Hb 6.8.       Adm 11-06 to  admitted s/p fall with R shoulder pain s/p fall   # nondisplaced fracture of the anterior right second rib  # intramuscular hematoma R shoulder, lower dose AC recommended  #Fever with acute hypoxic resp failure RESOLVED CTA neg PE  Likely multifactorial (reactive to intramuscular hematoma 2/2 pain)  #UTI tx w merrem  #CVA : MRI acute infarct R centrum semiovale; cardioembolic stroke in setting of holding AC due to acute anemia after trauma.  #Grade 3 diastolic dysfunction on TTE EF 55%  #Anemia due to IM hematoma s/p PRBC x 2, seen by Heme   At [facility] Fe 23/ 229 UIBC /251 TIBC; Eliquis held since Hb 7.1 to 6.8; given IV iron x 2  pt denied hematuria, black stools,     12/12 s/p transfusion with appropriate increase in hb  CT reveals liver cirrhosis  heme and GI following  s/p bm bx- results pending    12/13/24-- starting prednisone for hemolytic anemia-  reviewed follow up echo-- preserved EF, normal functioning TAVR valve, severe pHTN, significant diastolic dysfunction     12/14/24:  Resting comfortably and 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  No new cardiac symptoms.      12/15/24:  Feeling good without increased SOB and tolerating steroids so far.  Hgb=8.3 today with little change so far.  K+=3.4.  No new cardiac symptoms.      12/16/24- no acute issues overnight- tolerating steroids and diuretics       12/17/24-- increasing BUN/ creatinine ratio - suggestive of dehydration-  increased BP likley due to steriods     PAST MEDICAL HX  AS aortic stenosis   Atrial Fibrillation   CVA  MRI acute infarct  R centrum semiovale  H/O temporal arteritis   HLD hyperlipidemia  HTN hypertension   Nonrheumatic aortic valve stenosis.       FAMILY HX  lung cancer : Father      SOCIAL HX  at [facility] for rehab  nonsmoker  no alcohol   (09 Dec 2024 17:47)      PMHx: PAST MEDICAL & SURGICAL HISTORY:  History of Cardiac Arrhythmia  Hypertension  Atrial Fibrillation  Nonrheumatic aortic valve stenosis s/p TAVR  High cholesterol  H/O temporal arteritis  S/P Exploratory Laparotomy  1966  H/O prior ablation treatment  History of temporal artery biopsy  S/P foot surgery      Allergies: Allergies  adhesives (Rash)  No Known Drug Allergies    Vital Signs Last 24 Hrs  T(C): 36.4 (16 Dec 2024 23:20), Max: 36.6 (16 Dec 2024 09:05)  T(F): 97.6 (16 Dec 2024 23:20), Max: 97.8 (16 Dec 2024 09:05)  HR: 61 (17 Dec 2024 05:36) (53 - 71)  BP: 177/78 (17 Dec 2024 05:36) (127/62 - 177/78)  BP(mean): --  RR: 16 (16 Dec 2024 23:20) (16 - 18)  SpO2: 96% (16 Dec 2024 23:20) (95% - 96%)    Parameters below as of 16 Dec 2024 23:20  Patient On (Oxygen Delivery Method): room air        PHYSICAL EXAM:   Constitutional: NAD, awake and alert, well-developed  HEENT: PERR, EOMI, Normal Hearing, MMM  Neck: Soft and supple, No LAD, No JVD  Respiratory: Breath sounds are clear bilaterally, No wheezing, rales or rhonchi  Cardiovascular: S1 and S2,irregular rate and rhythm, EDWIGE  Extremities: No peripheral edema  Vascular: 2+ peripheral pulses  Neurological: A/O x 3, no focal deficits      MEDICATIONS  (STANDING):  acetaminophen     Tablet .. 975 milliGRAM(s) Oral every 8 hours  apixaban 5 milliGRAM(s) Oral every 12 hours  atorvastatin 10 milliGRAM(s) Oral at bedtime  estrogens    conjugated 0.625 milliGRAM(s) Oral daily  folic acid 1 milliGRAM(s) Oral daily  gabapentin 300 milliGRAM(s) Oral two times a day  influenza  Vaccine (HIGH DOSE) 0.5 milliLiter(s) IntraMuscular once  lidocaine   4% Patch 1 Patch Transdermal every 24 hours  methocarbamol 250 milliGRAM(s) Oral at bedtime  pantoprazole    Tablet 40 milliGRAM(s) Oral before breakfast  polyethylene glycol 3350 17 Gram(s) Oral daily  potassium chloride    Tablet ER 20 milliEquivalent(s) Oral daily  predniSONE   Tablet 70 milliGRAM(s) Oral daily  sucralfate 1 Gram(s) Oral <User Schedule>  torsemide 20 milliGRAM(s) Oral daily  valsartan 160 milliGRAM(s) Oral daily          LABS: All Labs Reviewed:                        8.3    9.55  )-----------( 255      ( 16 Dec 2024 07:23 )             27.1   12-16    139  |  108  |  44[H]  ----------------------------<  106[H]  3.4[L]   |  27  |  1.05    Ca    9.0      16 Dec 2024 07:23                      RADIOLOGY:< from: CT Abdomen and Pelvis w/ IV Cont (12.11.24 @ 12:11) >  FINDINGS:  LOWER CHEST: Cardiomegaly and transcatheter aortic valve repair.    LIVER: Cirrhotic morphology. No focal liver lesion. Heterogeneous   parenchymal attenuation likely due to passive hepatic congestion with   bridging fibrosis. No focal hepatic lesion.  BILE DUCTS: Normal caliber.  GALLBLADDER: Cholelithiasis.  SPLEEN: Within normal limits.  PANCREAS: Within normal limits.  ADRENALS: Within normal limits.  KIDNEYS/URETERS: Bilateral renal scarring. Right renal cyst.    BLADDER: Within normal limits.  REPRODUCTIVE ORGANS: Hysterectomy.    BOWEL: No bowel obstruction. Appendix not visualized. Large amount of   stool throughout the colon.  PERITONEUM/RETROPERITONEUM: Within normal limits.  VESSELS: Atherosclerotic changes. Patent hepatic, portal and superior   mesenteric veins.  LYMPH NODES: No lymphadenopathy.  ABDOMINAL WALL: Within normal limits.  BONES: No acute findings.    IMPRESSION:  Cirrhosis with passive hepatic congestion.  No evidence of hepatocellular carcinoma.          --- End of Report ---      < end of copied text >      EKG:< from: 12 Lead ECG (12.09.24 @ 14:32) >    Diagnosis Line Atrial fibrillation  Left axis deviation  Incomplete right bundle branch block  Minimal voltage criteria for LVH, may be normal variant ( Dorian product )  Anteroseptal infarct (cited on or before 08-FEB-2016)  Abnormal ECG  When compared with ECG of 02-NOV-2024 17:40,  Incomplete right bundle branch block is now Present  Confirmedby EDDIE CASTILLO ([id number]) on 12/10/2024 8:24:57 AM    < end of copied text >      < from: TTE W or WO Ultrasound Enhancing Agent (12.12.24 @ 14:18) >    CONCLUSIONS:      1. Estimated pulmonary artery systolic pressure is 62 mmHg, consistent with severe pulmonary hypertension.   2. Severe tricuspid regurgitation.   3. A Transcatheter deployed (TAVR) valve replacement is present in the aortic position The prosthetic valve is well seated. Mild intravalvular regurgitation.   4. Mild to moderate mitral regurgitation.   5. Left ventricular cavity is normal in size. Left ventricular systolic function is normal with an ejection fraction visually estimated at 55 to 60 %.   6. No left ventricular hypertrophy.   7. Enlarged right ventricular cavity size.   8. The right atrium is moderately dilated.   9. Compared to the transthoracic echocardiogram performed on 11/7/2024, there have been no significant interval changes.    ________________________________________________________________________________________  FINDINGS:     Left Ventricle:  The left ventricular cavity is normal in size. Left ventricular wall thickness is normal. Left ventricular systolic function is normal with a calculated ejection fraction of 57 % by 3D with an ejection fraction visually estimated at 55 to 60%. The left ventricular diastolic function is indeterminate.     Right Ventricle:  The right ventricular cavity is enlarged in size and right ventricular systolic function is normal. Tricuspid annular plane systolic excursion (TAPSE) is 2.2 cm (normal >=1.7 cm).     Left Atrium:  The left atrium is normal in size with an indexed volume of 27.06 ml/m².     Right Atrium:  The right atrium is moderately dilated with an indexed volume of 45.77 ml/m² and an indexed area of 14.37 cm²/m².     Interatrial Septum:  The interatrial septum appears intact.     Aortic Valve:  A a transcatheter deployed (TAVR) is present in the aortic position. The prosthetic valve is well seated. The peak transaortic velocity is 2.42 m/s, peak transaortic gradient is 23.4 mmHg and mean transaortic gradient is 11.0 mmHg with an LVOT/aortic valve VTI ratio of 0.61. The effective orifice area is estimated at 2.33 cm² by the continuity equation. There is mild intravalvular regurgitation.     Mitral Valve:  There is normal leaflet mobility of the mitral valve. There is moderate calcification of the mitral valve annulus. There is mild to moderate mitral regurgitation.     Tricuspid Valve:  Structurally normal tricuspid valve with normal leaflet excursion. There is severe tricuspid regurgitation. Estimated pulmonary artery systolic pressure is 62 mmHg, consistent with severe pulmonary hypertension.     Pulmonic Valve:  Structurally normal pulmonic valve with normal leaflet excursion.     Aorta:  The ascending aorta is normal in size, measuring 3.07 cm (indexed 1.78 cm/m²). The aortic arch diameter is normal in size, measuring 2.5 cm (indexed 1.45 cm/m²).     Pericardium:  No pericardial effusion seen.     Systemic Veins:  The inferior vena cava is dilated measuring 2.40 cm in diameter, (dilated >2.1cm) with normal inspiratory collapse (normal >50%) consistent with mildly elevated right atrial pressure (~8, range 5-10mmHg).  ____________________________________________________________________  QUANTITATIVE DATA:  Left Ventricle Measurements: (Indexed to BSA)     IVSd (2D):   0.9 cm  LVPWd (2D):  0.9 cm  LVIDd (2D):  4.5 cm  LVIDs (2D):  3.2 cm  LV Mass:     142 g  82.0 g/m²  Visualized LV EF%: 55 to 60%  3D LV EF%:         57 %     MV E Vmax:    1.55 m/s  MV A Vmax:    0.43 m/s  MV E/A:       3.57  e' lateral:   11.70 cm/s  e' medial:    7.18 cm/s  E/e' lateral: 13.25  E/e' medial:  21.59  E/e' Average: 16.42  MV DT:      169 msec    Aorta Measurements: (Normal range) (Indexed to BSA)     Ao Asc prox: 3.07 cm 1.78 cm/m²  Ao Arch:     2.5 cm            Left Atrium Measurements: (Indexed to BSA)  LA Diam 2D: 4.50 cm         Right Ventricle Measurements: Right Atrial Measurements:     TAPSE:           2.2 cm       RA Vol:            79.00 ml  RV S' Vmax:      11.50 cm/s   RA Vol s, MOD A4C  72.0 ml  RV Base (RVID1): 4.5 cm       RA Vol Index:      45.77 ml/m²                                RA Area s, MOD A4C 23.2 cm²       LVOT / RVOT/ Qp/Qs Data: (Indexed to BSA)  LVOT Diameter:  2.20 cm  LVOT Area:      3.80 cm²  LVOT Vmax:      1.47 m/s  LVOT Vmn:       1.040 m/s  LVOT VTI:       26.90 cm  LVOT peak grad: 9 mmHg  LVOT mean grad: 4.5 mmHg  LVOT SV:102.3 ml  59.24 ml/m²    Aortic Valve Measurements:  AV Vmax:                2.4 m/s  AV Peak Gradient:       23.4 mmHg  AV Mean Gradient:       11.0 mmHg  AV VTI:                 43.8 cm  AV VTI Ratio:           0.61  AoV EOA, Contin:        2.33 cm²  AoV EOA, Contin i:      1.35 cm²/m²  AoV Dimensionless Index 0.61    Mitral Valve Measurements:     MV E Vmax: 1.6 m/s  MV A Vmax: 0.4 m/s  MV E/A:    3.6       Tricuspid Valve Measurements:     TR Vmax:          3.7 m/s  TR Peak Gradient: 53.6 mmHg  RA Pressure:      8 mmHg  PASP:             62 mmHg    ________________________________________________________________________________________  Electronically signed on 12/12/2024 at 9:19:49 PM by Damián Aguirre MD         *** Final ***    < end of copied text >

## 2024-12-17 NOTE — DISCHARGE NOTE PROVIDER - NSDCMRMEDTOKEN_GEN_ALL_CORE_FT
acetaminophen 325 mg oral tablet: 2 tab(s) orally every 6 hours As needed Mild Pain (1 - 3)  acetaminophen 500 mg oral tablet: 2 tab(s) orally 3 times a day MDD:3 gm  atorvastatin 10 mg oral tablet: 1 tab(s) orally once a day (at bedtime)  docusate sodium 100 mg oral capsule: 1 cap(s) orally once a day (at bedtime)  Eliquis 5 mg oral tablet: 1 tab(s) orally 2 times a day  folic acid 1 mg oral tablet: 1 tab(s) orally once a day  gabapentin 300 mg oral capsule: 1 cap(s) orally 2 times a day  lidocaine 4% topical film: Apply topically to affected area once a day through 12/13  methocarbamol 500 mg oral tablet: 0.5 tab(s) orally once a day (at bedtime)  pantoprazole 20 mg oral delayed release tablet: 1 tab(s) orally once a day  Potassium Chloride (Eqv-Klor-Con M20) 20 mEq oral tablet, extended release: 1 tab(s) orally once a day  predniSONE 10 mg oral tablet: 7 tab(s) orally once a day  Premarin 0.625 mg oral tablet: 1 tab(s) orally once a day  sucralfate 1 g oral tablet: 1 tab(s) orally 3 times a day x 10 days ***Course Not Complete***  torsemide 10 mg oral tablet: 1 tab(s) orally once a day  traMADol 50 mg oral tablet: 0.5 tab(s) orally every 4 hours as needed for pain  valsartan 160 mg oral tablet: 1 tab(s) orally once a day

## 2024-12-18 ENCOUNTER — APPOINTMENT (OUTPATIENT)
Dept: HEMATOLOGY ONCOLOGY | Facility: CLINIC | Age: 79
End: 2024-12-18
Payer: MEDICARE

## 2024-12-18 DIAGNOSIS — I27.20 PULMONARY HYPERTENSION, UNSPECIFIED: ICD-10-CM

## 2024-12-18 DIAGNOSIS — Z12.83 ENCOUNTER FOR SCREENING FOR MALIGNANT NEOPLASM OF SKIN: ICD-10-CM

## 2024-12-18 DIAGNOSIS — Z87.2 PERSONAL HISTORY OF DISEASES OF THE SKIN AND SUBCUTANEOUS TISSUE: ICD-10-CM

## 2024-12-18 DIAGNOSIS — Z86.2 PERSONAL HISTORY OF DISEASES OF THE BLOOD AND BLOOD-FORMING ORGANS AND CERTAIN DISORDERS INVOLVING THE IMMUNE MECHANISM: ICD-10-CM

## 2024-12-18 DIAGNOSIS — Z86.03 PERSONAL HISTORY OF NEOPLASM OF UNCERTAIN BEHAVIOR: ICD-10-CM

## 2024-12-20 ENCOUNTER — RESULT REVIEW (OUTPATIENT)
Age: 79
End: 2024-12-20

## 2024-12-20 ENCOUNTER — APPOINTMENT (OUTPATIENT)
Dept: HEMATOLOGY ONCOLOGY | Facility: CLINIC | Age: 79
End: 2024-12-20
Payer: MEDICARE

## 2024-12-20 ENCOUNTER — LABORATORY RESULT (OUTPATIENT)
Age: 79
End: 2024-12-20

## 2024-12-20 ENCOUNTER — OUTPATIENT (OUTPATIENT)
Dept: OUTPATIENT SERVICES | Facility: HOSPITAL | Age: 79
LOS: 1 days | Discharge: HOME | End: 2024-12-20

## 2024-12-20 VITALS
SYSTOLIC BLOOD PRESSURE: 170 MMHG | WEIGHT: 133 LBS | TEMPERATURE: 97.5 F | HEIGHT: 64 IN | OXYGEN SATURATION: 97 % | HEART RATE: 77 BPM | DIASTOLIC BLOOD PRESSURE: 92 MMHG | BODY MASS INDEX: 22.71 KG/M2

## 2024-12-20 DIAGNOSIS — Z87.59 PERSONAL HISTORY OF OTHER COMPLICATIONS OF PREGNANCY, CHILDBIRTH AND THE PUERPERIUM: ICD-10-CM

## 2024-12-20 DIAGNOSIS — Z87.891 PERSONAL HISTORY OF NICOTINE DEPENDENCE: ICD-10-CM

## 2024-12-20 DIAGNOSIS — Z79.899 OTHER LONG TERM (CURRENT) DRUG THERAPY: ICD-10-CM

## 2024-12-20 DIAGNOSIS — Z98.890 OTHER SPECIFIED POSTPROCEDURAL STATES: Chronic | ICD-10-CM

## 2024-12-20 DIAGNOSIS — M75.101 UNSPECIFIED ROTATOR CUFF TEAR OR RUPTURE OF RIGHT SHOULDER, NOT SPECIFIED AS TRAUMATIC: ICD-10-CM

## 2024-12-20 DIAGNOSIS — D64.9 ANEMIA, UNSPECIFIED: ICD-10-CM

## 2024-12-20 DIAGNOSIS — Z63.4 DISAPPEARANCE AND DEATH OF FAMILY MEMBER: ICD-10-CM

## 2024-12-20 DIAGNOSIS — T14.8XXA OTHER INJURY OF UNSPECIFIED BODY REGION, INITIAL ENCOUNTER: ICD-10-CM

## 2024-12-20 DIAGNOSIS — M25.511 PAIN IN RIGHT SHOULDER: ICD-10-CM

## 2024-12-20 DIAGNOSIS — Z92.89 PERSONAL HISTORY OF OTHER MEDICAL TREATMENT: ICD-10-CM

## 2024-12-20 DIAGNOSIS — D58.9 HEREDITARY HEMOLYTIC ANEMIA, UNSPECIFIED: ICD-10-CM

## 2024-12-20 DIAGNOSIS — D50.9 IRON DEFICIENCY ANEMIA, UNSPECIFIED: ICD-10-CM

## 2024-12-20 DIAGNOSIS — Z79.01 LONG TERM (CURRENT) USE OF ANTICOAGULANTS: ICD-10-CM

## 2024-12-20 DIAGNOSIS — B37.9 CANDIDIASIS, UNSPECIFIED: ICD-10-CM

## 2024-12-20 DIAGNOSIS — N30.00 ACUTE CYSTITIS W/OUT HEMATURIA: ICD-10-CM

## 2024-12-20 DIAGNOSIS — T36.95XA CANDIDIASIS, UNSPECIFIED: ICD-10-CM

## 2024-12-20 PROBLEM — Z87.2 HISTORY OF ACTINIC KERATOSIS: Status: RESOLVED | Noted: 2020-09-11 | Resolved: 2024-12-20

## 2024-12-20 LAB
BASOPHILS # BLD AUTO: 0.01 K/UL — SIGNIFICANT CHANGE UP (ref 0–0.2)
BASOPHILS NFR BLD AUTO: 0.1 % — SIGNIFICANT CHANGE UP (ref 0–2)
EOSINOPHIL # BLD AUTO: 0.01 K/UL — SIGNIFICANT CHANGE UP (ref 0–0.5)
EOSINOPHIL NFR BLD AUTO: 0.1 % — SIGNIFICANT CHANGE UP (ref 0–6)
HCT VFR BLD CALC: 35.6 % — SIGNIFICANT CHANGE UP (ref 34.5–45)
HGB BLD-MCNC: 11 G/DL — LOW (ref 11.5–15.5)
IMM GRANULOCYTES NFR BLD AUTO: 0.6 % — SIGNIFICANT CHANGE UP (ref 0–0.9)
LYMPHOCYTES # BLD AUTO: 0.5 K/UL — LOW (ref 1–3.3)
LYMPHOCYTES # BLD AUTO: 4.3 % — LOW (ref 13–44)
MCHC RBC-ENTMCNC: 29.3 PG — SIGNIFICANT CHANGE UP (ref 27–34)
MCHC RBC-ENTMCNC: 30.9 G/DL — LOW (ref 32–36)
MCV RBC AUTO: 94.7 FL — SIGNIFICANT CHANGE UP (ref 80–100)
MONOCYTES # BLD AUTO: 0.54 K/UL — SIGNIFICANT CHANGE UP (ref 0–0.9)
MONOCYTES NFR BLD AUTO: 4.6 % — SIGNIFICANT CHANGE UP (ref 2–14)
NEUTROPHILS # BLD AUTO: 10.51 K/UL — HIGH (ref 1.8–7.4)
NEUTROPHILS NFR BLD AUTO: 90.3 % — HIGH (ref 43–77)
NRBC # BLD: 0 /100 WBCS — SIGNIFICANT CHANGE UP (ref 0–0)
NRBC BLD-RTO: 0 /100 WBCS — SIGNIFICANT CHANGE UP (ref 0–0)
PLATELET # BLD AUTO: 307 K/UL — SIGNIFICANT CHANGE UP (ref 150–400)
RBC # BLD: 3.76 M/UL — LOW (ref 3.8–5.2)
RBC # FLD: 21.1 % — HIGH (ref 10.3–14.5)
WBC # BLD: 11.64 K/UL — HIGH (ref 3.8–10.5)
WBC # FLD AUTO: 11.64 K/UL — HIGH (ref 3.8–10.5)

## 2024-12-20 PROCEDURE — 99214 OFFICE O/P EST MOD 30 MIN: CPT

## 2024-12-20 PROCEDURE — G2211 COMPLEX E/M VISIT ADD ON: CPT

## 2024-12-20 RX ORDER — PREDNISONE 20 MG/1
20 TABLET ORAL
Qty: 60 | Refills: 1 | Status: ACTIVE | COMMUNITY
Start: 2024-12-20 | End: 1900-01-01

## 2024-12-20 RX ORDER — TORSEMIDE 10 MG/1
10 TABLET ORAL
Refills: 0 | Status: ACTIVE | COMMUNITY

## 2024-12-20 RX ORDER — PANTOPRAZOLE 20 MG/1
20 TABLET, DELAYED RELEASE ORAL
Refills: 0 | Status: ACTIVE | COMMUNITY

## 2024-12-20 RX ORDER — METHOCARBAMOL 500 MG/1
500 TABLET, FILM COATED ORAL
Refills: 0 | Status: ACTIVE | COMMUNITY

## 2024-12-20 RX ORDER — FOLIC ACID 1 MG/1
1 TABLET ORAL
Refills: 0 | Status: ACTIVE | COMMUNITY

## 2024-12-20 RX ORDER — DOCUSATE SODIUM 100 MG/1
100 CAPSULE, LIQUID FILLED ORAL
Refills: 0 | Status: ACTIVE | COMMUNITY

## 2024-12-20 RX ORDER — PREDNISONE 10 MG/1
10 TABLET ORAL
Qty: 30 | Refills: 1 | Status: ACTIVE | COMMUNITY
Start: 2024-12-20 | End: 1900-01-01

## 2024-12-20 RX ORDER — VALSARTAN 160 MG/1
160 TABLET, COATED ORAL
Refills: 0 | Status: ACTIVE | COMMUNITY

## 2024-12-20 SDOH — SOCIAL STABILITY - SOCIAL INSECURITY: DISSAPEARANCE AND DEATH OF FAMILY MEMBER: Z63.4

## 2024-12-21 LAB
RBC # BLD: 3.7 M/UL — LOW (ref 3.8–5.2)
RETICS #: 118.8 K/UL — SIGNIFICANT CHANGE UP (ref 25–125)
RETICS/RBC NFR: 3.2 % — HIGH (ref 0.5–2.5)

## 2024-12-22 LAB
ALBUMIN SERPL ELPH-MCNC: 4.1 G/DL
ALP BLD-CCNC: 227 U/L
ALT SERPL-CCNC: 19 U/L
ANION GAP SERPL CALC-SCNC: 16 MMOL/L
AST SERPL-CCNC: 21 U/L
BILIRUB SERPL-MCNC: 1.1 MG/DL
BUN SERPL-MCNC: 47 MG/DL
CALCIUM SERPL-MCNC: 9.8 MG/DL
CHLORIDE SERPL-SCNC: 100 MMOL/L
CO2 SERPL-SCNC: 27 MMOL/L
CREAT SERPL-MCNC: 1.03 MG/DL
EGFR: 55 ML/MIN/1.73M2
ERYTHROCYTE [SEDIMENTATION RATE] IN BLOOD BY WESTERGREN METHOD: 89 MM/HR
FERRITIN SERPL-MCNC: 362 NG/ML
GLUCOSE SERPL-MCNC: 112 MG/DL
HAPTOGLOB SERPL-MCNC: <20 MG/DL
IRON SATN MFR SERPL: 20 %
IRON SERPL-MCNC: 84 UG/DL
LDH SERPL-CCNC: 662 U/L
POTASSIUM SERPL-SCNC: 3.5 MMOL/L
PROT SERPL-MCNC: 7.6 G/DL
SODIUM SERPL-SCNC: 144 MMOL/L
TIBC SERPL-MCNC: 418 UG/DL
UIBC SERPL-MCNC: 334 UG/DL

## 2024-12-23 DIAGNOSIS — D64.9 ANEMIA, UNSPECIFIED: ICD-10-CM

## 2024-12-23 DIAGNOSIS — D50.9 IRON DEFICIENCY ANEMIA, UNSPECIFIED: ICD-10-CM

## 2024-12-23 LAB — CHROM ANALY OVERALL INTERP SPEC-IMP: SIGNIFICANT CHANGE UP

## 2024-12-24 LAB
DAT POLY: ABNORMAL
ONKOSIGHT MYELOID SEQUENCE: NORMAL — SIGNIFICANT CHANGE UP

## 2024-12-27 ENCOUNTER — RESULT REVIEW (OUTPATIENT)
Age: 79
End: 2024-12-27

## 2024-12-27 ENCOUNTER — APPOINTMENT (OUTPATIENT)
Dept: HEMATOLOGY ONCOLOGY | Facility: CLINIC | Age: 79
End: 2024-12-27

## 2024-12-27 VITALS
SYSTOLIC BLOOD PRESSURE: 129 MMHG | WEIGHT: 137 LBS | DIASTOLIC BLOOD PRESSURE: 70 MMHG | HEART RATE: 85 BPM | OXYGEN SATURATION: 99 % | TEMPERATURE: 97.2 F | BODY MASS INDEX: 23.39 KG/M2 | HEIGHT: 64 IN

## 2024-12-27 DIAGNOSIS — Z79.899 OTHER LONG TERM (CURRENT) DRUG THERAPY: ICD-10-CM

## 2024-12-27 DIAGNOSIS — D58.9 HEREDITARY HEMOLYTIC ANEMIA, UNSPECIFIED: ICD-10-CM

## 2024-12-27 DIAGNOSIS — Z86.2 PERSONAL HISTORY OF DISEASES OF THE BLOOD AND BLOOD-FORMING ORGANS AND CERTAIN DISORDERS INVOLVING THE IMMUNE MECHANISM: ICD-10-CM

## 2024-12-27 LAB
BASOPHILS # BLD AUTO: 0.01 K/UL — SIGNIFICANT CHANGE UP (ref 0–0.2)
BASOPHILS NFR BLD AUTO: 0.1 % — SIGNIFICANT CHANGE UP (ref 0–2)
EOSINOPHIL # BLD AUTO: 0.12 K/UL — SIGNIFICANT CHANGE UP (ref 0–0.5)
EOSINOPHIL NFR BLD AUTO: 1.2 % — SIGNIFICANT CHANGE UP (ref 0–6)
HCT VFR BLD CALC: 33.8 % — LOW (ref 34.5–45)
HGB BLD-MCNC: 10.5 G/DL — LOW (ref 11.5–15.5)
IMM GRANULOCYTES NFR BLD AUTO: 0.5 % — SIGNIFICANT CHANGE UP (ref 0–0.9)
LYMPHOCYTES # BLD AUTO: 1.45 K/UL — SIGNIFICANT CHANGE UP (ref 1–3.3)
LYMPHOCYTES # BLD AUTO: 14.5 % — SIGNIFICANT CHANGE UP (ref 13–44)
MCHC RBC-ENTMCNC: 29.7 PG — SIGNIFICANT CHANGE UP (ref 27–34)
MCHC RBC-ENTMCNC: 31.1 G/DL — LOW (ref 32–36)
MCV RBC AUTO: 95.8 FL — SIGNIFICANT CHANGE UP (ref 80–100)
MONOCYTES # BLD AUTO: 0.76 K/UL — SIGNIFICANT CHANGE UP (ref 0–0.9)
MONOCYTES NFR BLD AUTO: 7.6 % — SIGNIFICANT CHANGE UP (ref 2–14)
NEUTROPHILS # BLD AUTO: 7.63 K/UL — HIGH (ref 1.8–7.4)
NEUTROPHILS NFR BLD AUTO: 76.1 % — SIGNIFICANT CHANGE UP (ref 43–77)
NRBC # BLD: 0 /100 WBCS — SIGNIFICANT CHANGE UP (ref 0–0)
NRBC BLD-RTO: 0 /100 WBCS — SIGNIFICANT CHANGE UP (ref 0–0)
PLATELET # BLD AUTO: 204 K/UL — SIGNIFICANT CHANGE UP (ref 150–400)
RBC # BLD: 3.52 M/UL
RBC # BLD: 3.53 M/UL — LOW (ref 3.8–5.2)
RBC # FLD: 21.2 % — HIGH (ref 10.3–14.5)
RETICS # AUTO: 2.1 %
RETICS AGGREG/RBC NFR: 72.2 K/UL
WBC # BLD: 10.02 K/UL — SIGNIFICANT CHANGE UP (ref 3.8–10.5)
WBC # FLD AUTO: 10.02 K/UL — SIGNIFICANT CHANGE UP (ref 3.8–10.5)

## 2024-12-27 PROCEDURE — G2211 COMPLEX E/M VISIT ADD ON: CPT

## 2024-12-27 PROCEDURE — 99213 OFFICE O/P EST LOW 20 MIN: CPT

## 2024-12-27 RX ORDER — PREDNISONE 10 MG/1
10 TABLET ORAL
Refills: 0 | Status: DISCONTINUED | COMMUNITY
End: 2024-12-27

## 2024-12-30 LAB
BILIRUB DIRECT SERPL-MCNC: 0.4 MG/DL
BILIRUB SERPL-MCNC: 0.8 MG/DL
HAPTOGLOB SERPL-MCNC: <20 MG/DL
LDH SERPL-CCNC: 481 U/L

## 2025-01-03 ENCOUNTER — RESULT REVIEW (OUTPATIENT)
Age: 80
End: 2025-01-03

## 2025-01-03 ENCOUNTER — APPOINTMENT (OUTPATIENT)
Dept: HEMATOLOGY ONCOLOGY | Facility: CLINIC | Age: 80
End: 2025-01-03
Payer: MEDICARE

## 2025-01-03 ENCOUNTER — APPOINTMENT (OUTPATIENT)
Dept: INFUSION THERAPY | Facility: CLINIC | Age: 80
End: 2025-01-03
Payer: MEDICARE

## 2025-01-03 ENCOUNTER — NON-APPOINTMENT (OUTPATIENT)
Age: 80
End: 2025-01-03

## 2025-01-03 VITALS
SYSTOLIC BLOOD PRESSURE: 110 MMHG | DIASTOLIC BLOOD PRESSURE: 68 MMHG | HEART RATE: 85 BPM | BODY MASS INDEX: 23.11 KG/M2 | WEIGHT: 135.38 LBS | OXYGEN SATURATION: 97 % | HEIGHT: 64 IN | TEMPERATURE: 96.5 F

## 2025-01-03 DIAGNOSIS — Z79.899 OTHER LONG TERM (CURRENT) DRUG THERAPY: ICD-10-CM

## 2025-01-03 DIAGNOSIS — Z79.01 LONG TERM (CURRENT) USE OF ANTICOAGULANTS: ICD-10-CM

## 2025-01-03 DIAGNOSIS — D58.9 HEREDITARY HEMOLYTIC ANEMIA, UNSPECIFIED: ICD-10-CM

## 2025-01-03 DIAGNOSIS — D50.9 IRON DEFICIENCY ANEMIA, UNSPECIFIED: ICD-10-CM

## 2025-01-03 PROBLEM — Z86.2 HISTORY OF HEMOLYTIC ANEMIA: Status: RESOLVED | Noted: 2024-12-13 | Resolved: 2025-01-03

## 2025-01-03 LAB
BASOPHILS # BLD AUTO: 0.01 K/UL — SIGNIFICANT CHANGE UP (ref 0–0.2)
BASOPHILS NFR BLD AUTO: 0.1 % — SIGNIFICANT CHANGE UP (ref 0–2)
BILIRUB DIRECT SERPL-MCNC: 0.3 MG/DL
BILIRUB SERPL-MCNC: 0.7 MG/DL
EOSINOPHIL # BLD AUTO: 0.09 K/UL — SIGNIFICANT CHANGE UP (ref 0–0.5)
EOSINOPHIL NFR BLD AUTO: 0.7 % — SIGNIFICANT CHANGE UP (ref 0–6)
HAPTOGLOB SERPL-MCNC: 45 MG/DL
HCT VFR BLD CALC: 29.4 % — LOW (ref 34.5–45)
HGB BLD-MCNC: 9.3 G/DL — LOW (ref 11.5–15.5)
IMM GRANULOCYTES NFR BLD AUTO: 0.4 % — SIGNIFICANT CHANGE UP (ref 0–0.9)
LDH SERPL-CCNC: 363 U/L
LYMPHOCYTES # BLD AUTO: 0.8 K/UL — LOW (ref 1–3.3)
LYMPHOCYTES # BLD AUTO: 6.6 % — LOW (ref 13–44)
MCHC RBC-ENTMCNC: 30.8 PG — SIGNIFICANT CHANGE UP (ref 27–34)
MCHC RBC-ENTMCNC: 31.6 G/DL — LOW (ref 32–36)
MCV RBC AUTO: 97.4 FL — SIGNIFICANT CHANGE UP (ref 80–100)
MONOCYTES # BLD AUTO: 0.57 K/UL — SIGNIFICANT CHANGE UP (ref 0–0.9)
MONOCYTES NFR BLD AUTO: 4.7 % — SIGNIFICANT CHANGE UP (ref 2–14)
NEUTROPHILS # BLD AUTO: 10.67 K/UL — HIGH (ref 1.8–7.4)
NEUTROPHILS NFR BLD AUTO: 87.5 % — HIGH (ref 43–77)
NRBC # BLD: 0 /100 WBCS — SIGNIFICANT CHANGE UP (ref 0–0)
NRBC BLD-RTO: 0 /100 WBCS — SIGNIFICANT CHANGE UP (ref 0–0)
PLATELET # BLD AUTO: 164 K/UL — SIGNIFICANT CHANGE UP (ref 150–400)
RBC # BLD: 3.02 M/UL — LOW (ref 3.8–5.2)
RBC # BLD: 3.03 M/UL
RBC # FLD: 21.2 % — HIGH (ref 10.3–14.5)
RETICS # AUTO: 3.3 %
RETICS AGGREG/RBC NFR: 98.5 K/UL
WBC # BLD: 12.19 K/UL — HIGH (ref 3.8–10.5)
WBC # FLD AUTO: 12.19 K/UL — HIGH (ref 3.8–10.5)

## 2025-01-03 PROCEDURE — 99214 OFFICE O/P EST MOD 30 MIN: CPT

## 2025-01-03 PROCEDURE — G2211 COMPLEX E/M VISIT ADD ON: CPT

## 2025-01-07 DIAGNOSIS — Z79.01 LONG TERM (CURRENT) USE OF ANTICOAGULANTS: ICD-10-CM

## 2025-01-09 LAB — HEMATOPATHOLOGY REPORT: SIGNIFICANT CHANGE UP

## 2025-01-10 ENCOUNTER — LABORATORY RESULT (OUTPATIENT)
Age: 80
End: 2025-01-10

## 2025-01-10 ENCOUNTER — APPOINTMENT (OUTPATIENT)
Dept: HEMATOLOGY ONCOLOGY | Facility: CLINIC | Age: 80
End: 2025-01-10
Payer: MEDICARE

## 2025-01-10 ENCOUNTER — RESULT REVIEW (OUTPATIENT)
Age: 80
End: 2025-01-10

## 2025-01-10 ENCOUNTER — APPOINTMENT (OUTPATIENT)
Dept: INFUSION THERAPY | Facility: CLINIC | Age: 80
End: 2025-01-10
Payer: MEDICARE

## 2025-01-10 VITALS
DIASTOLIC BLOOD PRESSURE: 75 MMHG | HEART RATE: 96 BPM | TEMPERATURE: 97.3 F | SYSTOLIC BLOOD PRESSURE: 147 MMHG | OXYGEN SATURATION: 100 % | HEIGHT: 64 IN | WEIGHT: 135 LBS | BODY MASS INDEX: 23.05 KG/M2

## 2025-01-10 DIAGNOSIS — D58.9 HEREDITARY HEMOLYTIC ANEMIA, UNSPECIFIED: ICD-10-CM

## 2025-01-10 DIAGNOSIS — I50.9 HEART FAILURE, UNSPECIFIED: ICD-10-CM

## 2025-01-10 DIAGNOSIS — Z79.899 OTHER LONG TERM (CURRENT) DRUG THERAPY: ICD-10-CM

## 2025-01-10 DIAGNOSIS — Z79.01 LONG TERM (CURRENT) USE OF ANTICOAGULANTS: ICD-10-CM

## 2025-01-10 DIAGNOSIS — D50.9 IRON DEFICIENCY ANEMIA, UNSPECIFIED: ICD-10-CM

## 2025-01-10 DIAGNOSIS — D64.9 ANEMIA, UNSPECIFIED: ICD-10-CM

## 2025-01-10 LAB
BASOPHILS # BLD AUTO: 0.01 K/UL — SIGNIFICANT CHANGE UP (ref 0–0.2)
BASOPHILS NFR BLD AUTO: 0.1 % — SIGNIFICANT CHANGE UP (ref 0–2)
EOSINOPHIL # BLD AUTO: 0.03 K/UL — SIGNIFICANT CHANGE UP (ref 0–0.5)
EOSINOPHIL NFR BLD AUTO: 0.3 % — SIGNIFICANT CHANGE UP (ref 0–6)
HCT VFR BLD CALC: 27.3 % — LOW (ref 34.5–45)
HGB BLD-MCNC: 8.5 G/DL — LOW (ref 11.5–15.5)
IMM GRANULOCYTES NFR BLD AUTO: 0.5 % — SIGNIFICANT CHANGE UP (ref 0–0.9)
LYMPHOCYTES # BLD AUTO: 0.33 K/UL — LOW (ref 1–3.3)
LYMPHOCYTES # BLD AUTO: 3.2 % — LOW (ref 13–44)
MCHC RBC-ENTMCNC: 30.7 PG — SIGNIFICANT CHANGE UP (ref 27–34)
MCHC RBC-ENTMCNC: 31.1 G/DL — LOW (ref 32–36)
MCV RBC AUTO: 98.6 FL — SIGNIFICANT CHANGE UP (ref 80–100)
MONOCYTES # BLD AUTO: 0.34 K/UL — SIGNIFICANT CHANGE UP (ref 0–0.9)
MONOCYTES NFR BLD AUTO: 3.3 % — SIGNIFICANT CHANGE UP (ref 2–14)
NEUTROPHILS # BLD AUTO: 9.51 K/UL — HIGH (ref 1.8–7.4)
NEUTROPHILS NFR BLD AUTO: 92.6 % — HIGH (ref 43–77)
NRBC # BLD: 0 /100 WBCS — SIGNIFICANT CHANGE UP (ref 0–0)
NRBC BLD-RTO: 0 /100 WBCS — SIGNIFICANT CHANGE UP (ref 0–0)
PLATELET # BLD AUTO: 195 K/UL — SIGNIFICANT CHANGE UP (ref 150–400)
RBC # BLD: 2.77 M/UL — LOW (ref 3.8–5.2)
RBC # FLD: 19.3 % — HIGH (ref 10.3–14.5)
WBC # BLD: 10.27 K/UL — SIGNIFICANT CHANGE UP (ref 3.8–10.5)
WBC # FLD AUTO: 10.27 K/UL — SIGNIFICANT CHANGE UP (ref 3.8–10.5)

## 2025-01-10 PROCEDURE — 99213 OFFICE O/P EST LOW 20 MIN: CPT

## 2025-01-10 PROCEDURE — G2211 COMPLEX E/M VISIT ADD ON: CPT

## 2025-01-11 LAB
ALBUMIN SERPL ELPH-MCNC: 4 G/DL
ALP BLD-CCNC: 113 U/L
ALT SERPL-CCNC: 43 U/L
ANION GAP SERPL CALC-SCNC: 14 MMOL/L
AST SERPL-CCNC: 23 U/L
BILIRUB SERPL-MCNC: 0.5 MG/DL
BUN SERPL-MCNC: 51 MG/DL
CALCIUM SERPL-MCNC: 9.4 MG/DL
CHLORIDE SERPL-SCNC: 100 MMOL/L
CO2 SERPL-SCNC: 26 MMOL/L
CREAT SERPL-MCNC: 1.22 MG/DL
EGFR: 45 ML/MIN/1.73M2
ERYTHROCYTE [SEDIMENTATION RATE] IN BLOOD BY WESTERGREN METHOD: 11 MM/HR
FERRITIN SERPL-MCNC: 126 NG/ML
GLUCOSE SERPL-MCNC: 177 MG/DL
HAPTOGLOB SERPL-MCNC: 66 MG/DL
IRON SATN MFR SERPL: 8 %
IRON SERPL-MCNC: 40 UG/DL
LDH SERPL-CCNC: 351 U/L
POTASSIUM SERPL-SCNC: 3.7 MMOL/L
PROT SERPL-MCNC: 6.5 G/DL
RBC # BLD: 2.75 M/UL
RETICS # AUTO: 2.9 %
RETICS AGGREG/RBC NFR: 78.9 K/UL
SODIUM SERPL-SCNC: 140 MMOL/L
TIBC SERPL-MCNC: 476 UG/DL
UIBC SERPL-MCNC: 437 UG/DL

## 2025-01-17 ENCOUNTER — RESULT REVIEW (OUTPATIENT)
Age: 80
End: 2025-01-17

## 2025-01-17 ENCOUNTER — APPOINTMENT (OUTPATIENT)
Dept: HEMATOLOGY ONCOLOGY | Facility: CLINIC | Age: 80
End: 2025-01-17
Payer: MEDICARE

## 2025-01-17 VITALS
HEIGHT: 64 IN | HEART RATE: 86 BPM | WEIGHT: 136.44 LBS | OXYGEN SATURATION: 100 % | SYSTOLIC BLOOD PRESSURE: 106 MMHG | DIASTOLIC BLOOD PRESSURE: 71 MMHG | TEMPERATURE: 98 F | BODY MASS INDEX: 23.29 KG/M2

## 2025-01-17 DIAGNOSIS — Z79.52 LONG TERM (CURRENT) USE OF SYSTEMIC STEROIDS: ICD-10-CM

## 2025-01-17 DIAGNOSIS — D50.9 IRON DEFICIENCY ANEMIA, UNSPECIFIED: ICD-10-CM

## 2025-01-17 DIAGNOSIS — D58.9 HEREDITARY HEMOLYTIC ANEMIA, UNSPECIFIED: ICD-10-CM

## 2025-01-17 DIAGNOSIS — Z79.01 LONG TERM (CURRENT) USE OF ANTICOAGULANTS: ICD-10-CM

## 2025-01-17 LAB
BASOPHILS # BLD AUTO: 0.01 K/UL — SIGNIFICANT CHANGE UP (ref 0–0.2)
BASOPHILS NFR BLD AUTO: 0.1 % — SIGNIFICANT CHANGE UP (ref 0–2)
BILIRUB SERPL-MCNC: 0.5 MG/DL
EOSINOPHIL # BLD AUTO: 0.04 K/UL — SIGNIFICANT CHANGE UP (ref 0–0.5)
EOSINOPHIL NFR BLD AUTO: 0.4 % — SIGNIFICANT CHANGE UP (ref 0–6)
HAPTOGLOB SERPL-MCNC: 76 MG/DL
HCT VFR BLD CALC: 30 % — LOW (ref 34.5–45)
HGB BLD-MCNC: 9.3 G/DL — LOW (ref 11.5–15.5)
IMM GRANULOCYTES NFR BLD AUTO: 0.7 % — SIGNIFICANT CHANGE UP (ref 0–0.9)
LDH SERPL-CCNC: 361 U/L
LYMPHOCYTES # BLD AUTO: 0.75 K/UL — LOW (ref 1–3.3)
LYMPHOCYTES # BLD AUTO: 7 % — LOW (ref 13–44)
MCHC RBC-ENTMCNC: 31 G/DL — LOW (ref 32–36)
MCHC RBC-ENTMCNC: 31.2 PG — SIGNIFICANT CHANGE UP (ref 27–34)
MCV RBC AUTO: 100.7 FL — HIGH (ref 80–100)
MONOCYTES # BLD AUTO: 0.62 K/UL — SIGNIFICANT CHANGE UP (ref 0–0.9)
MONOCYTES NFR BLD AUTO: 5.8 % — SIGNIFICANT CHANGE UP (ref 2–14)
NEUTROPHILS # BLD AUTO: 9.27 K/UL — HIGH (ref 1.8–7.4)
NEUTROPHILS NFR BLD AUTO: 86 % — HIGH (ref 43–77)
NRBC # BLD: 0 /100 WBCS — SIGNIFICANT CHANGE UP (ref 0–0)
NRBC BLD-RTO: 0 /100 WBCS — SIGNIFICANT CHANGE UP (ref 0–0)
PLATELET # BLD AUTO: 215 K/UL — SIGNIFICANT CHANGE UP (ref 150–400)
RBC # BLD: 2.98 M/UL — LOW (ref 3.8–5.2)
RBC # FLD: 20.8 % — HIGH (ref 10.3–14.5)
WBC # BLD: 10.76 K/UL — HIGH (ref 3.8–10.5)
WBC # FLD AUTO: 10.76 K/UL — HIGH (ref 3.8–10.5)

## 2025-01-17 PROCEDURE — G2211 COMPLEX E/M VISIT ADD ON: CPT

## 2025-01-17 PROCEDURE — 99213 OFFICE O/P EST LOW 20 MIN: CPT

## 2025-01-19 LAB
ESTIMATED AVERAGE GLUCOSE: 111 MG/DL
HBA1C MFR BLD HPLC: 5.5 %
RBC # BLD: 3.02 M/UL
RETICS # AUTO: 4.8 %
RETICS AGGREG/RBC NFR: 146.2 K/UL

## 2025-01-24 ENCOUNTER — RESULT REVIEW (OUTPATIENT)
Age: 80
End: 2025-01-24

## 2025-01-24 ENCOUNTER — APPOINTMENT (OUTPATIENT)
Dept: HEMATOLOGY ONCOLOGY | Facility: CLINIC | Age: 80
End: 2025-01-24
Payer: MEDICARE

## 2025-01-24 VITALS
TEMPERATURE: 97.3 F | BODY MASS INDEX: 23.28 KG/M2 | OXYGEN SATURATION: 99 % | HEART RATE: 89 BPM | SYSTOLIC BLOOD PRESSURE: 129 MMHG | WEIGHT: 136.38 LBS | HEIGHT: 64 IN | DIASTOLIC BLOOD PRESSURE: 78 MMHG

## 2025-01-24 DIAGNOSIS — D58.9 HEREDITARY HEMOLYTIC ANEMIA, UNSPECIFIED: ICD-10-CM

## 2025-01-24 LAB
BASOPHILS # BLD AUTO: 0.02 K/UL — SIGNIFICANT CHANGE UP (ref 0–0.2)
BASOPHILS NFR BLD AUTO: 0.2 % — SIGNIFICANT CHANGE UP (ref 0–2)
EOSINOPHIL # BLD AUTO: 0.02 K/UL — SIGNIFICANT CHANGE UP (ref 0–0.5)
EOSINOPHIL NFR BLD AUTO: 0.2 % — SIGNIFICANT CHANGE UP (ref 0–6)
HAPTOGLOB SERPL-MCNC: 54 MG/DL
HCT VFR BLD CALC: 32 % — LOW (ref 34.5–45)
HGB BLD-MCNC: 10.1 G/DL — LOW (ref 11.5–15.5)
IMM GRANULOCYTES NFR BLD AUTO: 0.5 % — SIGNIFICANT CHANGE UP (ref 0–0.9)
LDH SERPL-CCNC: 389 U/L
LYMPHOCYTES # BLD AUTO: 0.48 K/UL — LOW (ref 1–3.3)
LYMPHOCYTES # BLD AUTO: 4.4 % — LOW (ref 13–44)
MCHC RBC-ENTMCNC: 31.6 G/DL — LOW (ref 32–36)
MCHC RBC-ENTMCNC: 32.1 PG — SIGNIFICANT CHANGE UP (ref 27–34)
MCV RBC AUTO: 101.6 FL — HIGH (ref 80–100)
MONOCYTES # BLD AUTO: 0.39 K/UL — SIGNIFICANT CHANGE UP (ref 0–0.9)
MONOCYTES NFR BLD AUTO: 3.6 % — SIGNIFICANT CHANGE UP (ref 2–14)
NEUTROPHILS # BLD AUTO: 9.87 K/UL — HIGH (ref 1.8–7.4)
NEUTROPHILS NFR BLD AUTO: 91.1 % — HIGH (ref 43–77)
NRBC # BLD: 0 /100 WBCS — SIGNIFICANT CHANGE UP (ref 0–0)
NRBC BLD-RTO: 0 /100 WBCS — SIGNIFICANT CHANGE UP (ref 0–0)
PLATELET # BLD AUTO: 188 K/UL — SIGNIFICANT CHANGE UP (ref 150–400)
RBC # BLD: 3.09 M/UL
RBC # BLD: 3.15 M/UL — LOW (ref 3.8–5.2)
RBC # FLD: 19.8 % — HIGH (ref 10.3–14.5)
RETICS # AUTO: 2.9 %
RETICS AGGREG/RBC NFR: 89.9 K/UL
WBC # BLD: 10.83 K/UL — HIGH (ref 3.8–10.5)
WBC # FLD AUTO: 10.83 K/UL — HIGH (ref 3.8–10.5)

## 2025-01-24 PROCEDURE — 99213 OFFICE O/P EST LOW 20 MIN: CPT

## 2025-01-24 PROCEDURE — G2211 COMPLEX E/M VISIT ADD ON: CPT

## 2025-01-31 ENCOUNTER — RESULT REVIEW (OUTPATIENT)
Age: 80
End: 2025-01-31

## 2025-01-31 ENCOUNTER — APPOINTMENT (OUTPATIENT)
Dept: HEMATOLOGY ONCOLOGY | Facility: CLINIC | Age: 80
End: 2025-01-31

## 2025-01-31 ENCOUNTER — NON-APPOINTMENT (OUTPATIENT)
Age: 80
End: 2025-01-31

## 2025-01-31 LAB
BASOPHILS # BLD AUTO: 0.01 K/UL — SIGNIFICANT CHANGE UP (ref 0–0.2)
BASOPHILS NFR BLD AUTO: 0.1 % — SIGNIFICANT CHANGE UP (ref 0–2)
EOSINOPHIL # BLD AUTO: 0.03 K/UL — SIGNIFICANT CHANGE UP (ref 0–0.5)
EOSINOPHIL NFR BLD AUTO: 0.2 % — SIGNIFICANT CHANGE UP (ref 0–6)
HCT VFR BLD CALC: 30.9 % — LOW (ref 34.5–45)
HGB BLD-MCNC: 9.9 G/DL — LOW (ref 11.5–15.5)
IMM GRANULOCYTES NFR BLD AUTO: 0.3 % — SIGNIFICANT CHANGE UP (ref 0–0.9)
LYMPHOCYTES # BLD AUTO: 0.57 K/UL — LOW (ref 1–3.3)
LYMPHOCYTES # BLD AUTO: 4.6 % — LOW (ref 13–44)
MCHC RBC-ENTMCNC: 32 G/DL — SIGNIFICANT CHANGE UP (ref 32–36)
MCHC RBC-ENTMCNC: 32.6 PG — SIGNIFICANT CHANGE UP (ref 27–34)
MCV RBC AUTO: 101.6 FL — HIGH (ref 80–100)
MONOCYTES # BLD AUTO: 0.5 K/UL — SIGNIFICANT CHANGE UP (ref 0–0.9)
MONOCYTES NFR BLD AUTO: 4 % — SIGNIFICANT CHANGE UP (ref 2–14)
NEUTROPHILS # BLD AUTO: 11.36 K/UL — HIGH (ref 1.8–7.4)
NEUTROPHILS NFR BLD AUTO: 90.8 % — HIGH (ref 43–77)
NRBC # BLD: 0 /100 WBCS — SIGNIFICANT CHANGE UP (ref 0–0)
NRBC BLD-RTO: 0 /100 WBCS — SIGNIFICANT CHANGE UP (ref 0–0)
PLATELET # BLD AUTO: 186 K/UL — SIGNIFICANT CHANGE UP (ref 150–400)
RBC # BLD: 3.04 M/UL — LOW (ref 3.8–5.2)
RBC # FLD: 18.7 % — HIGH (ref 10.3–14.5)
WBC # BLD: 12.51 K/UL — HIGH (ref 3.8–10.5)
WBC # FLD AUTO: 12.51 K/UL — HIGH (ref 3.8–10.5)

## 2025-02-04 LAB
HAPTOGLOB SERPL-MCNC: 64 MG/DL
LDH SERPL-CCNC: 373 U/L
RETICS # AUTO: 2.9 %
RETICS AGGREG/RBC NFR: 86.7 K/UL

## 2025-02-07 ENCOUNTER — NON-APPOINTMENT (OUTPATIENT)
Age: 80
End: 2025-02-07

## 2025-02-07 ENCOUNTER — RESULT REVIEW (OUTPATIENT)
Age: 80
End: 2025-02-07

## 2025-02-07 ENCOUNTER — APPOINTMENT (OUTPATIENT)
Dept: HEMATOLOGY ONCOLOGY | Facility: CLINIC | Age: 80
End: 2025-02-07

## 2025-02-07 LAB
BASOPHILS # BLD AUTO: 0.01 K/UL — SIGNIFICANT CHANGE UP (ref 0–0.2)
BASOPHILS NFR BLD AUTO: 0.1 % — SIGNIFICANT CHANGE UP (ref 0–2)
EOSINOPHIL # BLD AUTO: 0.08 K/UL — SIGNIFICANT CHANGE UP (ref 0–0.5)
EOSINOPHIL NFR BLD AUTO: 0.8 % — SIGNIFICANT CHANGE UP (ref 0–6)
HAPTOGLOB SERPL-MCNC: 73 MG/DL
HCT VFR BLD CALC: 31.7 % — LOW (ref 34.5–45)
HGB BLD-MCNC: 10.1 G/DL — LOW (ref 11.5–15.5)
IMM GRANULOCYTES NFR BLD AUTO: 0.5 % — SIGNIFICANT CHANGE UP (ref 0–0.9)
LDH SERPL-CCNC: 371 U/L
LYMPHOCYTES # BLD AUTO: 1.18 K/UL — SIGNIFICANT CHANGE UP (ref 1–3.3)
LYMPHOCYTES # BLD AUTO: 12.5 % — LOW (ref 13–44)
MCHC RBC-ENTMCNC: 31.9 G/DL — LOW (ref 32–36)
MCHC RBC-ENTMCNC: 32.3 PG — SIGNIFICANT CHANGE UP (ref 27–34)
MCV RBC AUTO: 101.3 FL — HIGH (ref 80–100)
MONOCYTES # BLD AUTO: 0.71 K/UL — SIGNIFICANT CHANGE UP (ref 0–0.9)
MONOCYTES NFR BLD AUTO: 7.5 % — SIGNIFICANT CHANGE UP (ref 2–14)
NEUTROPHILS # BLD AUTO: 7.4 K/UL — SIGNIFICANT CHANGE UP (ref 1.8–7.4)
NEUTROPHILS NFR BLD AUTO: 78.6 % — HIGH (ref 43–77)
NRBC # BLD: 0 /100 WBCS — SIGNIFICANT CHANGE UP (ref 0–0)
NRBC BLD-RTO: 0 /100 WBCS — SIGNIFICANT CHANGE UP (ref 0–0)
PLATELET # BLD AUTO: 175 K/UL — SIGNIFICANT CHANGE UP (ref 150–400)
RBC # BLD: 3.11 M/UL
RBC # BLD: 3.13 M/UL — LOW (ref 3.8–5.2)
RBC # FLD: 17.8 % — HIGH (ref 10.3–14.5)
RETICS # AUTO: 3 %
RETICS AGGREG/RBC NFR: 92.4 K/UL
WBC # BLD: 9.43 K/UL — SIGNIFICANT CHANGE UP (ref 3.8–10.5)
WBC # FLD AUTO: 9.43 K/UL — SIGNIFICANT CHANGE UP (ref 3.8–10.5)

## 2025-02-14 ENCOUNTER — APPOINTMENT (OUTPATIENT)
Dept: HEMATOLOGY ONCOLOGY | Facility: CLINIC | Age: 80
End: 2025-02-14

## 2025-02-14 ENCOUNTER — RESULT REVIEW (OUTPATIENT)
Age: 80
End: 2025-02-14

## 2025-02-14 LAB
BASOPHILS # BLD AUTO: 0.02 K/UL — SIGNIFICANT CHANGE UP (ref 0–0.2)
BASOPHILS NFR BLD AUTO: 0.2 % — SIGNIFICANT CHANGE UP (ref 0–2)
EOSINOPHIL # BLD AUTO: 0.06 K/UL — SIGNIFICANT CHANGE UP (ref 0–0.5)
EOSINOPHIL NFR BLD AUTO: 0.6 % — SIGNIFICANT CHANGE UP (ref 0–6)
ERYTHROCYTE [SEDIMENTATION RATE] IN BLOOD BY WESTERGREN METHOD: 23 MM/HR
FERRITIN SERPL-MCNC: 157 NG/ML
FOLATE SERPL-MCNC: >20 NG/ML
HAPTOGLOB SERPL-MCNC: 75 MG/DL
HCT VFR BLD CALC: 32.4 % — LOW (ref 34.5–45)
HGB BLD-MCNC: 10.4 G/DL — LOW (ref 11.5–15.5)
IMM GRANULOCYTES NFR BLD AUTO: 0.5 % — SIGNIFICANT CHANGE UP (ref 0–0.9)
IRON SATN MFR SERPL: 13 %
IRON SERPL-MCNC: 56 UG/DL
LDH SERPL-CCNC: 348 U/L
LYMPHOCYTES # BLD AUTO: 1.05 K/UL — SIGNIFICANT CHANGE UP (ref 1–3.3)
LYMPHOCYTES # BLD AUTO: 9.8 % — LOW (ref 13–44)
MCHC RBC-ENTMCNC: 32.1 G/DL — SIGNIFICANT CHANGE UP (ref 32–36)
MCHC RBC-ENTMCNC: 32.3 PG — SIGNIFICANT CHANGE UP (ref 27–34)
MCV RBC AUTO: 100.6 FL — HIGH (ref 80–100)
MONOCYTES # BLD AUTO: 0.61 K/UL — SIGNIFICANT CHANGE UP (ref 0–0.9)
MONOCYTES NFR BLD AUTO: 5.7 % — SIGNIFICANT CHANGE UP (ref 2–14)
NEUTROPHILS # BLD AUTO: 8.92 K/UL — HIGH (ref 1.8–7.4)
NEUTROPHILS NFR BLD AUTO: 83.2 % — HIGH (ref 43–77)
NRBC BLD AUTO-RTO: 0 /100 WBCS — SIGNIFICANT CHANGE UP (ref 0–0)
PLATELET # BLD AUTO: 221 K/UL — SIGNIFICANT CHANGE UP (ref 150–400)
RBC # BLD: 3.22 M/UL — LOW (ref 3.8–5.2)
RBC # FLD: 16.5 % — HIGH (ref 10.3–14.5)
TIBC SERPL-MCNC: 434 UG/DL
UIBC SERPL-MCNC: 378 UG/DL
VIT B12 SERPL-MCNC: 631 PG/ML
WBC # BLD: 10.71 K/UL — HIGH (ref 3.8–10.5)
WBC # FLD AUTO: 10.71 K/UL — HIGH (ref 3.8–10.5)

## 2025-02-17 ENCOUNTER — OUTPATIENT (OUTPATIENT)
Dept: OUTPATIENT SERVICES | Facility: HOSPITAL | Age: 80
LOS: 1 days | Discharge: HOME | End: 2025-02-17

## 2025-02-17 DIAGNOSIS — D58.9 HEREDITARY HEMOLYTIC ANEMIA, UNSPECIFIED: ICD-10-CM

## 2025-02-17 DIAGNOSIS — Z98.890 OTHER SPECIFIED POSTPROCEDURAL STATES: Chronic | ICD-10-CM

## 2025-02-17 DIAGNOSIS — Z79.01 LONG TERM (CURRENT) USE OF ANTICOAGULANTS: ICD-10-CM

## 2025-02-17 DIAGNOSIS — D50.9 IRON DEFICIENCY ANEMIA, UNSPECIFIED: ICD-10-CM

## 2025-02-17 DIAGNOSIS — D64.9 ANEMIA, UNSPECIFIED: ICD-10-CM

## 2025-02-18 ENCOUNTER — NON-APPOINTMENT (OUTPATIENT)
Age: 80
End: 2025-02-18

## 2025-02-21 ENCOUNTER — APPOINTMENT (OUTPATIENT)
Dept: HEMATOLOGY ONCOLOGY | Facility: CLINIC | Age: 80
End: 2025-02-21

## 2025-02-21 ENCOUNTER — RESULT REVIEW (OUTPATIENT)
Age: 80
End: 2025-02-21

## 2025-02-21 LAB
BASOPHILS # BLD AUTO: 0.02 K/UL — SIGNIFICANT CHANGE UP (ref 0–0.2)
BASOPHILS NFR BLD AUTO: 0.2 % — SIGNIFICANT CHANGE UP (ref 0–2)
EOSINOPHIL # BLD AUTO: 0.03 K/UL — SIGNIFICANT CHANGE UP (ref 0–0.5)
EOSINOPHIL NFR BLD AUTO: 0.3 % — SIGNIFICANT CHANGE UP (ref 0–6)
HCT VFR BLD CALC: 32.7 % — LOW (ref 34.5–45)
HGB BLD-MCNC: 10.5 G/DL — LOW (ref 11.5–15.5)
IMM GRANULOCYTES NFR BLD AUTO: 0.7 % — SIGNIFICANT CHANGE UP (ref 0–0.9)
LYMPHOCYTES # BLD AUTO: 0.47 K/UL — LOW (ref 1–3.3)
LYMPHOCYTES # BLD AUTO: 5.4 % — LOW (ref 13–44)
MCHC RBC-ENTMCNC: 31.8 PG — SIGNIFICANT CHANGE UP (ref 27–34)
MCHC RBC-ENTMCNC: 32.1 G/DL — SIGNIFICANT CHANGE UP (ref 32–36)
MCV RBC AUTO: 99.1 FL — SIGNIFICANT CHANGE UP (ref 80–100)
MONOCYTES # BLD AUTO: 0.47 K/UL — SIGNIFICANT CHANGE UP (ref 0–0.9)
MONOCYTES NFR BLD AUTO: 5.4 % — SIGNIFICANT CHANGE UP (ref 2–14)
NEUTROPHILS # BLD AUTO: 7.62 K/UL — HIGH (ref 1.8–7.4)
NEUTROPHILS NFR BLD AUTO: 88 % — HIGH (ref 43–77)
NRBC BLD AUTO-RTO: 0 /100 WBCS — SIGNIFICANT CHANGE UP (ref 0–0)
PLATELET # BLD AUTO: 216 K/UL — SIGNIFICANT CHANGE UP (ref 150–400)
RBC # BLD: 3.23 M/UL
RBC # BLD: 3.3 M/UL — LOW (ref 3.8–5.2)
RBC # FLD: 15.9 % — HIGH (ref 10.3–14.5)
RETICS # AUTO: 1.7 %
RETICS AGGREG/RBC NFR: 54.3 K/UL
WBC # BLD: 8.67 K/UL — SIGNIFICANT CHANGE UP (ref 3.8–10.5)
WBC # FLD AUTO: 8.67 K/UL — SIGNIFICANT CHANGE UP (ref 3.8–10.5)

## 2025-02-24 ENCOUNTER — APPOINTMENT (OUTPATIENT)
Dept: INFUSION THERAPY | Facility: CLINIC | Age: 80
End: 2025-02-24

## 2025-02-24 LAB — HAPTOGLOB SERPL-MCNC: 74 MG/DL

## 2025-03-03 ENCOUNTER — RESULT REVIEW (OUTPATIENT)
Age: 80
End: 2025-03-03

## 2025-03-03 ENCOUNTER — APPOINTMENT (OUTPATIENT)
Dept: HEMATOLOGY ONCOLOGY | Facility: CLINIC | Age: 80
End: 2025-03-03

## 2025-03-03 LAB
BASOPHILS # BLD AUTO: 0.01 K/UL — SIGNIFICANT CHANGE UP (ref 0–0.2)
BASOPHILS NFR BLD AUTO: 0.1 % — SIGNIFICANT CHANGE UP (ref 0–2)
EOSINOPHIL # BLD AUTO: 0.01 K/UL — SIGNIFICANT CHANGE UP (ref 0–0.5)
EOSINOPHIL NFR BLD AUTO: 0.1 % — SIGNIFICANT CHANGE UP (ref 0–6)
HCT VFR BLD CALC: 34.7 % — SIGNIFICANT CHANGE UP (ref 34.5–45)
HGB BLD-MCNC: 11.1 G/DL — LOW (ref 11.5–15.5)
IMM GRANULOCYTES NFR BLD AUTO: 0.9 % — SIGNIFICANT CHANGE UP (ref 0–0.9)
LYMPHOCYTES # BLD AUTO: 0.53 K/UL — LOW (ref 1–3.3)
LYMPHOCYTES # BLD AUTO: 5.2 % — LOW (ref 13–44)
MCHC RBC-ENTMCNC: 32 G/DL — SIGNIFICANT CHANGE UP (ref 32–36)
MCHC RBC-ENTMCNC: 32.2 PG — SIGNIFICANT CHANGE UP (ref 27–34)
MCV RBC AUTO: 100.6 FL — HIGH (ref 80–100)
MONOCYTES # BLD AUTO: 0.5 K/UL — SIGNIFICANT CHANGE UP (ref 0–0.9)
MONOCYTES NFR BLD AUTO: 4.9 % — SIGNIFICANT CHANGE UP (ref 2–14)
NEUTROPHILS # BLD AUTO: 9.06 K/UL — HIGH (ref 1.8–7.4)
NEUTROPHILS NFR BLD AUTO: 88.8 % — HIGH (ref 43–77)
NRBC BLD AUTO-RTO: 0 /100 WBCS — SIGNIFICANT CHANGE UP (ref 0–0)
PLATELET # BLD AUTO: 190 K/UL — SIGNIFICANT CHANGE UP (ref 150–400)
RBC # BLD: 3.45 M/UL — LOW (ref 3.8–5.2)
RBC # FLD: 16.1 % — HIGH (ref 10.3–14.5)
WBC # BLD: 10.2 K/UL — SIGNIFICANT CHANGE UP (ref 3.8–10.5)
WBC # FLD AUTO: 10.2 K/UL — SIGNIFICANT CHANGE UP (ref 3.8–10.5)

## 2025-03-04 LAB
HAPTOGLOB SERPL-MCNC: 105 MG/DL
LDH SERPL-CCNC: 336 U/L
RBC # BLD: 3.46 M/UL
RETICS # AUTO: 2.2 %
RETICS AGGREG/RBC NFR: 77.2 K/UL

## 2025-03-05 PROBLEM — Z01.419 ENCOUNTER FOR ANNUAL ROUTINE GYNECOLOGICAL EXAMINATION: Status: ACTIVE | Noted: 2025-03-05

## 2025-03-05 PROBLEM — Z12.4 CERVICAL CANCER SCREENING: Status: ACTIVE | Noted: 2025-03-05

## 2025-03-05 PROBLEM — Z12.11 COLON CANCER SCREENING: Status: ACTIVE | Noted: 2025-03-05

## 2025-03-07 ENCOUNTER — OUTPATIENT (OUTPATIENT)
Dept: OUTPATIENT SERVICES | Facility: HOSPITAL | Age: 80
LOS: 1 days | End: 2025-03-07
Payer: MEDICARE

## 2025-03-07 ENCOUNTER — APPOINTMENT (OUTPATIENT)
Dept: ULTRASOUND IMAGING | Facility: CLINIC | Age: 80
End: 2025-03-07
Payer: MEDICARE

## 2025-03-07 DIAGNOSIS — Z98.890 OTHER SPECIFIED POSTPROCEDURAL STATES: Chronic | ICD-10-CM

## 2025-03-07 DIAGNOSIS — Z00.8 ENCOUNTER FOR OTHER GENERAL EXAMINATION: ICD-10-CM

## 2025-03-07 PROCEDURE — 76700 US EXAM ABDOM COMPLETE: CPT

## 2025-03-07 PROCEDURE — 76700 US EXAM ABDOM COMPLETE: CPT | Mod: 26

## 2025-03-11 ENCOUNTER — APPOINTMENT (OUTPATIENT)
Dept: HEMATOLOGY ONCOLOGY | Facility: CLINIC | Age: 80
End: 2025-03-11

## 2025-03-11 ENCOUNTER — RESULT REVIEW (OUTPATIENT)
Age: 80
End: 2025-03-11

## 2025-03-11 ENCOUNTER — NON-APPOINTMENT (OUTPATIENT)
Age: 80
End: 2025-03-11

## 2025-03-11 LAB
BASOPHILS # BLD AUTO: 0.02 K/UL — SIGNIFICANT CHANGE UP (ref 0–0.2)
BASOPHILS NFR BLD AUTO: 0.2 % — SIGNIFICANT CHANGE UP (ref 0–2)
EOSINOPHIL # BLD AUTO: 0.04 K/UL — SIGNIFICANT CHANGE UP (ref 0–0.5)
EOSINOPHIL NFR BLD AUTO: 0.4 % — SIGNIFICANT CHANGE UP (ref 0–6)
HCT VFR BLD CALC: 33.9 % — LOW (ref 34.5–45)
HGB BLD-MCNC: 11 G/DL — LOW (ref 11.5–15.5)
IMM GRANULOCYTES NFR BLD AUTO: 0.4 % — SIGNIFICANT CHANGE UP (ref 0–0.9)
LYMPHOCYTES # BLD AUTO: 0.58 K/UL — LOW (ref 1–3.3)
LYMPHOCYTES # BLD AUTO: 5.2 % — LOW (ref 13–44)
MCHC RBC-ENTMCNC: 32.4 G/DL — SIGNIFICANT CHANGE UP (ref 32–36)
MCHC RBC-ENTMCNC: 32.8 PG — SIGNIFICANT CHANGE UP (ref 27–34)
MCV RBC AUTO: 101.2 FL — HIGH (ref 80–100)
MONOCYTES # BLD AUTO: 0.88 K/UL — SIGNIFICANT CHANGE UP (ref 0–0.9)
MONOCYTES NFR BLD AUTO: 7.9 % — SIGNIFICANT CHANGE UP (ref 2–14)
NEUTROPHILS # BLD AUTO: 9.6 K/UL — HIGH (ref 1.8–7.4)
NEUTROPHILS NFR BLD AUTO: 85.9 % — HIGH (ref 43–77)
NRBC BLD AUTO-RTO: 0 /100 WBCS — SIGNIFICANT CHANGE UP (ref 0–0)
PLATELET # BLD AUTO: 188 K/UL — SIGNIFICANT CHANGE UP (ref 150–400)
RBC # BLD: 3.35 M/UL — LOW (ref 3.8–5.2)
RBC # FLD: 16.5 % — HIGH (ref 10.3–14.5)
WBC # BLD: 11.16 K/UL — HIGH (ref 3.8–10.5)
WBC # FLD AUTO: 11.16 K/UL — HIGH (ref 3.8–10.5)

## 2025-03-12 ENCOUNTER — APPOINTMENT (OUTPATIENT)
Dept: OBGYN | Facility: CLINIC | Age: 80
End: 2025-03-12
Payer: MEDICARE

## 2025-03-12 VITALS
DIASTOLIC BLOOD PRESSURE: 84 MMHG | WEIGHT: 140 LBS | HEART RATE: 82 BPM | HEIGHT: 64 IN | BODY MASS INDEX: 23.9 KG/M2 | SYSTOLIC BLOOD PRESSURE: 171 MMHG

## 2025-03-12 DIAGNOSIS — Z12.11 ENCOUNTER FOR SCREENING FOR MALIGNANT NEOPLASM OF COLON: ICD-10-CM

## 2025-03-12 DIAGNOSIS — N95.2 POSTMENOPAUSAL ATROPHIC VAGINITIS: ICD-10-CM

## 2025-03-12 DIAGNOSIS — Z12.4 ENCOUNTER FOR SCREENING FOR MALIGNANT NEOPLASM OF CERVIX: ICD-10-CM

## 2025-03-12 DIAGNOSIS — Z12.39 ENCOUNTER FOR OTHER SCREENING FOR MALIGNANT NEOPLASM OF BREAST: ICD-10-CM

## 2025-03-12 DIAGNOSIS — Z01.419 ENCOUNTER FOR GYNECOLOGICAL EXAMINATION (GENERAL) (ROUTINE) W/OUT ABNORMAL FINDINGS: ICD-10-CM

## 2025-03-12 LAB
BILIRUB UR QL STRIP: NEGATIVE
CLARITY UR: CLEAR
COLLECTION METHOD: NORMAL
DATE COLLECTED: NORMAL
GLUCOSE UR-MCNC: NEGATIVE
HCG UR QL: 0 EU/DL
HEMOCCULT SP1 STL QL: NEGATIVE
HGB UR QL STRIP.AUTO: NEGATIVE
KETONES UR-MCNC: NEGATIVE
LEUKOCYTE ESTERASE UR QL STRIP: NEGATIVE
NITRITE UR QL STRIP: NEGATIVE
PH UR STRIP: 5
PROT UR STRIP-MCNC: NEGATIVE
QUALITY CONTROL: YES
SP GR UR STRIP: 1

## 2025-03-12 PROCEDURE — 82270 OCCULT BLOOD FECES: CPT

## 2025-03-12 PROCEDURE — 81003 URINALYSIS AUTO W/O SCOPE: CPT | Mod: QW

## 2025-03-12 PROCEDURE — G0101: CPT

## 2025-03-18 ENCOUNTER — NON-APPOINTMENT (OUTPATIENT)
Age: 80
End: 2025-03-18

## 2025-03-26 ENCOUNTER — NON-APPOINTMENT (OUTPATIENT)
Age: 80
End: 2025-03-26

## 2025-03-28 ENCOUNTER — RESULT REVIEW (OUTPATIENT)
Age: 80
End: 2025-03-28

## 2025-03-28 ENCOUNTER — APPOINTMENT (OUTPATIENT)
Dept: HEMATOLOGY ONCOLOGY | Facility: CLINIC | Age: 80
End: 2025-03-28

## 2025-03-28 LAB
BASOPHILS # BLD AUTO: 0.05 K/UL — SIGNIFICANT CHANGE UP (ref 0–0.2)
BASOPHILS NFR BLD AUTO: 0.6 % — SIGNIFICANT CHANGE UP (ref 0–2)
EOSINOPHIL # BLD AUTO: 0.25 K/UL — SIGNIFICANT CHANGE UP (ref 0–0.5)
EOSINOPHIL NFR BLD AUTO: 3.2 % — SIGNIFICANT CHANGE UP (ref 0–6)
HCT VFR BLD CALC: 33.2 % — LOW (ref 34.5–45)
HGB BLD-MCNC: 11 G/DL — LOW (ref 11.5–15.5)
IMM GRANULOCYTES NFR BLD AUTO: 0.4 % — SIGNIFICANT CHANGE UP (ref 0–0.9)
LYMPHOCYTES # BLD AUTO: 1.05 K/UL — SIGNIFICANT CHANGE UP (ref 1–3.3)
LYMPHOCYTES # BLD AUTO: 13.3 % — SIGNIFICANT CHANGE UP (ref 13–44)
MCHC RBC-ENTMCNC: 32.7 PG — SIGNIFICANT CHANGE UP (ref 27–34)
MCHC RBC-ENTMCNC: 33.1 G/DL — SIGNIFICANT CHANGE UP (ref 32–36)
MCV RBC AUTO: 98.8 FL — SIGNIFICANT CHANGE UP (ref 80–100)
MONOCYTES # BLD AUTO: 0.74 K/UL — SIGNIFICANT CHANGE UP (ref 0–0.9)
MONOCYTES NFR BLD AUTO: 9.4 % — SIGNIFICANT CHANGE UP (ref 2–14)
NEUTROPHILS # BLD AUTO: 5.75 K/UL — SIGNIFICANT CHANGE UP (ref 1.8–7.4)
NEUTROPHILS NFR BLD AUTO: 73.1 % — SIGNIFICANT CHANGE UP (ref 43–77)
NRBC BLD AUTO-RTO: 0 /100 WBCS — SIGNIFICANT CHANGE UP (ref 0–0)
PLATELET # BLD AUTO: 194 K/UL — SIGNIFICANT CHANGE UP (ref 150–400)
RBC # BLD: 3.28 M/UL
RBC # BLD: 3.36 M/UL — LOW (ref 3.8–5.2)
RBC # FLD: 14.9 % — HIGH (ref 10.3–14.5)
RETICS # AUTO: 0.8 %
RETICS AGGREG/RBC NFR: 25.3 K/UL
WBC # BLD: 7.87 K/UL — SIGNIFICANT CHANGE UP (ref 3.8–10.5)
WBC # FLD AUTO: 7.87 K/UL — SIGNIFICANT CHANGE UP (ref 3.8–10.5)

## 2025-03-29 LAB
ERYTHROCYTE [SEDIMENTATION RATE] IN BLOOD BY WESTERGREN METHOD: 78 MM/HR
FERRITIN SERPL-MCNC: 463 NG/ML
HAPTOGLOB SERPL-MCNC: 157 MG/DL
IRON SATN MFR SERPL: 19 %
IRON SERPL-MCNC: 61 UG/DL
LDH SERPL-CCNC: 295 U/L
TIBC SERPL-MCNC: 326 UG/DL
UIBC SERPL-MCNC: 266 UG/DL

## 2025-04-10 ENCOUNTER — APPOINTMENT (OUTPATIENT)
Dept: DERMATOLOGY | Facility: CLINIC | Age: 80
End: 2025-04-10

## 2025-04-17 ENCOUNTER — OUTPATIENT (OUTPATIENT)
Dept: OUTPATIENT SERVICES | Facility: HOSPITAL | Age: 80
LOS: 1 days | Discharge: HOME | End: 2025-04-17

## 2025-04-17 DIAGNOSIS — Z98.890 OTHER SPECIFIED POSTPROCEDURAL STATES: Chronic | ICD-10-CM

## 2025-04-17 DIAGNOSIS — Z79.01 LONG TERM (CURRENT) USE OF ANTICOAGULANTS: ICD-10-CM

## 2025-04-17 DIAGNOSIS — D58.9 HEREDITARY HEMOLYTIC ANEMIA, UNSPECIFIED: ICD-10-CM

## 2025-04-17 DIAGNOSIS — D64.9 ANEMIA, UNSPECIFIED: ICD-10-CM

## 2025-04-17 DIAGNOSIS — D50.9 IRON DEFICIENCY ANEMIA, UNSPECIFIED: ICD-10-CM

## 2025-04-20 ENCOUNTER — INPATIENT (INPATIENT)
Facility: HOSPITAL | Age: 80
LOS: 4 days | Discharge: HOME | DRG: 857 | End: 2025-04-25
Attending: HOSPITALIST | Admitting: INTERNAL MEDICINE
Payer: MEDICARE

## 2025-04-20 VITALS — WEIGHT: 139.99 LBS | HEIGHT: 64 IN

## 2025-04-20 DIAGNOSIS — Z98.890 OTHER SPECIFIED POSTPROCEDURAL STATES: Chronic | ICD-10-CM

## 2025-04-20 DIAGNOSIS — L03.119 CELLULITIS OF UNSPECIFIED PART OF LIMB: ICD-10-CM

## 2025-04-20 DIAGNOSIS — I10 ESSENTIAL (PRIMARY) HYPERTENSION: ICD-10-CM

## 2025-04-20 DIAGNOSIS — E78.5 HYPERLIPIDEMIA, UNSPECIFIED: ICD-10-CM

## 2025-04-20 DIAGNOSIS — I48.91 UNSPECIFIED ATRIAL FIBRILLATION: ICD-10-CM

## 2025-04-20 DIAGNOSIS — L03.90 CELLULITIS, UNSPECIFIED: ICD-10-CM

## 2025-04-20 DIAGNOSIS — I50.32 CHRONIC DIASTOLIC (CONGESTIVE) HEART FAILURE: ICD-10-CM

## 2025-04-20 LAB
ADD ON TEST-SPECIMEN IN LAB: SIGNIFICANT CHANGE UP
ALBUMIN SERPL ELPH-MCNC: 2.9 G/DL — LOW (ref 3.3–5)
ALP SERPL-CCNC: 243 U/L — HIGH (ref 40–120)
ALT FLD-CCNC: 35 U/L — SIGNIFICANT CHANGE UP (ref 12–78)
ANION GAP SERPL CALC-SCNC: 8 MMOL/L — SIGNIFICANT CHANGE UP (ref 5–17)
APTT BLD: 41.9 SEC — HIGH (ref 24.5–35.6)
AST SERPL-CCNC: 25 U/L — SIGNIFICANT CHANGE UP (ref 15–37)
BASOPHILS # BLD AUTO: 0.1 K/UL — SIGNIFICANT CHANGE UP (ref 0–0.2)
BASOPHILS NFR BLD AUTO: 1.1 % — SIGNIFICANT CHANGE UP (ref 0–2)
BILIRUB SERPL-MCNC: 0.4 MG/DL — SIGNIFICANT CHANGE UP (ref 0.2–1.2)
BUN SERPL-MCNC: 33 MG/DL — HIGH (ref 7–23)
CALCIUM SERPL-MCNC: 9.8 MG/DL — SIGNIFICANT CHANGE UP (ref 8.5–10.1)
CHLORIDE SERPL-SCNC: 106 MMOL/L — SIGNIFICANT CHANGE UP (ref 96–108)
CO2 SERPL-SCNC: 27 MMOL/L — SIGNIFICANT CHANGE UP (ref 22–31)
CREAT SERPL-MCNC: 1.19 MG/DL — SIGNIFICANT CHANGE UP (ref 0.5–1.3)
CRP SERPL-MCNC: 40.9 MG/L — HIGH (ref 0–5)
EGFR: 47 ML/MIN/1.73M2 — LOW
EGFR: 47 ML/MIN/1.73M2 — LOW
EOSINOPHIL # BLD AUTO: 1.12 K/UL — HIGH (ref 0–0.5)
EOSINOPHIL NFR BLD AUTO: 12.8 % — HIGH (ref 0–6)
ERYTHROCYTE [SEDIMENTATION RATE] IN BLOOD: 111 MM/HR — HIGH (ref 0–20)
GLUCOSE SERPL-MCNC: 91 MG/DL — SIGNIFICANT CHANGE UP (ref 70–99)
HCT VFR BLD CALC: 32.6 % — LOW (ref 34.5–45)
HGB BLD-MCNC: 10.4 G/DL — LOW (ref 11.5–15.5)
IMM GRANULOCYTES # BLD AUTO: 0.03 K/UL — SIGNIFICANT CHANGE UP (ref 0–0.07)
IMM GRANULOCYTES NFR BLD AUTO: 0.3 % — SIGNIFICANT CHANGE UP (ref 0–0.9)
INR BLD: 1.54 RATIO — HIGH (ref 0.85–1.16)
LACTATE SERPL-SCNC: 0.9 MMOL/L — SIGNIFICANT CHANGE UP (ref 0.7–2)
LYMPHOCYTES # BLD AUTO: 1.33 K/UL — SIGNIFICANT CHANGE UP (ref 1–3.3)
LYMPHOCYTES NFR BLD AUTO: 15.2 % — SIGNIFICANT CHANGE UP (ref 13–44)
MCHC RBC-ENTMCNC: 31.9 G/DL — LOW (ref 32–36)
MCHC RBC-ENTMCNC: 32 PG — SIGNIFICANT CHANGE UP (ref 27–34)
MCV RBC AUTO: 100.3 FL — HIGH (ref 80–100)
MONOCYTES # BLD AUTO: 0.87 K/UL — SIGNIFICANT CHANGE UP (ref 0–0.9)
MONOCYTES NFR BLD AUTO: 10 % — SIGNIFICANT CHANGE UP (ref 2–14)
NEUTROPHILS # BLD AUTO: 5.28 K/UL — SIGNIFICANT CHANGE UP (ref 1.8–7.4)
NEUTROPHILS NFR BLD AUTO: 60.6 % — SIGNIFICANT CHANGE UP (ref 43–77)
NRBC # BLD AUTO: 0 K/UL — SIGNIFICANT CHANGE UP (ref 0–0)
NRBC # FLD: 0 K/UL — SIGNIFICANT CHANGE UP (ref 0–0)
NRBC BLD AUTO-RTO: 0 /100 WBCS — SIGNIFICANT CHANGE UP (ref 0–0)
PLATELET # BLD AUTO: 314 K/UL — SIGNIFICANT CHANGE UP (ref 150–400)
PMV BLD: 9.6 FL — SIGNIFICANT CHANGE UP (ref 7–13)
POTASSIUM SERPL-MCNC: 4.2 MMOL/L — SIGNIFICANT CHANGE UP (ref 3.5–5.3)
POTASSIUM SERPL-SCNC: 4.2 MMOL/L — SIGNIFICANT CHANGE UP (ref 3.5–5.3)
PROT SERPL-MCNC: 7.6 GM/DL — SIGNIFICANT CHANGE UP (ref 6–8.3)
PROTHROM AB SERPL-ACNC: 17.6 SEC — HIGH (ref 9.9–13.4)
RBC # BLD: 3.25 M/UL — LOW (ref 3.8–5.2)
RBC # FLD: 14.8 % — HIGH (ref 10.3–14.5)
SODIUM SERPL-SCNC: 141 MMOL/L — SIGNIFICANT CHANGE UP (ref 135–145)
WBC # BLD: 8.73 K/UL — SIGNIFICANT CHANGE UP (ref 3.8–10.5)
WBC # FLD AUTO: 8.73 K/UL — SIGNIFICANT CHANGE UP (ref 3.8–10.5)

## 2025-04-20 PROCEDURE — P9016: CPT

## 2025-04-20 PROCEDURE — 99285 EMERGENCY DEPT VISIT HI MDM: CPT | Mod: FS

## 2025-04-20 PROCEDURE — 86920 COMPATIBILITY TEST SPIN: CPT

## 2025-04-20 PROCEDURE — 83615 LACTATE (LD) (LDH) ENZYME: CPT

## 2025-04-20 PROCEDURE — 93970 EXTREMITY STUDY: CPT | Mod: 26

## 2025-04-20 PROCEDURE — 80076 HEPATIC FUNCTION PANEL: CPT

## 2025-04-20 PROCEDURE — 86921 COMPATIBILITY TEST INCUBATE: CPT

## 2025-04-20 PROCEDURE — 36415 COLL VENOUS BLD VENIPUNCTURE: CPT

## 2025-04-20 PROCEDURE — 36430 TRANSFUSION BLD/BLD COMPNT: CPT

## 2025-04-20 PROCEDURE — 86850 RBC ANTIBODY SCREEN: CPT

## 2025-04-20 PROCEDURE — 93010 ELECTROCARDIOGRAM REPORT: CPT

## 2025-04-20 PROCEDURE — 85027 COMPLETE CBC AUTOMATED: CPT

## 2025-04-20 PROCEDURE — 82728 ASSAY OF FERRITIN: CPT

## 2025-04-20 PROCEDURE — 86901 BLOOD TYPING SEROLOGIC RH(D): CPT

## 2025-04-20 PROCEDURE — 97530 THERAPEUTIC ACTIVITIES: CPT | Mod: GP

## 2025-04-20 PROCEDURE — 82746 ASSAY OF FOLIC ACID SERUM: CPT

## 2025-04-20 PROCEDURE — 85610 PROTHROMBIN TIME: CPT

## 2025-04-20 PROCEDURE — 93306 TTE W/DOPPLER COMPLETE: CPT

## 2025-04-20 PROCEDURE — 86900 BLOOD TYPING SEROLOGIC ABO: CPT

## 2025-04-20 PROCEDURE — 99222 1ST HOSP IP/OBS MODERATE 55: CPT

## 2025-04-20 PROCEDURE — 80202 ASSAY OF VANCOMYCIN: CPT

## 2025-04-20 PROCEDURE — 97163 PT EVAL HIGH COMPLEX 45 MIN: CPT | Mod: GP

## 2025-04-20 PROCEDURE — 84443 ASSAY THYROID STIM HORMONE: CPT

## 2025-04-20 PROCEDURE — 80061 LIPID PANEL: CPT

## 2025-04-20 PROCEDURE — 86922 COMPATIBILITY TEST ANTIGLOB: CPT

## 2025-04-20 PROCEDURE — 81003 URINALYSIS AUTO W/O SCOPE: CPT

## 2025-04-20 PROCEDURE — 83550 IRON BINDING TEST: CPT

## 2025-04-20 PROCEDURE — 80048 BASIC METABOLIC PNL TOTAL CA: CPT

## 2025-04-20 PROCEDURE — 83880 ASSAY OF NATRIURETIC PEPTIDE: CPT

## 2025-04-20 PROCEDURE — 85730 THROMBOPLASTIN TIME PARTIAL: CPT

## 2025-04-20 PROCEDURE — 83036 HEMOGLOBIN GLYCOSYLATED A1C: CPT

## 2025-04-20 PROCEDURE — 71045 X-RAY EXAM CHEST 1 VIEW: CPT

## 2025-04-20 PROCEDURE — 80053 COMPREHEN METABOLIC PANEL: CPT

## 2025-04-20 PROCEDURE — 85045 AUTOMATED RETICULOCYTE COUNT: CPT

## 2025-04-20 PROCEDURE — 82607 VITAMIN B-12: CPT

## 2025-04-20 PROCEDURE — 97116 GAIT TRAINING THERAPY: CPT | Mod: GP

## 2025-04-20 PROCEDURE — 73701 CT LOWER EXTREMITY W/DYE: CPT | Mod: 26,RT

## 2025-04-20 PROCEDURE — 83010 ASSAY OF HAPTOGLOBIN QUANT: CPT

## 2025-04-20 PROCEDURE — 83540 ASSAY OF IRON: CPT

## 2025-04-20 RX ORDER — ASPIRIN 325 MG
81 TABLET ORAL DAILY
Refills: 0 | Status: DISCONTINUED | OUTPATIENT
Start: 2025-04-20 | End: 2025-04-25

## 2025-04-20 RX ORDER — ASPIRIN 325 MG
1 TABLET ORAL
Refills: 0 | DISCHARGE

## 2025-04-20 RX ORDER — APIXABAN 2.5 MG/1
5 TABLET, FILM COATED ORAL
Refills: 0 | Status: DISCONTINUED | OUTPATIENT
Start: 2025-04-20 | End: 2025-04-25

## 2025-04-20 RX ORDER — B1/B2/B3/B5/B6/B12/VIT C/FOLIC 500-0.5 MG
1 TABLET ORAL DAILY
Refills: 0 | Status: DISCONTINUED | OUTPATIENT
Start: 2025-04-20 | End: 2025-04-25

## 2025-04-20 RX ORDER — FOLIC ACID 1 MG/1
1 TABLET ORAL
Refills: 0 | DISCHARGE

## 2025-04-20 RX ORDER — PIPERACILLIN-TAZO-DEXTROSE,ISO 2.25G/50ML
3.38 IV SOLUTION, PIGGYBACK PREMIX FROZEN(ML) INTRAVENOUS ONCE
Refills: 0 | Status: COMPLETED | OUTPATIENT
Start: 2025-04-20 | End: 2025-04-20

## 2025-04-20 RX ORDER — FOLIC ACID 1 MG/1
1 TABLET ORAL DAILY
Refills: 0 | Status: DISCONTINUED | OUTPATIENT
Start: 2025-04-20 | End: 2025-04-25

## 2025-04-20 RX ORDER — INFLUENZA A VIRUS A/IDAHO/07/2018 (H1N1) ANTIGEN (MDCK CELL DERIVED, PROPIOLACTONE INACTIVATED, INFLUENZA A VIRUS A/INDIANA/08/2018 (H3N2) ANTIGEN (MDCK CELL DERIVED, PROPIOLACTONE INACTIVATED), INFLUENZA B VIRUS B/SINGAPORE/INFTT-16-0610/2016 ANTIGEN (MDCK CELL DERIVED, PROPIOLACTONE INACTIVATED), INFLUENZA B VIRUS B/IOWA/06/2017 ANTIGEN (MDCK CELL DERIVED, PROPIOLACTONE INACTIVATED) 15; 15; 15; 15 UG/.5ML; UG/.5ML; UG/.5ML; UG/.5ML
0.5 INJECTION, SUSPENSION INTRAMUSCULAR ONCE
Refills: 0 | Status: DISCONTINUED | OUTPATIENT
Start: 2025-04-20 | End: 2025-04-25

## 2025-04-20 RX ORDER — TORSEMIDE 10 MG
10 TABLET ORAL DAILY
Refills: 0 | Status: DISCONTINUED | OUTPATIENT
Start: 2025-04-20 | End: 2025-04-21

## 2025-04-20 RX ORDER — GABAPENTIN 400 MG/1
300 CAPSULE ORAL
Refills: 0 | Status: DISCONTINUED | OUTPATIENT
Start: 2025-04-20 | End: 2025-04-25

## 2025-04-20 RX ORDER — ACETAMINOPHEN 500 MG/5ML
650 LIQUID (ML) ORAL EVERY 6 HOURS
Refills: 0 | Status: DISCONTINUED | OUTPATIENT
Start: 2025-04-20 | End: 2025-04-25

## 2025-04-20 RX ORDER — ONDANSETRON HCL/PF 4 MG/2 ML
4 VIAL (ML) INJECTION EVERY 8 HOURS
Refills: 0 | Status: DISCONTINUED | OUTPATIENT
Start: 2025-04-20 | End: 2025-04-25

## 2025-04-20 RX ORDER — VANCOMYCIN HCL IN 5 % DEXTROSE 1.5G/250ML
1000 PLASTIC BAG, INJECTION (ML) INTRAVENOUS ONCE
Refills: 0 | Status: COMPLETED | OUTPATIENT
Start: 2025-04-20 | End: 2025-04-20

## 2025-04-20 RX ORDER — SULFAMETHOXAZOLE/TRIMETHOPRIM 800-160 MG
1 TABLET ORAL
Refills: 0 | Status: DISCONTINUED | OUTPATIENT
Start: 2025-04-20 | End: 2025-04-21

## 2025-04-20 RX ORDER — KETOROLAC TROMETHAMINE 30 MG/ML
15 INJECTION, SOLUTION INTRAMUSCULAR; INTRAVENOUS ONCE
Refills: 0 | Status: DISCONTINUED | OUTPATIENT
Start: 2025-04-20 | End: 2025-04-20

## 2025-04-20 RX ORDER — ATORVASTATIN CALCIUM 80 MG/1
10 TABLET, FILM COATED ORAL AT BEDTIME
Refills: 0 | Status: DISCONTINUED | OUTPATIENT
Start: 2025-04-20 | End: 2025-04-25

## 2025-04-20 RX ORDER — MAGNESIUM, ALUMINUM HYDROXIDE 200-200 MG
30 TABLET,CHEWABLE ORAL EVERY 4 HOURS
Refills: 0 | Status: DISCONTINUED | OUTPATIENT
Start: 2025-04-20 | End: 2025-04-25

## 2025-04-20 RX ORDER — B1/B2/B3/B5/B6/B12/VIT C/FOLIC 500-0.5 MG
1 TABLET ORAL
Refills: 0 | DISCHARGE

## 2025-04-20 RX ORDER — APIXABAN 2.5 MG/1
1 TABLET, FILM COATED ORAL
Refills: 0 | DISCHARGE

## 2025-04-20 RX ORDER — MELATONIN 5 MG
3 TABLET ORAL AT BEDTIME
Refills: 0 | Status: DISCONTINUED | OUTPATIENT
Start: 2025-04-20 | End: 2025-04-25

## 2025-04-20 RX ORDER — SULFAMETHOXAZOLE/TRIMETHOPRIM 800-160 MG
1 TABLET ORAL
Refills: 0 | Status: DISCONTINUED | OUTPATIENT
Start: 2025-04-20 | End: 2025-04-20

## 2025-04-20 RX ADMIN — KETOROLAC TROMETHAMINE 15 MILLIGRAM(S): 30 INJECTION, SOLUTION INTRAMUSCULAR; INTRAVENOUS at 12:30

## 2025-04-20 RX ADMIN — GABAPENTIN 300 MILLIGRAM(S): 400 CAPSULE ORAL at 22:05

## 2025-04-20 RX ADMIN — Medication 1000 MILLIGRAM(S): at 12:40

## 2025-04-20 RX ADMIN — Medication 3.38 GRAM(S): at 11:45

## 2025-04-20 RX ADMIN — Medication 1 TABLET(S): at 18:26

## 2025-04-20 RX ADMIN — Medication 250 MILLIGRAM(S): at 11:40

## 2025-04-20 RX ADMIN — Medication 650 MILLIGRAM(S): at 18:26

## 2025-04-20 RX ADMIN — Medication 1 TABLET(S): at 22:06

## 2025-04-20 RX ADMIN — Medication 3 MILLIGRAM(S): at 22:06

## 2025-04-20 RX ADMIN — APIXABAN 5 MILLIGRAM(S): 2.5 TABLET, FILM COATED ORAL at 22:06

## 2025-04-20 RX ADMIN — Medication 200 GRAM(S): at 11:27

## 2025-04-20 RX ADMIN — ATORVASTATIN CALCIUM 10 MILLIGRAM(S): 80 TABLET, FILM COATED ORAL at 22:06

## 2025-04-20 NOTE — H&P ADULT - HISTORY OF PRESENT ILLNESS
Patient is a 80 YO F With past medical history of HTN, HLD, AS s/p TAVR, peDM, Atrial fibrillation on eliquis, HFpEF EF 55-60%, CVA 11/24, R 2nd rib fx, hx temporal arteritis. Presented to the ED with complaints of  LE swelling  worse on the right leg that started near the ankle and has went up to below the knee, took tylenol with poor improvement. Pt reports she had MOHS procedure last month on her R leg and since has noticed an infection, started PO keflex last friday without improvement. Denies fevers, chest pain, shortness of breath, chills, nausea, vomiting.     ED Vitals: /61 mmHg, HR 18/min, Temp 36.4C  Received Vancomycin and Zosyn in the ED. US bl neg for DVT  CT showing inflammation, results as below

## 2025-04-20 NOTE — PATIENT PROFILE ADULT - NSPROPTRIGHTSUPPORTPERSON_GEN_A_NUR
"Preceptor attestation:  Vital signs reviewed: /74   Pulse 97   Temp 99.6  F (37.6  C) (Oral)   Resp 20   Ht 1.252 m (4' 1.3\")   Wt 31.8 kg (70 lb 3.2 oz)   SpO2 99%   BMI 20.31 kg/m      Patient seen, evaluated, and discussed with the resident.  I verified the content of the note, which accurately reflects my assessment of the patient and the plan of care.    Supervising physician: Antonette Ellis MD  [facility]  " yes

## 2025-04-20 NOTE — ED STATDOCS - PHYSICAL EXAMINATION
Constitutional: well appearing, NAD AAOx3  Eyes: EOMI, PERRL  Head: Normocephalic atraumatic  Mouth: no airway obstruction, posterior oropharynx clear without erythema or exudate  Neck: supple  Cardiac: regular rate and rhythm, no MRG  Resp: Lungs CTAB  GI: Abd s/nt/nd  Neuro: CN2-12 intact, strength 5/5x4, sensation grossly intact  Skin: No rashes  Musculoskeletal: R anterior shin with open wound, 2x3cm with purulent discharge and surrounding erythema

## 2025-04-20 NOTE — H&P ADULT - NSHPPHYSICALEXAM_GEN_ALL_CORE
PHYSICAL EXAM:  GENERAL: NAD, speaks in full sentences, no signs of respiratory distress  HEAD:  Atraumatic, Normocephalic  CHEST/LUNG: Clear to auscultation bilaterally; No wheeze; No crackles; No accessory muscles used  HEART: Regular rate and rhythm; No murmurs;   ABDOMEN: Soft, Nontender, Nondistended; Bowel sounds present; No guarding  EXTREMITIES:  2+ Peripheral Pulses, No cyanosis or edema. Left leg with anteiror cellulitis, redness+, warmth+ TTP. Wound around 3x3 cm with no active drainage, no subcutaneous emphysema  PSYCH: AAOx3  NEUROLOGY: non-focal  SKIN: No rashes or lesions

## 2025-04-20 NOTE — ED ADULT NURSE NOTE - OBJECTIVE STATEMENT
Pt comes to the ED complaining of right lower leg pain and swelling. Pt states that she had MOHS surgery last month and she felt as if she had an infection from the beginning. Pt states that she has had several follow ups with her plastic surgeon that told her that it was infected and gave her a cream initially and then eventually gave her antibiotics on 04/11/2025. Pt was in [place] for the past week and decided to come to the ED for evaluation and treatment today.

## 2025-04-20 NOTE — H&P ADULT - NSHPPOADEEPVENOUSTHROMB_GEN_A_CORE
Pt has an abscessed tooth and cant see dentist until Thursday.  Can you call in an antibiotic please.  
Rx sent
no

## 2025-04-20 NOTE — ED STATDOCS - PROGRESS NOTE DETAILS
78 yo female presents to the ED c/o b/l LE swelling. Pt states that On March 14th she had Mohs surgery done on her RLE and that she was given Cephalexin at the time and sent home. 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 Pt last night returned from [place] and states that she has had worsening cellulitis to both of the LE over the last few weeks. She is able to ambulate but states that in [place] she was in a scooter for majority of the time. Pt has been taking Tylenol and then did take it this morning. Pt is on a Eliquis. She also states that the wound to her RL shin has been weeping as well. Dr. Patel is her dermatologist. No chest pain or SOB reported. US, CT negative for acute findings.  Admission to medicine for IV ABX.  Promise Pineda PA-C 80 yo female presents to the ED c/o b/l LE swelling. Pt states that On March 14th she had Mohs surgery done on her RLE and that she was given Cephalexin at the time and sent home. 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 Pt last night returned from [place] and states that she has had worsening cellulitis to both of the LE over the last few weeks. She is able to ambulate but states that in [place] she was in a scooter for majority of the time. Pt has been taking Tylenol and then did take it this morning. Pt is on a Eliquis. She also states that the wound to her RL shin has been weeping as well. Dr. Patel is her dermatologist. No chest pain or SOB reported.  Promise Pineda PA-C

## 2025-04-20 NOTE — ED ADULT TRIAGE NOTE - NS ED TRIAGE AVPU SCALE
Peripheral nerve/Neuraxial procedure note : epidural blood patch  Pre-Procedure  Performed by   Referred by ED PHYSICIAN  Location: ED    Procedure Times:8/9/2017 10:55 AM and 8/9/2017 11:10 AM  Pre-Anesthestic Checklist: patient identified, IV checked, site marked, risks and benefits discussed, informed consent, monitors and equipment checked, pre-op evaluation and at physician/surgeon's request    Timeout  Correct Patient: Yes   Correct Procedure: Yes   Correct Site: Yes   Correct Laterality: N/A   Correct Position: Yes   Site Marked: N/A   .   Procedure Documentation    Diagnosis:spinal headache.    Procedure:    Epidural blood patch.  Insertion Site:L3-4  (midline approach) Injection technique: LORT saline   Local skin infiltrated with 3 mL of 1% lidocaine.  CHICO at 5 cm     Patient Prep;mask, sterile gloves, povidone-iodine 7.5% surgical scrub, patient draped.  .  Needle: Touhy needle Needle Gauge: 18.    Needle Length (Inches) 3.5  # of attempts: 1 and  # of redirects:  1 .   . .   .  .   Blood patch amount 15 mL.    Assessment/Narrative  Paresthesias: No.  .  .  Aspiration negative for heme or CSF  .          Alert-The patient is alert, awake and responds to voice. The patient is oriented to time, place, and person. The triage nurse is able to obtain subjective information.

## 2025-04-20 NOTE — ED ADULT TRIAGE NOTE - CHIEF COMPLAINT QUOTE
pt brought in by EMS from home c/o bilateral lower extremity swelling worse on the right leg started near the ankle and has went up to below the knee. pt reports she had MOHS procedure last month on her R leg and since has noticed an infection. started PO keflex last friday without improvement. endorsing 10/10 pain. denies fevers. took tylenol with mild relief. hx aortic valve replacement 7/2024.

## 2025-04-20 NOTE — ED STATDOCS - OBJECTIVE STATEMENT
80 yo female presents to the ED c/o b/l LE swelling. Pt states that On March 14th she had Mohs surgery done on her RLE and that she was given Cephalexin at the time and sent home. 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 Pt last night returned from [place] and states that she has had worsening cellulitis to both of the LE over the last few weeks. She is able to ambulate but states that in [place] she was in a scooter for majority of the time. Pt has been taking Tylenol and then did take it this morning. Pt is on a Eliquis. She also states that the wound to her RL shin has been weeping as well. Dr. Patel is her dermatologist. No chest pain or SOB reported.

## 2025-04-20 NOTE — ED STATDOCS - CLINICAL SUMMARY MEDICAL DECISION MAKING FREE TEXT BOX
Presentation concerning for DVT, RLE infected wound, failed PO abx. Plan for EKG, labs, dupplex US to r/o DVT, ct RLE to determine extent of wound and admission for IV abx, Presentation concerning for DVT, RLE infected wound, failed PO abx. Plan for EKG, labs, dupplex US to r/o DVT, ct RLE to determine extent of wound and admission for IV abx. No DVT. Labs show no leukocytosis, lactic WNL. CT shows no abscess

## 2025-04-20 NOTE — PATIENT PROFILE ADULT - FALL HARM RISK - HARM RISK INTERVENTIONS

## 2025-04-20 NOTE — H&P ADULT - ASSESSMENT
Patient is a 78 YO F With past medical history of HTN, HLD, AS s/p TAVR, peDM, Atrial fibrillation on eliquis, HFpEF EF 55-60%, CVA 11/24, R 2nd rib fx, hx temporal arteritis. Presented to the ED with complaints of  LE swelling  worse on the right leg that started near the ankle and has went up to below the knee, took tylenol with poor improvement. Pt reports she had MOHS procedure last month on her R leg and since has noticed an infection, started PO keflex last friday without improvement. Denies fevers, chest pain, shortness of breath, chills, nausea, vomiting.     ED Vitals: /61 mmHg, HR 18/min, Temp 36.4C  Received Vancomycin and Zosyn in the ED. US bl neg for DVT  CT showing inflammation, results as below    Images and laboratories reviewed    Assessment and plan    #Right leg Cellulitis - CT as above, no DVT  Continue antibiotics: Bactrim  Send inflammatory markers  ID evaluation for gas in CT  Follow septic workup  Pain medications    #h/o Anemia Hgb 10.4, HCT 32.6  Anemia workup  monitor H&H  Trnasfuse if HGB <7    #HTN - Seems controlled  Continue home medications: Valsartan 160mg    #HLD  Check Lipid panel  Continue Atorvastatin 10mg    #AS s/p TAVR Las echo with Mild regurgitation  Continue surveillance  Cardiology f/u as outpatient    #Afib on Eliquis - Rate controlled  Continue Eliquis 5mg BID  Continue rate control with    #HFpEF - not on exacerbation, Echo as above  Continue Torsemide 10mg    #H/o CVA  Continue aspirin and statins    #Prediabetes  Check A1c  Monitor fingersticks    #HCM  DVT pps: Eliquis  GI ppx: Oral PPIs Patient is a 80 YO F With past medical history of HTN, HLD, AS s/p TAVR, peDM, Atrial fibrillation on eliquis, HFpEF EF 55-60%, CVA 11/24, R 2nd rib fx, hx temporal arteritis. Presented to the ED with complaints of  LE swelling  worse on the right leg that started near the ankle and has went up to below the knee, took tylenol with poor improvement. Pt reports she had MOHS procedure last month on her R leg and since has noticed an infection, started PO keflex last friday without improvement. Denies fevers, chest pain, shortness of breath, chills, nausea, vomiting.     ED Vitals: /61 mmHg, HR 18/min, Temp 36.4C  Received Vancomycin and Zosyn in the ED. US bl neg for DVT  CT showing inflammation, results as below    Images and laboratories reviewed    Assessment and plan    #Right leg Cellulitis - CT as above, no DVT  Continue antibiotics: Bactrim  Send inflammatory markers  ID evaluation for gas in CT  Follow septic workup  Pain medications    #h/o Anemia Hgb 10.4, HCT 32.6. S/p bone marrow biopsy with no iron storage, follows with Hem, was receiving IV iron  Check iron levels  monitor H&H  Consider Iron supplementation  Transfuse if HGB <7    #HTN - Seems controlled  Continue home medications: Valsartan 160mg    #HLD  Check Lipid panel  Continue Atorvastatin 10mg    #AS s/p TAVR Las echo with Mild regurgitation  Continue surveillance  Cardiology f/u as outpatient    #Afib on Eliquis - Rate controlled  Continue Eliquis 5mg BID  Continue rate control with    #HFpEF - not on exacerbation, Echo as above  Continue Torsemide 10mg    #H/o CVA  Continue aspirin and statins    #Prediabetes  Check A1c  Monitor fingersticks    #HCM  DVT pps: Eliquis  GI ppx: Oral PPIs Patient is a 80 YO F With past medical history of HTN, HLD, AS s/p TAVR, peDM, Atrial fibrillation on eliquis, HFpEF EF 55-60%, CVA 11/24, R 2nd rib fx, hx temporal arteritis. Presented to the ED with complaints of  LE swelling  worse on the right leg that started near the ankle and has went up to below the knee, took tylenol with poor improvement. Pt reports she had MOHS procedure last month on her R leg and since has noticed an infection, started PO keflex last friday without improvement. Denies fevers, chest pain, shortness of breath, chills, nausea, vomiting.     ED Vitals: /61 mmHg, HR 18/min, Temp 36.4C  Received Vancomycin and Zosyn in the ED. US bl neg for DVT  CT showing inflammation, results as below    Images and laboratories reviewed    Assessment and plan    #Right leg Cellulitis - CT as above, no DVT  Continue antibiotics: Bactrim  Send inflammatory markers  ID evaluation for gas in CT  Follow septic workup  Pain medications    #h/o Anemia Hgb 10.4, HCT 32.6. S/p bone marrow biopsy with no iron storage, follows with Hem, was receiving IV iron. No active bleeding  Check iron levels  monitor H&H  Consider Iron supplementation  Transfuse if HGB <7    #HTN - Seems controlled  Continue home medications: Valsartan 160mg    #HLD  Check Lipid panel  Continue Atorvastatin 10mg    #AS s/p TAVR Las echo with Mild regurgitation  Continue surveillance  Cardiology f/u as outpatient    #Afib on Eliquis - Rate controlled  Continue Eliquis 5mg BID  Continue rate control with    #HFpEF - not on exacerbation, Echo as above  Continue Torsemide 10mg    #H/o CVA  Continue aspirin and statins    #Prediabetes  Check A1c  Monitor fingersticks    #HCM  DVT pps: Eliquis  GI ppx: Oral PPIs

## 2025-04-20 NOTE — PHARMACOTHERAPY INTERVENTION NOTE - INTERVENTION TYPE MED REC
Med Rec - Admission - proBNP = [phone]  - TTE with hyperdynamic LVSF with EF 75%, distal segments and apex with elevated regional strain with basal + mid segments with decreased regional strain. suggestive of amyloid cardiomyopathy, RV not well visualized but with enlarged RV cavity size, normal wall thickness and reduced systolic fxn, mild MV stenosis, trace AR, trace pericardial effusion  - s/p thoracentesis with Pulmonary on 4/24 with 700cc removed  - pleural fluid studies appears transudative, cytology negative for malignancy  - volume removal with HD (with midodrine 10mg prior to HD on HD days to allow for more robust volume removal)  - CXR 4/28 demonstrating increased L pleural effusion indication need for more volume removal  - incentive spirometry as tolerated  - strict I/Os, daily weights

## 2025-04-20 NOTE — H&P ADULT - NSHPLABSRESULTS_GEN_ALL_CORE
LABS:  cret                        10.4   8.73  )-----------( 314      ( 20 Apr 2025 11:10 )             32.6     04-20    141  |  106  |  33[H]  ----------------------------<  91  4.2   |  27  |  1.19    Ca    9.8      20 Apr 2025 11:10    TPro  7.6  /  Alb  2.9[L]  /  TBili  0.4  /  DBili  x   /  AST  25  /  ALT  35  /  AlkPhos  243[H]  04-20    PT/INR - ( 20 Apr 2025 11:10 )   PT: 17.6 sec;   INR: 1.54 ratio         PTT - ( 20 Apr 2025 11:10 )  PTT:41.9 sec  ICU Vital Signs Last 24 Hrs  T(C): 36.3 (20 Apr 2025 14:05), Max: 36.4 (20 Apr 2025 10:31)  T(F): 97.3 (20 Apr 2025 14:05), Max: 97.6 (20 Apr 2025 10:31)  HR: 61 (20 Apr 2025 14:05) (61 - 85)  BP: 149/67 (20 Apr 2025 14:05) (118/61 - 149/67)  BP(mean): 89 (20 Apr 2025 14:05) (78 - 89)  ABP: --  ABP(mean): --  RR: 18 (20 Apr 2025 14:05) (18 - 18)  SpO2: 97% (20 Apr 2025 14:05) (97% - 99%)    O2 Parameters below as of 20 Apr 2025 14:05  Patient On (Oxygen Delivery Method): room air    MEDICATIONS  (STANDING):  apixaban 5 milliGRAM(s) Oral two times a day  aspirin enteric coated 81 milliGRAM(s) Oral daily  atorvastatin 10 milliGRAM(s) Oral at bedtime  folic acid 1 milliGRAM(s) Oral daily  gabapentin 300 milliGRAM(s) Oral two times a day  Nephro-ilana 1 Tablet(s) Oral daily  pantoprazole    Tablet 40 milliGRAM(s) Oral before breakfast  torsemide 10 milliGRAM(s) Oral daily  trimethoprim   80 mG/sulfamethoxazole 400 mG 1 Tablet(s) Oral two times a day  valsartan 160 milliGRAM(s) Oral daily    MEDICATIONS  (PRN):  acetaminophen     Tablet .. 650 milliGRAM(s) Oral every 6 hours PRN Temp greater or equal to 38C (100.4F), Mild Pain (1 - 3)  aluminum hydroxide/magnesium hydroxide/simethicone Suspension 30 milliLiter(s) Oral every 4 hours PRN Dyspepsia  melatonin 3 milliGRAM(s) Oral at bedtime PRN Insomnia  ondansetron Injectable 4 milliGRAM(s) IV Push every 8 hours PRN Nausea and/or Vomiting       US Duplex Venous Lower Ext Complete, Bilateral (04.20.25 @ 12:20) >    No evidence of deep venous thrombosis in either lower extremity.    CT Lower Extremity w/ IV Cont, Right (04.20.25 @ 12:09) >    IMPRESSION:  Diffuse subcutaneous tissue edema and swelling with presence of soft   tissue irregularity at the middle third of the anterior leg as described.   No organized fluid collection identified.    TTE W or WO Ultrasound Enhancing Agent (12.12.24 @ 14:18) >    CONCLUSIONS:      1. Estimated pulmonary artery systolic pressure is 62 mmHg, consistent with severe pulmonary hypertension.   2. Severe tricuspid regurgitation.   3. A Transcatheter deployed (TAVR) valve replacement is present in the aortic position The prosthetic valve is well seated. Mild intravalvular regurgitation.   4. Mild to moderate mitral regurgitation.   5. Left ventricular cavity is normal in size. Left ventricular systolic function is normal with an ejection fraction visually estimated at 55 to 60 %.   6. No left ventricular hypertrophy.   7. Enlarged right ventricular cavity size.   8. The right atrium is moderately dilated.   9. Compared to the transthoracic echocardiogram performed on 11/7/2024, there have been no significant interval changes.

## 2025-04-21 LAB
A1C WITH ESTIMATED AVERAGE GLUCOSE RESULT: 6.6 % — HIGH (ref 4–5.6)
ALBUMIN SERPL ELPH-MCNC: 2.5 G/DL — LOW (ref 3.3–5)
ALP SERPL-CCNC: 221 U/L — HIGH (ref 40–120)
ALT FLD-CCNC: 28 U/L — SIGNIFICANT CHANGE UP (ref 12–78)
ANION GAP SERPL CALC-SCNC: 2 MMOL/L — LOW (ref 5–17)
ANION GAP SERPL CALC-SCNC: 8 MMOL/L — SIGNIFICANT CHANGE UP (ref 5–17)
APPEARANCE UR: CLEAR — SIGNIFICANT CHANGE UP
APTT BLD: 37.4 SEC — HIGH (ref 24.5–35.6)
AST SERPL-CCNC: 20 U/L — SIGNIFICANT CHANGE UP (ref 15–37)
BILIRUB SERPL-MCNC: 0.4 MG/DL — SIGNIFICANT CHANGE UP (ref 0.2–1.2)
BILIRUB UR-MCNC: NEGATIVE — SIGNIFICANT CHANGE UP
BUN SERPL-MCNC: 39 MG/DL — HIGH (ref 7–23)
BUN SERPL-MCNC: 39 MG/DL — HIGH (ref 7–23)
CALCIUM SERPL-MCNC: 9.1 MG/DL — SIGNIFICANT CHANGE UP (ref 8.5–10.1)
CALCIUM SERPL-MCNC: 9.2 MG/DL — SIGNIFICANT CHANGE UP (ref 8.5–10.1)
CHLORIDE SERPL-SCNC: 107 MMOL/L — SIGNIFICANT CHANGE UP (ref 96–108)
CHLORIDE SERPL-SCNC: 108 MMOL/L — SIGNIFICANT CHANGE UP (ref 96–108)
CHOLEST SERPL-MCNC: 118 MG/DL — SIGNIFICANT CHANGE UP
CO2 SERPL-SCNC: 24 MMOL/L — SIGNIFICANT CHANGE UP (ref 22–31)
CO2 SERPL-SCNC: 29 MMOL/L — SIGNIFICANT CHANGE UP (ref 22–31)
COLOR SPEC: YELLOW — SIGNIFICANT CHANGE UP
CREAT SERPL-MCNC: 1.53 MG/DL — HIGH (ref 0.5–1.3)
CREAT SERPL-MCNC: 1.64 MG/DL — HIGH (ref 0.5–1.3)
DIFF PNL FLD: NEGATIVE — SIGNIFICANT CHANGE UP
EGFR: 32 ML/MIN/1.73M2 — LOW
EGFR: 32 ML/MIN/1.73M2 — LOW
EGFR: 34 ML/MIN/1.73M2 — LOW
EGFR: 34 ML/MIN/1.73M2 — LOW
ESTIMATED AVERAGE GLUCOSE: 143 MG/DL — HIGH (ref 68–114)
FERRITIN SERPL-MCNC: 360 NG/ML — HIGH (ref 13–330)
GLUCOSE SERPL-MCNC: 131 MG/DL — HIGH (ref 70–99)
GLUCOSE SERPL-MCNC: 83 MG/DL — SIGNIFICANT CHANGE UP (ref 70–99)
GLUCOSE UR QL: NEGATIVE MG/DL — SIGNIFICANT CHANGE UP
HCT VFR BLD CALC: 27.1 % — LOW (ref 34.5–45)
HCT VFR BLD CALC: 28.5 % — LOW (ref 34.5–45)
HDLC SERPL-MCNC: 56 MG/DL — SIGNIFICANT CHANGE UP
HGB BLD-MCNC: 8.6 G/DL — LOW (ref 11.5–15.5)
HGB BLD-MCNC: 8.9 G/DL — LOW (ref 11.5–15.5)
IMMATURE RETICULOCYTE FRACTION %: 18 % — SIGNIFICANT CHANGE UP
INR BLD: 1.39 RATIO — HIGH (ref 0.85–1.16)
IRON SATN MFR SERPL: 21 % — SIGNIFICANT CHANGE UP (ref 14–50)
IRON SATN MFR SERPL: 59 UG/DL — SIGNIFICANT CHANGE UP (ref 30–160)
KETONES UR-MCNC: NEGATIVE MG/DL — SIGNIFICANT CHANGE UP
LDH SERPL L TO P-CCNC: 218 U/L — SIGNIFICANT CHANGE UP (ref 84–241)
LDLC SERPL-MCNC: 49 MG/DL — SIGNIFICANT CHANGE UP
LEUKOCYTE ESTERASE UR-ACNC: NEGATIVE — SIGNIFICANT CHANGE UP
LIPID PNL WITH DIRECT LDL SERPL: 49 MG/DL — SIGNIFICANT CHANGE UP
MCHC RBC-ENTMCNC: 31.2 G/DL — LOW (ref 32–36)
MCHC RBC-ENTMCNC: 31.5 PG — SIGNIFICANT CHANGE UP (ref 27–34)
MCHC RBC-ENTMCNC: 31.7 G/DL — LOW (ref 32–36)
MCHC RBC-ENTMCNC: 31.7 PG — SIGNIFICANT CHANGE UP (ref 27–34)
MCV RBC AUTO: 101.4 FL — HIGH (ref 80–100)
MCV RBC AUTO: 99.3 FL — SIGNIFICANT CHANGE UP (ref 80–100)
NITRITE UR-MCNC: NEGATIVE — SIGNIFICANT CHANGE UP
NONHDLC SERPL-MCNC: 61 MG/DL — SIGNIFICANT CHANGE UP
NRBC # BLD AUTO: 0 K/UL — SIGNIFICANT CHANGE UP (ref 0–0)
NRBC # BLD AUTO: 0 K/UL — SIGNIFICANT CHANGE UP (ref 0–0)
NRBC # FLD: 0 K/UL — SIGNIFICANT CHANGE UP (ref 0–0)
NRBC # FLD: 0 K/UL — SIGNIFICANT CHANGE UP (ref 0–0)
NRBC BLD AUTO-RTO: 0 /100 WBCS — SIGNIFICANT CHANGE UP (ref 0–0)
NRBC BLD AUTO-RTO: 0 /100 WBCS — SIGNIFICANT CHANGE UP (ref 0–0)
NT-PROBNP SERPL-SCNC: 2034 PG/ML — HIGH (ref 0–450)
PH UR: 5.5 — SIGNIFICANT CHANGE UP (ref 5–8)
PLATELET # BLD AUTO: 271 K/UL — SIGNIFICANT CHANGE UP (ref 150–400)
PLATELET # BLD AUTO: 272 K/UL — SIGNIFICANT CHANGE UP (ref 150–400)
PMV BLD: 9.6 FL — SIGNIFICANT CHANGE UP (ref 7–13)
PMV BLD: 9.8 FL — SIGNIFICANT CHANGE UP (ref 7–13)
POTASSIUM SERPL-MCNC: 4.4 MMOL/L — SIGNIFICANT CHANGE UP (ref 3.5–5.3)
POTASSIUM SERPL-MCNC: 4.8 MMOL/L — SIGNIFICANT CHANGE UP (ref 3.5–5.3)
POTASSIUM SERPL-SCNC: 4.4 MMOL/L — SIGNIFICANT CHANGE UP (ref 3.5–5.3)
POTASSIUM SERPL-SCNC: 4.8 MMOL/L — SIGNIFICANT CHANGE UP (ref 3.5–5.3)
PROT SERPL-MCNC: 6.1 GM/DL — SIGNIFICANT CHANGE UP (ref 6–8.3)
PROT UR-MCNC: NEGATIVE MG/DL — SIGNIFICANT CHANGE UP
PROTHROM AB SERPL-ACNC: 16.4 SEC — HIGH (ref 9.9–13.4)
RBC # BLD: 2.73 M/UL — LOW (ref 3.8–5.2)
RBC # BLD: 2.81 M/UL — LOW (ref 3.8–5.2)
RBC # BLD: 2.81 M/UL — LOW (ref 3.8–5.2)
RBC # FLD: 14.8 % — HIGH (ref 10.3–14.5)
RBC # FLD: 15 % — HIGH (ref 10.3–14.5)
RETICS #: 32.6 K/UL — SIGNIFICANT CHANGE UP (ref 25–125)
RETICS/RBC NFR: 1.2 % — SIGNIFICANT CHANGE UP (ref 0.5–2.5)
RETICULOCYTE HEMOGLOBIN EQUIVALENT: 35.7 PG — SIGNIFICANT CHANGE UP (ref 30.6–40.7)
SODIUM SERPL-SCNC: 138 MMOL/L — SIGNIFICANT CHANGE UP (ref 135–145)
SODIUM SERPL-SCNC: 140 MMOL/L — SIGNIFICANT CHANGE UP (ref 135–145)
SP GR SPEC: >1.03 — HIGH (ref 1–1.03)
TIBC SERPL-MCNC: 277 UG/DL — SIGNIFICANT CHANGE UP (ref 220–430)
TRIGL SERPL-MCNC: 57 MG/DL — SIGNIFICANT CHANGE UP
UIBC SERPL-MCNC: 218 UG/DL — SIGNIFICANT CHANGE UP (ref 110–370)
UROBILINOGEN FLD QL: 0.2 MG/DL — SIGNIFICANT CHANGE UP (ref 0.2–1)
WBC # BLD: 8.53 K/UL — SIGNIFICANT CHANGE UP (ref 3.8–10.5)
WBC # BLD: 9.01 K/UL — SIGNIFICANT CHANGE UP (ref 3.8–10.5)
WBC # FLD AUTO: 8.53 K/UL — SIGNIFICANT CHANGE UP (ref 3.8–10.5)
WBC # FLD AUTO: 9.01 K/UL — SIGNIFICANT CHANGE UP (ref 3.8–10.5)

## 2025-04-21 PROCEDURE — 71045 X-RAY EXAM CHEST 1 VIEW: CPT | Mod: 26

## 2025-04-21 PROCEDURE — 99233 SBSQ HOSP IP/OBS HIGH 50: CPT

## 2025-04-21 RX ORDER — CEFEPIME 2 G/20ML
2000 INJECTION, POWDER, FOR SOLUTION INTRAVENOUS EVERY 24 HOURS
Refills: 0 | Status: DISCONTINUED | OUTPATIENT
Start: 2025-04-21 | End: 2025-04-21

## 2025-04-21 RX ORDER — CEFEPIME 2 G/20ML
2000 INJECTION, POWDER, FOR SOLUTION INTRAVENOUS EVERY 24 HOURS
Refills: 0 | Status: DISCONTINUED | OUTPATIENT
Start: 2025-04-21 | End: 2025-04-24

## 2025-04-21 RX ORDER — VANCOMYCIN HCL IN 5 % DEXTROSE 1.5G/250ML
750 PLASTIC BAG, INJECTION (ML) INTRAVENOUS EVERY 24 HOURS
Refills: 0 | Status: DISCONTINUED | OUTPATIENT
Start: 2025-04-21 | End: 2025-04-24

## 2025-04-21 RX ADMIN — Medication 160 MILLIGRAM(S): at 09:48

## 2025-04-21 RX ADMIN — Medication 81 MILLIGRAM(S): at 09:39

## 2025-04-21 RX ADMIN — Medication 1 TABLET(S): at 09:39

## 2025-04-21 RX ADMIN — GABAPENTIN 300 MILLIGRAM(S): 400 CAPSULE ORAL at 22:07

## 2025-04-21 RX ADMIN — APIXABAN 5 MILLIGRAM(S): 2.5 TABLET, FILM COATED ORAL at 09:39

## 2025-04-21 RX ADMIN — CEFEPIME 2000 MILLIGRAM(S): 2 INJECTION, POWDER, FOR SOLUTION INTRAVENOUS at 09:40

## 2025-04-21 RX ADMIN — Medication 650 MILLIGRAM(S): at 17:42

## 2025-04-21 RX ADMIN — APIXABAN 5 MILLIGRAM(S): 2.5 TABLET, FILM COATED ORAL at 22:07

## 2025-04-21 RX ADMIN — GABAPENTIN 300 MILLIGRAM(S): 400 CAPSULE ORAL at 09:40

## 2025-04-21 RX ADMIN — FOLIC ACID 1 MILLIGRAM(S): 1 TABLET ORAL at 09:39

## 2025-04-21 RX ADMIN — Medication 650 MILLIGRAM(S): at 11:15

## 2025-04-21 RX ADMIN — Medication 650 MILLIGRAM(S): at 10:35

## 2025-04-21 RX ADMIN — Medication 40 MILLIGRAM(S): at 06:06

## 2025-04-21 RX ADMIN — Medication 10 MILLIGRAM(S): at 09:39

## 2025-04-21 RX ADMIN — Medication 250 MILLIGRAM(S): at 09:40

## 2025-04-21 NOTE — PROGRESS NOTE ADULT - ASSESSMENT
#cellulitis s/p MOHS surgery to RLE   #open wound   #subcutaneous gas on CT RLE   abx switched to vanc and cefepime per id  plastic surgery consult   f/u blood cultures   morphine 2mg iv q6 prn for severe pain    #anemia   pt not clear was her diagnosis is but checks her hgb weekly with Dr. Milligan  hx of hemolysis per chart review  normally hgb 10-11   8.6 this morning   gets iv iron frequently - iron studies wnl  hgb 8.6 today   check repeat CBC given >1 point drop and check hemolytic labs b12 and folate  hematology consult    #b/l pitting edema   #hx of tavr   december echo reviewed   repeat echo  check pro-bnp  hold lasix with jules   doppler neg for dvt     #jules   baseline wnl   cr 1.53 today s/p toradol  hold on ivf given pitting edema   hold valsartan and trosemide  monitor with vanc    #HTN   hold valsartan with jules  monitor    #HLD  Check Lipid panel  Continue Atorvastatin 10mg    #AS s/p TAVR Las echo with Mild regurgitation  Continue surveillance  repeat echo    #Afib on Eliquis - Rate controlled  Continue Eliquis 5mg BID  contorlled without rate controlling agent    #HFpEF -   has pitting edema   but no sob   check tte   hold torsemide in setting of jules    #H/o CVA  Continue aspirin and statins    #Prediabetes  Check A1c  Monitor fingersticks    #HCM  DVT pps: Eliquis  GI ppx: Oral PPIs

## 2025-04-21 NOTE — CONSULT NOTE ADULT - ASSESSMENT
Assessment:  79F with HTN, HLD, AS s/p TAVR, preDM, Atrial fibrillation on eliquis, HFpEF EF 55-60%, CVA 11/24, R 2nd rib fx, hx temporal arteritis, recent R leg Mohs procedure 1 month ago, presents 4/20 with worsening RLE swelling and pain  Denies any fever or chills, cough, abdominal pain, n/v, diarrhea or dysuria  Afebrile on admission, on RA  No leukocytosis  Cr 1.53   CRP 40  CT RLE with Diffuse subcutaneous tissue edema and swelling with presence of soft tissue irregularity at the middle third of the anterior leg as described. No organized fluid collection identified.    Antimicrobials:  Vanco and Zosyn x1 (4/20)  trimethoprim  160 mG/sulfamethoxazole 800 mG 1 two times a day (4/20 --- )    Impression:   #RLE Cellulitis  #BHARAT    Recommendations:        Clinical team may change from intravenous to oral antibiotics when the following criteria are met:   1. Patient is clinically improving/stable       a)	Improved signs and symptoms of infection from initial presentation       b)	Afebrile for 24 hours       c)	Leukocytosis trending towards normal range   2. Patient is tolerating oral intake   3. Initial/repeat blood cultures are negative OR do not need to wait for preliminary blood cultures to result    When above criteria met, may change iv antibiotics to: agent, dose, frequency, duration.  Cannot advise changing to oral antibiotic therapy until culture sensitivity is available.   Assessment:  79F with HTN, HLD, AS s/p TAVR, preDM, Atrial fibrillation on eliquis, HFpEF EF 55-60%, CVA 11/24, R 2nd rib fx, hx temporal arteritis, recent R leg Mohs procedure 1 month ago, presents 4/20 with worsening RLE swelling and pain for 1 week  Mohs surgery results were benign, no malignancy, but 1 week after procedure, noted erythema, pain and swelling  Started Keflex by Plastics outpatient and no improvement despite ~5 days so far  Denies any fever or chills, cough, abdominal pain, n/v, diarrhea or dysuria  Afebrile on admission, on RA  No leukocytosis  Cr 1.53   CRP 40  CT RLE with Diffuse subcutaneous tissue edema and swelling with presence of soft tissue irregularity at the middle third of the anterior leg as described. No organized fluid collection identified.    Antimicrobials:  Vanco and Zosyn x1 (4/20)  trimethoprim  160 mG/sulfamethoxazole 800 mG 1 two times a day (4/20 --- 4/21)  Vanco (4/21 --- )  Cefepime (4/21 --- )    Impression:   #RLE Cellulitis, Surgical Wound Infection  #BHARAT    Recommendations:  - broaden coverage for now  - empiric Vanco 750mg q24 (Day #1)  - please check Vancomycin trough before 4th sequential dose  - empiric Cefepime 2G q24 (renally dsoed) (Day #1)  - monitor temperature curve  - trend WBC  - reason for abx use reviewed with patient  - side effects of antibiotic discussed, tolerating abx well so far  - Prior cultures reviewed. An epidemiologic assessment was performed. There is a significant risk for resistant microorganisms to spread to family members, and/or healthcare staff. Isolation precautions based on infection control policy. Will reconsider further isolation measures based on new culture results and other clinical data as appropriate Appropriate cultures collected and an appropriate broad spectrum antibiotic therapy will be considered  - follow up BCx x2  - Wound Care  - consider Plastics Surgery eval  - rest per primary team    Clinical team may change from intravenous to oral antibiotics when the following criteria are met:   1. Patient is clinically improving/stable       a)	Improved signs and symptoms of infection from initial presentation       b)	Afebrile for 24 hours       c)	Leukocytosis trending towards normal range   2. Patient is tolerating oral intake   3. Initial/repeat blood cultures are negative OR do not need to wait for preliminary blood cultures to result    Cannot advise changing to oral antibiotic therapy until culture sensitivity is available.

## 2025-04-21 NOTE — PROGRESS NOTE ADULT - SUBJECTIVE AND OBJECTIVE BOX
HOSPITALIST ATTENDING PROGRESS NOTE    Chart and meds reviewed.  Patient seen and examined.    CC: cellulitis     Subjective: afebrile; complains of pain to RLE;      All other systems reviewed and found to be negative with the exception of what has been described above.    MEDICATIONS  (STANDING):  apixaban 5 milliGRAM(s) Oral two times a day  aspirin enteric coated 81 milliGRAM(s) Oral daily  atorvastatin 10 milliGRAM(s) Oral at bedtime  cefepime  Injectable. 2000 milliGRAM(s) IV Push every 24 hours  folic acid 1 milliGRAM(s) Oral daily  gabapentin 300 milliGRAM(s) Oral two times a day  influenza  Vaccine (HIGH DOSE) 0.5 milliLiter(s) IntraMuscular once  Nephro-ilana 1 Tablet(s) Oral daily  pantoprazole    Tablet 40 milliGRAM(s) Oral before breakfast  torsemide 10 milliGRAM(s) Oral daily  valsartan 160 milliGRAM(s) Oral daily  vancomycin  IVPB 750 milliGRAM(s) IV Intermittent every 24 hours    MEDICATIONS  (PRN):  acetaminophen     Tablet .. 650 milliGRAM(s) Oral every 6 hours PRN Temp greater or equal to 38C (100.4F), Mild Pain (1 - 3)  aluminum hydroxide/magnesium hydroxide/simethicone Suspension 30 milliLiter(s) Oral every 4 hours PRN Dyspepsia  melatonin 3 milliGRAM(s) Oral at bedtime PRN Insomnia  morphine  - Injectable 2 milliGRAM(s) IV Push every 6 hours PRN Severe Pain (7 - 10)  ondansetron Injectable 4 milliGRAM(s) IV Push every 8 hours PRN Nausea and/or Vomiting      PHYSICAL EXAM:  Gen: NAD  CV:  +S1, +S2, regular, no murmurs or rubs  RESP:   lungs clear to auscultation bilaterally, no wheezing, rales, rhonchi, good air entry bilaterally  GI:  abdomen soft, non-tender, non-distended, normal BS, no bruits, no abdominal masses, no palpable masses  :  not examined  MSK:   normal muscle tone, no atrophy, no rigidity, no contractions  EXT:  b/l lower ext edema 2+ pitting pedal edema up to mid shin; RLE with erythema warmth TTP; 1inch x1inch wound partially scabbed with exposed stitches  NEURO:  AAOX3, no focal neurological deficits, follows all commands, able to move extremities spontaneously    LABS:                            8.6    8.53  )-----------( 272      ( 21 Apr 2025 06:23 )             27.1     04-21    140  |  108  |  39[H]  ----------------------------<  83  4.4   |  24  |  1.53[H]    Ca    9.1      21 Apr 2025 06:23    TPro  6.1  /  Alb  2.5[L]  /  TBili  0.4  /  DBili  x   /  AST  20  /  ALT  28  /  AlkPhos  221[H]  04-21        LIVER FUNCTIONS - ( 21 Apr 2025 06:23 )  Alb: 2.5 g/dL / Pro: 6.1 gm/dL / ALK PHOS: 221 U/L / ALT: 28 U/L / AST: 20 U/L / GGT: x           PT/INR - ( 21 Apr 2025 06:23 )   PT: 16.4 sec;   INR: 1.39 ratio         PTT - ( 21 Apr 2025 06:23 )  PTT:37.4 sec  Urinalysis Basic - ( 21 Apr 2025 09:00 )    Color: Yellow / Appearance: Clear / SG: >1.030 / pH: x  Gluc: x / Ketone: Negative mg/dL  / Bili: Negative / Urobili: 0.2 mg/dL   Blood: x / Protein: Negative mg/dL / Nitrite: Negative   Leuk Esterase: Negative / RBC: x / WBC x   Sq Epi: x / Non Sq Epi: x / Bacteria: x

## 2025-04-21 NOTE — CONSULT NOTE ADULT - SUBJECTIVE AND OBJECTIVE BOX
Patient is a 79y old  Female who presents with a chief complaint of Cellulitis (20 Apr 2025 14:36)    HPI:  Patient is a 80 YO F With past medical history of HTN, HLD, AS s/p TAVR, preDM, Atrial fibrillation on eliquis, HFpEF EF 55-60%, CVA 11/24, R 2nd rib fx, hx temporal arteritis. Presented to the ED with complaints of  LE swelling  worse on the right leg that started near the ankle and has went up to below the knee, took tylenol with poor improvement. Pt reports she had MOHS procedure last month on her R leg and since has noticed an infection, started PO keflex last friday without improvement. Denies fevers, chest pain, shortness of breath, chills, nausea, vomiting. (20 Apr 2025 14:36)  Above HPI reviewed and reconciled with patient    79F with HTN, HLD, AS s/p TAVR, preDM, Atrial fibrillation on eliquis, HFpEF EF 55-60%, CVA 11/24, R 2nd rib fx, hx temporal arteritis, recent R leg Mohs procedure 1 month ago, presents 4/20 with worsening RLE swelling and pain  Denies any fever or chills, cough, abdominal pain, n/v, diarrhea or dysuria  Afebrile on admission, on RA  No leukocytosis  Cr 1.53   CRP 40  CT RLE with Diffuse subcutaneous tissue edema and swelling with presence of soft tissue irregularity at the middle third of the anterior leg as described. No organized fluid collection identified.    PAST MEDICAL & SURGICAL HISTORY:  History of Cardiac Arrhythmia      Hypertension      Atrial Fibrillation      Aortic stenosis      Nonrheumatic aortic valve stenosis      High cholesterol      H/O temporal arteritis      S/P Exploratory Laparotomy  1966      H/O prior ablation treatment      History of temporal artery biopsy      S/P foot surgery        FAMILY HISTORY:  FH: lung cancer (Father)      Social Hx:    Allergies  adhesives (Rash)  No Known Drug Allergies    ANTIMICROBIALS (past 90 days)  MEDICATIONS  (STANDING):  piperacillin/tazobactam IVPB...   200 mL/Hr IV Intermittent (04-20-25 @ 11:27)    trimethoprim  160 mG/sulfamethoxazole 800 mG   1 Tablet(s) Oral (04-20-25 @ 22:06)   1 Tablet(s) Oral (04-20-25 @ 18:26)    vancomycin  IVPB.   250 mL/Hr IV Intermittent (04-20-25 @ 11:40)        ACTIVE ANTIMICROBIALS  Vanco and Zosyn x1 (4/20)  trimethoprim  160 mG/sulfamethoxazole 800 mG 1 two times a day (4/20 --- )    MEDICATIONS  (STANDING):  acetaminophen     Tablet .. 650 every 6 hours PRN  aluminum hydroxide/magnesium hydroxide/simethicone Suspension 30 every 4 hours PRN  apixaban 5 two times a day  aspirin enteric coated 81 daily  atorvastatin 10 at bedtime  gabapentin 300 two times a day  influenza  Vaccine (HIGH DOSE) 0.5 once  melatonin 3 at bedtime PRN  ondansetron Injectable 4 every 8 hours PRN  pantoprazole    Tablet 40 before breakfast  torsemide 10 daily  valsartan 160 daily      REVIEW OF SYSTEMS  [  ] ROS unobtainable because:    [ x ] All other systems negative except as noted below    Constitutional:  [ ] fever [ ] chills  [ ] weight loss  [ ]night sweat  [ ]poor appetite/PO intake [ ]fatigue   Skin:  [ ] rash [ ] phlebitis	  Eyes: [ ] icterus [ ] pain  [ ] discharge	  ENMT: [ ] sore throat  [ ] thrush [ ] ulcers [ ] exudates [ ]anosmia  Respiratory: [ ] dyspnea [ ] hemoptysis [ ] cough [ ] sputum	  Cardiovascular:  [ ] chest pain [ ] palpitations [ ] edema	  Gastrointestinal:  [ ] nausea [ ] vomiting [ ] diarrhea [ ] constipation [ ] pain	  Genitourinary:  [ ] dysuria [ ] frequency [ ] hematuria [ ] discharge [ ] flank pain  [ ] incontinence  Musculoskeletal:  [ ] myalgias [ ] arthralgias [ ] arthritis  [ ] back pain  Neurological:  [ ] headache [ ] weakness [ ] seizures  [ ] confusion/altered mental status    Vital Signs Last 24 Hrs  T(C): 36.8 (21 Apr 2025 08:11), Max: 36.8 (21 Apr 2025 00:27)  T(F): 98.2 (21 Apr 2025 08:11), Max: 98.2 (21 Apr 2025 00:27)  HR: 83 (21 Apr 2025 08:11) (61 - 85)  BP: 117/69 (21 Apr 2025 08:11) (117/69 - 149/67)  BP(mean): 89 (20 Apr 2025 14:05) (78 - 89)  RR: 18 (21 Apr 2025 08:11) (18 - 18)  SpO2: 95% (21 Apr 2025 08:11) (95% - 99%)    Parameters below as of 21 Apr 2025 08:11  Patient On (Oxygen Delivery Method): room air        Physical Exam:  Constitutional:  well preserved, comfortable  Head/Eyes: no icterus  LUNGS:  CTA  CVS:  regular rhythm  Abd:  soft, non-tender; non-distended  Ext:  no edema  Vascular:  IV site no erythema tenderness or discharge  Neuro: AAO X 3, non- focal    Labs: all available labs reviewed                        8.6    8.53  )-----------( 272      ( 21 Apr 2025 06:23 )             27.1     04-21    140  |  108  |  39[H]  ----------------------------<  83  4.4   |  24  |  1.53[H]    Ca    9.1      21 Apr 2025 06:23    TPro  6.1  /  Alb  2.5[L]  /  TBili  0.4  /  DBili  x   /  AST  20  /  ALT  28  /  AlkPhos  221[H]  04-21     LIVER FUNCTIONS - ( 21 Apr 2025 06:23 )  Alb: 2.5 g/dL / Pro: 6.1 gm/dL / ALK PHOS: 221 U/L / ALT: 28 U/L / AST: 20 U/L / GGT: x           Urinalysis Basic - ( 21 Apr 2025 06:23 )    Color: x / Appearance: x / SG: x / pH: x  Gluc: 83 mg/dL / Ketone: x  / Bili: x / Urobili: x   Blood: x / Protein: x / Nitrite: x   Leuk Esterase: x / RBC: x / WBC x   Sq Epi: x / Non Sq Epi: x / Bacteria: x          Radiology: all available radiological tests reviewed    Advanced directives addressed: full resuscitation Patient is a 79y old  Female who presents with a chief complaint of Cellulitis (20 Apr 2025 14:36)    HPI:  Patient is a 80 YO F With past medical history of HTN, HLD, AS s/p TAVR, preDM, Atrial fibrillation on eliquis, HFpEF EF 55-60%, CVA 11/24, R 2nd rib fx, hx temporal arteritis. Presented to the ED with complaints of  LE swelling  worse on the right leg that started near the ankle and has went up to below the knee, took tylenol with poor improvement. Pt reports she had MOHS procedure last month on her R leg and since has noticed an infection, started PO keflex last friday without improvement. Denies fevers, chest pain, shortness of breath, chills, nausea, vomiting. (20 Apr 2025 14:36)  Above HPI reviewed and reconciled with patient    79F with HTN, HLD, AS s/p TAVR, preDM, Atrial fibrillation on eliquis, HFpEF EF 55-60%, CVA 11/24, R 2nd rib fx, hx temporal arteritis, recent R leg Mohs procedure 1 month ago, presents 4/20 with worsening RLE swelling and pain for 1 week  Mohs surgery results were benign, no malignancy, but 1 week after procedure, noted erythema, pain and swelling  Started Keflex by Plastics outpatient and no improvement despite ~5 days so far  Denies any fever or chills, cough, abdominal pain, n/v, diarrhea or dysuria  Afebrile on admission, on RA  No leukocytosis  Cr 1.53   CRP 40  CT RLE with Diffuse subcutaneous tissue edema and swelling with presence of soft tissue irregularity at the middle third of the anterior leg as described. No organized fluid collection identified.    PAST MEDICAL & SURGICAL HISTORY:  History of Cardiac Arrhythmia      Hypertension      Atrial Fibrillation      Aortic stenosis      Nonrheumatic aortic valve stenosis      High cholesterol      H/O temporal arteritis      S/P Exploratory Laparotomy  1966      H/O prior ablation treatment      History of temporal artery biopsy      S/P foot surgery        FAMILY HISTORY:  FH: lung cancer (Father)      Social Hx: Denies alcohol, tobacco, recreational drug use    Allergies  adhesives (Rash)  No Known Drug Allergies    ANTIMICROBIALS (past 90 days)  MEDICATIONS  (STANDING):  piperacillin/tazobactam IVPB...   200 mL/Hr IV Intermittent (04-20-25 @ 11:27)    trimethoprim  160 mG/sulfamethoxazole 800 mG   1 Tablet(s) Oral (04-20-25 @ 22:06)   1 Tablet(s) Oral (04-20-25 @ 18:26)    vancomycin  IVPB.   250 mL/Hr IV Intermittent (04-20-25 @ 11:40)        ACTIVE ANTIMICROBIALS  Vanco and Zosyn x1 (4/20)  trimethoprim  160 mG/sulfamethoxazole 800 mG 1 two times a day (4/20 --- )    MEDICATIONS  (STANDING):  acetaminophen     Tablet .. 650 every 6 hours PRN  aluminum hydroxide/magnesium hydroxide/simethicone Suspension 30 every 4 hours PRN  apixaban 5 two times a day  aspirin enteric coated 81 daily  atorvastatin 10 at bedtime  gabapentin 300 two times a day  influenza  Vaccine (HIGH DOSE) 0.5 once  melatonin 3 at bedtime PRN  ondansetron Injectable 4 every 8 hours PRN  pantoprazole    Tablet 40 before breakfast  torsemide 10 daily  valsartan 160 daily      REVIEW OF SYSTEMS  [  ] ROS unobtainable because:    [ x ] All other systems negative except as noted below    Constitutional:  [ ] fever [ ] chills  [ ] weight loss  [ ]night sweat  [ ]poor appetite/PO intake [ ]fatigue   Skin:  [ ] rash [ ] phlebitis	  Eyes: [ ] icterus [ ] pain  [ ] discharge	  ENMT: [ ] sore throat  [ ] thrush [ ] ulcers [ ] exudates [ ]anosmia  Respiratory: [ ] dyspnea [ ] hemoptysis [ ] cough [ ] sputum	  Cardiovascular:  [ ] chest pain [ ] palpitations [ ] edema	  Gastrointestinal:  [ ] nausea [ ] vomiting [ ] diarrhea [ ] constipation [ ] pain	  Genitourinary:  [ ] dysuria [ ] frequency [ ] hematuria [ ] discharge [ ] flank pain  [ ] incontinence  Musculoskeletal:  [ ] myalgias [ ] arthralgias [ ] arthritis  [ ] back pain  Neurological:  [ ] headache [ ] weakness [ ] seizures  [ ] confusion/altered mental status    Vital Signs Last 24 Hrs  T(C): 36.8 (21 Apr 2025 08:11), Max: 36.8 (21 Apr 2025 00:27)  T(F): 98.2 (21 Apr 2025 08:11), Max: 98.2 (21 Apr 2025 00:27)  HR: 83 (21 Apr 2025 08:11) (61 - 85)  BP: 117/69 (21 Apr 2025 08:11) (117/69 - 149/67)  BP(mean): 89 (20 Apr 2025 14:05) (78 - 89)  RR: 18 (21 Apr 2025 08:11) (18 - 18)  SpO2: 95% (21 Apr 2025 08:11) (95% - 99%)    Parameters below as of 21 Apr 2025 08:11  Patient On (Oxygen Delivery Method): room air        Physical Exam:  Constitutional:  frail, NAD  Head/Eyes: no icterus  LUNGS:  CTA  CVS:  regular rhythm  Abd:  soft, non-tender; non-distended  Ext:  RLE swelling, wound anterior shin with periwound erythema, TTP  Vascular:  IV site no erythema tenderness or discharge  Neuro: AAO X 3, non- focal    Labs: all available labs reviewed                        8.6    8.53  )-----------( 272      ( 21 Apr 2025 06:23 )             27.1     04-21    140  |  108  |  39[H]  ----------------------------<  83  4.4   |  24  |  1.53[H]    Ca    9.1      21 Apr 2025 06:23    TPro  6.1  /  Alb  2.5[L]  /  TBili  0.4  /  DBili  x   /  AST  20  /  ALT  28  /  AlkPhos  221[H]  04-21     LIVER FUNCTIONS - ( 21 Apr 2025 06:23 )  Alb: 2.5 g/dL / Pro: 6.1 gm/dL / ALK PHOS: 221 U/L / ALT: 28 U/L / AST: 20 U/L / GGT: x           Urinalysis Basic - ( 21 Apr 2025 06:23 )    Color: x / Appearance: x / SG: x / pH: x  Gluc: 83 mg/dL / Ketone: x  / Bili: x / Urobili: x   Blood: x / Protein: x / Nitrite: x   Leuk Esterase: x / RBC: x / WBC x   Sq Epi: x / Non Sq Epi: x / Bacteria: x          Radiology: all available radiological tests reviewed    Advanced directives addressed: full resuscitation

## 2025-04-22 ENCOUNTER — RESULT REVIEW (OUTPATIENT)
Age: 80
End: 2025-04-22

## 2025-04-22 LAB
ANION GAP SERPL CALC-SCNC: 6 MMOL/L — SIGNIFICANT CHANGE UP (ref 5–17)
BUN SERPL-MCNC: 34 MG/DL — HIGH (ref 7–23)
CALCIUM SERPL-MCNC: 9.9 MG/DL — SIGNIFICANT CHANGE UP (ref 8.5–10.1)
CHLORIDE SERPL-SCNC: 109 MMOL/L — HIGH (ref 96–108)
CO2 SERPL-SCNC: 25 MMOL/L — SIGNIFICANT CHANGE UP (ref 22–31)
CREAT SERPL-MCNC: 1.37 MG/DL — HIGH (ref 0.5–1.3)
EGFR: 39 ML/MIN/1.73M2 — LOW
EGFR: 39 ML/MIN/1.73M2 — LOW
FOLATE SERPL-MCNC: >20 NG/ML — SIGNIFICANT CHANGE UP
GLUCOSE SERPL-MCNC: 93 MG/DL — SIGNIFICANT CHANGE UP (ref 70–99)
HAPTOGLOB SERPL-MCNC: 200 MG/DL — SIGNIFICANT CHANGE UP (ref 34–200)
HCT VFR BLD CALC: 26.9 % — LOW (ref 34.5–45)
HGB BLD-MCNC: 8.5 G/DL — LOW (ref 11.5–15.5)
LDH SERPL L TO P-CCNC: 224 U/L — SIGNIFICANT CHANGE UP (ref 84–241)
MCHC RBC-ENTMCNC: 31.1 PG — SIGNIFICANT CHANGE UP (ref 27–34)
MCHC RBC-ENTMCNC: 31.6 G/DL — LOW (ref 32–36)
MCV RBC AUTO: 98.5 FL — SIGNIFICANT CHANGE UP (ref 80–100)
NRBC # BLD AUTO: 0 K/UL — SIGNIFICANT CHANGE UP (ref 0–0)
NRBC # FLD: 0 K/UL — SIGNIFICANT CHANGE UP (ref 0–0)
NRBC BLD AUTO-RTO: 0 /100 WBCS — SIGNIFICANT CHANGE UP (ref 0–0)
PLATELET # BLD AUTO: 248 K/UL — SIGNIFICANT CHANGE UP (ref 150–400)
PMV BLD: 9.5 FL — SIGNIFICANT CHANGE UP (ref 7–13)
POTASSIUM SERPL-MCNC: 4.1 MMOL/L — SIGNIFICANT CHANGE UP (ref 3.5–5.3)
POTASSIUM SERPL-SCNC: 4.1 MMOL/L — SIGNIFICANT CHANGE UP (ref 3.5–5.3)
RBC # BLD: 2.73 M/UL — LOW (ref 3.8–5.2)
RBC # FLD: 15.1 % — HIGH (ref 10.3–14.5)
SODIUM SERPL-SCNC: 140 MMOL/L — SIGNIFICANT CHANGE UP (ref 135–145)
TSH SERPL-MCNC: 2.36 UU/ML — SIGNIFICANT CHANGE UP (ref 0.34–4.82)
VIT B12 SERPL-MCNC: 695 PG/ML — SIGNIFICANT CHANGE UP (ref 232–1245)
WBC # BLD: 8.41 K/UL — SIGNIFICANT CHANGE UP (ref 3.8–10.5)
WBC # FLD AUTO: 8.41 K/UL — SIGNIFICANT CHANGE UP (ref 3.8–10.5)

## 2025-04-22 PROCEDURE — 99232 SBSQ HOSP IP/OBS MODERATE 35: CPT

## 2025-04-22 PROCEDURE — 99233 SBSQ HOSP IP/OBS HIGH 50: CPT

## 2025-04-22 PROCEDURE — 93306 TTE W/DOPPLER COMPLETE: CPT | Mod: 26

## 2025-04-22 RX ORDER — EPOETIN ALFA 10000 [IU]/ML
10000 SOLUTION INTRAVENOUS; SUBCUTANEOUS ONCE
Refills: 0 | Status: COMPLETED | OUTPATIENT
Start: 2025-04-22 | End: 2025-04-22

## 2025-04-22 RX ORDER — EPOETIN ALFA 10000 [IU]/ML
10000 SOLUTION INTRAVENOUS; SUBCUTANEOUS ONCE
Refills: 0 | Status: DISCONTINUED | OUTPATIENT
Start: 2025-04-22 | End: 2025-04-22

## 2025-04-22 RX ADMIN — Medication 250 MILLIGRAM(S): at 10:56

## 2025-04-22 RX ADMIN — Medication 100 MILLILITER(S): at 12:24

## 2025-04-22 RX ADMIN — CEFEPIME 2000 MILLIGRAM(S): 2 INJECTION, POWDER, FOR SOLUTION INTRAVENOUS at 09:21

## 2025-04-22 RX ADMIN — EPOETIN ALFA 10000 UNIT(S): 10000 SOLUTION INTRAVENOUS; SUBCUTANEOUS at 20:36

## 2025-04-22 RX ADMIN — GABAPENTIN 300 MILLIGRAM(S): 400 CAPSULE ORAL at 09:21

## 2025-04-22 RX ADMIN — GABAPENTIN 300 MILLIGRAM(S): 400 CAPSULE ORAL at 21:48

## 2025-04-22 RX ADMIN — Medication 650 MILLIGRAM(S): at 21:57

## 2025-04-22 RX ADMIN — Medication 1 TABLET(S): at 10:14

## 2025-04-22 RX ADMIN — APIXABAN 5 MILLIGRAM(S): 2.5 TABLET, FILM COATED ORAL at 09:21

## 2025-04-22 RX ADMIN — Medication 650 MILLIGRAM(S): at 10:02

## 2025-04-22 RX ADMIN — ATORVASTATIN CALCIUM 10 MILLIGRAM(S): 80 TABLET, FILM COATED ORAL at 21:48

## 2025-04-22 RX ADMIN — Medication 81 MILLIGRAM(S): at 09:21

## 2025-04-22 RX ADMIN — FOLIC ACID 1 MILLIGRAM(S): 1 TABLET ORAL at 09:21

## 2025-04-22 RX ADMIN — Medication 650 MILLIGRAM(S): at 09:32

## 2025-04-22 RX ADMIN — APIXABAN 5 MILLIGRAM(S): 2.5 TABLET, FILM COATED ORAL at 21:48

## 2025-04-22 NOTE — CONSULT NOTE ADULT - SUBJECTIVE AND OBJECTIVE BOX
CHIEF COMPLAINT: Patient is a 79y old  Female who presents with a chief complaint of Cellulitis (21 Apr 2025 16:20)      HPI:  Patient is a 80 YO F With past medical history of HTN, HLD, AS s/p TAVR, peDM, Atrial fibrillation on eliquis, HFpEF EF 55-60%, CVA 11/24, R 2nd rib fx, hx temporal arteritis. Presented to the ED with complaints of  LE swelling  worse on the right leg that started near the ankle and has went up to below the knee, took tylenol with poor improvement. Pt reports she had MOHS procedure last month on her R leg and since has noticed an infection, started PO keflex last friday without improvement. Denies fevers, chest pain, shortness of breath, chills, nausea, vomiting.     ED Vitals: /61 mmHg, HR 18/min, Temp 36.4C  Received Vancomycin and Zosyn in the ED. US bl neg for DVT  CT showing inflammation, results as below (20 Apr 2025 14:36)      PMHx: PAST MEDICAL & SURGICAL HISTORY:  History of Cardiac Arrhythmia      Hypertension      Atrial Fibrillation      Aortic stenosis      Nonrheumatic aortic valve stenosis      High cholesterol      H/O temporal arteritis      S/P Exploratory Laparotomy  1966      H/O prior ablation treatment      History of temporal artery biopsy      S/P foot surgery            Soc Hx:       Allergies: Allergies    adhesives (Rash)  No Known Drug Allergies    Intolerances          REVIEW OF SYSTEMS:    CONSTITUTIONAL: No weakness, fevers or chills  EYES/ENT: No visual changes;  No vertigo or throat pain   NECK: No pain or stiffness  RESPIRATORY: No cough, wheezing, hemoptysis; No shortness of breath  CARDIOVASCULAR: No chest pain or palpitations  GASTROINTESTINAL: No abdominal or epigastric pain. No nausea, vomiting, or hematemesis; No diarrhea or constipation. No melena or hematochezia.  GENITOURINARY: No dysuria, frequency or hematuria  NEUROLOGICAL: No numbness or weakness  SKIN: No itching, burning, rashes, or lesions   All other review of systems is negative unless indicated above    Vital Signs Last 24 Hrs  T(C): 36.4 (21 Apr 2025 23:38), Max: 36.8 (21 Apr 2025 08:11)  T(F): 97.5 (21 Apr 2025 23:38), Max: 98.2 (21 Apr 2025 08:11)  HR: 77 (21 Apr 2025 23:38) (69 - 93)  BP: 127/76 (21 Apr 2025 23:38) (117/64 - 142/76)  BP(mean): --  RR: 18 (21 Apr 2025 23:38) (18 - 18)  SpO2: 96% (21 Apr 2025 23:38) (95% - 96%)    Parameters below as of 21 Apr 2025 23:38  Patient On (Oxygen Delivery Method): room air        I&O's Summary          PHYSICAL EXAM:   Constitutional: NAD, awake and alert, well-developed  HEENT: PERR, EOMI, Normal Hearing, MMM  Neck: Soft and supple, No LAD, No JVD  Respiratory: Breath sounds are clear bilaterally, No wheezing, rales or rhonchi  Cardiovascular: S1 and S2, regular rate and rhythm, no Murmurs, gallops or rubs  Gastrointestinal: Bowel Sounds present, soft, nontender, nondistended, no guarding, no rebound  Extremities: No peripheral edema  Vascular: 2+ peripheral pulses  Neurological: A/O x 3, no focal deficits  Musculoskeletal: 5/5 strength b/l upper and lower extremities  Skin: No rashes    MEDICATIONS:  MEDICATIONS  (STANDING):  apixaban 5 milliGRAM(s) Oral two times a day  aspirin enteric coated 81 milliGRAM(s) Oral daily  atorvastatin 10 milliGRAM(s) Oral at bedtime  cefepime  Injectable. 2000 milliGRAM(s) IV Push every 24 hours  folic acid 1 milliGRAM(s) Oral daily  gabapentin 300 milliGRAM(s) Oral two times a day  influenza  Vaccine (HIGH DOSE) 0.5 milliLiter(s) IntraMuscular once  Nephro-ilana 1 Tablet(s) Oral daily  pantoprazole    Tablet 40 milliGRAM(s) Oral before breakfast  sodium chloride 0.9%. 1000 milliLiter(s) (100 mL/Hr) IV Continuous <Continuous>  vancomycin  IVPB 750 milliGRAM(s) IV Intermittent every 24 hours      LABS: All Labs Reviewed:                        8.9    9.01  )-----------( 271      ( 21 Apr 2025 16:39 )             28.5     04-21    138  |  107  |  39[H]  ----------------------------<  131[H]  4.8   |  29  |  1.64[H]    Ca    9.2      21 Apr 2025 16:40    TPro  6.1  /  Alb  2.5[L]  /  TBili  0.4  /  DBili  x   /  AST  20  /  ALT  28  /  AlkPhos  221[H]  04-21    PT/INR - ( 21 Apr 2025 06:23 )   PT: 16.4 sec;   INR: 1.39 ratio         PTT - ( 21 Apr 2025 06:23 )  PTT:37.4 sec        Blood Culture: Organism --  Gram Stain Blood -- Gram Stain --  Specimen Source Blood None  Culture-Blood --          RADIOLOGY:< from: CT Lower Extremity w/ IV Cont, Right (04.20.25 @ 12:09) >    FINDINGS:  There is diffuse subcutaneous tissue edema and swelling throughout the   leg and ankle with associated thickening of the skin. At the middle third   of the anterior leg, there is soft tissue irregularity with punctate foci   of surrounding soft tissue gas present. Superior to this soft tissue   irregularity, there is focal nodule within the subcutaneous/cutaneous   tissues along the anterior shin. No tracking soft tissue gas is present.   No discrete organized fluid collection is noted.    No osseous destruction or aggressive periosteal reaction. No fracture or   dislocation. No productive changes at the knee or ankle. Os navicularis   identified. Vascular calcification is present.      IMPRESSION:  Diffuse subcutaneous tissue edema and swelling with presence of soft   tissue irregularity at the middle third of the anterior leg as described.   No organized fluid collection identified.    --- End of Report ---            OUMAR HOPE MD; Attending Radiologist  This document has been electronically signed. Apr 20 2025 12:35PM    < end of copied text >      EKG:< from: 12 Lead ECG (04.20.25 @ 10:45) >    Diagnosis Line Atrial fibrillation  Left axis deviation  Minimal voltage criteria for LVH, may be normal variant ( [facility] product )  Anteroseptal infarct (cited on or before 08-FEB-2016)  Abnormal ECG  When compared with ECG of 09-DEC-2024 14:32,  Incomplete right bundle branch block is no longer Present  Confirmed by EDDIE CASTILLO ([id number]) on 4/20/2025 1:59:44 PM    < end of copied text >        ECHO:  < from: TTE W or WO Ultrasound Enhancing Agent (12.12.24 @ 14:18) >     CONCLUSIONS:      1. Estimated pulmonary artery systolic pressure is 62 mmHg, consistent with severe pulmonary hypertension.   2. Severe tricuspid regurgitation.   3. A Transcatheter deployed (TAVR) valve replacement is present in the aortic position The prosthetic valve is well seated. Mild intravalvular regurgitation.   4. Mild to moderate mitral regurgitation.   5. Left ventricular cavity is normal in size. Left ventricular systolic function is normal with an ejection fraction visually estimated at 55 to 60 %.   6. No left ventricular hypertrophy.   7. Enlarged right ventricular cavity size.   8. The right atrium is moderately dilated.   9. Compared to the transthoracic echocardiogram performed on 11/7/2024, there have been no significant interval changes.    ________________________________________________________________________________________  FINDINGS:     Left Ventricle:  The left ventricular cavity is normal in size. Left ventricular wall thickness is normal. Left ventricular systolic function is normal with a calculated ejection fraction of 57 % by 3D with an ejection fraction visually estimated at 55 to 60%. The left ventricular diastolic function is indeterminate.     Right Ventricle:  The right ventricular cavity is enlarged in size and right ventricular systolic function is normal. Tricuspid annular plane systolic excursion (TAPSE) is 2.2 cm (normal >=1.7 cm).     Left Atrium:  The left atrium is normal in size with an indexed volume of 27.06 ml/m².     Right Atrium:  The right atrium is moderately dilated with an indexed volume of 45.77 ml/m² and an indexed area of 14.37 cm²/m².     Interatrial Septum:  The interatrial septum appears intact.     Aortic Valve:  A a transcatheter deployed (TAVR) is present in the aortic position. The prosthetic valve is well seated. The peak transaortic velocity is 2.42 m/s, peak transaortic gradient is 23.4 mmHg and mean transaortic gradient is 11.0 mmHg with an LVOT/aortic valve VTI ratio of 0.61. The effective orifice area is estimated at 2.33 cm² by the continuity equation. There is mild intravalvular regurgitation.     Mitral Valve:  There is normal leaflet mobility of the mitral valve. There is moderate calcification of the mitral valve annulus. There is mild to moderate mitral regurgitation.     Tricuspid Valve:  Structurally normal tricuspid valve with normal leaflet excursion. There is severe tricuspid regurgitation. Estimated pulmonary artery systolic pressure is 62 mmHg, consistent with severe pulmonary hypertension.     Pulmonic Valve:  Structurally normal pulmonic valve with normal leaflet excursion.     Aorta:  The ascending aorta is normal in size, measuring 3.07 cm (indexed 1.78 cm/m²). The aortic arch diameter is normal in size, measuring 2.5 cm (indexed 1.45 cm/m²).     Pericardium:  No pericardial effusion seen.     Systemic Veins:  The inferior vena cava is dilated measuring 2.40 cm in diameter, (dilated >2.1cm) with normal inspiratory collapse (normal >50%) consistent with mildly elevated right atrial pressure (~8, range 5-10mmHg).  ____________________________________________________________________    < end of copied text >

## 2025-04-22 NOTE — PHYSICAL THERAPY INITIAL EVALUATION ADULT - ADDITIONAL COMMENTS
Lives in house alone. 2 DARYN with B HR and 1 flight to access bedroom and main bathroom with tub shower. There is powder room on first floor. Had just returned from [place] with grandkids and family prior to coming to .

## 2025-04-22 NOTE — PROGRESS NOTE ADULT - SUBJECTIVE AND OBJECTIVE BOX
HOSPITALIST ATTENDING PROGRESS NOTE    Chart and meds reviewed.  Patient seen and examined.    CC: cellulitis    Subjective: no acute complaints; cellulitis improving slowly    All other systems reviewed and found to be negative with the exception of what has been described above.    MEDICATIONS  (STANDING):  apixaban 5 milliGRAM(s) Oral two times a day  aspirin enteric coated 81 milliGRAM(s) Oral daily  atorvastatin 10 milliGRAM(s) Oral at bedtime  cefepime  Injectable. 2000 milliGRAM(s) IV Push every 24 hours  epoetin bo-epbx (RETACRIT) Injectable 78820 Unit(s) SubCutaneous once  folic acid 1 milliGRAM(s) Oral daily  gabapentin 300 milliGRAM(s) Oral two times a day  influenza  Vaccine (HIGH DOSE) 0.5 milliLiter(s) IntraMuscular once  Nephro-ilana 1 Tablet(s) Oral daily  pantoprazole    Tablet 40 milliGRAM(s) Oral before breakfast  sodium chloride 0.9%. 1000 milliLiter(s) (100 mL/Hr) IV Continuous <Continuous>  vancomycin  IVPB 750 milliGRAM(s) IV Intermittent every 24 hours    MEDICATIONS  (PRN):  acetaminophen     Tablet .. 650 milliGRAM(s) Oral every 6 hours PRN Temp greater or equal to 38C (100.4F), Mild Pain (1 - 3)  aluminum hydroxide/magnesium hydroxide/simethicone Suspension 30 milliLiter(s) Oral every 4 hours PRN Dyspepsia  melatonin 3 milliGRAM(s) Oral at bedtime PRN Insomnia  morphine  - Injectable 2 milliGRAM(s) IV Push every 6 hours PRN Severe Pain (7 - 10)  ondansetron Injectable 4 milliGRAM(s) IV Push every 8 hours PRN Nausea and/or Vomiting      PHYSICAL EXAM:  Gen: NAD  CV:  +S1, +S2, regular, no murmurs or rubs  RESP:   lungs clear to auscultation bilaterally, no wheezing, rales, rhonchi, good air entry bilaterally  GI:  abdomen soft, non-tender, non-distended, normal BS, no bruits, no abdominal masses, no palpable masses  :  not examined  MSK:   normal muscle tone, no atrophy, no rigidity, no contractions  EXT:  b/l lower ext edema 2+ pitting pedal edema up to mid shin; RLE with erythema warmth TTP; 1inch x1inch wound partially scabbed with exposed stitches  NEURO:  AAOX3, no focal neurological deficits, follows all commands, able to move extremities spontaneously  LABS:                            8.5    8.41  )-----------( 248      ( 22 Apr 2025 07:16 )             26.9     04-22    140  |  109[H]  |  34[H]  ----------------------------<  93  4.1   |  25  |  1.37[H]    Ca    9.9      22 Apr 2025 07:16    TPro  6.1  /  Alb  2.5[L]  /  TBili  0.4  /  DBili  x   /  AST  20  /  ALT  28  /  AlkPhos  221[H]  04-21        LIVER FUNCTIONS - ( 21 Apr 2025 06:23 )  Alb: 2.5 g/dL / Pro: 6.1 gm/dL / ALK PHOS: 221 U/L / ALT: 28 U/L / AST: 20 U/L / GGT: x           PT/INR - ( 21 Apr 2025 06:23 )   PT: 16.4 sec;   INR: 1.39 ratio         PTT - ( 21 Apr 2025 06:23 )  PTT:37.4 sec  Urinalysis Basic - ( 22 Apr 2025 07:16 )    Color: x / Appearance: x / SG: x / pH: x  Gluc: 93 mg/dL / Ketone: x  / Bili: x / Urobili: x   Blood: x / Protein: x / Nitrite: x   Leuk Esterase: x / RBC: x / WBC x   Sq Epi: x / Non Sq Epi: x / Bacteria: x              CULTURES:  Culture Results:   No growth at 48 Hours (04-20-25 @ 11:10)  Culture Results:   No growth at 48 Hours (04-20-25 @ 11:10)

## 2025-04-22 NOTE — CONSULT NOTE ADULT - SUBJECTIVE AND OBJECTIVE BOX
coughing HPI:  Patient is a 80 YO F With past medical history of HTN, HLD, AS s/p TAVR, peDM, Atrial fibrillation on eliquis, HFpEF EF 55-60%, CVA 11/24, R 2nd rib fx, hx temporal arteritis. Presented to the ED with complaints of  LE swelling  worse on the right leg that started near the ankle and has went up to below the knee, took tylenol with poor improvement. Pt reports she had MOHS procedure last month on her R leg and since has noticed an infection, started PO keflex last friday without improvement. Denies fevers, chest pain, shortness of breath, chills, nausea, vomiting.     ED Vitals: /61 mmHg, HR 18/min, Temp 36.4C  Received Vancomycin and Zosyn in the ED. US bl neg for DVT  CT showing inflammation, results as below (20 Apr 2025 14:36)      PAST MEDICAL & SURGICAL HISTORY:  History of Cardiac Arrhythmia      Hypertension      Atrial Fibrillation      Aortic stenosis      Nonrheumatic aortic valve stenosis      High cholesterol      H/O temporal arteritis      S/P Exploratory Laparotomy  1966      H/O prior ablation treatment      History of temporal artery biopsy      S/P foot surgery          MEDICATIONS  (STANDING):  apixaban 5 milliGRAM(s) Oral two times a day  aspirin enteric coated 81 milliGRAM(s) Oral daily  atorvastatin 10 milliGRAM(s) Oral at bedtime  cefepime  Injectable. 2000 milliGRAM(s) IV Push every 24 hours  folic acid 1 milliGRAM(s) Oral daily  gabapentin 300 milliGRAM(s) Oral two times a day  influenza  Vaccine (HIGH DOSE) 0.5 milliLiter(s) IntraMuscular once  Nephro-ilana 1 Tablet(s) Oral daily  pantoprazole    Tablet 40 milliGRAM(s) Oral before breakfast  sodium chloride 0.9%. 1000 milliLiter(s) (100 mL/Hr) IV Continuous <Continuous>  vancomycin  IVPB 750 milliGRAM(s) IV Intermittent every 24 hours    MEDICATIONS  (PRN):  acetaminophen     Tablet .. 650 milliGRAM(s) Oral every 6 hours PRN Temp greater or equal to 38C (100.4F), Mild Pain (1 - 3)  aluminum hydroxide/magnesium hydroxide/simethicone Suspension 30 milliLiter(s) Oral every 4 hours PRN Dyspepsia  melatonin 3 milliGRAM(s) Oral at bedtime PRN Insomnia  morphine  - Injectable 2 milliGRAM(s) IV Push every 6 hours PRN Severe Pain (7 - 10)  ondansetron Injectable 4 milliGRAM(s) IV Push every 8 hours PRN Nausea and/or Vomiting      Allergies    adhesives (Rash)  No Known Drug Allergies    Intolerances        FAMILY HISTORY:  FH: lung cancer (Father)        Review of Systems    Constitutional, Eyes, ENT, Cardiovascular, Respiratory, Gastrointestinal, Genitourinary, Musculoskeletal, Integumentary, Neurological, Psychiatric, Endocrine, Heme/Lymph and Allergic/Immunologic review of systems are otherwise negative except as noted in HPI.     Vital Signs Last 24 Hrs  T(C): 36.7 (22 Apr 2025 07:20), Max: 36.7 (22 Apr 2025 07:20)  T(F): 98.1 (22 Apr 2025 07:20), Max: 98.1 (22 Apr 2025 07:20)  HR: 78 (22 Apr 2025 07:20) (69 - 78)  BP: 130/58 (22 Apr 2025 07:20) (117/64 - 130/58)  BP(mean): --  RR: 18 (22 Apr 2025 07:20) (18 - 18)  SpO2: 95% (22 Apr 2025 07:20) (95% - 96%)    Parameters below as of 22 Apr 2025 07:20  Patient On (Oxygen Delivery Method): room air        Physical Exam    Constitutional: no acute distress   Eyes: no conjunctival infection, anicteric.   ENT: pharynx is unremarkable  Neck: supple without JVD  Pulmonary: clear to auscultation bilaterally  Cardiac: RRR  Vascular: no calf tenderness, venous stasis changes  Abdomen: normoactive bowel sounds, soft and nontender, no hepatosplenomegaly or masses appreciated.   Lymphatic: no peripheral adenopathy appreciated.   Musculoskeletal: full range of motion and no deformities appreciated.   Skin: normal appearance, no rash  Neurology: awake, alert, oriented; no obvious focal deficits        LABS:  CBC Full  -  ( 22 Apr 2025 07:16 )  WBC Count : 8.41 K/uL  RBC Count : 2.73 M/uL  Hemoglobin : 8.5 g/dL  Hematocrit : 26.9 %  Platelet Count - Automated : 248 K/uL  Mean Cell Volume : 98.5 fl  Mean Cell Hemoglobin : 31.1 pg  Mean Cell Hemoglobin Concentration : 31.6 g/dL  Auto Neutrophil # : x  Auto Lymphocyte # : x  Auto Monocyte # : x  Auto Eosinophil # : x  Auto Basophil # : x  Auto Neutrophil % : x  Auto Lymphocyte % : x  Auto Monocyte % : x  Auto Eosinophil % : x  Auto Basophil % : x    04-22    140  |  109[H]  |  34[H]  ----------------------------<  93  4.1   |  25  |  1.37[H]    Ca    9.9      22 Apr 2025 07:16    TPro  6.1  /  Alb  2.5[L]  /  TBili  0.4  /  DBili  x   /  AST  20  /  ALT  28  /  AlkPhos  221[H]  04-21    PT/INR - ( 21 Apr 2025 06:23 )   PT: 16.4 sec;   INR: 1.39 ratio         PTT - ( 21 Apr 2025 06:23 )  PTT:37.4 sec  Urinalysis Basic - ( 22 Apr 2025 07:16 )    Color: x / Appearance: x / SG: x / pH: x  Gluc: 93 mg/dL / Ketone: x  / Bili: x / Urobili: x   Blood: x / Protein: x / Nitrite: x   Leuk Esterase: x / RBC: x / WBC x   Sq Epi: x / Non Sq Epi: x / Bacteria: x        RADIOLOGY & ADDITIONAL STUDIES:    < from: US Duplex Venous Lower Ext Complete, Bilateral (04.20.25 @ 12:20) >    ACC: [id number] EXAM:  US DPLX LWR EXT VEINS COMPL BI   ORDERED BY:   GURJIT ESCALANTE     PROCEDURE DATE:  04/20/2025          < from: US Duplex Venous Lower Ext Complete, Bilateral (04.20.25 @ 12:20) >    INTERPRETATION:  CLINICAL INFORMATION: Lower extremity swelling,   cellulitis    COMPARISON: None available.    TECHNIQUE: Duplex sonography of the BILATERAL LOWER extremity veins with   color and spectral Doppler, with and without compression.    FINDINGS:    RIGHT:  Normal compressibility of the RIGHT common femoral, femoral and popliteal   veins.  Doppler examination shows normal spontaneous and phasic flow.  No RIGHT calf vein thrombosis is detected.    LEFT:  Normal compressibility of the LEFT common femoral, femoral and popliteal   veins.  Doppler examination shows normal spontaneous and phasic flow.  No LEFT calf vein thrombosis is detected.    Diffuse bilateral subcutaneous calf edema    IMPRESSION:  No evidence of deep venous thrombosis in either lower extremity.      < end of copied text >         HPI:  Patient is a 80 YO F With past medical history of HTN, HLD, AS s/p TAVR, peDM, Atrial fibrillation on eliquis, HFpEF EF 55-60%, CVA 11/24, R 2nd rib fx, hx temporal arteritis. Presented to the ED with complaints of  LE swelling  worse on the right leg that started near the ankle and has went up to below the knee, took tylenol with poor improvement. Pt reports she had MOHS procedure last month on her R leg and since has noticed an infection, started PO keflex last friday without improvement. Denies fevers, chest pain, shortness of breath, chills, nausea, vomiting.     ED Vitals: /61 mmHg, HR 18/min, Temp 36.4C  Received Vancomycin and Zosyn in the ED. US bl neg for DVT  CT showing inflammation, results as below (20 Apr 2025 14:36)      PAST MEDICAL & SURGICAL HISTORY:  History of Cardiac Arrhythmia      Hypertension      Atrial Fibrillation      Aortic stenosis      Nonrheumatic aortic valve stenosis      High cholesterol      H/O temporal arteritis      S/P Exploratory Laparotomy  1966      H/O prior ablation treatment      History of temporal artery biopsy      S/P foot surgery          MEDICATIONS  (STANDING):  apixaban 5 milliGRAM(s) Oral two times a day  aspirin enteric coated 81 milliGRAM(s) Oral daily  atorvastatin 10 milliGRAM(s) Oral at bedtime  cefepime  Injectable. 2000 milliGRAM(s) IV Push every 24 hours  folic acid 1 milliGRAM(s) Oral daily  gabapentin 300 milliGRAM(s) Oral two times a day  influenza  Vaccine (HIGH DOSE) 0.5 milliLiter(s) IntraMuscular once  Nephro-ilana 1 Tablet(s) Oral daily  pantoprazole    Tablet 40 milliGRAM(s) Oral before breakfast  sodium chloride 0.9%. 1000 milliLiter(s) (100 mL/Hr) IV Continuous <Continuous>  vancomycin  IVPB 750 milliGRAM(s) IV Intermittent every 24 hours    MEDICATIONS  (PRN):  acetaminophen     Tablet .. 650 milliGRAM(s) Oral every 6 hours PRN Temp greater or equal to 38C (100.4F), Mild Pain (1 - 3)  aluminum hydroxide/magnesium hydroxide/simethicone Suspension 30 milliLiter(s) Oral every 4 hours PRN Dyspepsia  melatonin 3 milliGRAM(s) Oral at bedtime PRN Insomnia  morphine  - Injectable 2 milliGRAM(s) IV Push every 6 hours PRN Severe Pain (7 - 10)  ondansetron Injectable 4 milliGRAM(s) IV Push every 8 hours PRN Nausea and/or Vomiting      Allergies    adhesives (Rash)  No Known Drug Allergies    Intolerances        FAMILY HISTORY:  FH: lung cancer (Father)        Review of Systems    Constitutional, Eyes, ENT, Cardiovascular, Respiratory, Gastrointestinal, Genitourinary, Musculoskeletal, Integumentary, Neurological, Psychiatric, Endocrine, Heme/Lymph and Allergic/Immunologic review of systems are otherwise negative except as noted in HPI.     Vital Signs Last 24 Hrs  T(C): 36.7 (22 Apr 2025 07:20), Max: 36.7 (22 Apr 2025 07:20)  T(F): 98.1 (22 Apr 2025 07:20), Max: 98.1 (22 Apr 2025 07:20)  HR: 78 (22 Apr 2025 07:20) (69 - 78)  BP: 130/58 (22 Apr 2025 07:20) (117/64 - 130/58)  BP(mean): --  RR: 18 (22 Apr 2025 07:20) (18 - 18)  SpO2: 95% (22 Apr 2025 07:20) (95% - 96%)    Parameters below as of 22 Apr 2025 07:20  Patient On (Oxygen Delivery Method): room air        Physical Exam  Pt was not present in her room; was at echo.    LABS:  CBC Full  -  ( 22 Apr 2025 07:16 )  WBC Count : 8.41 K/uL  RBC Count : 2.73 M/uL  Hemoglobin : 8.5 g/dL  Hematocrit : 26.9 %  Platelet Count - Automated : 248 K/uL  Mean Cell Volume : 98.5 fl  Mean Cell Hemoglobin : 31.1 pg  Mean Cell Hemoglobin Concentration : 31.6 g/dL  Auto Neutrophil # : x  Auto Lymphocyte # : x  Auto Monocyte # : x  Auto Eosinophil # : x  Auto Basophil # : x  Auto Neutrophil % : x  Auto Lymphocyte % : x  Auto Monocyte % : x  Auto Eosinophil % : x  Auto Basophil % : x    04-22    140  |  109[H]  |  34[H]  ----------------------------<  93  4.1   |  25  |  1.37[H]    Ca    9.9      22 Apr 2025 07:16    TPro  6.1  /  Alb  2.5[L]  /  TBili  0.4  /  DBili  x   /  AST  20  /  ALT  28  /  AlkPhos  221[H]  04-21    PT/INR - ( 21 Apr 2025 06:23 )   PT: 16.4 sec;   INR: 1.39 ratio         PTT - ( 21 Apr 2025 06:23 )  PTT:37.4 sec  Urinalysis Basic - ( 22 Apr 2025 07:16 )    Color: x / Appearance: x / SG: x / pH: x  Gluc: 93 mg/dL / Ketone: x  / Bili: x / Urobili: x   Blood: x / Protein: x / Nitrite: x   Leuk Esterase: x / RBC: x / WBC x   Sq Epi: x / Non Sq Epi: x / Bacteria: x        RADIOLOGY & ADDITIONAL STUDIES:    < from: US Duplex Venous Lower Ext Complete, Bilateral (04.20.25 @ 12:20) >    ACC: [id number] EXAM:  US DPLX LWR EXT VEINS COMPL BI   ORDERED BY:   GURJIT ESCALANTE     PROCEDURE DATE:  04/20/2025          < from: US Duplex Venous Lower Ext Complete, Bilateral (04.20.25 @ 12:20) >    INTERPRETATION:  CLINICAL INFORMATION: Lower extremity swelling,   cellulitis    COMPARISON: None available.    TECHNIQUE: Duplex sonography of the BILATERAL LOWER extremity veins with   color and spectral Doppler, with and without compression.    FINDINGS:    RIGHT:  Normal compressibility of the RIGHT common femoral, femoral and popliteal   veins.  Doppler examination shows normal spontaneous and phasic flow.  No RIGHT calf vein thrombosis is detected.    LEFT:  Normal compressibility of the LEFT common femoral, femoral and popliteal   veins.  Doppler examination shows normal spontaneous and phasic flow.  No LEFT calf vein thrombosis is detected.    Diffuse bilateral subcutaneous calf edema    IMPRESSION:  No evidence of deep venous thrombosis in either lower extremity.      < end of copied text >

## 2025-04-22 NOTE — PHYSICAL THERAPY INITIAL EVALUATION ADULT - PERTINENT HX OF CURRENT PROBLEM, REHAB EVAL
Patient is a 78 YO F With past medical history of HTN, HLD, AS s/p TAVR, peDM, Atrial fibrillation on eliquis, HFpEF EF 55-60%, CVA 11/24, R 2nd rib fx, hx temporal arteritis. Presented to the ED with complaints of  LE swelling  worse on the right leg that started near the ankle and has went up to below the knee, took tylenol with poor improvement. Pt reports she had MOHS procedure last month on her R leg and since has noticed an infection, started PO keflex last friday without improvement. Denies fevers, chest pain, shortness of breath, chills, nausea, vomiting.

## 2025-04-22 NOTE — CONSULT NOTE ADULT - ASSESSMENT
78 y/o F with extensive cardiac PMHx currently admitted for severe anemia, pending PRBC transfusions, BHARAT and RLE cellulitis    # Persistent Normocytic Anemia     - Hgb currently 8.5 g/dL; MCV normal  - Diff likely s/t chronic disease/cellulitis/BHARAT component. She has had chronic normocytic anemia prior to her recent TAVR procedure.  - Discussed with primary provider Viola Sauer.  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 Given normal haptoglobin 200, does not appear to be hemolyzing presently.   - In setting of BHARAT, will order procrit.    - Did have GI workup in the past, current FOBT pending   - Complete anemia / hemolysis labs ordered   - She underwent a BM bx 12/2024  - Has been treated with IV iron in the past.  Will continue to follow up with Viola Sauer outpatient.  - Continue to trend serial CBCs while admitted   - Continue supportive measures as necessary

## 2025-04-22 NOTE — CONSULT NOTE ADULT - SUBJECTIVE AND OBJECTIVE BOX
Pt is a 79y Female with past medical history of HTN, HLD, AS s/p TAVR, peDM, Atrial fibrillation on eliquis, HFpEF EF 55-60%, CVA 11/24, R 2nd rib fx, hx temporal arteritis.     The patient presented to the ED with complaints of  LE swelling  worse on the right leg that started near the ankle and has went up to below the knee, took tylenol with poor improvement. Pt reports she had MOHS procedure last month on her R leg with closure by Dr Land and since has noticed an infection, started PO keflex last friday without improvement. Denies fevers, chest pain, shortness of breath, chills, nausea, vomiting.     No other injuries.    PMHx/PSHx- HTN, HLD, AS s/p TAVR, peDM, Atrial fibrillation on eliquis, HFpEF EF 55-60%, CVA 11/24, R 2nd rib fx, hx temporal arteritis.   Meds- as per chart  Allergies- NKDA  SocHx- denies    Family Hx- non contributory        ROS:  - CONSTITUTIONAL: Denies weight loss, fever and chills.  - HEENT: Denies changes in vision and hearing.  - RESPIRATORY: Denies SOB and cough.  - CV: Denies palpitations and CP.  - GI: Denies abdominal pain, nausea, vomiting and diarrhea.  - : Denies dysuria and urinary frequency.  - MSK: Denies myalgia and joint pain.  - SKIN: Denies rash and pruritus.  - NEUROLOGICAL: Denies headache and syncope.  - PSYCHIATRIC: Denies recent changes in mood. Denies anxiety and depression.    T(C): 36.7 (04-22-25 @ 07:20), Max: 36.7 (04-22-25 @ 07:20)  HR: 78 (04-22-25 @ 07:20) (69 - 78)  BP: 130/58 (04-22-25 @ 07:20) (117/64 - 130/58)  RR: 18 (04-22-25 @ 07:20) (18 - 18)  SpO2: 95% (04-22-25 @ 07:20) (95% - 96%)      PE: Gen- NAD  HEENT- EOMI  CVS- RRR  Chest- Equal Chest Expansion B/L  Abd- Soft NT, ND  Ext- FROMx4         Focused:  Right lower extremity with improving cellulitis 2.4x2.2 eschar, no purulent drainage     Wound Debridement Procedure Note    Pre-procedure diagnosis: Open wound of right lower leg    There was a Excisional Skin/Subcutaneous Tissue/Muscle Debridement with a total area of 5.28 sq cm performed by Kelvin Gautam MD. With the following instrument(s): Curette, scalpel  Material removed includes Eschar, sutures, Skin, Subcutaneous Tissue and Fascia.   A time out was conducted at 14:32, prior to the start of the procedure.   A Minimum amount of bleeding was controlled with N/A.   The procedure was tolerated well.   Post Debridement Measurements: 2.2cm length x 2.4cm width x 0.5cm depth; 2.64cm^3 volume.  Character of Wound/Ulcer Post Debridement is improved.    Post procedure Diagnosis Wound: Same as Pre-Procedure

## 2025-04-22 NOTE — CONSULT NOTE ADULT - NS ATTEND AMEND GEN_ALL_CORE FT
80 y/o F with extensive cardiac PMHx currently readmitted for persistent normocytic anemia, pending PRBC transfusions. Anemia workup underway / hemolysis labs ordered / but LDH is high and hapto is low. Discussed with patient recommendation for BM bx while inpatient to better understand nature of her anemia. IR consulted for BM biopsy.

## 2025-04-22 NOTE — CONSULT NOTE ADULT - ASSESSMENT
79 F with AS s/p TAVR, AF on AC, anemia, cirrhosis,  presents with LE swelling-- being treated  for cellulitis    LE edema- treatment of cellulitis wiht abx-- recommend adequate nutrition-- adequate protein intake as she has low albumin   AF- HR controlled- continue Eliquis    anemia- recommend  checking iron stud and b12 levels       BHARAT--  recommend gentle hydration and recheck of renal function---acknowledge increased BNP ( can always diurese after treatment of infection  )-- follow daily chem and BNP levels      will follow

## 2025-04-22 NOTE — PHYSICAL THERAPY INITIAL EVALUATION ADULT - GENERAL OBSERVATIONS, REHAB EVAL
Pre and post session: supine in bed with bed alarm on. Call bell and phone in reach. Personal quad cane in room.C/o pain R LE due to cellulitis. Received tylenol not too long ago for discomfort. Tolerated well and would benefit from further skilled PT for functional mobility training.

## 2025-04-22 NOTE — CONSULT NOTE ADULT - ASSESSMENT
79yFemale w/ lower leg cellultitis s/p mohs and closure presenting with cellulitis and skin necrosis of wound.    -will perform bedside debridement  -wound care- santyl, wet to dry and allevyn dressing daily  -f/u in wound care center on tues [phone]        -Over 45 minutes was spent on this evaluation. Time included previsit evaluation of medical record, face-to-face time, counseling the patient, and family, and post-visit documentation and coordination of care.

## 2025-04-22 NOTE — PROGRESS NOTE ADULT - ASSESSMENT
#cellulitis s/p MOHS surgery to RLE   #open wound   #subcutaneous gas on CT RLE   abx switched to vanc and cefepime per id  plastic surgery consult - s/p debridement of eschar at bedside  f/u blood cultures - ng x 48 hours  morphine 2mg iv q6 prn for severe pain  cellulitis slightly improved - continue current management    #anemia   pt not clear was her diagnosis is but checks her hgb weekly with Dr. Milligan  hx of hemolysis per chart review  normally hgb 10-11   gets iv iron frequently - iron studies wnl  hgb 8.6 today   check repeat CBC given >1 point drop and check hemolytic labs b12 and folate  hematology consult - no evidence of hemolysis from tavr; given one dose of procrit per hem onc    #b/l pitting edema   #hx of tavr   #CHFpEF  december echo reviewed   repeat echo - with normal EF 50% but grade 3 diastolic dysfunction and well seated TAVR  check pro-bnp - mildly elevated but similar to prior values  hold lasix with jules   doppler neg for dvt   cardio consult appreciated    #jules   baseline wnl   cr 1.53 s/p toradol--> 1.64 --> 1.37  pt ordered IVF after cxr showed no effusions  hold valsartan and trosemide  monitor with vanc    #HTN   hold valsartan with jules  monitor    #HLD  Check Lipid panel  Continue Atorvastatin 10mg    #AS s/p TAVR Las echo with Mild regurgitation  Continue surveillance  repeat echo    #Afib on Eliquis - Rate controlled  Continue Eliquis 5mg BID  controlled without rate controlling agent    #H/o CVA  Continue aspirin and statins    #Prediabetes  Check A1c  Monitor fingersticks    #HCM  DVT pps: Eliquis  GI ppx: Oral PPIs    dispo: needs a few days of IV abx; monitor for resolution of jules

## 2025-04-23 LAB
ADD ON TEST-SPECIMEN IN LAB: SIGNIFICANT CHANGE UP
ANION GAP SERPL CALC-SCNC: 6 MMOL/L — SIGNIFICANT CHANGE UP (ref 5–17)
BUN SERPL-MCNC: 26 MG/DL — HIGH (ref 7–23)
CALCIUM SERPL-MCNC: 9.3 MG/DL — SIGNIFICANT CHANGE UP (ref 8.5–10.1)
CHLORIDE SERPL-SCNC: 111 MMOL/L — HIGH (ref 96–108)
CO2 SERPL-SCNC: 24 MMOL/L — SIGNIFICANT CHANGE UP (ref 22–31)
CREAT SERPL-MCNC: 1 MG/DL — SIGNIFICANT CHANGE UP (ref 0.5–1.3)
EGFR: 57 ML/MIN/1.73M2 — LOW
EGFR: 57 ML/MIN/1.73M2 — LOW
GLUCOSE SERPL-MCNC: 96 MG/DL — SIGNIFICANT CHANGE UP (ref 70–99)
HCT VFR BLD CALC: 28.5 % — LOW (ref 34.5–45)
HGB BLD-MCNC: 8.9 G/DL — LOW (ref 11.5–15.5)
MCHC RBC-ENTMCNC: 31.2 G/DL — LOW (ref 32–36)
MCHC RBC-ENTMCNC: 31.6 PG — SIGNIFICANT CHANGE UP (ref 27–34)
MCV RBC AUTO: 101.1 FL — HIGH (ref 80–100)
NRBC # BLD AUTO: 0 K/UL — SIGNIFICANT CHANGE UP (ref 0–0)
NRBC # FLD: 0 K/UL — SIGNIFICANT CHANGE UP (ref 0–0)
NRBC BLD AUTO-RTO: 0 /100 WBCS — SIGNIFICANT CHANGE UP (ref 0–0)
NT-PROBNP SERPL-SCNC: 1631 PG/ML — HIGH (ref 0–450)
PLATELET # BLD AUTO: 257 K/UL — SIGNIFICANT CHANGE UP (ref 150–400)
PMV BLD: 9.5 FL — SIGNIFICANT CHANGE UP (ref 7–13)
POTASSIUM SERPL-MCNC: 4.1 MMOL/L — SIGNIFICANT CHANGE UP (ref 3.5–5.3)
POTASSIUM SERPL-SCNC: 4.1 MMOL/L — SIGNIFICANT CHANGE UP (ref 3.5–5.3)
RBC # BLD: 2.82 M/UL — LOW (ref 3.8–5.2)
RBC # FLD: 15.1 % — HIGH (ref 10.3–14.5)
SODIUM SERPL-SCNC: 141 MMOL/L — SIGNIFICANT CHANGE UP (ref 135–145)
VANCOMYCIN TROUGH SERPL-MCNC: 21.2 UG/ML — HIGH (ref 10–20)
WBC # BLD: 8.1 K/UL — SIGNIFICANT CHANGE UP (ref 3.8–10.5)
WBC # FLD AUTO: 8.1 K/UL — SIGNIFICANT CHANGE UP (ref 3.8–10.5)

## 2025-04-23 PROCEDURE — 99232 SBSQ HOSP IP/OBS MODERATE 35: CPT

## 2025-04-23 PROCEDURE — 99232 SBSQ HOSP IP/OBS MODERATE 35: CPT | Mod: FS

## 2025-04-23 RX ORDER — COLLAGENASE CLOSTRIDIUM HIST. 250 UNIT/G
1 OINTMENT (GRAM) TOPICAL DAILY
Refills: 0 | Status: DISCONTINUED | OUTPATIENT
Start: 2025-04-23 | End: 2025-04-25

## 2025-04-23 RX ORDER — TORSEMIDE 10 MG
10 TABLET ORAL DAILY
Refills: 0 | Status: DISCONTINUED | OUTPATIENT
Start: 2025-04-23 | End: 2025-04-23

## 2025-04-23 RX ORDER — TORSEMIDE 10 MG
10 TABLET ORAL DAILY
Refills: 0 | Status: DISCONTINUED | OUTPATIENT
Start: 2025-04-23 | End: 2025-04-25

## 2025-04-23 RX ADMIN — Medication 650 MILLIGRAM(S): at 17:49

## 2025-04-23 RX ADMIN — ATORVASTATIN CALCIUM 10 MILLIGRAM(S): 80 TABLET, FILM COATED ORAL at 22:14

## 2025-04-23 RX ADMIN — GABAPENTIN 300 MILLIGRAM(S): 400 CAPSULE ORAL at 11:11

## 2025-04-23 RX ADMIN — FOLIC ACID 1 MILLIGRAM(S): 1 TABLET ORAL at 10:40

## 2025-04-23 RX ADMIN — GABAPENTIN 300 MILLIGRAM(S): 400 CAPSULE ORAL at 22:14

## 2025-04-23 RX ADMIN — Medication 650 MILLIGRAM(S): at 10:41

## 2025-04-23 RX ADMIN — Medication 81 MILLIGRAM(S): at 10:40

## 2025-04-23 RX ADMIN — Medication 650 MILLIGRAM(S): at 00:37

## 2025-04-23 RX ADMIN — Medication 650 MILLIGRAM(S): at 18:09

## 2025-04-23 RX ADMIN — Medication 3 MILLIGRAM(S): at 22:13

## 2025-04-23 RX ADMIN — Medication 1 TABLET(S): at 11:12

## 2025-04-23 RX ADMIN — APIXABAN 5 MILLIGRAM(S): 2.5 TABLET, FILM COATED ORAL at 22:14

## 2025-04-23 RX ADMIN — APIXABAN 5 MILLIGRAM(S): 2.5 TABLET, FILM COATED ORAL at 10:40

## 2025-04-23 RX ADMIN — Medication 160 MILLIGRAM(S): at 13:15

## 2025-04-23 RX ADMIN — Medication 650 MILLIGRAM(S): at 11:10

## 2025-04-23 RX ADMIN — CEFEPIME 2000 MILLIGRAM(S): 2 INJECTION, POWDER, FOR SOLUTION INTRAVENOUS at 10:41

## 2025-04-23 NOTE — PROGRESS NOTE ADULT - ASSESSMENT
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  - Followed up with Primary Heme Onc NP Viola Sauer in outpatient setting to receive iron supplementation   - Now admitted with acute cellulitis s/p recent MOHS surgery to RLE anterior tibfib region  - Hgb lower than baseline, currently about 9.0 g/dL  - Had been admitted with mild BHAART that is now resolved, possibly contributory, received x1 dose JERSON 04/22/25  - For now, continue to trend serial CBCs while admitted   - Continue supportive measures as necessary         Chester Alejandra PA-C  Hematology Oncology   [facility] [place]  [phone]

## 2025-04-23 NOTE — PROGRESS NOTE ADULT - SUBJECTIVE AND OBJECTIVE BOX
CHIEF COMPLAINT: Patient is a 79y old  Female who presents with a chief complaint of Cellulitis (21 Apr 2025 16:20)      HPI:  Patient is a 80 YO F With past medical history of HTN, HLD, AS s/p TAVR, peDM, Atrial fibrillation on eliquis, HFpEF EF 55-60%, CVA 11/24, R 2nd rib fx, hx temporal arteritis. Presented to the ED with complaints of  LE swelling  worse on the right leg that started near the ankle and has went up to below the knee, took tylenol with poor improvement. Pt reports she had MOHS procedure last month on her R leg and since has noticed an infection, started PO keflex last friday without improvement. Denies fevers, chest pain, shortness of breath, chills, nausea, vomiting.     ED Vitals: /61 mmHg, HR 18/min, Temp 36.4C  Received Vancomycin and Zosyn in the ED. US bl neg for DVT  CT showing inflammation, results as below (20 Apr 2025 14:36)      PMHx: PAST MEDICAL & SURGICAL HISTORY:  History of Cardiac Arrhythmia  Hypertension  Atrial Fibrillation  Aortic stenosis  Nonrheumatic aortic valve stenosis  High cholesterol  H/O temporal arteritis  S/P Exploratory Laparotomy 1966  H/O prior ablation treatment  History of temporal artery biopsy  S/P foot surgery        Allergies: Allergies    adhesives (Rash)  No Known Drug Allergies    Intolerances      Vital Signs Last 24 Hrs  T(C): 36.8 (23 Apr 2025 00:00), Max: 36.8 (23 Apr 2025 00:00)  T(F): 98.3 (23 Apr 2025 00:00), Max: 98.3 (23 Apr 2025 00:00)  HR: 79 (23 Apr 2025 00:00) (78 - 79)  BP: 129/65 (23 Apr 2025 00:00) (129/65 - 138/70)  BP(mean): --  RR: 18 (23 Apr 2025 00:00) (18 - 18)  SpO2: 94% (23 Apr 2025 00:00) (94% - 97%)    Parameters below as of 23 Apr 2025 00:00  Patient On (Oxygen Delivery Method): room air        I&O's Summary          PHYSICAL EXAM:   Constitutional: NAD, awake and alert, well-developed  HEENT: PERR, EOMI, Normal Hearing, MMM  Neck: Soft and supple, No LAD, No JVD  Respiratory: Breath sounds are clear bilaterally, No wheezing, rales or rhonchi  Cardiovascular: S1 and S2, regular rate and rhythm, no Murmurs, gallops or rubs  Gastrointestinal: Bowel Sounds present, soft, nontender, nondistended, no guarding, no rebound  Extremities: No peripheral edema  Vascular: 2+ peripheral pulses  Neurological: A/O x 3, no focal deficits  Musculoskeletal: 5/5 strength b/l upper and lower extremities  Skin: No rashes        MEDICATIONS  (STANDING):  apixaban 5 milliGRAM(s) Oral two times a day  aspirin enteric coated 81 milliGRAM(s) Oral daily  atorvastatin 10 milliGRAM(s) Oral at bedtime  cefepime  Injectable. 2000 milliGRAM(s) IV Push every 24 hours  folic acid 1 milliGRAM(s) Oral daily  gabapentin 300 milliGRAM(s) Oral two times a day  influenza  Vaccine (HIGH DOSE) 0.5 milliLiter(s) IntraMuscular once  Nephro-ilana 1 Tablet(s) Oral daily  pantoprazole    Tablet 40 milliGRAM(s) Oral before breakfast  sodium chloride 0.9%. 1000 milliLiter(s) (100 mL/Hr) IV Continuous <Continuous>  vancomycin  IVPB 750 milliGRAM(s) IV Intermittent every 24 hours      LABS: All Labs Reviewed:                                        8.5    8.41  )-----------( 248      ( 22 Apr 2025 07:16 )             26.9   04-22    140  |  109[H]  |  34[H]  ----------------------------<  93  4.1   |  25  |  1.37[H]    Ca    9.9      22 Apr 2025 07:16            Blood Culture: Organism --  Gram Stain Blood -- Gram Stain --  Specimen Source Blood None  Culture-Blood --          RADIOLOGY:< from: CT Lower Extremity w/ IV Cont, Right (04.20.25 @ 12:09) >    FINDINGS:  There is diffuse subcutaneous tissue edema and swelling throughout the   leg and ankle with associated thickening of the skin. At the middle third   of the anterior leg, there is soft tissue irregularity with punctate foci   of surrounding soft tissue gas present. Superior to this soft tissue   irregularity, there is focal nodule within the subcutaneous/cutaneous   tissues along the anterior shin. No tracking soft tissue gas is present.   No discrete organized fluid collection is noted.    No osseous destruction or aggressive periosteal reaction. No fracture or   dislocation. No productive changes at the knee or ankle. Os navicularis   identified. Vascular calcification is present.      IMPRESSION:  Diffuse subcutaneous tissue edema and swelling with presence of soft   tissue irregularity at the middle third of the anterior leg as described.   No organized fluid collection identified.    --- End of Report ---            OUMAR HOPE MD; Attending Radiologist  This document has been electronically signed. Apr 20 2025 12:35PM    < end of copied text >      EKG:< from: 12 Lead ECG (04.20.25 @ 10:45) >    Diagnosis Line Atrial fibrillation  Left axis deviation  Minimal voltage criteria for LVH, may be normal variant ( Dorian product )  Anteroseptal infarct (cited on or before 08-FEB-2016)  Abnormal ECG  When compared with ECG of 09-DEC-2024 14:32,  Incomplete right bundle branch block is no longer Present  Confirmed by EDDIE CASTILLO ([id number]) on 4/20/2025 1:59:44 PM    < end of copied text >      < from: TTE W or WO Ultrasound Enhancing Agent (04.22.25 @ 13:17) >    CONCLUSIONS:      1. Left ventricular systolic function is low normal with an ejection fraction of 50 %.   2. There is severe (grade 3) left ventricular diastolic dysfunction.   3. Normal right ventricular systolic function.   4. Left atrium is mildly dilated.   5. The right atrium is severely dilated.   6. Mild to moderate mitral regurgitation.   7. Mild mitral valve stenosis.   8. Severe tricuspid regurgitation.   9. Moderate pulmonary hypertension.    ________________________________________________________________________________________  FINDINGS:     Left Ventricle:  The left ventricular cavity is normal in size. Left ventricular wall thickness is normal. Left ventricular systolic function is low normal with a calculated ejection fraction of 50 % by the Amador's biplane method of disks. There is severe (grade 3) left ventricular diastolic dysfunction.     Right Ventricle:  The right ventricular cavity is normal in size and right ventricular systolic function is normal. Tricuspid annular plane systolic excursion (TAPSE) is 2.0 cm (normal >=1.7 cm).     Left Atrium:  The left atrium is mildly dilated with an indexed volume of 41.93 ml/m².     Right Atrium:  The right atrium is severely dilated with an indexed volume of 47.03 ml/m² and an indexed area of 14.53 cm²/m².     Interatrial Septum:  The interatrial septum appears intact.     Aortic Valve:  A a transcatheter deployed (TAVR) is present in the aortic position. The prosthetic valve is well seated with normal function. The peak transaortic velocity is 2.02 m/s, peak transaortic gradient is 16.3 mmHg and mean transaortic gradient is 9.0 mmHg with an LVOT/aortic valve VTI ratio of 0.45. The effective orifice area is estimated at 1.42 cm² by the continuity equation. There is mild paravalvular regurgitation, originating at 3 o'clock. There is calcification of the aortic valve leaflets. The highest velocity was obtained from the apical window.     Mitral Valve:  There is mitral valve thickening of the anterior and posterior leaflets. There is reduced leaflet mobility of the mitral valve. There is moderate calcification of the mitral valve annulus. There is mild leaflet calcification. There is mild mitral valve stenosis. The transmitral peak gradient is 14.0 mmHg and mean gradient is 4.00 mmHg. There is mild to moderate mitral regurgitation. Mitral inflow is E-wave dominant (>1.2m/sec).     Tricuspid Valve:  Structurally normal tricuspid valve with normal leaflet excursion. The tricuspid valve leaflets appear normal. There is severe tricuspid regurgitation. Estimated pulmonary artery systolic pressure is 53 mmHg, consistent with moderate pulmonary hypertension.     Pulmonic Valve:  The pulmonic valve was not well visualized. There is no pulmonic valve stenosis.     Aorta:  The aortic root at the sinuses of Valsalva is normal in size, measuring 3.03 cm (indexed 1.80 cm/m²). The ascending aorta is normal in size, measuring 3.10 cm (indexed 1.84 cm/m²). The aortic arch diameter is normal in size, measuring 3.3 cm (indexed 1.96 cm/m²).     Pericardium:  No pericardial effusion seen.     Systemic Veins:  The inferior vena cava is dilated measuring 2.33 cm in diameter, (dilated >2.1cm) with abnormal inspiratory collapse (abnormal <50%) consistent with elevated right atrial pressure (~15, range 10-20mmHg).  ____________________________________________________________________  QUANTITATIVE DATA:    < end of copied text >

## 2025-04-23 NOTE — PROGRESS NOTE ADULT - ASSESSMENT
#cellulitis s/p MOHS surgery to RLE   #open wound   #subcutaneous gas on CT RLE   abx switched to vanc and cefepime per id  plastic surgery consult - s/p debridement of eschar at bedside  f/u blood cultures - ng x 72 hours  morphine 2mg iv q6 prn for severe pain  cellulitis continues to improve - will continue with iv antibiotics   vanc trough elevated discussed with id - check trough in am and c/w same dose    #anemia   pt not clear was her diagnosis is but checks her hgb weekly with Dr. Milligan  hx of hemolysis per chart review  normally hgb 10-11   gets iv iron frequently - iron studies wnl  hematology consult - no evidence of hemolysis from tavr; given one dose of procrit per hem onc  hgb stable at 8.9    #b/l pitting edema   #hx of tavr   #CHFpEF  december echo reviewed   repeat echo - with normal EF 50% but grade 3 diastolic dysfunction and well seated TAVR  check pro-bnp - mildly elevated but similar to prior values  resume torsemide as jules resolved    #jules   baseline wnl   cr 1.53 s/p toradol--> 1.64 --> 1.37-->1.00  pt ordered IVF after cxr showed no effusions  resume valsartan and torsemide    #HTN   valsartan resumed 4/23    #HLD  Check Lipid panel  Continue Atorvastatin 10mg    #AS s/p TAVR Las echo with Mild regurgitation  Continue surveillance  repeat echo    #Afib on Eliquis - Rate controlled  Continue Eliquis 5mg BID  controlled without rate controlling agent    #H/o CVA  Continue aspirin and statins    #Prediabetes  Check A1c  Monitor fingersticks    #HCM  DVT pps: Eliquis  GI ppx: Oral PPIs  dispo; day to day based on cellulitis.

## 2025-04-23 NOTE — PROGRESS NOTE ADULT - NS ATTEND AMEND GEN_ALL_CORE FT
78 y/o F with extensive cardiac PMHx currently admitted for severe anemia, BHARAT and RLE cellulitis. Worsening anemia likely s/t chronic disease/cellulitis/BHARAT component. She has had chronic normocytic anemia prior to her recent TAVR procedure. Discussed with primary provider Viola Sauer who recommended epogen.  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 Given normal haptoglobin 200, does not appear to be hemolyzing presently. Did have GI workup in the past, current FOBT pending. Complete anemia / hemolysis labs ordered. She underwent a BM bx 12/2024 and has been treated with IV iron in the past.  Will continue to follow up with Viola Sauer outpatient.

## 2025-04-23 NOTE — PROGRESS NOTE ADULT - ASSESSMENT
Assessment:  79F with HTN, HLD, AS s/p TAVR, preDM, Atrial fibrillation on eliquis, HFpEF EF 55-60%, CVA 11/24, R 2nd rib fx, hx temporal arteritis, recent R leg Mohs procedure 1 month ago, presents 4/20 with worsening RLE swelling and pain for 1 week  Mohs surgery results were benign, no malignancy, but 1 week after procedure, noted erythema, pain and swelling  Started Keflex by Plastics outpatient and no improvement despite ~5 days so far  Denies any fever or chills, cough, abdominal pain, n/v, diarrhea or dysuria  Afebrile on admission, on RA  No leukocytosis  Cr 1.53   CRP 40  CT RLE with Diffuse subcutaneous tissue edema and swelling with presence of soft tissue irregularity at the middle third of the anterior leg as described. No organized fluid collection identified.  BCx 4/20 negative    Antimicrobials:  Vanco and Zosyn x1 (4/20)  trimethoprim  160 mG/sulfamethoxazole 800 mG 1 two times a day (4/20 --- 4/21)  Vanco (4/21 --- )  Cefepime (4/21 --- )    Impression:   #RLE Cellulitis, Surgical Wound Infection  #BHARAT  - s/p bedside debridement (4/22) by Plastics  - remains afebrile, no leukocytosis    Recommendations:  - broaden coverage for now  - empiric Vanco 750mg q24 (Day #3)  - please check Vancomycin trough before 4th sequential dose  - empiric Cefepime 2G q24 (renally dsoed) (Day #3)  - monitor temperature curve  - trend WBC  - reason for abx use reviewed with patient  - side effects of antibiotic discussed, tolerating abx well so far  - Prior cultures reviewed. An epidemiologic assessment was performed. There is a significant risk for resistant microorganisms to spread to family members, and/or healthcare staff. Isolation precautions based on infection control policy. Will reconsider further isolation measures based on new culture results and other clinical data as appropriate Appropriate cultures collected and an appropriate broad spectrum antibiotic therapy will be considered  - Wound Care  - Plastics Surgery following  - rest per primary team    Clinical team may change from intravenous to oral antibiotics when the following criteria are met:   1. Patient is clinically improving/stable       a)	Improved signs and symptoms of infection from initial presentation       b)	Afebrile for 24 hours       c)	Leukocytosis trending towards normal range   2. Patient is tolerating oral intake   3. Initial/repeat blood cultures are negative OR do not need to wait for preliminary blood cultures to result    pending improvement

## 2025-04-23 NOTE — PROGRESS NOTE ADULT - SUBJECTIVE AND OBJECTIVE BOX
Date of Service:04-23-25 @ 10:27  Interval History/ROS: Afebrile overnight, comfortable on RA  Had debridement yesterday  Still has pain in the area, still erythematous, but no fever or chills      REVIEW OF SYSTEMS  [  ] ROS unobtainable because:    [ x ] All other systems negative except as noted below    Constitutional:  [ ] fever [ ] chills  [ ] weight loss  [ ]night sweat  [ ]poor appetite/PO intake [ ]fatigue   Skin:  [ ] rash [ ] phlebitis	  Eyes: [ ] icterus [ ] pain  [ ] discharge	  ENMT: [ ] sore throat  [ ] thrush [ ] ulcers [ ] exudates [ ]anosmia  Respiratory: [ ] dyspnea [ ] hemoptysis [ ] cough [ ] sputum	  Cardiovascular:  [ ] chest pain [ ] palpitations [ ] edema	  Gastrointestinal:  [ ] nausea [ ] vomiting [ ] diarrhea [ ] constipation [ ] pain	  Genitourinary:  [ ] dysuria [ ] frequency [ ] hematuria [ ] discharge [ ] flank pain  [ ] incontinence  Musculoskeletal:  [ ] myalgias [ ] arthralgias [ ] arthritis  [ ] back pain  Neurological:  [ ] headache [ ] weakness [ ] seizures  [ ] confusion/altered mental status    Allergies  adhesives (Rash)  No Known Drug Allergies        ANTIMICROBIALS:    cefepime  Injectable. 2000 every 24 hours  vancomycin  IVPB 750 every 24 hours        OTHER MEDS: MEDICATIONS  (STANDING):  acetaminophen     Tablet .. 650 every 6 hours PRN  aluminum hydroxide/magnesium hydroxide/simethicone Suspension 30 every 4 hours PRN  apixaban 5 two times a day  aspirin enteric coated 81 daily  atorvastatin 10 at bedtime  gabapentin 300 two times a day  influenza  Vaccine (HIGH DOSE) 0.5 once  melatonin 3 at bedtime PRN  morphine  - Injectable 2 every 6 hours PRN  ondansetron Injectable 4 every 8 hours PRN  pantoprazole    Tablet 40 before breakfast  torsemide 10 daily  valsartan 160 daily      Vital Signs Last 24 Hrs  T(F): 97.7 (04-23-25 @ 07:47), Max: 98.3 (04-23-25 @ 00:00)    Vital Signs Last 24 Hrs  HR: 71 (04-23-25 @ 07:47) (71 - 79)  BP: 150/69 (04-23-25 @ 07:47) (129/65 - 150/69)  RR: 18 (04-23-25 @ 07:47)  SpO2: 97% (04-23-25 @ 07:47) (94% - 97%)  Wt(kg): --    EXAM:    Constitutional:  frail, NAD  Head/Eyes: no icterus  LUNGS:  CTA  CVS:  regular rhythm  Abd:  soft, non-tender; non-distended  Ext:  RLE swelling, wound anterior shin with periwound erythema, TTP  Vascular:  IV site no erythema tenderness or discharge  Neuro: AAO X 3, non- focal    Labs:                        8.9    8.10  )-----------( 257      ( 23 Apr 2025 08:50 )             28.5     04-23    141  |  111[H]  |  26[H]  ----------------------------<  96  4.1   |  24  |  1.00    Ca    9.3      23 Apr 2025 08:50        WBC Trend:  WBC Count: 8.10 (04-23-25 @ 08:50)  WBC Count: 8.41 (04-22-25 @ 07:16)  WBC Count: 9.01 (04-21-25 @ 16:39)  WBC Count: 8.53 (04-21-25 @ 06:23)      Creatine Trend:  Creatinine: 1.00 (04-23)  Creatinine: 1.37 (04-22)  Creatinine: 1.64 (04-21)  Creatinine: 1.53 (04-21)      Liver Biochemical Testing Trend:  Alanine Aminotransferase (ALT/SGPT): 28 (04-21)  Alanine Aminotransferase (ALT/SGPT): 35 (04-20)  Alanine Aminotransferase (ALT/SGPT): 12 (12-10)  Alanine Aminotransferase (ALT/SGPT): 12 (12-09)  Alanine Aminotransferase (ALT/SGPT): 30 (11-13)  Aspartate Aminotransferase (AST/SGOT): 20 (04-21-25 @ 06:23)  Aspartate Aminotransferase (AST/SGOT): 25 (04-20-25 @ 11:10)  Aspartate Aminotransferase (AST/SGOT): 24 (12-10-24 @ 08:17)  Aspartate Aminotransferase (AST/SGOT): 21 (12-09-24 @ 14:42)  Aspartate Aminotransferase (AST/SGOT): 36 (11-13-24 @ 07:18)  Bilirubin Total: 0.4 (04-21)  Bilirubin Total: 0.4 (04-20)  Bilirubin Total: 0.9 (12-10)  Bilirubin Total: 0.9 (12-09)  Bilirubin Total: 0.5 (11-13)      Trend LDH  04-22-25 @ 17:19  224  04-21-25 @ 16:40  218  12-12-24 @ 17:05  402[H]  12-10-24 @ 08:17  357[H]  12-09-24 @ 20:39  398[H]      Urinalysis Basic - ( 23 Apr 2025 08:50 )    Color: x / Appearance: x / SG: x / pH: x  Gluc: 96 mg/dL / Ketone: x  / Bili: x / Urobili: x   Blood: x / Protein: x / Nitrite: x   Leuk Esterase: x / RBC: x / WBC x   Sq Epi: x / Non Sq Epi: x / Bacteria: x        MICROBIOLOGY:  Vancomycin Level, Trough: 21.2 (04-23 @ 09:14)    MRSA PCR Result.: NotDetec (06-21-24 @ 11:10)      Culture - Blood (collected 20 Apr 2025 11:10)  Source: Blood None  Preliminary Report:    No growth at 48 Hours    Culture - Blood (collected 20 Apr 2025 11:10)  Source: Blood None  Preliminary Report:    No growth at 48 Hours    Culture - Blood (collected 11 Nov 2024 15:35)  Source: .Blood BLOOD  Final Report:    No growth at 5 days    Culture - Blood (collected 11 Nov 2024 15:34)  Source: .Blood BLOOD  Final Report:    No growth at 5 days    Culture - Blood (collected 11 Nov 2024 07:52)  Source: .Blood BLOOD  Final Report:    No growth at 5 days    Culture - Blood (collected 11 Nov 2024 07:44)  Source: .Blood BLOOD  Final Report:    No growth at 5 days    Urinalysis with Rflx Culture (collected 06 Nov 2024 16:36)    Culture - Urine (collected 06 Nov 2024 16:36)  Source: .Urine None  Final Report:    >100,000 CFU/ml Klebsiella pneumoniae ESBL  Organism: Klebsiella pneumoniae ESBL  Organism: Klebsiella pneumoniae ESBL    Sensitivities:      -  Levofloxacin: S <=0.5      -  Tobramycin: S <=2      -  Nitrofurantoin: S <=32 Should not be used to treat pyelonephritis      -  Aztreonam: R 8      -  Gentamicin: S <=2      -  Cefazolin: R >16 For uncomplicated UTI with K. pneumoniae, E. coli, or P. mirablis: LUIS ARMANDO <=16 is sensitive and LUIS ARMANDO >=32 is resistant. This also predicts results for oral agents cefaclor, cefdinir, cefpodoxime, cefprozil, cefuroxime axetil, cephalexin and locarbef for uncomplicated UTI. Note that some isolates may be susceptible to these agents while testing resistant to cefazolin.      -  Cefepime: R >16      -  Piperacillin/Tazobactam: S <=8      -  Ciprofloxacin: I 0.5      -  Imipenem: S <=1      -  Ceftriaxone: R >32      -  Ampicillin: R >16 These ampicillin results predict results for amoxicillin      Method Type: LUIS ARMANDO      -  Meropenem: S <=1      -  Ampicillin/Sulbactam: I 16/8      -  Cefuroxime: R >16      -  Trimethoprim/Sulfamethoxazole: R >2/38      -  Ertapenem: S <=0.5    Culture - Urine (collected 21 Jun 2024 11:10)  Source: Clean Catch Clean Catch (Midstream)  Final Report:    <10,000 CFU/mL Normal Urogenital Anjali    Culture - Urine (collected 26 Feb 2024 18:00)  Source: Clean Catch Clean Catch (Midstream)  Final Report:    10,000 - 49,000 CFU/mL Klebsiella pneumoniae ESBL    <10,000 CFU/ml Normal Urogenital anjali present  Organism: Klebsiella pneumoniae ESBL  Organism: Klebsiella pneumoniae ESBL    Sensitivities:      -  Levofloxacin: S <=0.5      -  Tobramycin: S <=2      -  Nitrofurantoin: I 64 Should not be used to treat pyelonephritis      -  Aztreonam: R 8      -  Gentamicin: S <=2      -  Cefazolin: R >16 For uncomplicated UTI with K. pneumoniae, E. coli, or P. mirablis: LUIS ARMANDO <=16 is sensitive and LUIS ARMANDO >=32 is resistant. This also predicts results for oral agents cefaclor, cefdinir, cefpodoxime, cefprozil, cefuroxime axetil, cephalexin and locarbef for uncomplicated UTI. Note that some isolates may be susceptible to these agents while testing resistant to cefazolin.      -  Cefepime: R >16      -  Piperacillin/Tazobactam: S <=8      -  Ciprofloxacin: I 0.5      -  Imipenem: S <=1      -  Ceftriaxone: R >32      -  Ampicillin: R >16 These ampicillin results predict results for amoxicillin      Method Type: LUIS ARMANDO      -  Meropenem: S <=1      -  Ampicillin/Sulbactam: I 16/8      -  Cefuroxime: R >16      -  Amoxicillin/Clavulanic Acid: S <=8/4      -  Trimethoprim/Sulfamethoxazole: R >2/38      -  Ertapenem: S <=0.5    C-Reactive Protein: 40.9 (04-20)    Ferritin: 360 (04-21)      Lactate Dehydrogenase, Serum: 224 (04-22)  Lactate Dehydrogenase, Serum: 218 (04-21)        Lactate, Blood: 0.9 (04-20 @ 11:10)    Sedimentation Rate, Erythrocyte: 111 mm/hr (04-20-25 @ 11:10)  Sedimentation Rate, Erythrocyte: 29 mm/hr (10-10-24 @ 00:43)      RADIOLOGY:  imaging below personally reviewed

## 2025-04-23 NOTE — PROGRESS NOTE ADULT - SUBJECTIVE AND OBJECTIVE BOX
HOSPITALIST ATTENDING PROGRESS NOTE    Chart and meds reviewed.  Patient seen and examined.    CC: cellulitis    Subjective: afebrile jules resolved; cellulitis improving    All other systems reviewed and found to be negative with the exception of what has been described above.    MEDICATIONS  (STANDING):  apixaban 5 milliGRAM(s) Oral two times a day  aspirin enteric coated 81 milliGRAM(s) Oral daily  atorvastatin 10 milliGRAM(s) Oral at bedtime  cefepime  Injectable. 2000 milliGRAM(s) IV Push every 24 hours  folic acid 1 milliGRAM(s) Oral daily  gabapentin 300 milliGRAM(s) Oral two times a day  influenza  Vaccine (HIGH DOSE) 0.5 milliLiter(s) IntraMuscular once  Nephro-ilana 1 Tablet(s) Oral daily  pantoprazole    Tablet 40 milliGRAM(s) Oral before breakfast  sodium chloride 0.9%. 1000 milliLiter(s) (100 mL/Hr) IV Continuous <Continuous>  torsemide 10 milliGRAM(s) Oral daily  valsartan 160 milliGRAM(s) Oral daily  vancomycin  IVPB 750 milliGRAM(s) IV Intermittent every 24 hours    MEDICATIONS  (PRN):  acetaminophen     Tablet .. 650 milliGRAM(s) Oral every 6 hours PRN Temp greater or equal to 38C (100.4F), Mild Pain (1 - 3)  aluminum hydroxide/magnesium hydroxide/simethicone Suspension 30 milliLiter(s) Oral every 4 hours PRN Dyspepsia  melatonin 3 milliGRAM(s) Oral at bedtime PRN Insomnia  morphine  - Injectable 2 milliGRAM(s) IV Push every 6 hours PRN Severe Pain (7 - 10)  ondansetron Injectable 4 milliGRAM(s) IV Push every 8 hours PRN Nausea and/or Vomiting      PHYSICAL EXAM:  Gen: NAD  CV:  +S1, +S2, regular, no murmurs or rubs  RESP:   lungs clear to auscultation bilaterally, no wheezing, rales, rhonchi, good air entry bilaterally  GI:  abdomen soft, non-tender, non-distended, normal BS, no bruits, no abdominal masses, no palpable masses  :  not examined  MSK:   normal muscle tone, no atrophy, no rigidity, no contractions  EXT:  b/l lower ext edema 2+ pitting pedal edema up to mid shin; RLE with erythema warmth TTP; 1inch x1inch wound partially scabbed with exposed stitches  NEURO:  AAOX3, no focal neurological deficits, follows all commands, able to move extremities spontaneously  LABS:                            8.9    8.10  )-----------( 257      ( 23 Apr 2025 08:50 )             28.5     04-23    141  |  111[H]  |  26[H]  ----------------------------<  96  4.1   |  24  |  1.00    Ca    9.3      23 Apr 2025 08:50              Urinalysis Basic - ( 23 Apr 2025 08:50 )    Color: x / Appearance: x / SG: x / pH: x  Gluc: 96 mg/dL / Ketone: x  / Bili: x / Urobili: x   Blood: x / Protein: x / Nitrite: x   Leuk Esterase: x / RBC: x / WBC x   Sq Epi: x / Non Sq Epi: x / Bacteria: x              CULTURES:  Culture Results:   No growth at 72 Hours (04-20-25 @ 11:10)  Culture Results:   No growth at 72 Hours (04-20-25 @ 11:10)

## 2025-04-23 NOTE — PROGRESS NOTE ADULT - SUBJECTIVE AND OBJECTIVE BOX
HPI:    80 y/o F with a PMHx of HTN, HLD, AS s/p TAVR, peDM, AFib on Eliquis, HFpEF EF 55-60%, CVA 11/2024 now presented to the ED c/o LE swelling worse on the R leg that started near the ankle and has went up to below the knee, took Tylenol with poor improvement. She reported she had MOHS procedure last month on her R leg and since has noticed an infection, started PO Keflex approx 5-7 days ago without improvement.      04/23/25: Seen at bedside, no acute distress       PAST MEDICAL & SURGICAL HISTORY:    History of Cardiac Arrhythmia    Hypertension    Atrial Fibrillation    Aortic stenosis    Nonrheumatic aortic valve stenosis    High cholesterol    H/O temporal arteritis    S/P Exploratory Laparotomy  1966    H/O prior ablation treatment    History of temporal artery biopsy    S/P foot surgery        MEDICATIONS  (STANDING):    apixaban 5 milliGRAM(s) Oral two times a day  aspirin enteric coated 81 milliGRAM(s) Oral daily  atorvastatin 10 milliGRAM(s) Oral at bedtime  cefepime  Injectable. 2000 milliGRAM(s) IV Push every 24 hours  folic acid 1 milliGRAM(s) Oral daily  gabapentin 300 milliGRAM(s) Oral two times a day  influenza  Vaccine (HIGH DOSE) 0.5 milliLiter(s) IntraMuscular once  Nephro-ilana 1 Tablet(s) Oral daily  pantoprazole    Tablet 40 milliGRAM(s) Oral before breakfast  sodium chloride 0.9%. 1000 milliLiter(s) (100 mL/Hr) IV Continuous <Continuous>  torsemide 10 milliGRAM(s) Oral daily  valsartan 160 milliGRAM(s) Oral daily  vancomycin  IVPB 750 milliGRAM(s) IV Intermittent every 24 hours      MEDICATIONS  (PRN):    acetaminophen     Tablet .. 650 milliGRAM(s) Oral every 6 hours PRN Temp greater or equal to 38C (100.4F), Mild Pain (1 - 3)  aluminum hydroxide/magnesium hydroxide/simethicone Suspension 30 milliLiter(s) Oral every 4 hours PRN Dyspepsia  melatonin 3 milliGRAM(s) Oral at bedtime PRN Insomnia  morphine  - Injectable 2 milliGRAM(s) IV Push every 6 hours PRN Severe Pain (7 - 10)  ondansetron Injectable 4 milliGRAM(s) IV Push every 8 hours PRN Nausea and/or Vomiting      ALLERGIES:    adhesives (Rash)      FAMILY HISTORY:    lung cancer (Father)      REVIEW OF SYSTEMS:    Constitutional, Eyes, ENT, Cardiovascular, Respiratory, Gastrointestinal, Genitourinary, Musculoskeletal, Integumentary, Neurological, Psychiatric, Endocrine, Heme/Lymph and Allergic/Immunologic review of systems are otherwise negative except as noted in HPI.       VITALS:    T(C): 36.7 (23 Apr 2025 13:00), Max: 36.8 (23 Apr 2025 00:00)  T(F): 98 (23 Apr 2025 13:00), Max: 98.3 (23 Apr 2025 00:00)  HR: 78 (23 Apr 2025 13:00) (71 - 79)  BP: 138/70 (23 Apr 2025 13:00) (129/65 - 150/69)  BP(mean): --  RR: 17 (23 Apr 2025 13:00) (17 - 18)  SpO2: 100% (23 Apr 2025 13:00) (94% - 100%)    Parameters below as of 23 Apr 2025 13:00  Patient On (Oxygen Delivery Method): room air        PHYSICAL:    Constitutional: no acute distress  Eyes: no conjunctival infection, anicteric  ENT: pharynx is unremarkable  Neck: supple without JVD  Pulmonary: clear to auscultation bilaterally  Cardiac: RRR  Abdomen: normoactive bowel sounds, soft and nontender   Lymphatic: no peripheral adenopathy appreciated  Musculoskeletal: full range of motion and no deformities appreciated  Skin: RLE erythema, warmth, TTP anterior aspect with extension superiorly and inferiorly; otherwise normal   Neurology: awake, alert, oriented        LABS:    CBC Full  -  ( 23 Apr 2025 08:50 )  WBC Count : 8.10 K/uL  RBC Count : 2.82 M/uL  Hemoglobin : 8.9 g/dL  Hematocrit : 28.5 %  Platelet Count - Automated : 257 K/uL  Mean Cell Volume : 101.1 fl  Mean Cell Hemoglobin : 31.6 pg  Mean Cell Hemoglobin Concentration : 31.2 g/dL  Auto Neutrophil # : x  Auto Lymphocyte # : x  Auto Monocyte # : x  Auto Eosinophil # : x  Auto Basophil # : x  Auto Neutrophil % : x  Auto Lymphocyte % : x  Auto Monocyte % : x  Auto Eosinophil % : x  Auto Basophil % : x    04-23    141  |  111[H]  |  26[H]  ----------------------------<  96  4.1   |  24  |  1.00    Ca    9.3      23 Apr 2025 08:50        Urinalysis Basic - ( 23 Apr 2025 08:50 )    Color: x / Appearance: x / SG: x / pH: x  Gluc: 96 mg/dL / Ketone: x  / Bili: x / Urobili: x   Blood: x / Protein: x / Nitrite: x   Leuk Esterase: x / RBC: x / WBC x   Sq Epi: x / Non Sq Epi: x / Bacteria: x        RADIOLOGY & ADDITIONAL STUDIES:      CT Lower Extremity w/ IV Cont, Right (04.20.25 @ 12:09)    FINDINGS:  There is diffuse subcutaneous tissue edema and swelling throughout the   leg and ankle with associated thickening of the skin. At the middle third   of the anterior leg, there is soft tissue irregularity with punctate foci   of surrounding soft tissue gas present. Superior to this soft tissue   irregularity, there is focal nodule within the subcutaneous/cutaneous   tissues along the anterior shin. No tracking soft tissue gas is present.   No discrete organized fluid collection is noted.    No osseous destruction or aggressive periosteal reaction. No fracture or   dislocation. No productive changes at the knee or ankle. Os navicularis   identified. Vascular calcification is present.      IMPRESSION:  Diffuse subcutaneous tissue edema and swelling with presence of soft   tissue irregularity at the middle third of the anterior leg as described.   No organized fluid collection identified.        US Duplex Venous Lower Ext Complete, Bilateral (04.20.25 @ 12:20)    FINDINGS:    RIGHT:  Normal compressibility of the RIGHT common femoral, femoral and popliteal   veins.  Doppler examination shows normal spontaneous and phasic flow.  No RIGHT calf vein thrombosis is detected.    LEFT:  Normal compressibility of the LEFT common femoral, femoral and popliteal   veins.  Doppler examination shows normal spontaneous and phasic flow.  No LEFT calf vein thrombosis is detected.    Diffuse bilateral subcutaneous calf edema    IMPRESSION:  No evidence of deep venous thrombosis in either lower extremity.

## 2025-04-23 NOTE — PROGRESS NOTE ADULT - ASSESSMENT
79 F with AS s/p TAVR, AF on AC, anemia, cirrhosis,  presents with LE swelling-- being treated  for cellulitis    LE edema- multifactorial- infection, right sided elevated heart pressures( right sided failure)  recommend treatment of cellulitis wiht abx-- recommend adequate nutrition-- adequate protein intake as she has low albumin     low normal LVEF, elevated  right sided pressures-- if renal function is stable-- will initiate meidcatiosn for CHF --  farxiga, nitrates, diuretics etc     AF- HR controlled- continue Eliquis    anemia- seen  and managed by hematology       BHARAT--  recommend gentle hydration and recheck of renal function---acknowledge increased BNP ( can always diurese after treatment of infection  )-- follow daily chem and BNP levels      will follow

## 2025-04-24 ENCOUNTER — NON-APPOINTMENT (OUTPATIENT)
Age: 80
End: 2025-04-24

## 2025-04-24 ENCOUNTER — APPOINTMENT (OUTPATIENT)
Dept: HEMATOLOGY ONCOLOGY | Facility: CLINIC | Age: 80
End: 2025-04-24

## 2025-04-24 LAB
ADD ON TEST-SPECIMEN IN LAB: SIGNIFICANT CHANGE UP
ANION GAP SERPL CALC-SCNC: 5 MMOL/L — SIGNIFICANT CHANGE UP (ref 5–17)
BLD GP AB SCN SERPL QL: SIGNIFICANT CHANGE UP
BUN SERPL-MCNC: 30 MG/DL — HIGH (ref 7–23)
CALCIUM SERPL-MCNC: 9.1 MG/DL — SIGNIFICANT CHANGE UP (ref 8.5–10.1)
CHLORIDE SERPL-SCNC: 111 MMOL/L — HIGH (ref 96–108)
CO2 SERPL-SCNC: 25 MMOL/L — SIGNIFICANT CHANGE UP (ref 22–31)
CREAT SERPL-MCNC: 0.85 MG/DL — SIGNIFICANT CHANGE UP (ref 0.5–1.3)
EGFR: 70 ML/MIN/1.73M2 — SIGNIFICANT CHANGE UP
EGFR: 70 ML/MIN/1.73M2 — SIGNIFICANT CHANGE UP
GLUCOSE SERPL-MCNC: 92 MG/DL — SIGNIFICANT CHANGE UP (ref 70–99)
HCT VFR BLD CALC: 24.7 % — LOW (ref 34.5–45)
HGB BLD-MCNC: 7.9 G/DL — LOW (ref 11.5–15.5)
MCHC RBC-ENTMCNC: 31.9 PG — SIGNIFICANT CHANGE UP (ref 27–34)
MCHC RBC-ENTMCNC: 32 G/DL — SIGNIFICANT CHANGE UP (ref 32–36)
MCV RBC AUTO: 99.6 FL — SIGNIFICANT CHANGE UP (ref 80–100)
NRBC # BLD AUTO: 0 K/UL — SIGNIFICANT CHANGE UP (ref 0–0)
NRBC # FLD: 0 K/UL — SIGNIFICANT CHANGE UP (ref 0–0)
NRBC BLD AUTO-RTO: 0 /100 WBCS — SIGNIFICANT CHANGE UP (ref 0–0)
NT-PROBNP SERPL-SCNC: 1876 PG/ML — HIGH (ref 0–450)
PLATELET # BLD AUTO: 235 K/UL — SIGNIFICANT CHANGE UP (ref 150–400)
PMV BLD: 9.8 FL — SIGNIFICANT CHANGE UP (ref 7–13)
POTASSIUM SERPL-MCNC: 3.8 MMOL/L — SIGNIFICANT CHANGE UP (ref 3.5–5.3)
POTASSIUM SERPL-SCNC: 3.8 MMOL/L — SIGNIFICANT CHANGE UP (ref 3.5–5.3)
RBC # BLD: 2.48 M/UL — LOW (ref 3.8–5.2)
RBC # FLD: 15.2 % — HIGH (ref 10.3–14.5)
SODIUM SERPL-SCNC: 141 MMOL/L — SIGNIFICANT CHANGE UP (ref 135–145)
VANCOMYCIN TROUGH SERPL-MCNC: 7.9 UG/ML — LOW (ref 10–20)
WBC # BLD: 9.12 K/UL — SIGNIFICANT CHANGE UP (ref 3.8–10.5)
WBC # FLD AUTO: 9.12 K/UL — SIGNIFICANT CHANGE UP (ref 3.8–10.5)

## 2025-04-24 PROCEDURE — 99232 SBSQ HOSP IP/OBS MODERATE 35: CPT | Mod: FS

## 2025-04-24 PROCEDURE — 99233 SBSQ HOSP IP/OBS HIGH 50: CPT

## 2025-04-24 RX ORDER — VANCOMYCIN HCL IN 5 % DEXTROSE 1.5G/250ML
750 PLASTIC BAG, INJECTION (ML) INTRAVENOUS EVERY 12 HOURS
Refills: 0 | Status: DISCONTINUED | OUTPATIENT
Start: 2025-04-24 | End: 2025-04-25

## 2025-04-24 RX ORDER — CEFEPIME 2 G/20ML
2000 INJECTION, POWDER, FOR SOLUTION INTRAVENOUS EVERY 24 HOURS
Refills: 0 | Status: DISCONTINUED | OUTPATIENT
Start: 2025-04-24 | End: 2025-04-24

## 2025-04-24 RX ORDER — CEFEPIME 2 G/20ML
2000 INJECTION, POWDER, FOR SOLUTION INTRAVENOUS EVERY 24 HOURS
Refills: 0 | Status: DISCONTINUED | OUTPATIENT
Start: 2025-04-25 | End: 2025-04-25

## 2025-04-24 RX ORDER — FUROSEMIDE 10 MG/ML
40 INJECTION INTRAMUSCULAR; INTRAVENOUS ONCE
Refills: 0 | Status: COMPLETED | OUTPATIENT
Start: 2025-04-24 | End: 2025-04-24

## 2025-04-24 RX ADMIN — Medication 650 MILLIGRAM(S): at 21:28

## 2025-04-24 RX ADMIN — Medication 650 MILLIGRAM(S): at 14:49

## 2025-04-24 RX ADMIN — Medication 160 MILLIGRAM(S): at 10:51

## 2025-04-24 RX ADMIN — Medication 650 MILLIGRAM(S): at 15:41

## 2025-04-24 RX ADMIN — Medication 10 MILLIGRAM(S): at 10:51

## 2025-04-24 RX ADMIN — Medication 650 MILLIGRAM(S): at 03:51

## 2025-04-24 RX ADMIN — Medication 1 TABLET(S): at 10:50

## 2025-04-24 RX ADMIN — Medication 250 MILLIGRAM(S): at 21:21

## 2025-04-24 RX ADMIN — ATORVASTATIN CALCIUM 10 MILLIGRAM(S): 80 TABLET, FILM COATED ORAL at 21:22

## 2025-04-24 RX ADMIN — GABAPENTIN 300 MILLIGRAM(S): 400 CAPSULE ORAL at 21:22

## 2025-04-24 RX ADMIN — Medication 81 MILLIGRAM(S): at 10:51

## 2025-04-24 RX ADMIN — FOLIC ACID 1 MILLIGRAM(S): 1 TABLET ORAL at 10:50

## 2025-04-24 RX ADMIN — Medication 1 APPLICATION(S): at 10:51

## 2025-04-24 RX ADMIN — CEFEPIME 2000 MILLIGRAM(S): 2 INJECTION, POWDER, FOR SOLUTION INTRAVENOUS at 10:47

## 2025-04-24 RX ADMIN — APIXABAN 5 MILLIGRAM(S): 2.5 TABLET, FILM COATED ORAL at 21:22

## 2025-04-24 RX ADMIN — Medication 650 MILLIGRAM(S): at 04:20

## 2025-04-24 RX ADMIN — FUROSEMIDE 40 MILLIGRAM(S): 10 INJECTION INTRAMUSCULAR; INTRAVENOUS at 21:47

## 2025-04-24 RX ADMIN — Medication 650 MILLIGRAM(S): at 21:58

## 2025-04-24 RX ADMIN — Medication 3 MILLIGRAM(S): at 21:21

## 2025-04-24 RX ADMIN — GABAPENTIN 300 MILLIGRAM(S): 400 CAPSULE ORAL at 10:51

## 2025-04-24 RX ADMIN — APIXABAN 5 MILLIGRAM(S): 2.5 TABLET, FILM COATED ORAL at 10:51

## 2025-04-24 RX ADMIN — Medication 250 MILLIGRAM(S): at 10:41

## 2025-04-24 NOTE — PROGRESS NOTE ADULT - ASSESSMENT
Assessment:  79F with HTN, HLD, AS s/p TAVR, preDM, Atrial fibrillation on eliquis, HFpEF EF 55-60%, CVA 11/24, R 2nd rib fx, hx temporal arteritis, recent R leg Mohs procedure 1 month ago, presents 4/20 with worsening RLE swelling and pain for 1 week  Mohs surgery results were benign, no malignancy, but 1 week after procedure, noted erythema, pain and swelling  Started Keflex by Plastics outpatient and no improvement despite ~5 days so far  Denies any fever or chills, cough, abdominal pain, n/v, diarrhea or dysuria  Afebrile on admission, on RA  No leukocytosis  Cr 1.53   CRP 40  CT RLE with Diffuse subcutaneous tissue edema and swelling with presence of soft tissue irregularity at the middle third of the anterior leg as described. No organized fluid collection identified.  BCx 4/20 negative    Antimicrobials:  Vanco and Zosyn x1 (4/20)  trimethoprim  160 mG/sulfamethoxazole 800 mG 1 two times a day (4/20 --- 4/21)  Vanco (4/21 --- )  Cefepime (4/21 --- )    Impression:   #RLE Cellulitis, Surgical Wound Infection  #BHARAT improving  #Anemia  - s/p bedside debridement (4/22) by Plastics  - remains afebrile, no leukocytosis    Recommendations:  - continue empiric Vanco 750mg q12 (Day #4)  - please check Vancomycin trough before 4th sequential dose  - continue empiric Cefepime 2G q24 (Day #4)  - monitor temperature curve  - trend WBC  - reason for abx use reviewed with patient  - side effects of antibiotic discussed, tolerating abx well so far  - Prior cultures reviewed. An epidemiologic assessment was performed. There is a significant risk for resistant microorganisms to spread to family members, and/or healthcare staff. Isolation precautions based on infection control policy. Will reconsider further isolation measures based on new culture results and other clinical data as appropriate Appropriate cultures collected and an appropriate broad spectrum antibiotic therapy will be considered  - Wound Care  - Plastics Surgery following  - Heme following; plan for transfusion  - if continues to improve, complete 10-day course empiric antibiotic; oral option Doxy 100mg q12 and Cefuroxime 500mg q12  - rest per primary team    Clinical team may change from intravenous to oral antibiotics when the following criteria are met:   1. Patient is clinically improving/stable       a)	Improved signs and symptoms of infection from initial presentation       b)	Afebrile for 24 hours       c)	Leukocytosis trending towards normal range   2. Patient is tolerating oral intake   3. Initial/repeat blood cultures are negative OR do not need to wait for preliminary blood cultures to result    When above criteria met may change iv antibiotics to an oral agent as above

## 2025-04-24 NOTE — PROGRESS NOTE ADULT - SUBJECTIVE AND OBJECTIVE BOX
HOSPITALIST ATTENDING PROGRESS NOTE    Chart and meds reviewed.  Patient seen and examined.    CC: cellulitis    Subjective: anemia worsening this morning. 7.9 - hem recommending prbc    All other systems reviewed and found to be negative with the exception of what has been described above.    MEDICATIONS  (STANDING):  apixaban 5 milliGRAM(s) Oral two times a day  aspirin enteric coated 81 milliGRAM(s) Oral daily  atorvastatin 10 milliGRAM(s) Oral at bedtime  cefepime  Injectable. 2000 milliGRAM(s) IV Push every 24 hours  collagenase Ointment 1 Application(s) Topical daily  folic acid 1 milliGRAM(s) Oral daily  gabapentin 300 milliGRAM(s) Oral two times a day  influenza  Vaccine (HIGH DOSE) 0.5 milliLiter(s) IntraMuscular once  Nephro-ilana 1 Tablet(s) Oral daily  pantoprazole    Tablet 40 milliGRAM(s) Oral before breakfast  sodium chloride 0.9%. 1000 milliLiter(s) (100 mL/Hr) IV Continuous <Continuous>  torsemide 10 milliGRAM(s) Oral daily  valsartan 160 milliGRAM(s) Oral daily  vancomycin  IVPB 750 milliGRAM(s) IV Intermittent every 12 hours    MEDICATIONS  (PRN):  acetaminophen     Tablet .. 650 milliGRAM(s) Oral every 6 hours PRN Temp greater or equal to 38C (100.4F), Mild Pain (1 - 3)  aluminum hydroxide/magnesium hydroxide/simethicone Suspension 30 milliLiter(s) Oral every 4 hours PRN Dyspepsia  melatonin 3 milliGRAM(s) Oral at bedtime PRN Insomnia  morphine  - Injectable 2 milliGRAM(s) IV Push every 6 hours PRN Severe Pain (7 - 10)  ondansetron Injectable 4 milliGRAM(s) IV Push every 8 hours PRN Nausea and/or Vomiting      PHYSICAL EXAM:  Gen: NAD  CV:  +S1, +S2, regular, no murmurs or rubs  RESP:   lungs clear to auscultation bilaterally, no wheezing, rales, rhonchi, good air entry bilaterally  GI:  abdomen soft, non-tender, non-distended, normal BS, no bruits, no abdominal masses, no palpable masses  :  not examined  MSK:   normal muscle tone, no atrophy, no rigidity, no contractions  EXT:  b/l lower ext edema 2+ pitting pedal edema up to mid shin; RLE with erythema warmth TTP; 1inch x1inch wound partially scabbed with exposed stitches  NEURO:  AAOX3, no focal neurological deficits, follows all commands, able to move extremities spontaneously    LABS:                            7.9    9.12  )-----------( 235      ( 24 Apr 2025 06:22 )             24.7     04-24    141  |  111[H]  |  30[H]  ----------------------------<  92  3.8   |  25  |  0.85    Ca    9.1      24 Apr 2025 06:22              Urinalysis Basic - ( 24 Apr 2025 06:22 )    Color: x / Appearance: x / SG: x / pH: x  Gluc: 92 mg/dL / Ketone: x  / Bili: x / Urobili: x   Blood: x / Protein: x / Nitrite: x   Leuk Esterase: x / RBC: x / WBC x   Sq Epi: x / Non Sq Epi: x / Bacteria: x              CULTURES:  Culture Results:   No growth at 4 days (04-20-25 @ 11:10)  Culture Results:   No growth at 4 days (04-20-25 @ 11:10)

## 2025-04-24 NOTE — PROGRESS NOTE ADULT - ASSESSMENT
#cellulitis s/p MOHS surgery to RLE   #open wound   #subcutaneous gas on CT RLE   abx switched to vanc and cefepime per id  plastic surgery consult - s/p debridement of eschar at bedside  f/u blood cultures - ng x 4d  morphine 2mg iv q6 prn for severe pain  cellulitis continues to improve - will continue with iv antibiotics   vanc adjusted to 750 q12  cefepime 2gram q12    #anemia   pt not clear was her diagnosis is but checks her hgb weekly with Dr. Milligan  hx of hemolysis per chart review  normally hgb 10-11   gets iv iron frequently - iron studies wnl  hematology consult - no evidence of hemolysis from tavr; given one dose of procrit per hem onc  hgb dropped to 7.9 this morning; hem onc recommending 1 unit prbc; 40 iv lasix post transfusion    #b/l pitting edema   #hx of tavr   #CHFpEF  december echo reviewed   repeat echo - with normal EF 50% but grade 3 diastolic dysfunction and well seated TAVR  check pro-bnp - mildly elevated but similar to prior values  resume torsemide as jules resolved  40 iv lasix post transfusion    #jules   baseline wnl   cr 1.53 s/p toradol--> 1.64 --> 1.37-->1.00--.85  pt ordered IVF after cxr showed no effusions  resume valsartan and torsemide    #HTN   valsartan resumed 4/23    #HLD  Check Lipid panel  Continue Atorvastatin 10mg    #AS s/p TAVR Las echo with Mild regurgitation  Continue surveillance  repeat echo    #Afib on Eliquis - Rate controlled  Continue Eliquis 5mg BID  controlled without rate controlling agent    #H/o CVA  Continue aspirin and statins    #Prediabetes  Check A1c  Monitor fingersticks    #HCM  DVT pps: Eliquis  GI ppx: Oral PPIs  Dispo: receiving transfusion today - dc tomorrow on po abx

## 2025-04-24 NOTE — PROGRESS NOTE ADULT - ASSESSMENT
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  - Followed up with Primary Heme Onc NP Viola Sauer in outpatient setting to receive iron supplementation     - Now admitted with acute cellulitis s/p recent MOHS surgery to RLE anterior tibfib region  - Hgb lower than baseline, currently about 9.0 g/dL but dropping ---> now <8.0 g/dL ---> needs PRBC transfusion  - Had been admitted with mild BHARAT that is now resolved, possibly contributory, received x1 dose JERSON 04/22/25  - For now, continue to trend serial CBCs while admitted   - Continue supportive measures as necessary         Chester Alejandra PA-C  Hematology Oncology   [facility] [place]  [phone]

## 2025-04-24 NOTE — PROGRESS NOTE ADULT - SUBJECTIVE AND OBJECTIVE BOX
HPI:    80 y/o F with a PMHx of HTN, HLD, AS s/p TAVR, peDM, AFib on Eliquis, HFpEF EF 55-60%, CVA 11/2024 now presented to the ED c/o LE swelling worse on the R leg that started near the ankle and has went up to below the knee, took Tylenol with poor improvement. She reported she had MOHS procedure last month on her R leg and since has noticed an infection, started PO Keflex approx 5-7 days ago without improvement.      04/23/25: Seen at bedside, no acute distress     04/24/25: To be seen      PAST MEDICAL & SURGICAL HISTORY:    History of Cardiac Arrhythmia    Hypertension    Atrial Fibrillation    Aortic stenosis    Nonrheumatic aortic valve stenosis    High cholesterol    H/O temporal arteritis    S/P Exploratory Laparotomy  1966    H/O prior ablation treatment    History of temporal artery biopsy    S/P foot surgery        MEDICATIONS  (STANDING):    apixaban 5 milliGRAM(s) Oral two times a day  aspirin enteric coated 81 milliGRAM(s) Oral daily  atorvastatin 10 milliGRAM(s) Oral at bedtime  cefepime  Injectable. 2000 milliGRAM(s) IV Push every 24 hours  collagenase Ointment 1 Application(s) Topical daily  folic acid 1 milliGRAM(s) Oral daily  gabapentin 300 milliGRAM(s) Oral two times a day  influenza  Vaccine (HIGH DOSE) 0.5 milliLiter(s) IntraMuscular once  Nephro-ilana 1 Tablet(s) Oral daily  pantoprazole    Tablet 40 milliGRAM(s) Oral before breakfast  sodium chloride 0.9%. 1000 milliLiter(s) (100 mL/Hr) IV Continuous <Continuous>  torsemide 10 milliGRAM(s) Oral daily  valsartan 160 milliGRAM(s) Oral daily  vancomycin  IVPB 750 milliGRAM(s) IV Intermittent every 24 hours      MEDICATIONS  (PRN):    acetaminophen     Tablet .. 650 milliGRAM(s) Oral every 6 hours PRN Temp greater or equal to 38C (100.4F), Mild Pain (1 - 3)  aluminum hydroxide/magnesium hydroxide/simethicone Suspension 30 milliLiter(s) Oral every 4 hours PRN Dyspepsia  melatonin 3 milliGRAM(s) Oral at bedtime PRN Insomnia  morphine  - Injectable 2 milliGRAM(s) IV Push every 6 hours PRN Severe Pain (7 - 10)  ondansetron Injectable 4 milliGRAM(s) IV Push every 8 hours PRN Nausea and/or Vomiting        ALLERGIES:    adhesives (Rash)      FAMILY HISTORY:    lung cancer (Father)      REVIEW OF SYSTEMS:    Constitutional, Eyes, ENT, Cardiovascular, Respiratory, Gastrointestinal, Genitourinary, Musculoskeletal, Integumentary, Neurological, Psychiatric, Endocrine, Heme/Lymph and Allergic/Immunologic review of systems are otherwise negative except as noted in HPI.       VITALS:    T(C): 36.6 (24 Apr 2025 00:01), Max: 36.7 (23 Apr 2025 13:00)  T(F): 97.9 (24 Apr 2025 00:01), Max: 98.1 (23 Apr 2025 16:59)  HR: 70 (24 Apr 2025 00:01) (70 - 80)  BP: 135/70 (24 Apr 2025 00:01) (108/64 - 138/70)  BP(mean): --  ABP: --  ABP(mean): --  RR: 17 (24 Apr 2025 00:01) (16 - 17)  SpO2: 95% (24 Apr 2025 00:01) (93% - 100%)    O2 Parameters below as of 24 Apr 2025 00:01  Patient On (Oxygen Delivery Method): room air        PHYSICAL:    Constitutional: no acute distress  Eyes: no conjunctival infection, anicteric  ENT: pharynx is unremarkable  Neck: supple without JVD  Pulmonary: clear to auscultation bilaterally  Cardiac: RRR  Abdomen: normoactive bowel sounds, soft and nontender   Lymphatic: no peripheral adenopathy appreciated  Musculoskeletal: full range of motion and no deformities appreciated  Skin: RLE erythema, warmth, TTP anterior aspect with extension superiorly and inferiorly; otherwise normal   Neurology: awake, alert, oriented        LABS:                          7.9    9.12  )-----------( 235      ( 24 Apr 2025 06:22 )             24.7     04-24    141  |  111[H]  |  30[H]  ----------------------------<  92  3.8   |  25  |  0.85    Ca    9.1      24 Apr 2025 06:22        RADIOLOGY & ADDITIONAL STUDIES:      CT Lower Extremity w/ IV Cont, Right (04.20.25 @ 12:09)    FINDINGS:  There is diffuse subcutaneous tissue edema and swelling throughout the   leg and ankle with associated thickening of the skin. At the middle third   of the anterior leg, there is soft tissue irregularity with punctate foci   of surrounding soft tissue gas present. Superior to this soft tissue   irregularity, there is focal nodule within the subcutaneous/cutaneous   tissues along the anterior shin. No tracking soft tissue gas is present.   No discrete organized fluid collection is noted.    No osseous destruction or aggressive periosteal reaction. No fracture or   dislocation. No productive changes at the knee or ankle. Os navicularis   identified. Vascular calcification is present.      IMPRESSION:  Diffuse subcutaneous tissue edema and swelling with presence of soft   tissue irregularity at the middle third of the anterior leg as described.   No organized fluid collection identified.        US Duplex Venous Lower Ext Complete, Bilateral (04.20.25 @ 12:20)    FINDINGS:    RIGHT:  Normal compressibility of the RIGHT common femoral, femoral and popliteal   veins.  Doppler examination shows normal spontaneous and phasic flow.  No RIGHT calf vein thrombosis is detected.    LEFT:  Normal compressibility of the LEFT common femoral, femoral and popliteal   veins.  Doppler examination shows normal spontaneous and phasic flow.  No LEFT calf vein thrombosis is detected.    Diffuse bilateral subcutaneous calf edema    IMPRESSION:  No evidence of deep venous thrombosis in either lower extremity.       HPI:    80 y/o F with a PMHx of HTN, HLD, AS s/p TAVR, peDM, AFib on Eliquis, HFpEF EF 55-60%, CVA 11/2024 now presented to the ED c/o LE swelling worse on the R leg that started near the ankle and has went up to below the knee, took Tylenol with poor improvement. She reported she had MOHS procedure last month on her R leg and since has noticed an infection, started PO Keflex approx 5-7 days ago without improvement.      04/23/25: Seen at bedside, no acute distress     04/24/25: Seen at bedside, agreeable to PRBC transfusional support at this time given drop in H/H      PAST MEDICAL & SURGICAL HISTORY:    History of Cardiac Arrhythmia    Hypertension    Atrial Fibrillation    Aortic stenosis    Nonrheumatic aortic valve stenosis    High cholesterol    H/O temporal arteritis    S/P Exploratory Laparotomy  1966    H/O prior ablation treatment    History of temporal artery biopsy    S/P foot surgery        MEDICATIONS  (STANDING):    apixaban 5 milliGRAM(s) Oral two times a day  aspirin enteric coated 81 milliGRAM(s) Oral daily  atorvastatin 10 milliGRAM(s) Oral at bedtime  cefepime  Injectable. 2000 milliGRAM(s) IV Push every 24 hours  collagenase Ointment 1 Application(s) Topical daily  folic acid 1 milliGRAM(s) Oral daily  gabapentin 300 milliGRAM(s) Oral two times a day  influenza  Vaccine (HIGH DOSE) 0.5 milliLiter(s) IntraMuscular once  Nephro-ilana 1 Tablet(s) Oral daily  pantoprazole    Tablet 40 milliGRAM(s) Oral before breakfast  sodium chloride 0.9%. 1000 milliLiter(s) (100 mL/Hr) IV Continuous <Continuous>  torsemide 10 milliGRAM(s) Oral daily  valsartan 160 milliGRAM(s) Oral daily  vancomycin  IVPB 750 milliGRAM(s) IV Intermittent every 24 hours      MEDICATIONS  (PRN):    acetaminophen     Tablet .. 650 milliGRAM(s) Oral every 6 hours PRN Temp greater or equal to 38C (100.4F), Mild Pain (1 - 3)  aluminum hydroxide/magnesium hydroxide/simethicone Suspension 30 milliLiter(s) Oral every 4 hours PRN Dyspepsia  melatonin 3 milliGRAM(s) Oral at bedtime PRN Insomnia  morphine  - Injectable 2 milliGRAM(s) IV Push every 6 hours PRN Severe Pain (7 - 10)  ondansetron Injectable 4 milliGRAM(s) IV Push every 8 hours PRN Nausea and/or Vomiting        ALLERGIES:    adhesives (Rash)      FAMILY HISTORY:    lung cancer (Father)      REVIEW OF SYSTEMS:    Constitutional, Eyes, ENT, Cardiovascular, Respiratory, Gastrointestinal, Genitourinary, Musculoskeletal, Integumentary, Neurological, Psychiatric, Endocrine, Heme/Lymph and Allergic/Immunologic review of systems are otherwise negative except as noted in HPI.       VITALS:    T(C): 36.6 (24 Apr 2025 00:01), Max: 36.7 (23 Apr 2025 13:00)  T(F): 97.9 (24 Apr 2025 00:01), Max: 98.1 (23 Apr 2025 16:59)  HR: 70 (24 Apr 2025 00:01) (70 - 80)  BP: 135/70 (24 Apr 2025 00:01) (108/64 - 138/70)  BP(mean): --  ABP: --  ABP(mean): --  RR: 17 (24 Apr 2025 00:01) (16 - 17)  SpO2: 95% (24 Apr 2025 00:01) (93% - 100%)    O2 Parameters below as of 24 Apr 2025 00:01  Patient On (Oxygen Delivery Method): room air        PHYSICAL:    Constitutional: no acute distress  Eyes: no conjunctival infection, anicteric  ENT: pharynx is unremarkable  Neck: supple without JVD  Pulmonary: clear to auscultation bilaterally  Cardiac: RRR  Abdomen: normoactive bowel sounds, soft and nontender   Lymphatic: no peripheral adenopathy appreciated  Musculoskeletal: full range of motion and no deformities appreciated  Skin: RLE erythema, warmth, TTP anterior aspect with extension superiorly and inferiorly; otherwise normal   Neurology: awake, alert, oriented        LABS:                          7.9    9.12  )-----------( 235      ( 24 Apr 2025 06:22 )             24.7     04-24    141  |  111[H]  |  30[H]  ----------------------------<  92  3.8   |  25  |  0.85    Ca    9.1      24 Apr 2025 06:22        RADIOLOGY & ADDITIONAL STUDIES:      CT Lower Extremity w/ IV Cont, Right (04.20.25 @ 12:09)    FINDINGS:  There is diffuse subcutaneous tissue edema and swelling throughout the   leg and ankle with associated thickening of the skin. At the middle third   of the anterior leg, there is soft tissue irregularity with punctate foci   of surrounding soft tissue gas present. Superior to this soft tissue   irregularity, there is focal nodule within the subcutaneous/cutaneous   tissues along the anterior shin. No tracking soft tissue gas is present.   No discrete organized fluid collection is noted.    No osseous destruction or aggressive periosteal reaction. No fracture or   dislocation. No productive changes at the knee or ankle. Os navicularis   identified. Vascular calcification is present.      IMPRESSION:  Diffuse subcutaneous tissue edema and swelling with presence of soft   tissue irregularity at the middle third of the anterior leg as described.   No organized fluid collection identified.        US Duplex Venous Lower Ext Complete, Bilateral (04.20.25 @ 12:20)    FINDINGS:    RIGHT:  Normal compressibility of the RIGHT common femoral, femoral and popliteal   veins.  Doppler examination shows normal spontaneous and phasic flow.  No RIGHT calf vein thrombosis is detected.    LEFT:  Normal compressibility of the LEFT common femoral, femoral and popliteal   veins.  Doppler examination shows normal spontaneous and phasic flow.  No LEFT calf vein thrombosis is detected.    Diffuse bilateral subcutaneous calf edema    IMPRESSION:  No evidence of deep venous thrombosis in either lower extremity.

## 2025-04-24 NOTE — PROGRESS NOTE ADULT - SUBJECTIVE AND OBJECTIVE BOX
Date of Service:04-24-25 @ 11:22  Interval History/ROS: Afebrile overnight, comfortable on RA  Still complaining of leg pain but redness is better  Denies any fever or chills       REVIEW OF SYSTEMS  [  ] ROS unobtainable because:    [ x ] All other systems negative except as noted below    Constitutional:  [ ] fever [ ] chills  [ ] weight loss  [ ]night sweat  [ ]poor appetite/PO intake [ ]fatigue   Skin:  [ ] rash [ ] phlebitis	  Eyes: [ ] icterus [ ] pain  [ ] discharge	  ENMT: [ ] sore throat  [ ] thrush [ ] ulcers [ ] exudates [ ]anosmia  Respiratory: [ ] dyspnea [ ] hemoptysis [ ] cough [ ] sputum	  Cardiovascular:  [ ] chest pain [ ] palpitations [ ] edema	  Gastrointestinal:  [ ] nausea [ ] vomiting [ ] diarrhea [ ] constipation [ ] pain	  Genitourinary:  [ ] dysuria [ ] frequency [ ] hematuria [ ] discharge [ ] flank pain  [ ] incontinence  Musculoskeletal:  [ ] myalgias [ ] arthralgias [ ] arthritis  [ ] back pain  Neurological:  [ ] headache [ ] weakness [ ] seizures  [ ] confusion/altered mental status    Allergies  adhesives (Rash)  No Known Drug Allergies        ANTIMICROBIALS:    cefepime  Injectable. 2000 every 24 hours        OTHER MEDS: MEDICATIONS  (STANDING):  acetaminophen     Tablet .. 650 every 6 hours PRN  aluminum hydroxide/magnesium hydroxide/simethicone Suspension 30 every 4 hours PRN  apixaban 5 two times a day  aspirin enteric coated 81 daily  atorvastatin 10 at bedtime  gabapentin 300 two times a day  influenza  Vaccine (HIGH DOSE) 0.5 once  melatonin 3 at bedtime PRN  morphine  - Injectable 2 every 6 hours PRN  ondansetron Injectable 4 every 8 hours PRN  pantoprazole    Tablet 40 before breakfast  torsemide 10 daily  valsartan 160 daily      Vital Signs Last 24 Hrs  T(F): 97.6 (04-24-25 @ 08:53), Max: 98.3 (04-23-25 @ 00:00)    Vital Signs Last 24 Hrs  HR: 78 (04-24-25 @ 08:53) (70 - 80)  BP: 132/72 (04-24-25 @ 08:53) (108/64 - 138/70)  RR: 18 (04-24-25 @ 08:53)  SpO2: 97% (04-24-25 @ 08:53) (93% - 100%)  Wt(kg): --    EXAM:    Constitutional:  frail, NAD  Head/Eyes: no icterus  LUNGS:  CTA  CVS:  regular rhythm  Abd:  soft, non-tender; non-distended  Ext:  RLE swelling, wound anterior shin with periwound erythema, TTP  Vascular:  IV site no erythema tenderness or discharge  Neuro: AAO X 3, non- focal      Labs:                        7.9    9.12  )-----------( 235      ( 24 Apr 2025 06:22 )             24.7     04-24    141  |  111[H]  |  30[H]  ----------------------------<  92  3.8   |  25  |  0.85    Ca    9.1      24 Apr 2025 06:22        WBC Trend:  WBC Count: 9.12 (04-24-25 @ 06:22)  WBC Count: 8.10 (04-23-25 @ 08:50)  WBC Count: 8.41 (04-22-25 @ 07:16)  WBC Count: 9.01 (04-21-25 @ 16:39)      Creatine Trend:  Creatinine: 0.85 (04-24)  Creatinine: 1.00 (04-23)  Creatinine: 1.37 (04-22)  Creatinine: 1.64 (04-21)      Liver Biochemical Testing Trend:  Alanine Aminotransferase (ALT/SGPT): 28 (04-21)  Alanine Aminotransferase (ALT/SGPT): 35 (04-20)  Alanine Aminotransferase (ALT/SGPT): 12 (12-10)  Alanine Aminotransferase (ALT/SGPT): 12 (12-09)  Alanine Aminotransferase (ALT/SGPT): 30 (11-13)  Aspartate Aminotransferase (AST/SGOT): 20 (04-21-25 @ 06:23)  Aspartate Aminotransferase (AST/SGOT): 25 (04-20-25 @ 11:10)  Aspartate Aminotransferase (AST/SGOT): 24 (12-10-24 @ 08:17)  Aspartate Aminotransferase (AST/SGOT): 21 (12-09-24 @ 14:42)  Aspartate Aminotransferase (AST/SGOT): 36 (11-13-24 @ 07:18)  Bilirubin Total: 0.4 (04-21)  Bilirubin Total: 0.4 (04-20)  Bilirubin Total: 0.9 (12-10)  Bilirubin Total: 0.9 (12-09)  Bilirubin Total: 0.5 (11-13)      Trend LDH  04-22-25 @ 17:19  224  04-21-25 @ 16:40  218  12-12-24 @ 17:05  402[H]  12-10-24 @ 08:17  357[H]  12-09-24 @ 20:39  398[H]      Urinalysis Basic - ( 24 Apr 2025 06:22 )    Color: x / Appearance: x / SG: x / pH: x  Gluc: 92 mg/dL / Ketone: x  / Bili: x / Urobili: x   Blood: x / Protein: x / Nitrite: x   Leuk Esterase: x / RBC: x / WBC x   Sq Epi: x / Non Sq Epi: x / Bacteria: x        MICROBIOLOGY:  Vancomycin Level, Trough: 7.9 (04-24 @ 10:24)  Vancomycin Level, Trough: 21.2 (04-23 @ 09:14)    MRSA PCR Result.: NotDetec (06-21-24 @ 11:10)      Culture - Blood (collected 20 Apr 2025 11:10)  Source: Blood None  Preliminary Report:    No growth at 72 Hours    Culture - Blood (collected 20 Apr 2025 11:10)  Source: Blood None  Preliminary Report:    No growth at 72 Hours    Culture - Blood (collected 11 Nov 2024 15:35)  Source: .Blood BLOOD  Final Report:    No growth at 5 days    Culture - Blood (collected 11 Nov 2024 15:34)  Source: .Blood BLOOD  Final Report:    No growth at 5 days    Culture - Blood (collected 11 Nov 2024 07:52)  Source: .Blood BLOOD  Final Report:    No growth at 5 days    Culture - Blood (collected 11 Nov 2024 07:44)  Source: .Blood BLOOD  Final Report:    No growth at 5 days    Culture - Urine (collected 06 Nov 2024 16:36)  Source: .Urine None  Final Report:    >100,000 CFU/ml Klebsiella pneumoniae ESBL  Organism: Klebsiella pneumoniae ESBL  Organism: Klebsiella pneumoniae ESBL    Sensitivities:      Method Type: LUIS ARMANDO      -  Ampicillin: R >16 These ampicillin results predict results for amoxicillin      -  Ampicillin/Sulbactam: I 16/8      -  Aztreonam: R 8      -  Cefazolin: R >16 For uncomplicated UTI with K. pneumoniae, E. coli, or P. mirablis: LUIS ARMANDO <=16 is sensitive and LUIS ARMANDO >=32 is resistant. This also predicts results for oral agents cefaclor, cefdinir, cefpodoxime, cefprozil, cefuroxime axetil, cephalexin and locarbef for uncomplicated UTI. Note that some isolates may be susceptible to these agents while testing resistant to cefazolin.      -  Cefepime: R >16      -  Ceftriaxone: R >32      -  Cefuroxime: R >16      -  Ciprofloxacin: I 0.5      -  Ertapenem: S <=0.5      -  Gentamicin: S <=2      -  Imipenem: S <=1      -  Levofloxacin: S <=0.5      -  Meropenem: S <=1      -  Nitrofurantoin: S <=32 Should not be used to treat pyelonephritis      -  Piperacillin/Tazobactam: S <=8      -  Tobramycin: S <=2      -  Trimethoprim/Sulfamethoxazole: R >2/38    Urinalysis with Rflx Culture (collected 06 Nov 2024 16:36)    Culture - Urine (collected 21 Jun 2024 11:10)  Source: Clean Catch Clean Catch (Midstream)  Final Report:    <10,000 CFU/mL Normal Urogenital Anjali    Culture - Urine (collected 26 Feb 2024 18:00)  Source: Clean Catch Clean Catch (Midstream)  Final Report:    10,000 - 49,000 CFU/mL Klebsiella pneumoniae ESBL    <10,000 CFU/ml Normal Urogenital anjali present  Organism: Klebsiella pneumoniae ESBL  Organism: Klebsiella pneumoniae ESBL    Sensitivities:      Method Type: LUIS ARMANDO      -  Amoxicillin/Clavulanic Acid: S <=8/4      -  Ampicillin: R >16 These ampicillin results predict results for amoxicillin      -  Ampicillin/Sulbactam: I 16/8      -  Aztreonam: R 8      -  Cefazolin: R >16 For uncomplicated UTI with K. pneumoniae, E. coli, or P. mirablis: LUIS ARMANDO <=16 is sensitive and LUIS ARMANDO >=32 is resistant. This also predicts results for oral agents cefaclor, cefdinir, cefpodoxime, cefprozil, cefuroxime axetil, cephalexin and locarbef for uncomplicated UTI. Note that some isolates may be susceptible to these agents while testing resistant to cefazolin.      -  Cefepime: R >16      -  Ceftriaxone: R >32      -  Cefuroxime: R >16      -  Ciprofloxacin: I 0.5      -  Ertapenem: S <=0.5      -  Gentamicin: S <=2      -  Imipenem: S <=1      -  Levofloxacin: S <=0.5      -  Meropenem: S <=1      -  Nitrofurantoin: I 64 Should not be used to treat pyelonephritis      -  Piperacillin/Tazobactam: S <=8      -  Tobramycin: S <=2      -  Trimethoprim/Sulfamethoxazole: R >2/38      C-Reactive Protein: 40.9 (04-20)    Ferritin: 360 (04-21)      Lactate Dehydrogenase, Serum: 224 (04-22)  Lactate Dehydrogenase, Serum: 218 (04-21)          Sedimentation Rate, Erythrocyte: 111 mm/hr (04-20-25 @ 11:10)  Sedimentation Rate, Erythrocyte: 29 mm/hr (10-10-24 @ 00:43)      RADIOLOGY:  imaging below personally reviewed

## 2025-04-24 NOTE — PROGRESS NOTE ADULT - TIME BILLING
Time spent  coordinating the patient's care. This includes reviewing documentation pertinent to this admission, results and imaging. Further tests, medications, and procedures have been ordered as indicated. Laboratory results and the plan of care were communicated to the patient. Discussed care plan with consultants including ID. Supporting documentation was completed and added to the patient's chart.
Time spent  coordinating the patient's care. This includes reviewing documentation pertinent to this admission, results and imaging. Further tests, medications, and procedures have been ordered as indicated. Laboratory results and the plan of care were communicated to the patient. Discussed care plan with consultants including oncology and plastic surgery. Supporting documentation was completed and added to the patient's chart.
Time spent  coordinating the patient's care. This includes reviewing documentation pertinent to this admission, results and imaging. Further tests, medications, and procedures have been ordered as indicated. Laboratory results and the plan of care were communicated to the patient. Discussed care plan with consultants including hem/onc, ID. Supporting documentation was completed and added to the patient's chart.
Time spent  coordinating the patient's care. This includes reviewing documentation pertinent to this admission, results and imaging. Further tests, medications, and procedures have been ordered as indicated. Laboratory results and the plan of care were communicated to the patient. Discussed care plan with consultants including ID. Supporting documentation was completed and added to the patient's chart.

## 2025-04-24 NOTE — PROGRESS NOTE ADULT - ASSESSMENT
79 F with AS s/p TAVR, AF on AC, anemia, cirrhosis,  presents with LE swelling-- being treated  for cellulitis    LE edema- multifactorial- infection, right sided elevated heart pressures( right sided failure)  recommend treatment of cellulitis wiht abx-- recommend adequate nutrition-- adequate protein intake as she has low albumin     low normal LVEF, elevated  right sided pressures--  back on valsartan-- if renal function remains stable --- consider low dose farxiga 5 mg     AF- HR controlled- continue Eliquis    anemia- seen  and managed by hematology       BHARAT--  resolved     will follow

## 2025-04-24 NOTE — PROGRESS NOTE ADULT - SUBJECTIVE AND OBJECTIVE BOX
CHIEF COMPLAINT: Patient is a 79y old  Female who presents with a chief complaint of Cellulitis (21 Apr 2025 16:20)      HPI:  Patient is a 78 YO F With past medical history of HTN, HLD, AS s/p TAVR, peDM, Atrial fibrillation on eliquis, HFpEF EF 55-60%, CVA 11/24, R 2nd rib fx, hx temporal arteritis. Presented to the ED with complaints of  LE swelling  worse on the right leg that started near the ankle and has went up to below the knee, took tylenol with poor improvement. Pt reports she had MOHS procedure last month on her R leg and since has noticed an infection, started PO keflex last friday without improvement. Denies fevers, chest pain, shortness of breath, chills, nausea, vomiting.     ED Vitals: /61 mmHg, HR 18/min, Temp 36.4C  Received Vancomycin and Zosyn in the ED. US bl neg for DVT  CT showing inflammation, results as below (20 Apr 2025 14:36)    no acute issues overnight       PMHx: PAST MEDICAL & SURGICAL HISTORY:  History of Cardiac Arrhythmia  Hypertension  Atrial Fibrillation  Aortic stenosis  Nonrheumatic aortic valve stenosis  High cholesterol  H/O temporal arteritis  S/P Exploratory Laparotomy 1966  H/O prior ablation treatment  History of temporal artery biopsy  S/P foot surgery        Allergies: Allergies    adhesives (Rash)  No Known Drug Allergies    Intolerances  Vital Signs Last 24 Hrs  T(C): 36.6 (24 Apr 2025 00:01), Max: 36.7 (23 Apr 2025 13:00)  T(F): 97.9 (24 Apr 2025 00:01), Max: 98.1 (23 Apr 2025 16:59)  HR: 70 (24 Apr 2025 00:01) (70 - 80)  BP: 135/70 (24 Apr 2025 00:01) (108/64 - 150/69)  BP(mean): --  RR: 17 (24 Apr 2025 00:01) (16 - 18)  SpO2: 95% (24 Apr 2025 00:01) (93% - 100%)    Parameters below as of 24 Apr 2025 00:01  Patient On (Oxygen Delivery Method): room air      I&O's Summary          PHYSICAL EXAM:   Constitutional: NAD, awake and alert, well-developed  HEENT: PERR, EOMI, Normal Hearing, MMM  Neck: Soft and supple, No LAD, No JVD  Respiratory: Breath sounds are clear bilaterally, No wheezing, rales or rhonchi  Cardiovascular: S1 and S2,irregular     Extremities: + peripheral edema  Vascular: 2+ peripheral pulses  Neurological: A/O x 3, no focal deficits        MEDICATIONS  (STANDING):  apixaban 5 milliGRAM(s) Oral two times a day  aspirin enteric coated 81 milliGRAM(s) Oral daily  atorvastatin 10 milliGRAM(s) Oral at bedtime  cefepime  Injectable. 2000 milliGRAM(s) IV Push every 24 hours  collagenase Ointment 1 Application(s) Topical daily  folic acid 1 milliGRAM(s) Oral daily  gabapentin 300 milliGRAM(s) Oral two times a day  influenza  Vaccine (HIGH DOSE) 0.5 milliLiter(s) IntraMuscular once  Nephro-ilana 1 Tablet(s) Oral daily  pantoprazole    Tablet 40 milliGRAM(s) Oral before breakfast  sodium chloride 0.9%. 1000 milliLiter(s) (100 mL/Hr) IV Continuous <Continuous>  torsemide 10 milliGRAM(s) Oral daily  valsartan 160 milliGRAM(s) Oral daily  vancomycin  IVPB 750 milliGRAM(s) IV Intermittent every 24 hours          LABS: All Labs Reviewed:                                         8.9    8.10  )-----------( 257      ( 23 Apr 2025 08:50 )             28.5   04-23    141  |  111[H]  |  26[H]  ----------------------------<  96  4.1   |  24  |  1.00    Ca    9.3      23 Apr 2025 08:50              Blood Culture: Organism --  Gram Stain Blood -- Gram Stain --  Specimen Source Blood None  Culture-Blood --          RADIOLOGY:< from: CT Lower Extremity w/ IV Cont, Right (04.20.25 @ 12:09) >    FINDINGS:  There is diffuse subcutaneous tissue edema and swelling throughout the   leg and ankle with associated thickening of the skin. At the middle third   of the anterior leg, there is soft tissue irregularity with punctate foci   of surrounding soft tissue gas present. Superior to this soft tissue   irregularity, there is focal nodule within the subcutaneous/cutaneous   tissues along the anterior shin. No tracking soft tissue gas is present.   No discrete organized fluid collection is noted.    No osseous destruction or aggressive periosteal reaction. No fracture or   dislocation. No productive changes at the knee or ankle. Os navicularis   identified. Vascular calcification is present.      IMPRESSION:  Diffuse subcutaneous tissue edema and swelling with presence of soft   tissue irregularity at the middle third of the anterior leg as described.   No organized fluid collection identified.    --- End of Report ---            OUMAR HOPE MD; Attending Radiologist  This document has been electronically signed. Apr 20 2025 12:35PM    < end of copied text >      EKG:< from: 12 Lead ECG (04.20.25 @ 10:45) >    Diagnosis Line Atrial fibrillation  Left axis deviation  Minimal voltage criteria for LVH, may be normal variant ( Dorian product )  Anteroseptal infarct (cited on or before 08-FEB-2016)  Abnormal ECG  When compared with ECG of 09-DEC-2024 14:32,  Incomplete right bundle branch block is no longer Present  Confirmed by EDDIE CASTILLO ([id number]) on 4/20/2025 1:59:44 PM    < end of copied text >      < from: TTE W or WO Ultrasound Enhancing Agent (04.22.25 @ 13:17) >    CONCLUSIONS:      1. Left ventricular systolic function is low normal with an ejection fraction of 50 %.   2. There is severe (grade 3) left ventricular diastolic dysfunction.   3. Normal right ventricular systolic function.   4. Left atrium is mildly dilated.   5. The right atrium is severely dilated.   6. Mild to moderate mitral regurgitation.   7. Mild mitral valve stenosis.   8. Severe tricuspid regurgitation.   9. Moderate pulmonary hypertension.    ________________________________________________________________________________________  FINDINGS:     Left Ventricle:  The left ventricular cavity is normal in size. Left ventricular wall thickness is normal. Left ventricular systolic function is low normal with a calculated ejection fraction of 50 % by the Amador's biplane method of disks. There is severe (grade 3) left ventricular diastolic dysfunction.     Right Ventricle:  The right ventricular cavity is normal in size and right ventricular systolic function is normal. Tricuspid annular plane systolic excursion (TAPSE) is 2.0 cm (normal >=1.7 cm).     Left Atrium:  The left atrium is mildly dilated with an indexed volume of 41.93 ml/m².     Right Atrium:  The right atrium is severely dilated with an indexed volume of 47.03 ml/m² and an indexed area of 14.53 cm²/m².     Interatrial Septum:  The interatrial septum appears intact.     Aortic Valve:  A a transcatheter deployed (TAVR) is present in the aortic position. The prosthetic valve is well seated with normal function. The peak transaortic velocity is 2.02 m/s, peak transaortic gradient is 16.3 mmHg and mean transaortic gradient is 9.0 mmHg with an LVOT/aortic valve VTI ratio of 0.45. The effective orifice area is estimated at 1.42 cm² by the continuity equation. There is mild paravalvular regurgitation, originating at 3 o'clock. There is calcification of the aortic valve leaflets. The highest velocity was obtained from the apical window.     Mitral Valve:  There is mitral valve thickening of the anterior and posterior leaflets. There is reduced leaflet mobility of the mitral valve. There is moderate calcification of the mitral valve annulus. There is mild leaflet calcification. There is mild mitral valve stenosis. The transmitral peak gradient is 14.0 mmHg and mean gradient is 4.00 mmHg. There is mild to moderate mitral regurgitation. Mitral inflow is E-wave dominant (>1.2m/sec).     Tricuspid Valve:  Structurally normal tricuspid valve with normal leaflet excursion. The tricuspid valve leaflets appear normal. There is severe tricuspid regurgitation. Estimated pulmonary artery systolic pressure is 53 mmHg, consistent with moderate pulmonary hypertension.     Pulmonic Valve:  The pulmonic valve was not well visualized. There is no pulmonic valve stenosis.     Aorta:  The aortic root at the sinuses of Valsalva is normal in size, measuring 3.03 cm (indexed 1.80 cm/m²). The ascending aorta is normal in size, measuring 3.10 cm (indexed 1.84 cm/m²). The aortic arch diameter is normal in size, measuring 3.3 cm (indexed 1.96 cm/m²).     Pericardium:  No pericardial effusion seen.     Systemic Veins:  The inferior vena cava is dilated measuring 2.33 cm in diameter, (dilated >2.1cm) with abnormal inspiratory collapse (abnormal <50%) consistent with elevated right atrial pressure (~15, range 10-20mmHg).  ____________________________________________________________________  QUANTITATIVE DATA:    < end of copied text >

## 2025-04-24 NOTE — PROGRESS NOTE ADULT - NS ATTEND AMEND GEN_ALL_CORE FT
78 y/o F with extensive cardiac PMHx currently admitted for severe anemia, BHARAT and RLE cellulitis. Worsening anemia likely s/t chronic disease/cellulitis/BHARAT component. She has had chronic normocytic anemia prior to her recent TAVR procedure. Discussed with primary provider Viola Sauer who recommended epogen.  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 Given normal haptoglobin 200, does not appear to be hemolyzing presently. Did have GI workup in the past, current FOBT pending. Complete anemia / hemolysis labs ordered. She underwent a BM bx 12/2024 and has been treated with IV iron in the past.  Will continue to follow up with Viola Sauer outpatient. 78 y/o F with extensive cardiac PMHx currently admitted for severe anemia, BHARAT and RLE cellulitis. Worsening anemia likely s/t chronic disease/cellulitis/BHARAT component. She has had chronic normocytic anemia prior to her recent TAVR procedure. Discussed with primary provider Viola Sauer who recommended epogen.  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 Given normal haptoglobin 200, does not appear to be hemolyzing presently. Did have GI workup in the past, current FOBT pending. Complete anemia / hemolysis labs ordered. She underwent a BM bx 12/2024 and has been treated with IV iron in the past.  Will get 1 unit PRBC today st Hb 7.9 gm/dL. Will continue to follow up with Viola Sauer outpatient.

## 2025-04-25 ENCOUNTER — TRANSCRIPTION ENCOUNTER (OUTPATIENT)
Age: 80
End: 2025-04-25

## 2025-04-25 VITALS
HEART RATE: 66 BPM | DIASTOLIC BLOOD PRESSURE: 61 MMHG | OXYGEN SATURATION: 98 % | SYSTOLIC BLOOD PRESSURE: 117 MMHG | TEMPERATURE: 98 F | RESPIRATION RATE: 16 BRPM

## 2025-04-25 LAB
ADD ON TEST-SPECIMEN IN LAB: SIGNIFICANT CHANGE UP
ALBUMIN SERPL ELPH-MCNC: 2.4 G/DL — LOW (ref 3.3–5)
ALP SERPL-CCNC: 237 U/L — HIGH (ref 40–120)
ALT FLD-CCNC: 30 U/L — SIGNIFICANT CHANGE UP (ref 12–78)
ANION GAP SERPL CALC-SCNC: 4 MMOL/L — LOW (ref 5–17)
AST SERPL-CCNC: 26 U/L — SIGNIFICANT CHANGE UP (ref 15–37)
BILIRUB DIRECT SERPL-MCNC: 0.2 MG/DL — SIGNIFICANT CHANGE UP (ref 0–0.3)
BILIRUB INDIRECT FLD-MCNC: 0.2 MG/DL — SIGNIFICANT CHANGE UP (ref 0.2–1)
BILIRUB SERPL-MCNC: 0.4 MG/DL — SIGNIFICANT CHANGE UP (ref 0.2–1.2)
BUN SERPL-MCNC: 28 MG/DL — HIGH (ref 7–23)
CALCIUM SERPL-MCNC: 9.2 MG/DL — SIGNIFICANT CHANGE UP (ref 8.5–10.1)
CHLORIDE SERPL-SCNC: 107 MMOL/L — SIGNIFICANT CHANGE UP (ref 96–108)
CO2 SERPL-SCNC: 27 MMOL/L — SIGNIFICANT CHANGE UP (ref 22–31)
CREAT SERPL-MCNC: 1.01 MG/DL — SIGNIFICANT CHANGE UP (ref 0.5–1.3)
CULTURE RESULTS: SIGNIFICANT CHANGE UP
CULTURE RESULTS: SIGNIFICANT CHANGE UP
EGFR: 57 ML/MIN/1.73M2 — LOW
EGFR: 57 ML/MIN/1.73M2 — LOW
GLUCOSE SERPL-MCNC: 86 MG/DL — SIGNIFICANT CHANGE UP (ref 70–99)
HCT VFR BLD CALC: 29.5 % — LOW (ref 34.5–45)
HGB BLD-MCNC: 9.6 G/DL — LOW (ref 11.5–15.5)
MCHC RBC-ENTMCNC: 31.7 PG — SIGNIFICANT CHANGE UP (ref 27–34)
MCHC RBC-ENTMCNC: 32.5 G/DL — SIGNIFICANT CHANGE UP (ref 32–36)
MCV RBC AUTO: 97.4 FL — SIGNIFICANT CHANGE UP (ref 80–100)
NRBC # BLD AUTO: 0.03 K/UL — HIGH (ref 0–0)
NRBC # FLD: 0.03 K/UL — HIGH (ref 0–0)
NRBC BLD AUTO-RTO: 0 /100 WBCS — SIGNIFICANT CHANGE UP (ref 0–0)
NT-PROBNP SERPL-SCNC: 1613 PG/ML — HIGH (ref 0–450)
PLATELET # BLD AUTO: 227 K/UL — SIGNIFICANT CHANGE UP (ref 150–400)
PMV BLD: 9.6 FL — SIGNIFICANT CHANGE UP (ref 7–13)
POTASSIUM SERPL-MCNC: 3.3 MMOL/L — LOW (ref 3.5–5.3)
POTASSIUM SERPL-SCNC: 3.3 MMOL/L — LOW (ref 3.5–5.3)
PROT SERPL-MCNC: 6.3 GM/DL — SIGNIFICANT CHANGE UP (ref 6–8.3)
RBC # BLD: 3.03 M/UL — LOW (ref 3.8–5.2)
RBC # FLD: 15.8 % — HIGH (ref 10.3–14.5)
SODIUM SERPL-SCNC: 138 MMOL/L — SIGNIFICANT CHANGE UP (ref 135–145)
SPECIMEN SOURCE: SIGNIFICANT CHANGE UP
SPECIMEN SOURCE: SIGNIFICANT CHANGE UP
WBC # BLD: 9.81 K/UL — SIGNIFICANT CHANGE UP (ref 3.8–10.5)
WBC # FLD AUTO: 9.81 K/UL — SIGNIFICANT CHANGE UP (ref 3.8–10.5)

## 2025-04-25 PROCEDURE — 99239 HOSP IP/OBS DSCHRG MGMT >30: CPT

## 2025-04-25 PROCEDURE — 99497 ADVNCD CARE PLAN 30 MIN: CPT | Mod: 25

## 2025-04-25 PROCEDURE — 99222 1ST HOSP IP/OBS MODERATE 55: CPT

## 2025-04-25 RX ORDER — COLLAGENASE CLOSTRIDIUM HIST. 250 UNIT/G
1 OINTMENT (GRAM) TOPICAL
Qty: 1 | Refills: 0
Start: 2025-04-25 | End: 2025-05-08

## 2025-04-25 RX ORDER — DOXYCYCLINE HYCLATE 100 MG
2 TABLET ORAL
Qty: 24 | Refills: 0
Start: 2025-04-25 | End: 2025-04-30

## 2025-04-25 RX ORDER — TORSEMIDE 10 MG
1 TABLET ORAL
Qty: 0 | Refills: 0 | DISCHARGE

## 2025-04-25 RX ORDER — CEFUROXIME SODIUM 1.5 G
1 VIAL (EA) INJECTION
Qty: 12 | Refills: 0
Start: 2025-04-25 | End: 2025-04-30

## 2025-04-25 RX ORDER — DAPAGLIFLOZIN 5 MG/1
1 TABLET, FILM COATED ORAL
Qty: 30 | Refills: 0
Start: 2025-04-25 | End: 2025-05-24

## 2025-04-25 RX ORDER — DOXYCYCLINE HYCLATE 100 MG
100 TABLET ORAL EVERY 12 HOURS
Refills: 0 | Status: DISCONTINUED | OUTPATIENT
Start: 2025-04-25 | End: 2025-04-25

## 2025-04-25 RX ORDER — CEFUROXIME SODIUM 1.5 G
500 VIAL (EA) INJECTION EVERY 12 HOURS
Refills: 0 | Status: DISCONTINUED | OUTPATIENT
Start: 2025-04-25 | End: 2025-04-25

## 2025-04-25 RX ORDER — DAPAGLIFLOZIN 5 MG/1
5 TABLET, FILM COATED ORAL DAILY
Refills: 0 | Status: DISCONTINUED | OUTPATIENT
Start: 2025-04-25 | End: 2025-04-25

## 2025-04-25 RX ADMIN — FOLIC ACID 1 MILLIGRAM(S): 1 TABLET ORAL at 10:06

## 2025-04-25 RX ADMIN — Medication 100 MILLIGRAM(S): at 12:35

## 2025-04-25 RX ADMIN — Medication 1 TABLET(S): at 10:06

## 2025-04-25 RX ADMIN — Medication 1 APPLICATION(S): at 10:09

## 2025-04-25 RX ADMIN — Medication 160 MILLIGRAM(S): at 10:08

## 2025-04-25 RX ADMIN — APIXABAN 5 MILLIGRAM(S): 2.5 TABLET, FILM COATED ORAL at 10:07

## 2025-04-25 RX ADMIN — Medication 500 MILLIGRAM(S): at 12:34

## 2025-04-25 RX ADMIN — Medication 81 MILLIGRAM(S): at 10:07

## 2025-04-25 RX ADMIN — Medication 40 MILLIEQUIVALENT(S): at 12:35

## 2025-04-25 RX ADMIN — DAPAGLIFLOZIN 5 MILLIGRAM(S): 5 TABLET, FILM COATED ORAL at 10:15

## 2025-04-25 RX ADMIN — Medication 650 MILLIGRAM(S): at 08:47

## 2025-04-25 RX ADMIN — GABAPENTIN 300 MILLIGRAM(S): 400 CAPSULE ORAL at 10:07

## 2025-04-25 RX ADMIN — Medication 650 MILLIGRAM(S): at 09:20

## 2025-04-25 RX ADMIN — Medication 10 MILLIGRAM(S): at 10:08

## 2025-04-25 NOTE — DISCHARGE NOTE PROVIDER - NSDCPNSUBOBJ_GEN_ALL_CORE
HOSPITALIST ATTENDING PROGRESS NOTE    Chart and meds reviewed.  Patient seen and examined.    CC: cellulitis anemia    Subjective: no acute complaints; hgb responded to prbc. cellulitis almost 90% resolved    All other systems reviewed and found to be negative with the exception of what has been described above.    MEDICATIONS  (STANDING):  apixaban 5 milliGRAM(s) Oral two times a day  aspirin enteric coated 81 milliGRAM(s) Oral daily  atorvastatin 10 milliGRAM(s) Oral at bedtime  cefuroxime   Tablet 500 milliGRAM(s) Oral every 12 hours  collagenase Ointment 1 Application(s) Topical daily  dapagliflozin 5 milliGRAM(s) Oral daily  doxycycline monohydrate Capsule 100 milliGRAM(s) Oral every 12 hours  folic acid 1 milliGRAM(s) Oral daily  gabapentin 300 milliGRAM(s) Oral two times a day  influenza  Vaccine (HIGH DOSE) 0.5 milliLiter(s) IntraMuscular once  Nephro-ilana 1 Tablet(s) Oral daily  pantoprazole    Tablet 40 milliGRAM(s) Oral before breakfast  potassium chloride    Tablet ER 40 milliEquivalent(s) Oral once  sodium chloride 0.9%. 1000 milliLiter(s) (100 mL/Hr) IV Continuous <Continuous>  torsemide 10 milliGRAM(s) Oral daily  valsartan 160 milliGRAM(s) Oral daily    MEDICATIONS  (PRN):  acetaminophen     Tablet .. 650 milliGRAM(s) Oral every 6 hours PRN Temp greater or equal to 38C (100.4F), Mild Pain (1 - 3)  aluminum hydroxide/magnesium hydroxide/simethicone Suspension 30 milliLiter(s) Oral every 4 hours PRN Dyspepsia  melatonin 3 milliGRAM(s) Oral at bedtime PRN Insomnia  morphine  - Injectable 2 milliGRAM(s) IV Push every 6 hours PRN Severe Pain (7 - 10)  ondansetron Injectable 4 milliGRAM(s) IV Push every 8 hours PRN Nausea and/or Vomiting      PHYSICAL EXAM:  Gen: NAD  CV:  +S1, +S2, regular, no murmurs or rubs  RESP:   lungs clear to auscultation bilaterally, no wheezing, rales, rhonchi, good air entry bilaterally  GI:  abdomen soft, non-tender, non-distended, normal BS, no bruits, no abdominal masses, no palpable masses  :  not examined  MSK:   normal muscle tone, no atrophy, no rigidity, no contractions  EXT: b/l lower ext edema 1+ to trace to mid shin; bandage over debrided wound; no surrounding erythema; improved edema of RLE  NEURO:  AAOX3, no focal neurological deficits, follows all commands, able to move extremities spontaneously  LABS:                            9.6    9.81  )-----------( 227      ( 25 Apr 2025 06:40 )             29.5     04-25    138  |  107  |  28[H]  ----------------------------<  86  3.3[L]   |  27  |  1.01    Ca    9.2      25 Apr 2025 06:40    TPro  6.3  /  Alb  2.4[L]  /  TBili  0.4  /  DBili  0.2  /  AST  26  /  ALT  30  /  AlkPhos  237[H]  04-25        LIVER FUNCTIONS - ( 25 Apr 2025 06:40 )  Alb: 2.4 g/dL / Pro: 6.3 gm/dL / ALK PHOS: 237 U/L / ALT: 30 U/L / AST: 26 U/L / GGT: x             Urinalysis Basic - ( 25 Apr 2025 06:40 )    Color: x / Appearance: x / SG: x / pH: x  Gluc: 86 mg/dL / Ketone: x  / Bili: x / Urobili: x   Blood: x / Protein: x / Nitrite: x   Leuk Esterase: x / RBC: x / WBC x   Sq Epi: x / Non Sq Epi: x / Bacteria: x

## 2025-04-25 NOTE — DISCHARGE NOTE PROVIDER - HOSPITAL COURSE
"80 YO F With past medical history of HTN, HLD, AS s/p TAVR, peDM, Atrial fibrillation on eliquis, HFpEF EF 55-60%, CVA 11/24, R 2nd rib fx, hx temporal arteritis. Presented to the ED with complaints of  LE swelling  worse on the right leg that started near the ankle and has went up to below the knee, took tylenol with poor improvement. Pt reports she had MOHS procedure last month on her R leg and since has noticed an infection, started PO keflex last friday without improvement. Denies fevers, chest pain, shortness of breath, chills, nausea, vomiting."    Hospital Course: Pt admitted for cellulitis post MOHS surgery. cellulitis and leukocytosis clinically improved with IV vanc and cefepime. switched to po ceftin and doxy per ID reccs to complete 10 day course. course complicated by BHARAT improved with IVF. resume all home meds prior to dc. per cardio,farxiga added and torsemide switched to Q48 hours. course complicated by acute on chronic anemia. hemolysis ruled out. pt recived 1 unit prbc with adequate response.     #cellulitis s/p MOHS surgery to RLE   #open wound   #subcutaneous gas on CT RLE   abx switched to vanc and cefepime per id  plastic surgery consult - s/p debridement of eschar at bedside  f/u blood cultures - ng x 4d  morphine 2mg iv q6 prn for severe pain  cellulitis appears 90% resolved   will dc with po ceftin and doxy per id reccs to complete 10 days   f/u outpt with wound care center   daily dressing changes.     #anemia   pt not clear was her diagnosis is but checks her hgb weekly with Dr. Milligan  hx of hemolysis per chart review  normally hgb 10-11   gets iv iron frequently - iron studies wnl  hematology consult - no evidence of hemolysis from tavr; given one dose of procrit per hem onc  7.9-->1 unit prbc --> 9.6    #b/l pitting edema   #hx of tavr   #CHFpEF  december echo reviewed   repeat echo - with normal EF 50% but grade 3 diastolic dysfunction and well seated TAVR  check pro-bnp - mildly elevated but similar to prior values  resume torsemide as bharat resolved  40 iv lasix post transfusion  edema improved - per cardio change torsemide to q48 hours due to starting farxiga  start farxiga 5mg po qd - sent to pharmacy    #bharat (prerenal)  baseline wnl   cr 1.53 s/p toradol--> 1.64 --> 1.37-->1.00--.85  pt ordered IVF after cxr showed no effusions  resume valsartan and torsemide and q48 dosing    #HTN   valsartan resumed 4/23    #HLD  Check Lipid panel  Continue Atorvastatin 10mg    #AS s/p TAVR Las echo with Mild regurgitation  Continue surveillance  repeat echo    #Afib on Eliquis - Rate controlled  Continue Eliquis 5mg BID  controlled without rate controlling agent    #H/o CVA  Continue aspirin and statins    #Prediabetes  Check A1c  Monitor fingersticks    #HCM  DVT pps: Eliquis

## 2025-04-25 NOTE — DISCHARGE NOTE PROVIDER - CARE PROVIDER_API CALL
Sridhar Lovell  Cardiovascular Disease  [address], [address]  [place] [address]  Phone: [phone]  Fax: [phone]  Follow Up Time: 1 week    Eva Milligan  Medical Oncology  [address], [place] [address]  Phone: [phone]  Fax: [phone]  Follow Up Time: 1 week

## 2025-04-25 NOTE — DISCHARGE NOTE NURSING/CASE MANAGEMENT/SOCIAL WORK - PATIENT PORTAL LINK FT
You can access the FollowMyHealth Patient Portal offered by [facility] by registering at the following website: [email or web address]. By joining [facility]’s FollowMyHealth portal, you will also be able to view your health information using other applications (apps) compatible with our system.

## 2025-04-25 NOTE — PROGRESS NOTE ADULT - ASSESSMENT
79 F with AS s/p TAVR, AF on AC, anemia, cirrhosis,  presents with LE swelling-- being treated  for cellulitis    LE edema- multifactorial- infection, right sided elevated heart pressures( right sided failure)  recommend treatment of cellulitis with abx-- recommend adequate nutrition-- adequate protein intake as she has low albumin     low normal LVEF, elevated  right sided pressures--  back on valsartan with better blood pressure--  as  renal function has stablized --- recommend low dose farxiga 5 mg -- will needto continue to monitor renal function and BNP levels     AF- HR controlled- continue Eliquis    anemia- seen  and managed by hematology- given PRBC      over weekend , please call as needed  79 F with AS s/p TAVR, AF on AC, anemia, cirrhosis,  presents with LE swelling-- being treated  for cellulitis    LE edema- multifactorial- infection, right sided elevated heart pressures( right sided failure)  recommend treatment of cellulitis with abx-- recommend adequate nutrition-- adequate protein intake as she has low albumin     low normal LVEF, elevated  right sided pressures--  back on valsartan with better blood pressure--  as  renal function has stabilized --- recommend low dose farxiga 5 mg -- will need to continue to monitor renal function and BNP levels  ( for discharge, can decrease torsemide to q48 hours-- to ,minimize overdiuresis now that farxiga added)     AF- HR controlled- continue Eliquis    anemia- seen  and managed by hematology- given PRBC      over weekend , please call as needed

## 2025-04-25 NOTE — GOALS OF CARE CONVERSATION - ADVANCED CARE PLANNING - CONVERSATION DETAILS
HPI:  Patient is a 80 YO F With past medical history of HTN, HLD, AS s/p TAVR, peDM, Atrial fibrillation on eliquis, HFpEF EF 55-60%, CVA 11/24, R 2nd rib fx, hx temporal arteritis. Presented to the ED with complaints of  LE swelling  worse on the right leg that started near the ankle and has went up to below the knee, took tylenol with poor improvement. Pt reports she had MOHS procedure last month on her R leg and since has noticed an infection, started PO keflex last friday without improvement. Denies fevers, chest pain, shortness of breath, chills, nausea, vomiting.    (20 Apr 2025 14:36)      PERTINENT PMH REVIEWED:  [ x ] YES [ ] NO           Primary Contact:      Doc, health care agent    HCP [ x ] Surrogate [   ] Guardian [   ]    Mental Status: [ x ] Alert  [ x  ] Oriented [  ] Confused [  ] Lethargic  Concerns of Depression [  ] -not identified  Anxiety [   ]  -not identified  Baseline ADLs (prior to admission):  Independent [ ] moderately [ x ] fully   Dependent   [ ] moderately [ ]fully    Family Meeting attendees: GOC discussed    Anticipated Grief: Patient[  ] Family [  ]    Caregiver Kevin Assessed: Yes [  x ] No [  ]    Zoroastrian: Unknown.    Spiritual Concerns: Not identified,  available for support.    Goals of Care: Comfort [  ] Rehabilitation [  ] Curative [  ] Life Prolonging [ x ]    Previous Services: None    ADVANCE DIRECTIVES:   -Pt has capacity  -HCP on One Content names Doc Locke  -Full Code    Anticipated D/C Plan: return home                     Summary:  Palliative team met with Pt at the bedside to discuss GOC, assist with planning and provide supportive counseling.  Palliative role explained.  Emotional support provided.  Pt is alert and oriented, able to make needs known.  [place].  Prior to hospitalization, Pt resides at home with son and adult grandson.  Pt was independent with ADLs.  States she just returned from a [place] trip with 3 out of her 4 children and grand children.  Reports she used a scooter in [place].  Pts feelings explored.  SUppor tprovided.    Advance directives reviewed.  HCP on One Content names Doc Locke.  Pt states she did not name any of her children as her health care agent, she felt it would be too difficult for them.  We discussed CPR vs DNR/DNI.  Pt shared she has never thought about her wishes.  She shared a story about her spouse whom was medically complex noting she thought he was at the end of his life after a hospitalization but he returned to work within 6 months as he significantly improved.  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  Pt aware she is a Full Code until MOLST completed to reflect otherwise.  No limits currently set.    Plan to return home.  Educated of palliative team availability.  Our team to continue to follow. Purse String (Intermediate) Text: Given the location of the defect and the characteristics of the surrounding skin a purse string intermediate closure was deemed most appropriate.  Undermining was performed circumfirentially around the surgical defect.  A purse string suture was then placed and tightened.

## 2025-04-25 NOTE — CONSULT NOTE ADULT - ASSESSMENT
80 y/o F with extensive cardiac PMHx currently admitted for acute RLE cellulitis with worse anemia    1) history of heart failure   - history of AS s/p TAVR  - Afib   - c/w valsartan   - cardiology consult appreciated   - c/w ac    2) Cellulitis   - c/w abx    Process of Care  --Reviewed dx/treatment problems and alignment with Goals of Care    Physical Aspects of Care  --Pain  patient denies at this time  c/w current management    --Bowel Regimen  denies constipation  risk for constipation d/t immobility  daily dulcolax    --Dyspnea  No SOB at this time  comfortable and in NAD    --Nausea Vomiting  denies    --Weakness  PT as tolerated     Psychological and Psychiatric Aspects of Care:   --Greif/Bereavment: emotional support provided  --Hx of psychiatric dx: none  -Pastoral Care Available PRN     Social Aspects of Care  -SW involved     Cultural Aspects  -Primary Language: English    Goals of Care:     We discussed Palliative Care team being a supportive team when a patient has ongoing illnesses.  We also discussed that it is not an end of life care service, but can help navigate symptoms and emotional support througout their hospital stay here.    Ethical and Legal Aspects: NA  Capacity- pt has capaity   HCP/Surrogate: HCP confirmed and on one content naming her friend Rajesh Gomes MD  Code Status- Full code   MOLST-   Dispo Plan-    Discussed With: Dr. Armendariz coordinated with attending and SW and RN     Time Spent: 70 minutes including the care, coordination and counseling of this patient, 50% of which was spent coordinating and counseling.

## 2025-04-25 NOTE — DISCHARGE NOTE PROVIDER - NSDCCAREPROVSEEN_GEN_ALL_CORE_FT
Erickson, Gail Hall, Osiris Lovell, Sridhar Bahena, Vernell Villalobos, Prakash Gautam, Kelvin Pulido, Montez Milligan, Eva Alejandra, Chester Dc, Bam BENNETT

## 2025-04-25 NOTE — PROGRESS NOTE ADULT - SUBJECTIVE AND OBJECTIVE BOX
CHIEF COMPLAINT: Patient is a 79y old  Female who presents with a chief complaint of Cellulitis (21 Apr 2025 16:20)      HPI:  Patient is a 78 YO F With past medical history of HTN, HLD, AS s/p TAVR, peDM, Atrial fibrillation on eliquis, HFpEF EF 55-60%, CVA 11/24, R 2nd rib fx, hx temporal arteritis. Presented to the ED with complaints of  LE swelling  worse on the right leg that started near the ankle and has went up to below the knee, took tylenol with poor improvement. Pt reports she had MOHS procedure last month on her R leg and since has noticed an infection, started PO keflex last friday without improvement. Denies fevers, chest pain, shortness of breath, chills, nausea, vomiting.     ED Vitals: /61 mmHg, HR 18/min, Temp 36.4C  Received Vancomycin and Zosyn in the ED. US bl neg for DVT  CT showing inflammation, results as below (20 Apr 2025 14:36)    no acute issues overnight     4/25/25   received PRBC, resolution in renal function        PMHx: PAST MEDICAL & SURGICAL HISTORY:  History of Cardiac Arrhythmia  Hypertension  Atrial Fibrillation  Aortic stenosis  Nonrheumatic aortic valve stenosis  High cholesterol  H/O temporal arteritis  S/P Exploratory Laparotomy 1966  H/O prior ablation treatment  History of temporal artery biopsy  S/P foot surgery        Allergies: Allergies    adhesives (Rash)  No Known Drug Allergies        Vital Signs Last 24 Hrs  T(C): 36.6 (25 Apr 2025 00:13), Max: 36.7 (24 Apr 2025 18:05)  T(F): 97.9 (25 Apr 2025 00:13), Max: 98.1 (24 Apr 2025 18:05)  HR: 68 (25 Apr 2025 00:13) (68 - 78)  BP: 138/65 (25 Apr 2025 00:13) (132/72 - 149/69)  BP(mean): --  RR: 17 (25 Apr 2025 00:13) (17 - 18)  SpO2: 97% (25 Apr 2025 00:13) (97% - 99%)    Parameters below as of 25 Apr 2025 00:13  Patient On (Oxygen Delivery Method): room air          PHYSICAL EXAM:   Constitutional: NAD, awake and alert, well-developed  HEENT: PERR, EOMI, Normal Hearing, MMM  Neck: Soft and supple, No LAD, No JVD  Respiratory: Breath sounds are clear bilaterally,   Cardiovascular: S1 and S2,irregular   Extremities: + peripheral edema  Vascular: 2+ peripheral pulses  Neurological: A/O x 3, no focal deficits      MEDICATIONS  (STANDING):  apixaban 5 milliGRAM(s) Oral two times a day  aspirin enteric coated 81 milliGRAM(s) Oral daily  atorvastatin 10 milliGRAM(s) Oral at bedtime  cefepime  Injectable. 2000 milliGRAM(s) IV Push every 24 hours  collagenase Ointment 1 Application(s) Topical daily  dapagliflozin 5 milliGRAM(s) Oral daily  folic acid 1 milliGRAM(s) Oral daily  gabapentin 300 milliGRAM(s) Oral two times a day  influenza  Vaccine (HIGH DOSE) 0.5 milliLiter(s) IntraMuscular once  Nephro-ilana 1 Tablet(s) Oral daily  pantoprazole    Tablet 40 milliGRAM(s) Oral before breakfast  sodium chloride 0.9%. 1000 milliLiter(s) (100 mL/Hr) IV Continuous <Continuous>  torsemide 10 milliGRAM(s) Oral daily  valsartan 160 milliGRAM(s) Oral daily  vancomycin  IVPB 750 milliGRAM(s) IV Intermittent every 12 hours          LABS: All Labs Reviewed:                                                  7.9    9.12  )-----------( 235      ( 24 Apr 2025 06:22 )             24.7   04-24    141  |  111[H]  |  30[H]  ----------------------------<  92  3.8   |  25  |  0.85    Ca    9.1      24 Apr 2025 06:22              Blood Culture: Organism --  Gram Stain Blood -- Gram Stain --  Specimen Source Blood None  Culture-Blood --          RADIOLOGY:< from: CT Lower Extremity w/ IV Cont, Right (04.20.25 @ 12:09) >    FINDINGS:  There is diffuse subcutaneous tissue edema and swelling throughout the   leg and ankle with associated thickening of the skin. At the middle third   of the anterior leg, there is soft tissue irregularity with punctate foci   of surrounding soft tissue gas present. Superior to this soft tissue   irregularity, there is focal nodule within the subcutaneous/cutaneous   tissues along the anterior shin. No tracking soft tissue gas is present.   No discrete organized fluid collection is noted.    No osseous destruction or aggressive periosteal reaction. No fracture or   dislocation. No productive changes at the knee or ankle. Os navicularis   identified. Vascular calcification is present.      IMPRESSION:  Diffuse subcutaneous tissue edema and swelling with presence of soft   tissue irregularity at the middle third of the anterior leg as described.   No organized fluid collection identified.    --- End of Report ---            OUMAR HOPE MD; Attending Radiologist  This document has been electronically signed. Apr 20 2025 12:35PM    < end of copied text >      EKG:< from: 12 Lead ECG (04.20.25 @ 10:45) >    Diagnosis Line Atrial fibrillation  Left axis deviation  Minimal voltage criteria for LVH, may be normal variant ( Dorian product )  Anteroseptal infarct (cited on or before 08-FEB-2016)  Abnormal ECG  When compared with ECG of 09-DEC-2024 14:32,  Incomplete right bundle branch block is no longer Present  Confirmed by EDDIE CASTILLO ([id number]) on 4/20/2025 1:59:44 PM    < end of copied text >      < from: TTE W or WO Ultrasound Enhancing Agent (04.22.25 @ 13:17) >    CONCLUSIONS:      1. Left ventricular systolic function is low normal with an ejection fraction of 50 %.   2. There is severe (grade 3) left ventricular diastolic dysfunction.   3. Normal right ventricular systolic function.   4. Left atrium is mildly dilated.   5. The right atrium is severely dilated.   6. Mild to moderate mitral regurgitation.   7. Mild mitral valve stenosis.   8. Severe tricuspid regurgitation.   9. Moderate pulmonary hypertension.    ________________________________________________________________________________________  FINDINGS:     Left Ventricle:  The left ventricular cavity is normal in size. Left ventricular wall thickness is normal. Left ventricular systolic function is low normal with a calculated ejection fraction of 50 % by the Amador's biplane method of disks. There is severe (grade 3) left ventricular diastolic dysfunction.     Right Ventricle:  The right ventricular cavity is normal in size and right ventricular systolic function is normal. Tricuspid annular plane systolic excursion (TAPSE) is 2.0 cm (normal >=1.7 cm).     Left Atrium:  The left atrium is mildly dilated with an indexed volume of 41.93 ml/m².     Right Atrium:  The right atrium is severely dilated with an indexed volume of 47.03 ml/m² and an indexed area of 14.53 cm²/m².     Interatrial Septum:  The interatrial septum appears intact.     Aortic Valve:  A a transcatheter deployed (TAVR) is present in the aortic position. The prosthetic valve is well seated with normal function. The peak transaortic velocity is 2.02 m/s, peak transaortic gradient is 16.3 mmHg and mean transaortic gradient is 9.0 mmHg with an LVOT/aortic valve VTI ratio of 0.45. The effective orifice area is estimated at 1.42 cm² by the continuity equation. There is mild paravalvular regurgitation, originating at 3 o'clock. There is calcification of the aortic valve leaflets. The highest velocity was obtained from the apical window.     Mitral Valve:  There is mitral valve thickening of the anterior and posterior leaflets. There is reduced leaflet mobility of the mitral valve. There is moderate calcification of the mitral valve annulus. There is mild leaflet calcification. There is mild mitral valve stenosis. The transmitral peak gradient is 14.0 mmHg and mean gradient is 4.00 mmHg. There is mild to moderate mitral regurgitation. Mitral inflow is E-wave dominant (>1.2m/sec).     Tricuspid Valve:  Structurally normal tricuspid valve with normal leaflet excursion. The tricuspid valve leaflets appear normal. There is severe tricuspid regurgitation. Estimated pulmonary artery systolic pressure is 53 mmHg, consistent with moderate pulmonary hypertension.     Pulmonic Valve:  The pulmonic valve was not well visualized. There is no pulmonic valve stenosis.     Aorta:  The aortic root at the sinuses of Valsalva is normal in size, measuring 3.03 cm (indexed 1.80 cm/m²). The ascending aorta is normal in size, measuring 3.10 cm (indexed 1.84 cm/m²). The aortic arch diameter is normal in size, measuring 3.3 cm (indexed 1.96 cm/m²).     Pericardium:  No pericardial effusion seen.     Systemic Veins:  The inferior vena cava is dilated measuring 2.33 cm in diameter, (dilated >2.1cm) with abnormal inspiratory collapse (abnormal <50%) consistent with elevated right atrial pressure (~15, range 10-20mmHg).  ____________________________________________________________________  QUANTITATIVE DATA:    < end of copied text >

## 2025-04-25 NOTE — DISCHARGE NOTE PROVIDER - NSDCCPCAREPLAN_GEN_ALL_CORE_FT
PRINCIPAL DISCHARGE DIAGNOSIS  Diagnosis: Cellulitis of leg  Assessment and Plan of Treatment: please complete ceftin and doxy twice daily for 6 more days      SECONDARY DISCHARGE DIAGNOSES  Diagnosis: Anemia  Assessment and Plan of Treatment: you received 1 unit of blood. there is no evidence of hemoylsis on labs. please follow up with Dr. Milligan in the office next week    Diagnosis: Chronic CHF  Assessment and Plan of Treatment: per Dr. Lovell, please start farxiga 5mg daily and change torsemide to 10mg every 48 hours    Diagnosis: BHARAT (acute kidney injury)  Assessment and Plan of Treatment: your kidney function was normal upon dc. improved with iv fluids

## 2025-04-25 NOTE — CONSULT NOTE ADULT - SUBJECTIVE AND OBJECTIVE BOX
HPI: Pt is a 79y old Female with hx of       PAIN: ( )Yes   ( )No  Level:  Location:  Intensity:    /10  Quality:  Aggravating Factors:  Alleviating Factors:  Radiation:  Duration/Timing:  Impact on ADLs:    DYSPNEA: ( ) Yes  ( ) No  Level:    PAST MEDICAL & SURGICAL HISTORY:  History of Cardiac Arrhythmia      Hypertension      Atrial Fibrillation      Aortic stenosis      Nonrheumatic aortic valve stenosis      High cholesterol      H/O temporal arteritis      S/P Exploratory Laparotomy  1966      H/O prior ablation treatment      History of temporal artery biopsy      S/P foot surgery          SOCIAL HX:    Hx opiate tolerance ( )YES  ( )NO    Baseline ADLs  (Prior to Admission)  ( ) Independent   ( )Dependent    FAMILY HISTORY:  FH: lung cancer (Father)        Review of Systems:    Anxiety-  Depression-  Physical Discomfort-  Dyspnea-  Constipation-  Diarrhea-  Nausea-  Vomiting-  Anorexia-  Weight Loss-   Cough-  Secretions-  Fatigue-  Weakness-  Delirium-    All other systems reviewed and negative  Unable to obtain/Limited due to:      PHYSICAL EXAM:    Vital Signs Last 24 Hrs  T(C): 36.3 (25 Apr 2025 08:55), Max: 36.7 (24 Apr 2025 18:05)  T(F): 97.3 (25 Apr 2025 08:55), Max: 98.1 (24 Apr 2025 18:05)  HR: 72 (25 Apr 2025 08:55) (68 - 75)  BP: 144/63 (25 Apr 2025 08:55) (138/65 - 149/69)  BP(mean): --  RR: 18 (25 Apr 2025 08:55) (17 - 18)  SpO2: 95% (25 Apr 2025 08:55) (95% - 99%)    Parameters below as of 25 Apr 2025 08:55  Patient On (Oxygen Delivery Method): room air      Daily     Daily     PPSV2:   %  FAST:    General:  Mental Status:  HEENT:  Lungs:  Cardiac:  GI:  :  Ext:  Neuro:      LABS:                        9.6    9.81  )-----------( 227      ( 25 Apr 2025 06:40 )             29.5     04-25    138  |  107  |  28[H]  ----------------------------<  86  3.3[L]   |  27  |  1.01    Ca    9.2      25 Apr 2025 06:40    TPro  6.3  /  Alb  2.4[L]  /  TBili  0.4  /  DBili  0.2  /  AST  26  /  ALT  30  /  AlkPhos  237[H]  04-25      Albumin: Albumin: 2.4 g/dL (04-25 @ 06:40)      Allergies    adhesives (Rash)  No Known Drug Allergies    Intolerances      MEDICATIONS  (STANDING):  apixaban 5 milliGRAM(s) Oral two times a day  aspirin enteric coated 81 milliGRAM(s) Oral daily  atorvastatin 10 milliGRAM(s) Oral at bedtime  cefuroxime   Tablet 500 milliGRAM(s) Oral every 12 hours  collagenase Ointment 1 Application(s) Topical daily  dapagliflozin 5 milliGRAM(s) Oral daily  doxycycline monohydrate Capsule 100 milliGRAM(s) Oral every 12 hours  folic acid 1 milliGRAM(s) Oral daily  gabapentin 300 milliGRAM(s) Oral two times a day  influenza  Vaccine (HIGH DOSE) 0.5 milliLiter(s) IntraMuscular once  Nephro-ilana 1 Tablet(s) Oral daily  pantoprazole    Tablet 40 milliGRAM(s) Oral before breakfast  potassium chloride    Tablet ER 40 milliEquivalent(s) Oral once  sodium chloride 0.9%. 1000 milliLiter(s) (100 mL/Hr) IV Continuous <Continuous>  torsemide 10 milliGRAM(s) Oral daily  valsartan 160 milliGRAM(s) Oral daily    MEDICATIONS  (PRN):  acetaminophen     Tablet .. 650 milliGRAM(s) Oral every 6 hours PRN Temp greater or equal to 38C (100.4F), Mild Pain (1 - 3)  aluminum hydroxide/magnesium hydroxide/simethicone Suspension 30 milliLiter(s) Oral every 4 hours PRN Dyspepsia  melatonin 3 milliGRAM(s) Oral at bedtime PRN Insomnia  morphine  - Injectable 2 milliGRAM(s) IV Push every 6 hours PRN Severe Pain (7 - 10)  ondansetron Injectable 4 milliGRAM(s) IV Push every 8 hours PRN Nausea and/or Vomiting      RADIOLOGY/ADDITIONAL STUDIES: HPI: Pt is a 79y old Female with hx of HTN, HLD, AS s/p TAVR, peDM, AFib on Eliquis, HFpEF EF 55-60%, CVA 11/2024 now presented to the ED c/o LE swelling worse on the R leg that started near the ankle and has went up to below the knee, took Tylenol with poor improvement. She reported she had MOHS procedure last month on her R leg and since has noticed an infection, started PO Keflex approx 5-7 days ago without improvement. Palliative medicine consulted to help establish GOC and advance care planning     4/25/2025 pt seen and examined with no family at bedside, patient resting comfortable at this time, GOC meeting held with patient please see note - plan for patient to return home today       PAIN: ( )Yes   (x )No  DYSPNEA: ( ) Yes  (x ) No      PAST MEDICAL & SURGICAL HISTORY:  History of Cardiac Arrhythmia  Hypertension  Atrial Fibrillation  Aortic stenosis  Nonrheumatic aortic valve stenosis  High cholesterol  H/O temporal arteritis  S/P Exploratory Laparotomy 1966  H/O prior ablation treatment  History of temporal artery biopsy  S/P foot surgery    SOCIAL HX: lives at home with son and grandson     Hx opiate tolerance ( )YES  (x )NO    Baseline ADLs  (Prior to Admission)  (x ) Independent   ( )Dependent    FAMILY HISTORY:  FH: lung cancer (Father)    Review of Systems:    Physical Discomfort- better since admission was terrible before     All other systems reviewed and negative    PHYSICAL EXAM:    Vital Signs Last 24 Hrs  T(C): 36.3 (25 Apr 2025 08:55), Max: 36.7 (24 Apr 2025 18:05)  T(F): 97.3 (25 Apr 2025 08:55), Max: 98.1 (24 Apr 2025 18:05)  HR: 72 (25 Apr 2025 08:55) (68 - 75)  BP: 144/63 (25 Apr 2025 08:55) (138/65 - 149/69)  RR: 18 (25 Apr 2025 08:55) (17 - 18)  SpO2: 95% (25 Apr 2025 08:55) (95% - 99%)    Parameters below as of 25 Apr 2025 08:55  Patient On (Oxygen Delivery Method): room air    PPSV2: 70  %    General: pleasant female in bed, NAD   Mental Status: alert and oriented   HEENT: mmm   Lungs: cta b/l   Cardiac: s1s2 +  GI: nontender, nondistended +BS   : +voiding   Ext: RLE erythema, warmth, TTP anterior aspect with extension superiorly and inferiorly; otherwise normal   Neuro:  intact    LABS:                        9.6    9.81  )-----------( 227      ( 25 Apr 2025 06:40 )             29.5     04-25    138  |  107  |  28[H]  ----------------------------<  86  3.3[L]   |  27  |  1.01    Ca    9.2      25 Apr 2025 06:40    TPro  6.3  /  Alb  2.4[L]  /  TBili  0.4  /  DBili  0.2  /  AST  26  /  ALT  30  /  AlkPhos  237[H]  04-25    Albumin: Albumin: 2.4 g/dL (04-25 @ 06:40)    Allergies    adhesives (Rash)  No Known Drug Allergies    Intolerances    MEDICATIONS  (STANDING):  apixaban 5 milliGRAM(s) Oral two times a day  aspirin enteric coated 81 milliGRAM(s) Oral daily  atorvastatin 10 milliGRAM(s) Oral at bedtime  cefuroxime   Tablet 500 milliGRAM(s) Oral every 12 hours  collagenase Ointment 1 Application(s) Topical daily  dapagliflozin 5 milliGRAM(s) Oral daily  doxycycline monohydrate Capsule 100 milliGRAM(s) Oral every 12 hours  folic acid 1 milliGRAM(s) Oral daily  gabapentin 300 milliGRAM(s) Oral two times a day  influenza  Vaccine (HIGH DOSE) 0.5 milliLiter(s) IntraMuscular once  Nephro-ilana 1 Tablet(s) Oral daily  pantoprazole    Tablet 40 milliGRAM(s) Oral before breakfast  potassium chloride    Tablet ER 40 milliEquivalent(s) Oral once  sodium chloride 0.9%. 1000 milliLiter(s) (100 mL/Hr) IV Continuous <Continuous>  torsemide 10 milliGRAM(s) Oral daily  valsartan 160 milliGRAM(s) Oral daily    MEDICATIONS  (PRN):  acetaminophen     Tablet .. 650 milliGRAM(s) Oral every 6 hours PRN Temp greater or equal to 38C (100.4F), Mild Pain (1 - 3)  aluminum hydroxide/magnesium hydroxide/simethicone Suspension 30 milliLiter(s) Oral every 4 hours PRN Dyspepsia  melatonin 3 milliGRAM(s) Oral at bedtime PRN Insomnia  morphine  - Injectable 2 milliGRAM(s) IV Push every 6 hours PRN Severe Pain (7 - 10)  ondansetron Injectable 4 milliGRAM(s) IV Push every 8 hours PRN Nausea and/or Vomiting      RADIOLOGY/ADDITIONAL STUDIES:    ACC: [id number] EXAM:  XR CHEST PORTABLE IMMED 1V   ORDERED BY: PEDRO KAPADIA   PROCEDURE DATE:  04/21/2025    INTERPRETATION:  TECHNIQUE: A single AP view of the chest was obtained. Ordered time:   4/21/2025 7:22 PM  COMPARISON: 11/11/2024  CLINICAL INFORMATION: Elevated pro-BNP  FINDINGS:  The heart is not well assessed on an AP film.  The lungs are clear.  There are no pleural effusions.  There is no pneumothorax.    IMPRESSION:  No radiographic evidence of acute cardiopulmonary disease      ACC: [id number] EXAM:  US DPLX LWR EXT VEINS COMPL BI   ORDERED BY: GURJIT ESCALANTE   PROCEDURE DATE:  04/20/2025    INTERPRETATION:  CLINICAL INFORMATION: Lower extremity swelling, cellulitis  COMPARISON: None available.  TECHNIQUE: Duplex sonography of the BILATERAL LOWER extremity veins with   color and spectral Doppler, with and without compression.    FINDINGS:  RIGHT:  Normal compressibility of the RIGHT common femoral, femoral and popliteal   veins.  Doppler examination shows normal spontaneous and phasic flow.  No RIGHT calf vein thrombosis is detected.  LEFT:  Normal compressibility of the LEFT common femoral, femoral and popliteal   veins.  Doppler examination shows normal spontaneous and phasic flow.  No LEFT calf vein thrombosis is detected.  Diffuse bilateral subcutaneous calf edema    IMPRESSION:  No evidence of deep venous thrombosis in either lower extremity.      ACC: [id number] EXAM:  CT LWR EXT IC RT   ORDERED BY: GURJIT ESCALANTE   PROCEDURE DATE:  04/20/2025    INTERPRETATION:  Exam Type: CT LOWER EXTREMITY WITH IV CONTRAST RIGHT  Exam Date and Time: 4/20/2025 12:09 PM  Indication: MOHS surgery anterior shin infection and pain. Concern for   abscess.  Comparison: Right femur MRI 12/10/2022.  TECHNIQUE:  Multiplanar CT images of the right lower extremity from the knee through   the ankle were obtained after administration of 90 cc of [id number] (0   cc discarded).  FINDINGS:  There is diffuse subcutaneous tissue edema and swelling throughout the   leg and ankle with associated thickening of the skin. At the middle third   of the anterior leg, there is soft tissue irregularity with punctate foci   of surrounding soft tissue gas present. Superior to this soft tissue   irregularity, there is focal nodule within the subcutaneous/cutaneous   tissues along the anterior shin. No tracking soft tissue gas is present.   No discrete organized fluid collection is noted.  No osseous destruction or aggressive periosteal reaction. No fracture or   dislocation. No productive changes at the knee or ankle. Os navicularis   identified. Vascular calcification is present.    IMPRESSION:  Diffuse subcutaneous tissue edema and swelling with presence of soft   tissue irregularity at the middle third of the anterior leg as described.   No organized fluid collection identified.

## 2025-04-25 NOTE — DISCHARGE NOTE NURSING/CASE MANAGEMENT/SOCIAL WORK - NSDCPEFALRISK_GEN_ALL_CORE
For information on Fall & Injury Prevention, visit: [email or web address] OR  [email or web address] OR  https://www.cdc.gov/steadi/patient.html

## 2025-04-25 NOTE — DISCHARGE NOTE NURSING/CASE MANAGEMENT/SOCIAL WORK - NSDCVIVACCINE_GEN_ALL_CORE_FT
Tdap; 02-Nov-2024 18:18; Marjorie Amaral (RN); [employer]; [id number] (Exp. Date: 21-May-2026); IntraMuscular; Deltoid Left.; 0.5 milliLiter(s); VIS (VIS Published: 09-May-2013, VIS Presented: 02-Nov-2024);

## 2025-04-25 NOTE — CONSULT NOTE ADULT - CONVERSATION DETAILS
Palliative team met with Pt at the bedside to discuss GOC, assist with planning and provide supportive counseling.  Palliative role explained.  Emotional support provided.  Pt is alert and oriented, able to make needs known.  [place].  Prior to hospitalization, Pt resides at home with son and adult grandson.  Pt was independent with ADLs.  States she just returned from a [place] trip with 3 out of her 4 children and grand children.  Reports she used a scooter in [place].  Pts feelings explored.  SUppor tprovided.    Advance directives reviewed.  HCP on [facility] names Doc Locke.  Pt states she did not name any of her children as her health care agent, she felt it would be too difficult for them.  We discussed CPR vs DNR/DNI.  Pt shared she has never thought about her wishes.  She shared a story about her spouse whom was medically complex noting she thought he was at the end of his life after a hospitalization but he returned to work within 6 months as he significantly improved.  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  Pt aware she is a Full Code until MOLST completed to reflect otherwise.  No limits currently set.    Plan to return home.  Educated of palliative team availability.  Our team to continue to follow.

## 2025-04-25 NOTE — DISCHARGE NOTE PROVIDER - NSDCMRMEDTOKEN_GEN_ALL_CORE_FT
apixaban 5 mg oral tablet: 1 tab(s) orally 2 times a day  aspirin 81 mg oral delayed release tablet: 1 tab(s) orally once a day  atorvastatin 10 mg oral tablet: 1 tab(s) orally once a day (at bedtime)  cefuroxime 500 mg oral tablet: 1 tab(s) orally every 12 hours  collagenase 250 units/g topical ointment: Apply topically to affected area once a day 1 Apply topically to affected area once a day  dapagliflozin 5 mg oral tablet: 1 tab(s) orally once a day  doxycycline monohydrate 50 mg oral capsule: 2 cap(s) orally every 12 hours  folic acid 0.8 mg oral tablet: 1 tab(s) orally once a day  gabapentin 300 mg oral capsule: 1 cap(s) orally 2 times a day  Potassium Chloride (Eqv-Klor-Con M20) 20 mEq oral tablet, extended release: 1 tab(s) orally once a day  Premarin 0.625 mg oral tablet: 1 tab(s) orally once a day  torsemide 10 mg oral tablet: 1 tab(s) orally every 48 hours  valsartan 160 mg oral tablet: 1 tab(s) orally once a day  Vitamin B Complex with C oral tablet: 1 tab(s) orally once a day

## 2025-04-25 NOTE — GOALS OF CARE CONVERSATION - ADVANCED CARE PLANNING - STAFF/SOCIAL WORKER
Occupational Therapy      Patient Name:  Katarina Rivera   MRN:  [id number]    Attempts made 11:40 AM and 1: 45 PM Patient not seen today secondary to (pt refusing OT tx upon initial attempt, t/fing to procedure upon 2nd attempt). Will follow-up as able.    Sanaz Dubois, OT  11/9/2020   Rosie Murillo, Palliative SW

## 2025-04-25 NOTE — DISCHARGE NOTE PROVIDER - NSDCFUSCHEDAPPT_GEN_ALL_CORE_FT
[facility]   [facility]  Scheduled Appointment: 04/26/2025    Bam Dc  [facility]  HNT PreAdmits  Scheduled Appointment: 05/27/2025

## 2025-04-25 NOTE — DISCHARGE NOTE NURSING/CASE MANAGEMENT/SOCIAL WORK - FINANCIAL ASSISTANCE
[facility] provides services at a reduced cost to those who are determined to be eligible through [facility]’s financial assistance program. Information regarding [facility]’s financial assistance program can be found by going to [email or web address] or by calling [phone].

## 2025-04-25 NOTE — DISCHARGE NOTE PROVIDER - PROVIDER TOKENS
PROVIDER:[TOKEN:[[id number]],FOLLOWUP:[1 week]],PROVIDER:[TOKEN:[[id number]],FOLLOWUP:[1 week]]

## 2025-04-25 NOTE — PROGRESS NOTE ADULT - PROVIDER SPECIALTY LIST ADULT
Cardiology
Hospitalist
Infectious Disease
Infectious Disease
Cardiology
Heme/Onc
Hospitalist
Cardiology
Hospitalist
Hospitalist
Heme/Onc

## 2025-04-26 ENCOUNTER — APPOINTMENT (OUTPATIENT)
Dept: MRI IMAGING | Facility: CLINIC | Age: 80
End: 2025-04-26
Payer: MEDICARE

## 2025-04-26 ENCOUNTER — OUTPATIENT (OUTPATIENT)
Dept: OUTPATIENT SERVICES | Facility: HOSPITAL | Age: 80
LOS: 1 days | End: 2025-04-26
Payer: MEDICARE

## 2025-04-26 DIAGNOSIS — Z00.8 ENCOUNTER FOR OTHER GENERAL EXAMINATION: ICD-10-CM

## 2025-04-26 DIAGNOSIS — Z98.890 OTHER SPECIFIED POSTPROCEDURAL STATES: Chronic | ICD-10-CM

## 2025-04-26 PROCEDURE — 72148 MRI LUMBAR SPINE W/O DYE: CPT | Mod: MH

## 2025-04-26 PROCEDURE — 72148 MRI LUMBAR SPINE W/O DYE: CPT | Mod: 26

## 2025-04-28 ENCOUNTER — TRANSCRIPTION ENCOUNTER (OUTPATIENT)
Age: 80
End: 2025-04-28

## 2025-04-28 ENCOUNTER — RESULT REVIEW (OUTPATIENT)
Age: 80
End: 2025-04-28

## 2025-04-28 ENCOUNTER — APPOINTMENT (OUTPATIENT)
Dept: INFUSION THERAPY | Facility: CLINIC | Age: 80
End: 2025-04-28

## 2025-04-28 VITALS
DIASTOLIC BLOOD PRESSURE: 71 MMHG | HEIGHT: 64 IN | HEART RATE: 86 BPM | SYSTOLIC BLOOD PRESSURE: 117 MMHG | TEMPERATURE: 98.2 F | WEIGHT: 140 LBS | OXYGEN SATURATION: 98 % | BODY MASS INDEX: 23.9 KG/M2

## 2025-04-28 LAB
BASOPHILS # BLD AUTO: 0.13 K/UL — SIGNIFICANT CHANGE UP (ref 0–0.2)
BASOPHILS NFR BLD AUTO: 1.4 % — SIGNIFICANT CHANGE UP (ref 0–2)
EOSINOPHIL # BLD AUTO: 0.93 K/UL — HIGH (ref 0–0.5)
EOSINOPHIL NFR BLD AUTO: 9.8 % — HIGH (ref 0–6)
HCT VFR BLD CALC: 32.9 % — LOW (ref 34.5–45)
HGB BLD-MCNC: 10.9 G/DL — LOW (ref 11.5–15.5)
IMM GRANULOCYTES NFR BLD AUTO: 0.4 % — SIGNIFICANT CHANGE UP (ref 0–0.9)
LYMPHOCYTES # BLD AUTO: 1.15 K/UL — SIGNIFICANT CHANGE UP (ref 1–3.3)
LYMPHOCYTES # BLD AUTO: 12.1 % — LOW (ref 13–44)
MCHC RBC-ENTMCNC: 32.5 PG — SIGNIFICANT CHANGE UP (ref 27–34)
MCHC RBC-ENTMCNC: 33.1 G/DL — SIGNIFICANT CHANGE UP (ref 32–36)
MCV RBC AUTO: 98.2 FL — SIGNIFICANT CHANGE UP (ref 80–100)
MONOCYTES # BLD AUTO: 1.05 K/UL — HIGH (ref 0–0.9)
MONOCYTES NFR BLD AUTO: 11.1 % — SIGNIFICANT CHANGE UP (ref 2–14)
NEUTROPHILS # BLD AUTO: 6.18 K/UL — SIGNIFICANT CHANGE UP (ref 1.8–7.4)
NEUTROPHILS NFR BLD AUTO: 65.2 % — SIGNIFICANT CHANGE UP (ref 43–77)
NRBC BLD AUTO-RTO: 0 /100 WBCS — SIGNIFICANT CHANGE UP (ref 0–0)
PLATELET # BLD AUTO: 276 K/UL — SIGNIFICANT CHANGE UP (ref 150–400)
RBC # BLD: 3.35 M/UL — LOW (ref 3.8–5.2)
RBC # FLD: 15.9 % — HIGH (ref 10.3–14.5)
WBC # BLD: 9.48 K/UL — SIGNIFICANT CHANGE UP (ref 3.8–10.5)
WBC # FLD AUTO: 9.48 K/UL — SIGNIFICANT CHANGE UP (ref 3.8–10.5)

## 2025-04-29 ENCOUNTER — OUTPATIENT (OUTPATIENT)
Dept: OUTPATIENT SERVICES | Facility: HOSPITAL | Age: 80
LOS: 1 days | End: 2025-04-29
Payer: MEDICARE

## 2025-04-29 DIAGNOSIS — Z98.890 OTHER SPECIFIED POSTPROCEDURAL STATES: Chronic | ICD-10-CM

## 2025-04-29 DIAGNOSIS — S81.801A UNSPECIFIED OPEN WOUND, RIGHT LOWER LEG, INITIAL ENCOUNTER: ICD-10-CM

## 2025-04-29 PROCEDURE — 11043 DBRDMT MUSC&/FSCA 1ST 20/<: CPT | Mod: 59

## 2025-04-29 PROCEDURE — 99202 OFFICE O/P NEW SF 15 MIN: CPT | Mod: 25

## 2025-04-30 DIAGNOSIS — D63.8 ANEMIA IN OTHER CHRONIC DISEASES CLASSIFIED ELSEWHERE: ICD-10-CM

## 2025-04-30 DIAGNOSIS — I50.32 CHRONIC DIASTOLIC (CONGESTIVE) HEART FAILURE: ICD-10-CM
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
## 2025-04-30 DIAGNOSIS — T81.41XA INFECTION FOLLOWING A PROCEDURE, SUPERFICIAL INCISIONAL SURGICAL SITE, INITIAL ENCOUNTER: ICD-10-CM

## 2025-04-30 DIAGNOSIS — N17.9 ACUTE KIDNEY FAILURE, UNSPECIFIED: ICD-10-CM

## 2025-04-30 DIAGNOSIS — I11.0 HYPERTENSIVE HEART DISEASE WITH HEART FAILURE: ICD-10-CM

## 2025-04-30 DIAGNOSIS — E78.5 HYPERLIPIDEMIA, UNSPECIFIED: ICD-10-CM

## 2025-04-30 DIAGNOSIS — L03.116 CELLULITIS OF LEFT LOWER LIMB: ICD-10-CM

## 2025-04-30 DIAGNOSIS — R73.03 PREDIABETES: ICD-10-CM
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
## 2025-04-30 DIAGNOSIS — Z86.73 PERSONAL HISTORY OF TRANSIENT ISCHEMIC ATTACK (TIA), AND CEREBRAL INFARCTION WITHOUT RESIDUAL DEFICITS: ICD-10-CM

## 2025-04-30 DIAGNOSIS — Z79.01 LONG TERM (CURRENT) USE OF ANTICOAGULANTS: ICD-10-CM

## 2025-04-30 DIAGNOSIS — I48.91 UNSPECIFIED ATRIAL FIBRILLATION: ICD-10-CM

## 2025-05-05 ENCOUNTER — TRANSCRIPTION ENCOUNTER (OUTPATIENT)
Age: 80
End: 2025-05-05

## 2025-05-06 ENCOUNTER — OUTPATIENT (OUTPATIENT)
Dept: OUTPATIENT SERVICES | Facility: HOSPITAL | Age: 80
LOS: 1 days | End: 2025-05-06
Payer: MEDICARE

## 2025-05-06 DIAGNOSIS — I89.0 LYMPHEDEMA, NOT ELSEWHERE CLASSIFIED: ICD-10-CM

## 2025-05-06 DIAGNOSIS — Z98.890 OTHER SPECIFIED POSTPROCEDURAL STATES: Chronic | ICD-10-CM

## 2025-05-06 DIAGNOSIS — L97.812 NON-PRESSURE CHRONIC ULCER OF OTHER PART OF RIGHT LOWER LEG WITH FAT LAYER EXPOSED: ICD-10-CM

## 2025-05-06 DIAGNOSIS — L97.828 NON-PRESSURE CHRONIC ULCER OF OTHER PART OF LEFT LOWER LEG WITH OTHER SPECIFIED SEVERITY: ICD-10-CM

## 2025-05-06 DIAGNOSIS — I70.238 ATHEROSCLEROSIS OF NATIVE ARTERIES OF RIGHT LEG WITH ULCERATION OF OTHER PART OF LOWER LEG: ICD-10-CM

## 2025-05-06 DIAGNOSIS — R60.9 EDEMA, UNSPECIFIED: ICD-10-CM

## 2025-05-06 DIAGNOSIS — I87.312 CHRONIC VENOUS HYPERTENSION (IDIOPATHIC) WITH ULCER OF LEFT LOWER EXTREMITY: ICD-10-CM

## 2025-05-06 DIAGNOSIS — I10 ESSENTIAL (PRIMARY) HYPERTENSION: ICD-10-CM

## 2025-05-06 DIAGNOSIS — M79.604 PAIN IN RIGHT LEG: ICD-10-CM

## 2025-05-06 DIAGNOSIS — S81.801A UNSPECIFIED OPEN WOUND, RIGHT LOWER LEG, INITIAL ENCOUNTER: ICD-10-CM

## 2025-05-06 DIAGNOSIS — M79.605 PAIN IN LEFT LEG: ICD-10-CM

## 2025-05-06 PROCEDURE — 11043 DBRDMT MUSC&/FSCA 1ST 20/<: CPT

## 2025-05-12 ENCOUNTER — RESULT REVIEW (OUTPATIENT)
Age: 80
End: 2025-05-12

## 2025-05-12 ENCOUNTER — APPOINTMENT (OUTPATIENT)
Dept: INFUSION THERAPY | Facility: CLINIC | Age: 80
End: 2025-05-12

## 2025-05-12 VITALS
HEIGHT: 64 IN | DIASTOLIC BLOOD PRESSURE: 70 MMHG | TEMPERATURE: 97.8 F | OXYGEN SATURATION: 98 % | HEART RATE: 70 BPM | SYSTOLIC BLOOD PRESSURE: 110 MMHG | WEIGHT: 137 LBS | BODY MASS INDEX: 23.39 KG/M2

## 2025-05-12 LAB
BASOPHILS # BLD AUTO: 0.08 K/UL — SIGNIFICANT CHANGE UP (ref 0–0.2)
BASOPHILS NFR BLD AUTO: 0.9 % — SIGNIFICANT CHANGE UP (ref 0–2)
EOSINOPHIL # BLD AUTO: 0.62 K/UL — HIGH (ref 0–0.5)
EOSINOPHIL NFR BLD AUTO: 6.8 % — HIGH (ref 0–6)
HCT VFR BLD CALC: 31.1 % — LOW (ref 34.5–45)
HGB BLD-MCNC: 9.7 G/DL — LOW (ref 11.5–15.5)
IMM GRANULOCYTES NFR BLD AUTO: 0.4 % — SIGNIFICANT CHANGE UP (ref 0–0.9)
LYMPHOCYTES # BLD AUTO: 1.42 K/UL — SIGNIFICANT CHANGE UP (ref 1–3.3)
LYMPHOCYTES # BLD AUTO: 15.7 % — SIGNIFICANT CHANGE UP (ref 13–44)
MCHC RBC-ENTMCNC: 30.3 PG — SIGNIFICANT CHANGE UP (ref 27–34)
MCHC RBC-ENTMCNC: 31.2 G/DL — LOW (ref 32–36)
MCV RBC AUTO: 97.2 FL — SIGNIFICANT CHANGE UP (ref 80–100)
MONOCYTES # BLD AUTO: 0.78 K/UL — SIGNIFICANT CHANGE UP (ref 0–0.9)
MONOCYTES NFR BLD AUTO: 8.6 % — SIGNIFICANT CHANGE UP (ref 2–14)
NEUTROPHILS # BLD AUTO: 6.13 K/UL — SIGNIFICANT CHANGE UP (ref 1.8–7.4)
NEUTROPHILS NFR BLD AUTO: 67.6 % — SIGNIFICANT CHANGE UP (ref 43–77)
NRBC BLD AUTO-RTO: 0 /100 WBCS — SIGNIFICANT CHANGE UP (ref 0–0)
PLATELET # BLD AUTO: 314 K/UL — SIGNIFICANT CHANGE UP (ref 150–400)
RBC # BLD: 3.2 M/UL — LOW (ref 3.8–5.2)
RBC # FLD: 15.1 % — HIGH (ref 10.3–14.5)
WBC # BLD: 9.07 K/UL — SIGNIFICANT CHANGE UP (ref 3.8–10.5)
WBC # FLD AUTO: 9.07 K/UL — SIGNIFICANT CHANGE UP (ref 3.8–10.5)

## 2025-05-13 ENCOUNTER — OUTPATIENT (OUTPATIENT)
Dept: OUTPATIENT SERVICES | Facility: HOSPITAL | Age: 80
LOS: 1 days | End: 2025-05-13
Payer: MEDICARE

## 2025-05-13 DIAGNOSIS — I70.238 ATHEROSCLEROSIS OF NATIVE ARTERIES OF RIGHT LEG WITH ULCERATION OF OTHER PART OF LOWER LEG: ICD-10-CM

## 2025-05-13 DIAGNOSIS — R60.9 EDEMA, UNSPECIFIED: ICD-10-CM

## 2025-05-13 DIAGNOSIS — I89.0 LYMPHEDEMA, NOT ELSEWHERE CLASSIFIED: ICD-10-CM

## 2025-05-13 DIAGNOSIS — Z98.890 OTHER SPECIFIED POSTPROCEDURAL STATES: Chronic | ICD-10-CM

## 2025-05-13 DIAGNOSIS — I10 ESSENTIAL (PRIMARY) HYPERTENSION: ICD-10-CM

## 2025-05-13 DIAGNOSIS — I87.312 CHRONIC VENOUS HYPERTENSION (IDIOPATHIC) WITH ULCER OF LEFT LOWER EXTREMITY: ICD-10-CM

## 2025-05-13 DIAGNOSIS — L97.812 NON-PRESSURE CHRONIC ULCER OF OTHER PART OF RIGHT LOWER LEG WITH FAT LAYER EXPOSED: ICD-10-CM

## 2025-05-13 DIAGNOSIS — M79.604 PAIN IN RIGHT LEG: ICD-10-CM

## 2025-05-13 DIAGNOSIS — L97.828 NON-PRESSURE CHRONIC ULCER OF OTHER PART OF LEFT LOWER LEG WITH OTHER SPECIFIED SEVERITY: ICD-10-CM

## 2025-05-13 DIAGNOSIS — M79.605 PAIN IN LEFT LEG: ICD-10-CM

## 2025-05-13 PROCEDURE — 11043 DBRDMT MUSC&/FSCA 1ST 20/<: CPT

## 2025-05-13 PROCEDURE — 11046 DBRDMT MUSC&/FSCA EA ADDL: CPT

## 2025-05-20 ENCOUNTER — OUTPATIENT (OUTPATIENT)
Dept: OUTPATIENT SERVICES | Facility: HOSPITAL | Age: 80
LOS: 1 days | End: 2025-05-20
Payer: MEDICARE

## 2025-05-20 DIAGNOSIS — Z98.890 OTHER SPECIFIED POSTPROCEDURAL STATES: Chronic | ICD-10-CM

## 2025-05-20 PROCEDURE — 11043 DBRDMT MUSC&/FSCA 1ST 20/<: CPT

## 2025-05-21 DIAGNOSIS — I89.0 LYMPHEDEMA, NOT ELSEWHERE CLASSIFIED: ICD-10-CM

## 2025-05-21 DIAGNOSIS — M79.604 PAIN IN RIGHT LEG: ICD-10-CM

## 2025-05-21 DIAGNOSIS — L97.812 NON-PRESSURE CHRONIC ULCER OF OTHER PART OF RIGHT LOWER LEG WITH FAT LAYER EXPOSED: ICD-10-CM

## 2025-05-21 DIAGNOSIS — I70.238 ATHEROSCLEROSIS OF NATIVE ARTERIES OF RIGHT LEG WITH ULCERATION OF OTHER PART OF LOWER LEG: ICD-10-CM

## 2025-05-21 DIAGNOSIS — M79.605 PAIN IN LEFT LEG: ICD-10-CM

## 2025-05-21 DIAGNOSIS — I87.312 CHRONIC VENOUS HYPERTENSION (IDIOPATHIC) WITH ULCER OF LEFT LOWER EXTREMITY: ICD-10-CM

## 2025-05-21 DIAGNOSIS — L97.828 NON-PRESSURE CHRONIC ULCER OF OTHER PART OF LEFT LOWER LEG WITH OTHER SPECIFIED SEVERITY: ICD-10-CM

## 2025-05-21 DIAGNOSIS — R60.9 EDEMA, UNSPECIFIED: ICD-10-CM

## 2025-05-21 DIAGNOSIS — I10 ESSENTIAL (PRIMARY) HYPERTENSION: ICD-10-CM

## 2025-05-27 ENCOUNTER — OUTPATIENT (OUTPATIENT)
Dept: OUTPATIENT SERVICES | Facility: HOSPITAL | Age: 80
LOS: 1 days | End: 2025-05-27
Payer: MEDICARE

## 2025-05-27 DIAGNOSIS — Z98.890 OTHER SPECIFIED POSTPROCEDURAL STATES: Chronic | ICD-10-CM

## 2025-05-27 DIAGNOSIS — S81.801A UNSPECIFIED OPEN WOUND, RIGHT LOWER LEG, INITIAL ENCOUNTER: ICD-10-CM

## 2025-05-27 PROCEDURE — 11043 DBRDMT MUSC&/FSCA 1ST 20/<: CPT

## 2025-05-28 ENCOUNTER — APPOINTMENT (OUTPATIENT)
Dept: HEMATOLOGY ONCOLOGY | Facility: CLINIC | Age: 80
End: 2025-05-28
Payer: MEDICARE

## 2025-05-28 ENCOUNTER — APPOINTMENT (OUTPATIENT)
Dept: INFUSION THERAPY | Facility: CLINIC | Age: 80
End: 2025-05-28
Payer: MEDICARE

## 2025-05-28 ENCOUNTER — RESULT REVIEW (OUTPATIENT)
Age: 80
End: 2025-05-28

## 2025-05-28 VITALS
HEART RATE: 78 BPM | BODY MASS INDEX: 23.69 KG/M2 | TEMPERATURE: 97.2 F | WEIGHT: 138 LBS | OXYGEN SATURATION: 98 % | SYSTOLIC BLOOD PRESSURE: 135 MMHG | DIASTOLIC BLOOD PRESSURE: 67 MMHG

## 2025-05-28 DIAGNOSIS — S81.801A UNSPECIFIED OPEN WOUND, RIGHT LOWER LEG, INITIAL ENCOUNTER: ICD-10-CM

## 2025-05-28 DIAGNOSIS — D50.9 IRON DEFICIENCY ANEMIA, UNSPECIFIED: ICD-10-CM

## 2025-05-28 DIAGNOSIS — D64.9 ANEMIA, UNSPECIFIED: ICD-10-CM

## 2025-05-28 DIAGNOSIS — Z79.52 LONG TERM (CURRENT) USE OF SYSTEMIC STEROIDS: ICD-10-CM

## 2025-05-28 DIAGNOSIS — Z79.01 LONG TERM (CURRENT) USE OF ANTICOAGULANTS: ICD-10-CM

## 2025-05-28 DIAGNOSIS — Z86.2 PERSONAL HISTORY OF DISEASES OF THE BLOOD AND BLOOD-FORMING ORGANS AND CERTAIN DISORDERS INVOLVING THE IMMUNE MECHANISM: ICD-10-CM

## 2025-05-28 DIAGNOSIS — N18.32 CHRONIC KIDNEY DISEASE, STAGE 3B: ICD-10-CM

## 2025-05-28 LAB
BASOPHILS # BLD AUTO: 0.09 K/UL — SIGNIFICANT CHANGE UP (ref 0–0.2)
BASOPHILS NFR BLD AUTO: 1.5 % — SIGNIFICANT CHANGE UP (ref 0–2)
EOSINOPHIL # BLD AUTO: 0.38 K/UL — SIGNIFICANT CHANGE UP (ref 0–0.5)
EOSINOPHIL NFR BLD AUTO: 6.4 % — HIGH (ref 0–6)
HCT VFR BLD CALC: 34.4 % — LOW (ref 34.5–45)
HGB BLD-MCNC: 10.8 G/DL — LOW (ref 11.5–15.5)
IMM GRANULOCYTES NFR BLD AUTO: 0.5 % — SIGNIFICANT CHANGE UP (ref 0–0.9)
LYMPHOCYTES # BLD AUTO: 1.21 K/UL — SIGNIFICANT CHANGE UP (ref 1–3.3)
LYMPHOCYTES # BLD AUTO: 20.3 % — SIGNIFICANT CHANGE UP (ref 13–44)
MCHC RBC-ENTMCNC: 31 PG — SIGNIFICANT CHANGE UP (ref 27–34)
MCHC RBC-ENTMCNC: 31.4 G/DL — LOW (ref 32–36)
MCV RBC AUTO: 98.9 FL — SIGNIFICANT CHANGE UP (ref 80–100)
MONOCYTES # BLD AUTO: 0.69 K/UL — SIGNIFICANT CHANGE UP (ref 0–0.9)
MONOCYTES NFR BLD AUTO: 11.6 % — SIGNIFICANT CHANGE UP (ref 2–14)
NEUTROPHILS # BLD AUTO: 3.57 K/UL — SIGNIFICANT CHANGE UP (ref 1.8–7.4)
NEUTROPHILS NFR BLD AUTO: 59.7 % — SIGNIFICANT CHANGE UP (ref 43–77)
NRBC BLD AUTO-RTO: 0 /100 WBCS — SIGNIFICANT CHANGE UP (ref 0–0)
PLATELET # BLD AUTO: 216 K/UL — SIGNIFICANT CHANGE UP (ref 150–400)
RBC # BLD: 3.48 M/UL — LOW (ref 3.8–5.2)
RBC # FLD: 16.3 % — HIGH (ref 10.3–14.5)
WBC # BLD: 5.97 K/UL — SIGNIFICANT CHANGE UP (ref 3.8–10.5)
WBC # FLD AUTO: 5.97 K/UL — SIGNIFICANT CHANGE UP (ref 3.8–10.5)

## 2025-05-28 PROCEDURE — G2211 COMPLEX E/M VISIT ADD ON: CPT

## 2025-05-28 PROCEDURE — 99214 OFFICE O/P EST MOD 30 MIN: CPT

## 2025-05-30 DIAGNOSIS — L97.812 NON-PRESSURE CHRONIC ULCER OF OTHER PART OF RIGHT LOWER LEG WITH FAT LAYER EXPOSED: ICD-10-CM

## 2025-05-30 DIAGNOSIS — I89.0 LYMPHEDEMA, NOT ELSEWHERE CLASSIFIED: ICD-10-CM

## 2025-05-30 DIAGNOSIS — M79.604 PAIN IN RIGHT LEG: ICD-10-CM

## 2025-05-30 DIAGNOSIS — I87.312 CHRONIC VENOUS HYPERTENSION (IDIOPATHIC) WITH ULCER OF LEFT LOWER EXTREMITY: ICD-10-CM

## 2025-05-30 DIAGNOSIS — R60.9 EDEMA, UNSPECIFIED: ICD-10-CM

## 2025-05-30 DIAGNOSIS — N18.32 CHRONIC KIDNEY DISEASE, STAGE 3B: ICD-10-CM

## 2025-05-30 DIAGNOSIS — M79.605 PAIN IN LEFT LEG: ICD-10-CM

## 2025-05-30 DIAGNOSIS — I70.238 ATHEROSCLEROSIS OF NATIVE ARTERIES OF RIGHT LEG WITH ULCERATION OF OTHER PART OF LOWER LEG: ICD-10-CM

## 2025-05-30 PROBLEM — S81.801A WOUND OF RIGHT LOWER EXTREMITY: Status: ACTIVE | Noted: 2025-05-30

## 2025-05-30 PROBLEM — Z86.2 HISTORY OF HEMOLYTIC ANEMIA: Status: RESOLVED | Noted: 2024-12-20 | Resolved: 2025-05-30

## 2025-05-30 PROBLEM — Z79.52 ON PREDNISONE THERAPY: Status: RESOLVED | Noted: 2025-01-17 | Resolved: 2025-05-30

## 2025-05-30 RX ORDER — LOSARTAN POTASSIUM 100 MG/1
TABLET, FILM COATED ORAL
Refills: 0 | Status: ACTIVE | COMMUNITY

## 2025-05-30 RX ORDER — APIXABAN 5 MG/1
5 TABLET, FILM COATED ORAL
Qty: 60 | Refills: 6 | Status: ACTIVE | COMMUNITY

## 2025-05-30 RX ORDER — DAPAGLIFLOZIN 5 MG/1
5 TABLET, FILM COATED ORAL
Refills: 0 | Status: ACTIVE | COMMUNITY

## 2025-05-30 RX ORDER — ASPIRIN 81 MG
81 TABLET, DELAYED RELEASE (ENTERIC COATED) ORAL
Refills: 0 | Status: ACTIVE | COMMUNITY

## 2025-06-03 ENCOUNTER — OUTPATIENT (OUTPATIENT)
Dept: OUTPATIENT SERVICES | Facility: HOSPITAL | Age: 80
LOS: 1 days | End: 2025-06-03

## 2025-06-03 DIAGNOSIS — I70.238 ATHEROSCLEROSIS OF NATIVE ARTERIES OF RIGHT LEG WITH ULCERATION OF OTHER PART OF LOWER LEG: ICD-10-CM

## 2025-06-03 DIAGNOSIS — Z98.890 OTHER SPECIFIED POSTPROCEDURAL STATES: Chronic | ICD-10-CM

## 2025-06-03 DIAGNOSIS — R60.9 EDEMA, UNSPECIFIED: ICD-10-CM

## 2025-06-03 DIAGNOSIS — M79.605 PAIN IN LEFT LEG: ICD-10-CM

## 2025-06-03 DIAGNOSIS — I87.312 CHRONIC VENOUS HYPERTENSION (IDIOPATHIC) WITH ULCER OF LEFT LOWER EXTREMITY: ICD-10-CM

## 2025-06-03 DIAGNOSIS — M79.604 PAIN IN RIGHT LEG: ICD-10-CM

## 2025-06-03 DIAGNOSIS — I10 ESSENTIAL (PRIMARY) HYPERTENSION: ICD-10-CM

## 2025-06-03 DIAGNOSIS — L97.828 NON-PRESSURE CHRONIC ULCER OF OTHER PART OF LEFT LOWER LEG WITH OTHER SPECIFIED SEVERITY: ICD-10-CM

## 2025-06-03 DIAGNOSIS — I89.0 LYMPHEDEMA, NOT ELSEWHERE CLASSIFIED: ICD-10-CM

## 2025-06-03 DIAGNOSIS — L97.812 NON-PRESSURE CHRONIC ULCER OF OTHER PART OF RIGHT LOWER LEG WITH FAT LAYER EXPOSED: ICD-10-CM

## 2025-06-03 PROCEDURE — 11043 DBRDMT MUSC&/FSCA 1ST 20/<: CPT

## 2025-06-09 NOTE — PATIENT PROFILE ADULT - NSPROGENPREVTRANSF_GEN_A_NUR
[FreeTextEntry1] : 2/10/25: CT AP- 1. Study protocol not optimized for detection of active gastrointestinal bleeding. Within this limitation, no hemorrhage is identified. 2. Several incidental findings, including: Suspected indeterminate 1.1 cm lesion at left kidney interpole. Pancreatic tail 3.5 cm unilocular cystic lesion. 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 Arterially enhancing 0.7 cm lesion in the left hepatic lobe. Recommend multiphase contrast-enhanced MR on nonemergent basis for further evaluation of these lesions.  2/12/25: Colonoscopy  2/12/25: MR Pelvis/Abdomen- *  0.7 cm flash filling hemangioma in the left lateral hepatic lobe corresponding to arterially enhancing focus on CT. *  Additional indeterminate hypovascular lesions in the right and left hepatic lobe are worrisome for metastases. *  3.3 cm annular soft tissue mass in the upper rectum suspicious for rectal neoplasm. No obstruction. *  3.9 x 3.6 cm unilocular thick-walled cystic lesion in the pancreatic tail with internal fluid level is suspicious for mucinous consistent neoplasm versus walled off pancreatic necrosis. Follow-up with GI for endoscopic evaluation. 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  2/13/25: CT Chest- 1.  Clear lungs. 2.  2.3 cm Inferior left thyroid gland nodule. Ultrasound recommended  2/14/25: Thyroid and Parathyroid US- Bilateral thyroid nodules. Consider follow-up ultrasound in one year. TI-RAD 3: Mildly suspicious (FNA if > 2.5 cm, Follow if > 1.5 cm)  2/14/25: EGD  2/14/25: Biopsy- Final Diagnosis 1.  Cecal polyps: -   Tubular adenoma(s) 2.  Ascending colon polyp: -   Tubular adenoma 3.  Transverse colon polyp: -   Tubular adenoma 4.  Rectal tumor, 15 cm from rectum: -   Invasive moderately differentiated colonic adenocarcinoma -   MMR immunohistochemistry shows intact proteins (MLH1, MSH2, MSH6 and PMS2)  2/14/25: Biopsy- Final Diagnosis 1. Fundus: -Gastric mucosa with foveolar hyperplasia  2/21/25: Liver Lesion Biopsy- pending  3/21/25: US Duplex Venous Lower Ext- Noncompressible RIGHT distal femoral vein and right popliteal vein Probably chronic DVT.  5/8/2025: 1.  Previous circumferential rectal sigmoid mass is not identified on the current exam. 2.  Bilobar hepatic metastases have decreased in size and conspicuity since 2/13/2025. 3.  No evidence of metastatic disease in the chest. 4.  Unchanged 4.1 cm thick walled cystic lesion in the pancreatic tail.  no

## 2025-06-10 ENCOUNTER — OUTPATIENT (OUTPATIENT)
Dept: OUTPATIENT SERVICES | Facility: HOSPITAL | Age: 80
LOS: 1 days | End: 2025-06-10
Payer: MEDICARE

## 2025-06-10 DIAGNOSIS — I87.312 CHRONIC VENOUS HYPERTENSION (IDIOPATHIC) WITH ULCER OF LEFT LOWER EXTREMITY: ICD-10-CM

## 2025-06-10 DIAGNOSIS — R60.9 EDEMA, UNSPECIFIED: ICD-10-CM

## 2025-06-10 DIAGNOSIS — Z98.890 OTHER SPECIFIED POSTPROCEDURAL STATES: Chronic | ICD-10-CM

## 2025-06-10 DIAGNOSIS — I70.238 ATHEROSCLEROSIS OF NATIVE ARTERIES OF RIGHT LEG WITH ULCERATION OF OTHER PART OF LOWER LEG: ICD-10-CM

## 2025-06-10 DIAGNOSIS — I10 ESSENTIAL (PRIMARY) HYPERTENSION: ICD-10-CM

## 2025-06-10 DIAGNOSIS — I89.0 LYMPHEDEMA, NOT ELSEWHERE CLASSIFIED: ICD-10-CM

## 2025-06-10 DIAGNOSIS — L97.828 NON-PRESSURE CHRONIC ULCER OF OTHER PART OF LEFT LOWER LEG WITH OTHER SPECIFIED SEVERITY: ICD-10-CM

## 2025-06-10 DIAGNOSIS — L97.812 NON-PRESSURE CHRONIC ULCER OF OTHER PART OF RIGHT LOWER LEG WITH FAT LAYER EXPOSED: ICD-10-CM

## 2025-06-10 DIAGNOSIS — M79.604 PAIN IN RIGHT LEG: ICD-10-CM

## 2025-06-10 PROCEDURE — 11043 DBRDMT MUSC&/FSCA 1ST 20/<: CPT

## 2025-06-11 DIAGNOSIS — Z86.718 PERSONAL HISTORY OF OTHER VENOUS THROMBOSIS AND EMBOLISM: ICD-10-CM

## 2025-06-11 DIAGNOSIS — I73.9 PERIPHERAL VASCULAR DISEASE, UNSPECIFIED: ICD-10-CM

## 2025-06-11 DIAGNOSIS — L89.154 PRESSURE ULCER OF SACRAL REGION, STAGE 4: ICD-10-CM

## 2025-06-11 DIAGNOSIS — Z86.73 PERSONAL HISTORY OF TRANSIENT ISCHEMIC ATTACK (TIA), AND CEREBRAL INFARCTION WITHOUT RESIDUAL DEFICITS: ICD-10-CM

## 2025-06-11 DIAGNOSIS — I25.10 ATHEROSCLEROTIC HEART DISEASE OF NATIVE CORONARY ARTERY WITHOUT ANGINA PECTORIS: ICD-10-CM

## 2025-06-11 DIAGNOSIS — D64.9 ANEMIA, UNSPECIFIED: ICD-10-CM

## 2025-06-12 ENCOUNTER — RESULT REVIEW (OUTPATIENT)
Age: 80
End: 2025-06-12

## 2025-06-12 ENCOUNTER — APPOINTMENT (OUTPATIENT)
Dept: INFUSION THERAPY | Facility: CLINIC | Age: 80
End: 2025-06-12

## 2025-06-12 VITALS
SYSTOLIC BLOOD PRESSURE: 109 MMHG | HEART RATE: 82 BPM | DIASTOLIC BLOOD PRESSURE: 57 MMHG | OXYGEN SATURATION: 100 % | TEMPERATURE: 97.2 F | BODY MASS INDEX: 24.2 KG/M2 | WEIGHT: 141 LBS

## 2025-06-12 LAB
BASOPHILS # BLD AUTO: 0.04 K/UL — SIGNIFICANT CHANGE UP (ref 0–0.2)
BASOPHILS NFR BLD AUTO: 0.5 % — SIGNIFICANT CHANGE UP (ref 0–2)
EOSINOPHIL # BLD AUTO: 0.21 K/UL — SIGNIFICANT CHANGE UP (ref 0–0.5)
EOSINOPHIL NFR BLD AUTO: 2.5 % — SIGNIFICANT CHANGE UP (ref 0–6)
HCT VFR BLD CALC: 27.7 % — LOW (ref 34.5–45)
HGB BLD-MCNC: 9 G/DL — LOW (ref 11.5–15.5)
IMM GRANULOCYTES NFR BLD AUTO: 0.2 % — SIGNIFICANT CHANGE UP (ref 0–0.9)
LYMPHOCYTES # BLD AUTO: 1.21 K/UL — SIGNIFICANT CHANGE UP (ref 1–3.3)
LYMPHOCYTES # BLD AUTO: 14.6 % — SIGNIFICANT CHANGE UP (ref 13–44)
MCHC RBC-ENTMCNC: 30.9 PG — SIGNIFICANT CHANGE UP (ref 27–34)
MCHC RBC-ENTMCNC: 32.5 G/DL — SIGNIFICANT CHANGE UP (ref 32–36)
MCV RBC AUTO: 95.2 FL — SIGNIFICANT CHANGE UP (ref 80–100)
MONOCYTES # BLD AUTO: 1.38 K/UL — HIGH (ref 0–0.9)
MONOCYTES NFR BLD AUTO: 16.6 % — HIGH (ref 2–14)
NEUTROPHILS # BLD AUTO: 5.44 K/UL — SIGNIFICANT CHANGE UP (ref 1.8–7.4)
NEUTROPHILS NFR BLD AUTO: 65.6 % — SIGNIFICANT CHANGE UP (ref 43–77)
NRBC BLD AUTO-RTO: 0 /100 WBCS — SIGNIFICANT CHANGE UP (ref 0–0)
PLATELET # BLD AUTO: 188 K/UL — SIGNIFICANT CHANGE UP (ref 150–400)
RBC # BLD: 2.91 M/UL — LOW (ref 3.8–5.2)
RBC # FLD: 15.9 % — HIGH (ref 10.3–14.5)
WBC # BLD: 8.3 K/UL — SIGNIFICANT CHANGE UP (ref 3.8–10.5)
WBC # FLD AUTO: 8.3 K/UL — SIGNIFICANT CHANGE UP (ref 3.8–10.5)

## 2025-06-13 DIAGNOSIS — D63.1 ANEMIA IN CHRONIC KIDNEY DISEASE: ICD-10-CM

## 2025-06-14 ENCOUNTER — OUTPATIENT (OUTPATIENT)
Dept: OUTPATIENT SERVICES | Facility: HOSPITAL | Age: 80
LOS: 1 days | End: 2025-06-14
Payer: MEDICARE

## 2025-06-14 ENCOUNTER — APPOINTMENT (OUTPATIENT)
Dept: MRI IMAGING | Facility: CLINIC | Age: 80
End: 2025-06-14
Payer: MEDICARE

## 2025-06-14 ENCOUNTER — RESULT REVIEW (OUTPATIENT)
Age: 80
End: 2025-06-14

## 2025-06-14 DIAGNOSIS — Z00.8 ENCOUNTER FOR OTHER GENERAL EXAMINATION: ICD-10-CM

## 2025-06-14 DIAGNOSIS — Z98.890 OTHER SPECIFIED POSTPROCEDURAL STATES: Chronic | ICD-10-CM

## 2025-06-14 PROCEDURE — 73221 MRI JOINT UPR EXTREM W/O DYE: CPT | Mod: RT

## 2025-06-14 PROCEDURE — 73221 MRI JOINT UPR EXTREM W/O DYE: CPT | Mod: 26,RT

## 2025-06-17 ENCOUNTER — OUTPATIENT (OUTPATIENT)
Dept: OUTPATIENT SERVICES | Facility: HOSPITAL | Age: 80
LOS: 1 days | End: 2025-06-17
Payer: MEDICARE

## 2025-06-17 DIAGNOSIS — Z98.890 OTHER SPECIFIED POSTPROCEDURAL STATES: Chronic | ICD-10-CM

## 2025-06-17 DIAGNOSIS — L89.154 PRESSURE ULCER OF SACRAL REGION, STAGE 4: ICD-10-CM

## 2025-06-17 PROCEDURE — 11043 DBRDMT MUSC&/FSCA 1ST 20/<: CPT

## 2025-06-18 DIAGNOSIS — R60.9 EDEMA, UNSPECIFIED: ICD-10-CM

## 2025-06-18 DIAGNOSIS — L97.828 NON-PRESSURE CHRONIC ULCER OF OTHER PART OF LEFT LOWER LEG WITH OTHER SPECIFIED SEVERITY: ICD-10-CM

## 2025-06-18 DIAGNOSIS — L97.812 NON-PRESSURE CHRONIC ULCER OF OTHER PART OF RIGHT LOWER LEG WITH FAT LAYER EXPOSED: ICD-10-CM

## 2025-06-18 DIAGNOSIS — I89.0 LYMPHEDEMA, NOT ELSEWHERE CLASSIFIED: ICD-10-CM

## 2025-06-18 DIAGNOSIS — I87.312 CHRONIC VENOUS HYPERTENSION (IDIOPATHIC) WITH ULCER OF LEFT LOWER EXTREMITY: ICD-10-CM

## 2025-06-18 DIAGNOSIS — M79.604 PAIN IN RIGHT LEG: ICD-10-CM

## 2025-06-18 DIAGNOSIS — I70.238 ATHEROSCLEROSIS OF NATIVE ARTERIES OF RIGHT LEG WITH ULCERATION OF OTHER PART OF LOWER LEG: ICD-10-CM

## 2025-06-18 DIAGNOSIS — I10 ESSENTIAL (PRIMARY) HYPERTENSION: ICD-10-CM

## 2025-06-19 ENCOUNTER — OUTPATIENT (OUTPATIENT)
Dept: OUTPATIENT SERVICES | Facility: HOSPITAL | Age: 80
LOS: 1 days | Discharge: HOME | End: 2025-06-19

## 2025-06-19 DIAGNOSIS — D50.9 IRON DEFICIENCY ANEMIA, UNSPECIFIED: ICD-10-CM

## 2025-06-19 DIAGNOSIS — D58.9 HEREDITARY HEMOLYTIC ANEMIA, UNSPECIFIED: ICD-10-CM

## 2025-06-19 DIAGNOSIS — Z98.890 OTHER SPECIFIED POSTPROCEDURAL STATES: Chronic | ICD-10-CM

## 2025-06-19 DIAGNOSIS — N18.32 CHRONIC KIDNEY DISEASE, STAGE 3B: ICD-10-CM

## 2025-06-19 DIAGNOSIS — Z79.01 LONG TERM (CURRENT) USE OF ANTICOAGULANTS: ICD-10-CM

## 2025-06-19 DIAGNOSIS — D64.9 ANEMIA, UNSPECIFIED: ICD-10-CM

## 2025-06-26 ENCOUNTER — RESULT REVIEW (OUTPATIENT)
Age: 80
End: 2025-06-26

## 2025-06-26 ENCOUNTER — APPOINTMENT (OUTPATIENT)
Dept: INFUSION THERAPY | Facility: CLINIC | Age: 80
End: 2025-06-26
Payer: MEDICARE

## 2025-06-26 ENCOUNTER — APPOINTMENT (OUTPATIENT)
Dept: HEMATOLOGY ONCOLOGY | Facility: CLINIC | Age: 80
End: 2025-06-26
Payer: MEDICARE

## 2025-06-26 VITALS
HEIGHT: 64 IN | DIASTOLIC BLOOD PRESSURE: 65 MMHG | OXYGEN SATURATION: 99 % | HEART RATE: 68 BPM | TEMPERATURE: 97.3 F | SYSTOLIC BLOOD PRESSURE: 110 MMHG | WEIGHT: 140 LBS | BODY MASS INDEX: 23.9 KG/M2

## 2025-06-26 LAB
BASOPHILS # BLD AUTO: 0.09 K/UL — SIGNIFICANT CHANGE UP (ref 0–0.2)
BASOPHILS NFR BLD AUTO: 1 % — SIGNIFICANT CHANGE UP (ref 0–2)
EOSINOPHIL # BLD AUTO: 1.24 K/UL — HIGH (ref 0–0.5)
EOSINOPHIL NFR BLD AUTO: 14.1 % — HIGH (ref 0–6)
HCT VFR BLD CALC: 26.3 % — LOW (ref 34.5–45)
HGB BLD-MCNC: 8.1 G/DL — LOW (ref 11.5–15.5)
IMM GRANULOCYTES # BLD AUTO: 0.03 K/UL — SIGNIFICANT CHANGE UP (ref 0–0.07)
IMM GRANULOCYTES NFR BLD AUTO: 0.3 % — SIGNIFICANT CHANGE UP (ref 0–0.9)
LYMPHOCYTES # BLD AUTO: 1.35 K/UL — SIGNIFICANT CHANGE UP (ref 1–3.3)
LYMPHOCYTES NFR BLD AUTO: 15.3 % — SIGNIFICANT CHANGE UP (ref 13–44)
MCHC RBC-ENTMCNC: 30.6 PG — SIGNIFICANT CHANGE UP (ref 27–34)
MCHC RBC-ENTMCNC: 30.8 G/DL — LOW (ref 32–36)
MCV RBC AUTO: 99.2 FL — SIGNIFICANT CHANGE UP (ref 80–100)
MONOCYTES # BLD AUTO: 1.05 K/UL — HIGH (ref 0–0.9)
MONOCYTES NFR BLD AUTO: 11.9 % — SIGNIFICANT CHANGE UP (ref 2–14)
NEUTROPHILS # BLD AUTO: 5.05 K/UL — SIGNIFICANT CHANGE UP (ref 1.8–7.4)
NEUTROPHILS NFR BLD AUTO: 57.4 % — SIGNIFICANT CHANGE UP (ref 43–77)
NRBC # BLD AUTO: 0 K/UL — SIGNIFICANT CHANGE UP (ref 0–0)
NRBC # FLD: 0 K/UL — SIGNIFICANT CHANGE UP (ref 0–0)
NRBC BLD AUTO-RTO: 0 /100 WBCS — SIGNIFICANT CHANGE UP (ref 0–0)
PLATELET # BLD AUTO: 341 K/UL — SIGNIFICANT CHANGE UP (ref 150–400)
PMV BLD: 9.1 FL — SIGNIFICANT CHANGE UP (ref 7–13)
RBC # BLD: 2.65 M/UL — LOW (ref 3.8–5.2)
RBC # FLD: 16.5 % — HIGH (ref 10.3–14.5)
WBC # BLD: 8.81 K/UL — SIGNIFICANT CHANGE UP (ref 3.8–10.5)
WBC # FLD AUTO: 8.81 K/UL — SIGNIFICANT CHANGE UP (ref 3.8–10.5)

## 2025-06-26 PROCEDURE — G2211 COMPLEX E/M VISIT ADD ON: CPT

## 2025-06-26 PROCEDURE — 99214 OFFICE O/P EST MOD 30 MIN: CPT

## 2025-07-01 ENCOUNTER — OUTPATIENT (OUTPATIENT)
Dept: OUTPATIENT SERVICES | Facility: HOSPITAL | Age: 80
LOS: 1 days | End: 2025-07-01
Payer: MEDICARE

## 2025-07-01 DIAGNOSIS — I70.238 ATHEROSCLEROSIS OF NATIVE ARTERIES OF RIGHT LEG WITH ULCERATION OF OTHER PART OF LOWER LEG: ICD-10-CM

## 2025-07-01 DIAGNOSIS — Z98.890 OTHER SPECIFIED POSTPROCEDURAL STATES: Chronic | ICD-10-CM

## 2025-07-01 DIAGNOSIS — S81.801D UNSPECIFIED OPEN WOUND, RIGHT LOWER LEG, SUBSEQUENT ENCOUNTER: ICD-10-CM

## 2025-07-01 DIAGNOSIS — I10 ESSENTIAL (PRIMARY) HYPERTENSION: ICD-10-CM

## 2025-07-01 DIAGNOSIS — I89.0 LYMPHEDEMA, NOT ELSEWHERE CLASSIFIED: ICD-10-CM

## 2025-07-01 DIAGNOSIS — M79.604 PAIN IN RIGHT LEG: ICD-10-CM

## 2025-07-01 DIAGNOSIS — C44.722 SQUAMOUS CELL CARCINOMA OF SKIN OF RIGHT LOWER LIMB, INCLUDING HIP: ICD-10-CM

## 2025-07-01 DIAGNOSIS — S81.812D LACERATION WITHOUT FOREIGN BODY, LEFT LOWER LEG, SUBSEQUENT ENCOUNTER: ICD-10-CM

## 2025-07-01 DIAGNOSIS — T81.30XD DISRUPTION OF WOUND, UNSPECIFIED, SUBSEQUENT ENCOUNTER: ICD-10-CM

## 2025-07-01 PROCEDURE — 11042 DBRDMT SUBQ TIS 1ST 20SQCM/<: CPT

## 2025-07-03 ENCOUNTER — APPOINTMENT (OUTPATIENT)
Dept: INFUSION THERAPY | Facility: CLINIC | Age: 80
End: 2025-07-03

## 2025-07-03 ENCOUNTER — RESULT REVIEW (OUTPATIENT)
Age: 80
End: 2025-07-03

## 2025-07-03 LAB
BASOPHILS # BLD AUTO: 0.09 K/UL — SIGNIFICANT CHANGE UP (ref 0–0.2)
BASOPHILS NFR BLD AUTO: 1.2 % — SIGNIFICANT CHANGE UP (ref 0–2)
EOSINOPHIL # BLD AUTO: 0.79 K/UL — HIGH (ref 0–0.5)
EOSINOPHIL NFR BLD AUTO: 10.8 % — HIGH (ref 0–6)
HCT VFR BLD CALC: 28.2 % — LOW (ref 34.5–45)
HGB BLD-MCNC: 8.6 G/DL — LOW (ref 11.5–15.5)
IMM GRANULOCYTES # BLD AUTO: 0.02 K/UL — SIGNIFICANT CHANGE UP (ref 0–0.07)
IMM GRANULOCYTES NFR BLD AUTO: 0.3 % — SIGNIFICANT CHANGE UP (ref 0–0.9)
LYMPHOCYTES # BLD AUTO: 1.11 K/UL — SIGNIFICANT CHANGE UP (ref 1–3.3)
LYMPHOCYTES NFR BLD AUTO: 15.2 % — SIGNIFICANT CHANGE UP (ref 13–44)
MCHC RBC-ENTMCNC: 30.5 G/DL — LOW (ref 32–36)
MCHC RBC-ENTMCNC: 30.8 PG — SIGNIFICANT CHANGE UP (ref 27–34)
MCV RBC AUTO: 101.1 FL — HIGH (ref 80–100)
MONOCYTES # BLD AUTO: 0.91 K/UL — HIGH (ref 0–0.9)
MONOCYTES NFR BLD AUTO: 12.5 % — SIGNIFICANT CHANGE UP (ref 2–14)
NEUTROPHILS # BLD AUTO: 4.38 K/UL — SIGNIFICANT CHANGE UP (ref 1.8–7.4)
NEUTROPHILS NFR BLD AUTO: 60 % — SIGNIFICANT CHANGE UP (ref 43–77)
NRBC # BLD AUTO: 0 K/UL — SIGNIFICANT CHANGE UP (ref 0–0)
NRBC # FLD: 0 K/UL — SIGNIFICANT CHANGE UP (ref 0–0)
NRBC BLD AUTO-RTO: 0 /100 WBCS — SIGNIFICANT CHANGE UP (ref 0–0)
PLATELET # BLD AUTO: 269 K/UL — SIGNIFICANT CHANGE UP (ref 150–400)
PMV BLD: 9.4 FL — SIGNIFICANT CHANGE UP (ref 7–13)
RBC # BLD: 2.79 M/UL — LOW (ref 3.8–5.2)
RBC # FLD: 18 % — HIGH (ref 10.3–14.5)
WBC # BLD: 7.3 K/UL — SIGNIFICANT CHANGE UP (ref 3.8–10.5)
WBC # FLD AUTO: 7.3 K/UL — SIGNIFICANT CHANGE UP (ref 3.8–10.5)

## 2025-07-03 NOTE — ED STATDOCS - PATIENT PORTAL LINK FT
done You can access the FollowMyHealth Patient Portal offered by [facility] by registering at the following website: [email or web address]. By joining [employer]’s FollowMyHealth portal, you will also be able to view your health information using other applications (apps) compatible with our system.

## 2025-07-07 DIAGNOSIS — D63.1 ANEMIA IN CHRONIC KIDNEY DISEASE: ICD-10-CM

## 2025-07-08 ENCOUNTER — OUTPATIENT (OUTPATIENT)
Dept: OUTPATIENT SERVICES | Facility: HOSPITAL | Age: 80
LOS: 1 days | End: 2025-07-08
Payer: MEDICARE

## 2025-07-08 DIAGNOSIS — I10 ESSENTIAL (PRIMARY) HYPERTENSION: ICD-10-CM

## 2025-07-08 DIAGNOSIS — I70.238 ATHEROSCLEROSIS OF NATIVE ARTERIES OF RIGHT LEG WITH ULCERATION OF OTHER PART OF LOWER LEG: ICD-10-CM

## 2025-07-08 DIAGNOSIS — Z98.890 OTHER SPECIFIED POSTPROCEDURAL STATES: Chronic | ICD-10-CM

## 2025-07-08 PROCEDURE — 11042 DBRDMT SUBQ TIS 1ST 20SQCM/<: CPT

## 2025-07-10 ENCOUNTER — APPOINTMENT (OUTPATIENT)
Dept: INFUSION THERAPY | Facility: CLINIC | Age: 80
End: 2025-07-10

## 2025-07-10 ENCOUNTER — RESULT REVIEW (OUTPATIENT)
Age: 80
End: 2025-07-10

## 2025-07-10 VITALS
DIASTOLIC BLOOD PRESSURE: 56 MMHG | HEART RATE: 59 BPM | SYSTOLIC BLOOD PRESSURE: 112 MMHG | BODY MASS INDEX: 24.2 KG/M2 | TEMPERATURE: 97.2 F | OXYGEN SATURATION: 98 % | WEIGHT: 141 LBS

## 2025-07-10 LAB
BASOPHILS # BLD AUTO: 0.07 K/UL — SIGNIFICANT CHANGE UP (ref 0–0.2)
BASOPHILS NFR BLD AUTO: 1 % — SIGNIFICANT CHANGE UP (ref 0–2)
EOSINOPHIL # BLD AUTO: 0.74 K/UL — HIGH (ref 0–0.5)
EOSINOPHIL NFR BLD AUTO: 10.4 % — HIGH (ref 0–6)
HCT VFR BLD CALC: 30 % — LOW (ref 34.5–45)
HGB BLD-MCNC: 9.5 G/DL — LOW (ref 11.5–15.5)
IMM GRANULOCYTES # BLD AUTO: 0.01 K/UL — SIGNIFICANT CHANGE UP (ref 0–0.07)
IMM GRANULOCYTES NFR BLD AUTO: 0.1 % — SIGNIFICANT CHANGE UP (ref 0–0.9)
LYMPHOCYTES # BLD AUTO: 1.1 K/UL — SIGNIFICANT CHANGE UP (ref 1–3.3)
LYMPHOCYTES NFR BLD AUTO: 15.4 % — SIGNIFICANT CHANGE UP (ref 13–44)
MCHC RBC-ENTMCNC: 31.7 G/DL — LOW (ref 32–36)
MCHC RBC-ENTMCNC: 32.6 PG — SIGNIFICANT CHANGE UP (ref 27–34)
MCV RBC AUTO: 103.1 FL — HIGH (ref 80–100)
MONOCYTES # BLD AUTO: 1.09 K/UL — HIGH (ref 0–0.9)
MONOCYTES NFR BLD AUTO: 15.3 % — HIGH (ref 2–14)
NEUTROPHILS # BLD AUTO: 4.12 K/UL — SIGNIFICANT CHANGE UP (ref 1.8–7.4)
NEUTROPHILS NFR BLD AUTO: 57.8 % — SIGNIFICANT CHANGE UP (ref 43–77)
NRBC # BLD AUTO: 0 K/UL — SIGNIFICANT CHANGE UP (ref 0–0)
NRBC # FLD: 0 K/UL — SIGNIFICANT CHANGE UP (ref 0–0)
NRBC BLD AUTO-RTO: 0 /100 WBCS — SIGNIFICANT CHANGE UP (ref 0–0)
PLATELET # BLD AUTO: 162 K/UL — SIGNIFICANT CHANGE UP (ref 150–400)
PMV BLD: 10 FL — SIGNIFICANT CHANGE UP (ref 7–13)
RBC # BLD: 2.91 M/UL — LOW (ref 3.8–5.2)
RBC # FLD: 19.2 % — HIGH (ref 10.3–14.5)
WBC # BLD: 7.13 K/UL — SIGNIFICANT CHANGE UP (ref 3.8–10.5)
WBC # FLD AUTO: 7.13 K/UL — SIGNIFICANT CHANGE UP (ref 3.8–10.5)

## 2025-07-16 ENCOUNTER — APPOINTMENT (OUTPATIENT)
Dept: MRI IMAGING | Facility: CLINIC | Age: 80
End: 2025-07-16

## 2025-07-18 ENCOUNTER — OUTPATIENT (OUTPATIENT)
Dept: OUTPATIENT SERVICES | Facility: HOSPITAL | Age: 80
LOS: 1 days | End: 2025-07-18
Payer: MEDICARE

## 2025-07-18 ENCOUNTER — APPOINTMENT (OUTPATIENT)
Dept: MRI IMAGING | Facility: CLINIC | Age: 80
End: 2025-07-18

## 2025-07-18 DIAGNOSIS — Z98.890 OTHER SPECIFIED POSTPROCEDURAL STATES: Chronic | ICD-10-CM

## 2025-07-18 DIAGNOSIS — M25.411 EFFUSION, RIGHT SHOULDER: ICD-10-CM

## 2025-07-18 PROCEDURE — 73223 MRI JOINT UPR EXTR W/O&W/DYE: CPT | Mod: 26,RT

## 2025-07-18 PROCEDURE — 73223 MRI JOINT UPR EXTR W/O&W/DYE: CPT | Mod: MH

## 2025-07-18 PROCEDURE — A9585: CPT

## 2025-07-21 DIAGNOSIS — I89.0 LYMPHEDEMA, NOT ELSEWHERE CLASSIFIED: ICD-10-CM

## 2025-07-21 DIAGNOSIS — I10 ESSENTIAL (PRIMARY) HYPERTENSION: ICD-10-CM

## 2025-07-21 DIAGNOSIS — L97.812 NON-PRESSURE CHRONIC ULCER OF OTHER PART OF RIGHT LOWER LEG WITH FAT LAYER EXPOSED: ICD-10-CM

## 2025-07-21 DIAGNOSIS — M79.604 PAIN IN RIGHT LEG: ICD-10-CM

## 2025-07-21 DIAGNOSIS — I70.238 ATHEROSCLEROSIS OF NATIVE ARTERIES OF RIGHT LEG WITH ULCERATION OF OTHER PART OF LOWER LEG: ICD-10-CM

## 2025-07-24 ENCOUNTER — APPOINTMENT (OUTPATIENT)
Dept: INFUSION THERAPY | Facility: CLINIC | Age: 80
End: 2025-07-24

## 2025-07-24 ENCOUNTER — RESULT REVIEW (OUTPATIENT)
Age: 80
End: 2025-07-24

## 2025-07-24 LAB
BASOPHILS # BLD AUTO: 0.1 K/UL — SIGNIFICANT CHANGE UP (ref 0–0.2)
BASOPHILS NFR BLD AUTO: 1.5 % — SIGNIFICANT CHANGE UP (ref 0–2)
EOSINOPHIL # BLD AUTO: 0.38 K/UL — SIGNIFICANT CHANGE UP (ref 0–0.5)
EOSINOPHIL NFR BLD AUTO: 5.7 % — SIGNIFICANT CHANGE UP (ref 0–6)
HCT VFR BLD CALC: 33.9 % — LOW (ref 34.5–45)
HGB BLD-MCNC: 10.6 G/DL — LOW (ref 11.5–15.5)
IMM GRANULOCYTES # BLD AUTO: 0.01 K/UL — SIGNIFICANT CHANGE UP (ref 0–0.07)
IMM GRANULOCYTES NFR BLD AUTO: 0.1 % — SIGNIFICANT CHANGE UP (ref 0–0.9)
LYMPHOCYTES # BLD AUTO: 1.2 K/UL — SIGNIFICANT CHANGE UP (ref 1–3.3)
LYMPHOCYTES NFR BLD AUTO: 17.9 % — SIGNIFICANT CHANGE UP (ref 13–44)
MCHC RBC-ENTMCNC: 31.3 G/DL — LOW (ref 32–36)
MCHC RBC-ENTMCNC: 31.5 PG — SIGNIFICANT CHANGE UP (ref 27–34)
MCV RBC AUTO: 100.9 FL — HIGH (ref 80–100)
MONOCYTES # BLD AUTO: 1.05 K/UL — HIGH (ref 0–0.9)
MONOCYTES NFR BLD AUTO: 15.6 % — HIGH (ref 2–14)
NEUTROPHILS # BLD AUTO: 3.98 K/UL — SIGNIFICANT CHANGE UP (ref 1.8–7.4)
NEUTROPHILS NFR BLD AUTO: 59.2 % — SIGNIFICANT CHANGE UP (ref 43–77)
NRBC # BLD AUTO: 0 K/UL — SIGNIFICANT CHANGE UP (ref 0–0)
NRBC # FLD: 0 K/UL — SIGNIFICANT CHANGE UP (ref 0–0)
NRBC BLD AUTO-RTO: 0 /100 WBCS — SIGNIFICANT CHANGE UP (ref 0–0)
PLATELET # BLD AUTO: 187 K/UL — SIGNIFICANT CHANGE UP (ref 150–400)
PMV BLD: 10 FL — SIGNIFICANT CHANGE UP (ref 7–13)
RBC # BLD: 3.36 M/UL — LOW (ref 3.8–5.2)
RBC # FLD: 17.1 % — HIGH (ref 10.3–14.5)
WBC # BLD: 6.72 K/UL — SIGNIFICANT CHANGE UP (ref 3.8–10.5)
WBC # FLD AUTO: 6.72 K/UL — SIGNIFICANT CHANGE UP (ref 3.8–10.5)

## 2025-07-29 ENCOUNTER — OUTPATIENT (OUTPATIENT)
Dept: OUTPATIENT SERVICES | Facility: HOSPITAL | Age: 80
LOS: 1 days | End: 2025-07-29
Payer: MEDICARE

## 2025-07-29 DIAGNOSIS — I70.238 ATHEROSCLEROSIS OF NATIVE ARTERIES OF RIGHT LEG WITH ULCERATION OF OTHER PART OF LOWER LEG: ICD-10-CM

## 2025-07-29 DIAGNOSIS — I10 ESSENTIAL (PRIMARY) HYPERTENSION: ICD-10-CM

## 2025-07-29 DIAGNOSIS — Z98.890 OTHER SPECIFIED POSTPROCEDURAL STATES: Chronic | ICD-10-CM

## 2025-07-29 PROCEDURE — 11043 DBRDMT MUSC&/FSCA 1ST 20/<: CPT

## 2025-08-05 DIAGNOSIS — L97.812 NON-PRESSURE CHRONIC ULCER OF OTHER PART OF RIGHT LOWER LEG WITH FAT LAYER EXPOSED: ICD-10-CM

## 2025-08-05 DIAGNOSIS — I89.0 LYMPHEDEMA, NOT ELSEWHERE CLASSIFIED: ICD-10-CM

## 2025-08-05 DIAGNOSIS — I10 ESSENTIAL (PRIMARY) HYPERTENSION: ICD-10-CM

## 2025-08-05 DIAGNOSIS — M79.604 PAIN IN RIGHT LEG: ICD-10-CM

## 2025-08-05 DIAGNOSIS — I70.238 ATHEROSCLEROSIS OF NATIVE ARTERIES OF RIGHT LEG WITH ULCERATION OF OTHER PART OF LOWER LEG: ICD-10-CM

## 2025-08-08 ENCOUNTER — RESULT REVIEW (OUTPATIENT)
Age: 80
End: 2025-08-08

## 2025-08-08 ENCOUNTER — APPOINTMENT (OUTPATIENT)
Dept: INFUSION THERAPY | Facility: CLINIC | Age: 80
End: 2025-08-08

## 2025-08-08 LAB
BASOPHILS # BLD AUTO: 0.08 K/UL — SIGNIFICANT CHANGE UP (ref 0–0.2)
BASOPHILS NFR BLD AUTO: 1.6 % — SIGNIFICANT CHANGE UP (ref 0–2)
EOSINOPHIL # BLD AUTO: 0.42 K/UL — SIGNIFICANT CHANGE UP (ref 0–0.5)
EOSINOPHIL NFR BLD AUTO: 8.3 % — HIGH (ref 0–6)
HCT VFR BLD CALC: 35.8 % — SIGNIFICANT CHANGE UP (ref 34.5–45)
HGB BLD-MCNC: 11.2 G/DL — LOW (ref 11.5–15.5)
IMM GRANULOCYTES # BLD AUTO: 0.01 K/UL — SIGNIFICANT CHANGE UP (ref 0–0.07)
IMM GRANULOCYTES NFR BLD AUTO: 0.2 % — SIGNIFICANT CHANGE UP (ref 0–0.9)
LYMPHOCYTES # BLD AUTO: 1.25 K/UL — SIGNIFICANT CHANGE UP (ref 1–3.3)
LYMPHOCYTES NFR BLD AUTO: 24.8 % — SIGNIFICANT CHANGE UP (ref 13–44)
MCHC RBC-ENTMCNC: 30.5 PG — SIGNIFICANT CHANGE UP (ref 27–34)
MCHC RBC-ENTMCNC: 31.3 G/DL — LOW (ref 32–36)
MCV RBC AUTO: 97.5 FL — SIGNIFICANT CHANGE UP (ref 80–100)
MONOCYTES # BLD AUTO: 0.76 K/UL — SIGNIFICANT CHANGE UP (ref 0–0.9)
MONOCYTES NFR BLD AUTO: 15 % — HIGH (ref 2–14)
NEUTROPHILS # BLD AUTO: 2.53 K/UL — SIGNIFICANT CHANGE UP (ref 1.8–7.4)
NEUTROPHILS NFR BLD AUTO: 50.1 % — SIGNIFICANT CHANGE UP (ref 43–77)
NRBC # BLD AUTO: 0 K/UL — SIGNIFICANT CHANGE UP (ref 0–0)
NRBC # FLD: 0 K/UL — SIGNIFICANT CHANGE UP (ref 0–0)
NRBC BLD AUTO-RTO: 0 /100 WBCS — SIGNIFICANT CHANGE UP (ref 0–0)
PLATELET # BLD AUTO: 165 K/UL — SIGNIFICANT CHANGE UP (ref 150–400)
PMV BLD: 11.1 FL — SIGNIFICANT CHANGE UP (ref 7–13)
RBC # BLD: 3.67 M/UL — LOW (ref 3.8–5.2)
RBC # FLD: 15.9 % — HIGH (ref 10.3–14.5)
WBC # BLD: 5.05 K/UL — SIGNIFICANT CHANGE UP (ref 3.8–10.5)
WBC # FLD AUTO: 5.05 K/UL — SIGNIFICANT CHANGE UP (ref 3.8–10.5)

## 2025-08-12 ENCOUNTER — OUTPATIENT (OUTPATIENT)
Dept: OUTPATIENT SERVICES | Facility: HOSPITAL | Age: 80
LOS: 1 days | End: 2025-08-12

## 2025-08-12 DIAGNOSIS — Z98.890 OTHER SPECIFIED POSTPROCEDURAL STATES: Chronic | ICD-10-CM

## 2025-08-12 DIAGNOSIS — I70.238 ATHEROSCLEROSIS OF NATIVE ARTERIES OF RIGHT LEG WITH ULCERATION OF OTHER PART OF LOWER LEG: ICD-10-CM

## 2025-08-12 DIAGNOSIS — M79.604 PAIN IN RIGHT LEG: ICD-10-CM

## 2025-08-12 DIAGNOSIS — I89.0 LYMPHEDEMA, NOT ELSEWHERE CLASSIFIED: ICD-10-CM

## 2025-08-12 DIAGNOSIS — I10 ESSENTIAL (PRIMARY) HYPERTENSION: ICD-10-CM

## 2025-08-12 PROCEDURE — 99212 OFFICE O/P EST SF 10 MIN: CPT

## 2025-08-14 ENCOUNTER — APPOINTMENT (OUTPATIENT)
Dept: DERMATOLOGY | Facility: CLINIC | Age: 80
End: 2025-08-14
Payer: MEDICARE

## 2025-08-14 DIAGNOSIS — L82.0 INFLAMED SEBORRHEIC KERATOSIS: ICD-10-CM

## 2025-08-14 DIAGNOSIS — L81.4 OTHER MELANIN HYPERPIGMENTATION: ICD-10-CM

## 2025-08-14 DIAGNOSIS — L82.1 OTHER SEBORRHEIC KERATOSIS: ICD-10-CM

## 2025-08-14 DIAGNOSIS — L57.0 ACTINIC KERATOSIS: ICD-10-CM

## 2025-08-14 PROCEDURE — 99213 OFFICE O/P EST LOW 20 MIN: CPT | Mod: 25

## 2025-08-14 PROCEDURE — 17110 DESTRUCTION B9 LES UP TO 14: CPT

## 2025-08-14 PROCEDURE — 17000 DESTRUCT PREMALG LESION: CPT | Mod: 59

## 2025-08-14 PROCEDURE — 17003 DESTRUCT PREMALG LES 2-14: CPT | Mod: 59

## 2025-08-19 ENCOUNTER — OUTPATIENT (OUTPATIENT)
Dept: OUTPATIENT SERVICES | Facility: HOSPITAL | Age: 80
LOS: 1 days | Discharge: HOME | End: 2025-08-19
Payer: MEDICARE

## 2025-08-19 VITALS
WEIGHT: 138.01 LBS | OXYGEN SATURATION: 100 % | SYSTOLIC BLOOD PRESSURE: 144 MMHG | TEMPERATURE: 97 F | DIASTOLIC BLOOD PRESSURE: 65 MMHG | HEART RATE: 78 BPM | RESPIRATION RATE: 18 BRPM | HEIGHT: 64 IN

## 2025-08-19 DIAGNOSIS — Z90.710 ACQUIRED ABSENCE OF BOTH CERVIX AND UTERUS: Chronic | ICD-10-CM

## 2025-08-19 DIAGNOSIS — Z95.2 PRESENCE OF PROSTHETIC HEART VALVE: Chronic | ICD-10-CM

## 2025-08-19 DIAGNOSIS — Z98.890 OTHER SPECIFIED POSTPROCEDURAL STATES: Chronic | ICD-10-CM

## 2025-08-19 DIAGNOSIS — D50.0 IRON DEFICIENCY ANEMIA SECONDARY TO BLOOD LOSS (CHRONIC): ICD-10-CM

## 2025-08-19 DIAGNOSIS — K74.69 OTHER CIRRHOSIS OF LIVER: ICD-10-CM

## 2025-08-19 PROCEDURE — 88313 SPECIAL STAINS GROUP 2: CPT | Mod: 26

## 2025-08-19 PROCEDURE — 88312 SPECIAL STAINS GROUP 1: CPT | Mod: 26

## 2025-08-19 PROCEDURE — 88312 SPECIAL STAINS GROUP 1: CPT

## 2025-08-19 PROCEDURE — 88305 TISSUE EXAM BY PATHOLOGIST: CPT

## 2025-08-19 PROCEDURE — 88313 SPECIAL STAINS GROUP 2: CPT

## 2025-08-19 PROCEDURE — 88305 TISSUE EXAM BY PATHOLOGIST: CPT | Mod: 26

## 2025-08-22 DIAGNOSIS — K74.60 UNSPECIFIED CIRRHOSIS OF LIVER: ICD-10-CM

## 2025-08-22 DIAGNOSIS — Z87.891 PERSONAL HISTORY OF NICOTINE DEPENDENCE: ICD-10-CM

## 2025-08-22 DIAGNOSIS — Z79.01 LONG TERM (CURRENT) USE OF ANTICOAGULANTS: ICD-10-CM

## 2025-08-22 DIAGNOSIS — K29.50 UNSPECIFIED CHRONIC GASTRITIS WITHOUT BLEEDING: ICD-10-CM

## 2025-08-22 DIAGNOSIS — D50.9 IRON DEFICIENCY ANEMIA, UNSPECIFIED: ICD-10-CM

## 2025-08-22 DIAGNOSIS — I10 ESSENTIAL (PRIMARY) HYPERTENSION: ICD-10-CM

## 2025-08-22 DIAGNOSIS — E78.5 HYPERLIPIDEMIA, UNSPECIFIED: ICD-10-CM

## 2025-08-22 DIAGNOSIS — K64.8 OTHER HEMORRHOIDS: ICD-10-CM

## 2025-08-22 DIAGNOSIS — K20.90 ESOPHAGITIS, UNSPECIFIED WITHOUT BLEEDING: ICD-10-CM

## 2025-08-22 DIAGNOSIS — Z86.0100 PERSONAL HISTORY OF COLON POLYPS, UNSPECIFIED: ICD-10-CM

## 2025-08-22 DIAGNOSIS — Z79.82 LONG TERM (CURRENT) USE OF ASPIRIN: ICD-10-CM

## 2025-08-22 DIAGNOSIS — K31.7 POLYP OF STOMACH AND DUODENUM: ICD-10-CM

## 2025-08-22 DIAGNOSIS — I48.91 UNSPECIFIED ATRIAL FIBRILLATION: ICD-10-CM

## 2025-08-22 PROBLEM — Z86.79 PERSONAL HISTORY OF OTHER DISEASES OF THE CIRCULATORY SYSTEM: Chronic | Status: ACTIVE | Noted: 2025-08-19

## 2025-08-22 LAB — SURGICAL PATHOLOGY STUDY: SIGNIFICANT CHANGE UP

## 2025-08-29 ENCOUNTER — RESULT REVIEW (OUTPATIENT)
Age: 80
End: 2025-08-29

## 2025-08-29 ENCOUNTER — APPOINTMENT (OUTPATIENT)
Dept: INFUSION THERAPY | Facility: CLINIC | Age: 80
End: 2025-08-29

## 2025-08-29 VITALS
DIASTOLIC BLOOD PRESSURE: 52 MMHG | SYSTOLIC BLOOD PRESSURE: 109 MMHG | WEIGHT: 134 LBS | OXYGEN SATURATION: 97 % | TEMPERATURE: 97.2 F | HEART RATE: 72 BPM | BODY MASS INDEX: 23 KG/M2

## (undated) DEVICE — SYR LUER LOK 20CC

## (undated) DEVICE — SOL IRR POUR NS 0.9% 1000ML

## (undated) DEVICE — DRSG MASTISOL

## (undated) DEVICE — DRAPE C ARM UNIVERSAL

## (undated) DEVICE — Device

## (undated) DEVICE — PACK MINOR WITH LAP

## (undated) DEVICE — PREP SCRUB BRUSH W CHG 4%

## (undated) DEVICE — DRAPE 3/4 SHEET 52X76"

## (undated) DEVICE — WOUND IRR IRRISEPT W 0.5 CHG

## (undated) DEVICE — SUT MONOCRYL 4-0 27" PS-2 UNDYED

## (undated) DEVICE — DRAPE DOME BAG 22"

## (undated) DEVICE — VISITEC 4X4

## (undated) DEVICE — SUT SILK 0 30" SH

## (undated) DEVICE — DRSG MEPILEX 10 X 30CM (4 X 12") WHITE

## (undated) DEVICE — PREP CHLORAPREP HI-LITE ORANGE 26ML

## (undated) DEVICE — WARMING BLANKET FULL UNDERBODY

## (undated) DEVICE — SOL INJ NS 0.9% 1000ML

## (undated) DEVICE — DRAPE CV 106" X 135"

## (undated) DEVICE — ELCTR MULTIFUNCTION DEFIBRILLATION ELECTRODE EDGE SYSTEM ADULT

## (undated) DEVICE — GOWN TRIMAX LG

## (undated) DEVICE — PACK BASIC GOWN

## (undated) DEVICE — POSITIONER FOAM EGG CRATE ULNAR 2PCS (PINK)

## (undated) DEVICE — DRSG TEGADERM 4 X 4.75"

## (undated) DEVICE — DRAPE TOWEL BLUE 17" X 24"